# Patient Record
Sex: FEMALE | Race: BLACK OR AFRICAN AMERICAN | NOT HISPANIC OR LATINO | Employment: OTHER | URBAN - METROPOLITAN AREA
[De-identification: names, ages, dates, MRNs, and addresses within clinical notes are randomized per-mention and may not be internally consistent; named-entity substitution may affect disease eponyms.]

---

## 2017-04-04 ENCOUNTER — OFFICE VISIT (OUTPATIENT)
Dept: ORTHOPEDIC SURGERY | Facility: CLINIC | Age: 72
End: 2017-04-04

## 2017-04-04 VITALS — HEIGHT: 66 IN | TEMPERATURE: 97.6 F | WEIGHT: 235 LBS | BODY MASS INDEX: 37.77 KG/M2

## 2017-04-04 DIAGNOSIS — M47.22 CERVICAL SPONDYLOSIS WITH RADICULOPATHY: Primary | ICD-10-CM

## 2017-04-04 PROCEDURE — 99214 OFFICE O/P EST MOD 30 MIN: CPT | Performed by: ORTHOPAEDIC SURGERY

## 2017-04-04 RX ORDER — LISINOPRIL 5 MG/1
TABLET ORAL
Refills: 0 | COMMUNITY
Start: 2017-03-27 | End: 2017-04-17 | Stop reason: SDUPTHER

## 2017-04-04 RX ORDER — SODIUM CHLORIDE, SODIUM LACTATE, POTASSIUM CHLORIDE, CALCIUM CHLORIDE 600; 310; 30; 20 MG/100ML; MG/100ML; MG/100ML; MG/100ML
100 INJECTION, SOLUTION INTRAVENOUS CONTINUOUS
Status: CANCELLED | OUTPATIENT
Start: 2017-04-04

## 2017-04-04 RX ORDER — ATORVASTATIN CALCIUM 10 MG/1
10 TABLET, FILM COATED ORAL NIGHTLY
COMMUNITY
Start: 2017-04-03

## 2017-04-04 RX ORDER — SODIUM CHLORIDE 0.9 % (FLUSH) 0.9 %
1-10 SYRINGE (ML) INJECTION AS NEEDED
Status: CANCELLED | OUTPATIENT
Start: 2017-04-04

## 2017-04-04 RX ORDER — MELOXICAM 7.5 MG/1
7.5 TABLET ORAL DAILY
Status: ON HOLD | COMMUNITY
Start: 2017-04-03 | End: 2017-04-27

## 2017-04-04 NOTE — PROGRESS NOTES
She complains of continued neck pain now radiating into the shoulder and arm on the right causing occipital headache numbness and tingling.  Colby's difficulties.  She is failed to improve with traction physical therapy medication and time.  She does not want to do epidural injections have told her these are temporary at best.  No balance difficulties bowel or bladder complaints.  Musculoskeletal:  Posture is slightly kyphotic to coronal and sagittal inspection.  Motion appears undisturbed.  The skin about the area is intact.  There is no palpable or visible deformity.  There is no local spasm. There is moderate tenderness to percussion and palpation of the cervical spine.   Neurologic:  In the bilateral upper extremities there is no evidence of atrophy.  Motor function is undisturbed in shoulder abduction, elbow flexion, wrist extension, finger extension, triceps extension, or finger abduction   .  Sensation appears symmetrically intact to light touch   .  Reflexes are diminished in the biceps, brachioradialis, and triceps. Link test is negative.  Gait appears undisturbed.  Spurling test is negative.  Cervical x-rays show C3 4 and C4 5 spondylolisthesis of 5 mm each which reduce his significantly on the supine MRI scan.  There is foraminal stenosis at 3 for and 45 and mild central stenosis as well.  56 and 67 show bilateral foraminal stenosis and disc degeneration.  Impression is cervical spine spondylosis with radiculopathy.  She is failed to improve with conservative care  Plan: Recommended C3 to C7 anterior cervical discectomy and fusion with allograft and plate.I discussed the risks and benefits of anterior cervical discectomy and fusion with instrumentation and allograft or mechanical strut placement.  Risks include adverse anesthetic events such as death, stroke and myocardial infarction.  More specific surgical complications include infection, nonunion, and persistent pain.  Less likely problems include  hardware failure, hoarseness, prolonged difficulty swallowing, visceral or vascular injury, pneumothorax, graft extrusion, and paralysis, among others. There is a 90 percent chance of success.   Alternatives were also discussed.  After careful consideration the patient wishes to proceed with surgery.

## 2017-04-17 ENCOUNTER — APPOINTMENT (OUTPATIENT)
Dept: PREADMISSION TESTING | Facility: HOSPITAL | Age: 72
End: 2017-04-17

## 2017-04-17 VITALS
HEART RATE: 83 BPM | RESPIRATION RATE: 18 BRPM | WEIGHT: 249 LBS | DIASTOLIC BLOOD PRESSURE: 74 MMHG | OXYGEN SATURATION: 96 % | BODY MASS INDEX: 40.02 KG/M2 | TEMPERATURE: 98.3 F | HEIGHT: 66 IN | SYSTOLIC BLOOD PRESSURE: 122 MMHG

## 2017-04-17 LAB
ANION GAP SERPL CALCULATED.3IONS-SCNC: 14 MMOL/L
BUN BLD-MCNC: 27 MG/DL (ref 8–23)
BUN/CREAT SERPL: 26 (ref 7–25)
CALCIUM SPEC-SCNC: 9.5 MG/DL (ref 8.6–10.5)
CHLORIDE SERPL-SCNC: 103 MMOL/L (ref 98–107)
CO2 SERPL-SCNC: 24 MMOL/L (ref 22–29)
CREAT BLD-MCNC: 1.04 MG/DL (ref 0.57–1)
DEPRECATED RDW RBC AUTO: 44.6 FL (ref 37–54)
ERYTHROCYTE [DISTWIDTH] IN BLOOD BY AUTOMATED COUNT: 13.6 % (ref 11.7–13)
GFR SERPL CREATININE-BSD FRML MDRD: 63 ML/MIN/1.73
GLUCOSE BLD-MCNC: 198 MG/DL (ref 65–99)
HCT VFR BLD AUTO: 34.9 % (ref 35.6–45.5)
HGB BLD-MCNC: 11.3 G/DL (ref 11.9–15.5)
MCH RBC QN AUTO: 29.3 PG (ref 26.9–32)
MCHC RBC AUTO-ENTMCNC: 32.4 G/DL (ref 32.4–36.3)
MCV RBC AUTO: 90.4 FL (ref 80.5–98.2)
PLATELET # BLD AUTO: 265 10*3/MM3 (ref 140–500)
PMV BLD AUTO: 10.4 FL (ref 6–12)
POTASSIUM BLD-SCNC: 4.1 MMOL/L (ref 3.5–5.2)
RBC # BLD AUTO: 3.86 10*6/MM3 (ref 3.9–5.2)
SODIUM BLD-SCNC: 141 MMOL/L (ref 136–145)
WBC NRBC COR # BLD: 7.61 10*3/MM3 (ref 4.5–10.7)

## 2017-04-17 PROCEDURE — 36415 COLL VENOUS BLD VENIPUNCTURE: CPT

## 2017-04-17 PROCEDURE — 93010 ELECTROCARDIOGRAM REPORT: CPT | Performed by: INTERNAL MEDICINE

## 2017-04-17 PROCEDURE — 80048 BASIC METABOLIC PNL TOTAL CA: CPT

## 2017-04-17 PROCEDURE — 85027 COMPLETE CBC AUTOMATED: CPT

## 2017-04-17 PROCEDURE — 93005 ELECTROCARDIOGRAM TRACING: CPT

## 2017-04-17 RX ORDER — GLIMEPIRIDE 2 MG/1
2 TABLET ORAL 2 TIMES DAILY
COMMUNITY
End: 2017-10-02

## 2017-04-17 RX ORDER — POTASSIUM CHLORIDE 750 MG/1
10 TABLET, FILM COATED, EXTENDED RELEASE ORAL 2 TIMES DAILY WITH MEALS
COMMUNITY
End: 2018-01-26 | Stop reason: HOSPADM

## 2017-04-17 RX ORDER — CHOLECALCIFEROL (VITAMIN D3) 125 MCG
2000 CAPSULE ORAL DAILY
COMMUNITY
End: 2022-05-19 | Stop reason: HOSPADM

## 2017-04-17 RX ORDER — RANITIDINE 150 MG/1
150 TABLET ORAL 2 TIMES DAILY
COMMUNITY
End: 2022-05-17

## 2017-04-17 RX ORDER — LISINOPRIL 5 MG/1
5 TABLET ORAL DAILY
COMMUNITY
End: 2018-01-03

## 2017-04-17 RX ORDER — DULOXETIN HYDROCHLORIDE 60 MG/1
60 CAPSULE, DELAYED RELEASE ORAL 2 TIMES DAILY
COMMUNITY
End: 2018-02-14

## 2017-04-17 RX ORDER — ASPIRIN 81 MG/1
81 TABLET ORAL DAILY
COMMUNITY
End: 2018-01-03 | Stop reason: SDUPTHER

## 2017-04-17 NOTE — DISCHARGE INSTRUCTIONS
Take the following medications the morning of surgery with a small sip of water:  NONE    [id number]    General Instructions:  • Do not eat solid food after midnight the night before surgery.  • You may drink clear liquids day of surgery but must stop at least one hour before your hospital arrival time.  • It is beneficial for you to have a clear drink that contains carbohydrates the day of surgery.  We suggest a 20 ounce bottle of Gatorade or Powerade for non-diabetic patients or a 20 ounce bottle of G2 or Powerade Zero for diabetic patients. (Pediatric patients, are not advised to drink a 20 ounce carbohydrate drink)    Clear liquids are liquids you can see through.  Nothing red in color.     Plain water                               Sports drinks  Sodas                                   Gelatin (Jell-O)  Fruit juices without pulp such as white grape juice and apple juice  Popsicles that contain no fruit or yogurt  Tea or coffee (no cream or milk added)  Gatorade / Powerade  G2 / Powerade Zero    • Infants may have breast milk up to four hours before surgery.  • Infants drinking formula may drink formula up to six hours before surgery.   • Patients who avoid smoking, chewing tobacco and alcohol for 4 weeks prior to surgery have a reduced risk of post-operative complications.  Quit smoking as many days before surgery as you can.  • Do not smoke, use chewing tobacco or drink alcohol the day of surgery.   • If applicable bring your C-PAP/ BI-PAP machine.  • Bring any papers given to you in the doctor’s office.  • Wear clean comfortable clothes and socks.  • Do not wear contact lenses or make-up.  Bring a case for your glasses.   • Bring crutches or walker if applicable.  • Leave all other valuables and jewelry at home.  • The Pre-Admission Testing nurse will instruct you to bring medications if unable to obtain an accurate list in Pre-Admission Testing.            Preventing a Surgical Site Infection:  • For 2 to 3  days before surgery, avoid shaving with a razor because the razor can irritate skin and make it easier to develop an infection.  • The night prior to surgery sleep in a clean bed with clean clothing.  Do not allow pets to sleep with you.  • Shower on the morning of surgery using a fresh bar of anti-bacterial soap (such as Dial) and clean washcloth.  Dry with a clean towel and dress in clean clothing.  • Ask your surgeon if you will be receiving antibiotics prior to surgery.  • Make sure you, your family, and all healthcare providers clean their hands with soap and water or an alcohol based hand  before caring for you or your wound.    Day of surgery:  Upon arrival, a Pre-op nurse and Anesthesiologist will review your health history, obtain vital signs, and answer questions you may have.  The only belongings needed at this time will be your home medications and if applicable your C-PAP/BI-PAP machine.  If you are staying overnight your family can leave the rest of your belongings in the car and bring them to your room later.  A Pre-op nurse will start an IV and you may receive medication in preparation for surgery, including something to help you relax.  Your family will be able to see you in the Pre-op area.  While you are in surgery your family should notify the waiting room  if they leave the waiting room area and provide a contact phone number.    Please be aware that surgery does come with discomfort.  We want to make every effort to control your discomfort so please discuss any uncontrolled symptoms with your nurse.   Your doctor will most likely have prescribed pain medications.      If you are going home after surgery you will receive individualized written care instructions before being discharged.  A responsible adult must drive you to and from the hospital on the day of your surgery and stay with you for 24 hours.    If you are staying overnight following surgery, you will be  transported to your hospital room following the recovery period.  [facility] has all private rooms.    If you have any questions please call Pre-Admission Testing at [phone].  Deductibles and co-payments are collected on the day of service. Please be prepared to pay the required co-pay, deductible or deposit on the day of service as defined by your plan.

## 2017-04-18 ENCOUNTER — TELEPHONE (OUTPATIENT)
Dept: ORTHOPEDIC SURGERY | Facility: CLINIC | Age: 72
End: 2017-04-18

## 2017-04-27 ENCOUNTER — APPOINTMENT (OUTPATIENT)
Dept: GENERAL RADIOLOGY | Facility: HOSPITAL | Age: 72
End: 2017-04-27

## 2017-04-27 ENCOUNTER — ANESTHESIA (OUTPATIENT)
Dept: PERIOP | Facility: HOSPITAL | Age: 72
End: 2017-04-27

## 2017-04-27 ENCOUNTER — HOSPITAL ENCOUNTER (INPATIENT)
Facility: HOSPITAL | Age: 72
LOS: 2 days | Discharge: HOME | End: 2017-04-29
Attending: ORTHOPAEDIC SURGERY | Admitting: ORTHOPAEDIC SURGERY

## 2017-04-27 ENCOUNTER — ANESTHESIA EVENT (OUTPATIENT)
Dept: PERIOP | Facility: HOSPITAL | Age: 72
End: 2017-04-27

## 2017-04-27 DIAGNOSIS — M47.22 CERVICAL SPONDYLOSIS WITH RADICULOPATHY: ICD-10-CM

## 2017-04-27 LAB
CLOSE TME COLL+ADP + EPINEP PNL BLD: 97 %
GLUCOSE BLDC GLUCOMTR-MCNC: 148 MG/DL (ref 70–130)
GLUCOSE BLDC GLUCOMTR-MCNC: 246 MG/DL (ref 70–130)
GLUCOSE BLDC GLUCOMTR-MCNC: 283 MG/DL (ref 70–130)

## 2017-04-27 PROCEDURE — 94799 UNLISTED PULMONARY SVC/PX: CPT

## 2017-04-27 PROCEDURE — G0378 HOSPITAL OBSERVATION PER HR: HCPCS

## 2017-04-27 PROCEDURE — C1713 ANCHOR/SCREW BN/BN,TIS/BN: HCPCS | Performed by: ORTHOPAEDIC SURGERY

## 2017-04-27 PROCEDURE — 22846 INSERT SPINE FIXATION DEVICE: CPT | Performed by: ORTHOPAEDIC SURGERY

## 2017-04-27 PROCEDURE — 25010000002 VANCOMYCIN: Performed by: ORTHOPAEDIC SURGERY

## 2017-04-27 PROCEDURE — 25010000002 MIDAZOLAM PER 1 MG: Performed by: ANESTHESIOLOGY

## 2017-04-27 PROCEDURE — 25010000002 DEXAMETHASONE PER 1 MG: Performed by: NURSE ANESTHETIST, CERTIFIED REGISTERED

## 2017-04-27 PROCEDURE — 72020 X-RAY EXAM OF SPINE 1 VIEW: CPT

## 2017-04-27 PROCEDURE — 25010000002 FENTANYL CITRATE (PF) 100 MCG/2ML SOLUTION: Performed by: ANESTHESIOLOGY

## 2017-04-27 PROCEDURE — 85576 BLOOD PLATELET AGGREGATION: CPT | Performed by: ORTHOPAEDIC SURGERY

## 2017-04-27 PROCEDURE — 20931 SP BONE ALGRFT STRUCT ADD-ON: CPT | Performed by: ORTHOPAEDIC SURGERY

## 2017-04-27 PROCEDURE — 25010000002 HYDROMORPHONE PER 4 MG: Performed by: NURSE ANESTHETIST, CERTIFIED REGISTERED

## 2017-04-27 PROCEDURE — 76000 FLUOROSCOPY <1 HR PHYS/QHP: CPT

## 2017-04-27 PROCEDURE — 0RB30ZZ EXCISION OF CERVICAL VERTEBRAL DISC, OPEN APPROACH: ICD-10-PCS | Performed by: ORTHOPAEDIC SURGERY

## 2017-04-27 PROCEDURE — 22551 ARTHRD ANT NTRBDY CERVICAL: CPT | Performed by: ORTHOPAEDIC SURGERY

## 2017-04-27 PROCEDURE — 63710000001 DEXAMETHASONE PER 0.25 MG: Performed by: ORTHOPAEDIC SURGERY

## 2017-04-27 PROCEDURE — 25010000002 FENTANYL CITRATE (PF) 100 MCG/2ML SOLUTION: Performed by: NURSE ANESTHETIST, CERTIFIED REGISTERED

## 2017-04-27 PROCEDURE — 25010000002 PHENYLEPHRINE PER 1 ML: Performed by: NURSE ANESTHETIST, CERTIFIED REGISTERED

## 2017-04-27 PROCEDURE — 22552 ARTHRD ANT NTRBD CERVICAL EA: CPT | Performed by: ORTHOPAEDIC SURGERY

## 2017-04-27 PROCEDURE — 25010000002 PROPOFOL 10 MG/ML EMULSION: Performed by: NURSE ANESTHETIST, CERTIFIED REGISTERED

## 2017-04-27 PROCEDURE — 0RG20K0 FUSION OF 2 OR MORE CERVICAL VERTEBRAL JOINTS WITH NONAUTOLOGOUS TISSUE SUBSTITUTE, ANTERIOR APPROACH, ANTERIOR COLUMN, OPEN APPROACH: ICD-10-PCS | Performed by: ORTHOPAEDIC SURGERY

## 2017-04-27 PROCEDURE — L0120 CERV FLEX N/ADJ FOAM PRE OTS: HCPCS | Performed by: ORTHOPAEDIC SURGERY

## 2017-04-27 PROCEDURE — 25810000003 POTASSIUM CHLORIDE PER 2 MEQ: Performed by: ORTHOPAEDIC SURGERY

## 2017-04-27 PROCEDURE — 25010000002 ONDANSETRON PER 1 MG: Performed by: NURSE ANESTHETIST, CERTIFIED REGISTERED

## 2017-04-27 PROCEDURE — 25010000002 NEOSTIGMINE 10 MG/10ML SOLUTION: Performed by: NURSE ANESTHETIST, CERTIFIED REGISTERED

## 2017-04-27 PROCEDURE — 82962 GLUCOSE BLOOD TEST: CPT

## 2017-04-27 DEVICE — ALLOGRFT SPINE ACDF VERTIGRAFT WEDGE FZ 7DEG 6.22X4.75MM: Type: IMPLANTABLE DEVICE | Site: SPINE CERVICAL | Status: FUNCTIONAL

## 2017-04-27 DEVICE — IMPLANTABLE DEVICE: Type: IMPLANTABLE DEVICE | Site: SPINE CERVICAL | Status: FUNCTIONAL

## 2017-04-27 DEVICE — SCRW EAGLE/SWIFT PLS S/TAP 12MM: Type: IMPLANTABLE DEVICE | Site: SPINE CERVICAL | Status: FUNCTIONAL

## 2017-04-27 RX ORDER — NALOXONE HCL 0.4 MG/ML
0.2 VIAL (ML) INJECTION AS NEEDED
Status: DISCONTINUED | OUTPATIENT
Start: 2017-04-27 | End: 2017-04-27 | Stop reason: HOSPADM

## 2017-04-27 RX ORDER — PROMETHAZINE HYDROCHLORIDE 25 MG/1
25 SUPPOSITORY RECTAL ONCE AS NEEDED
Status: DISCONTINUED | OUTPATIENT
Start: 2017-04-27 | End: 2017-04-27 | Stop reason: HOSPADM

## 2017-04-27 RX ORDER — ONDANSETRON 4 MG/1
4 TABLET, ORALLY DISINTEGRATING ORAL EVERY 6 HOURS PRN
Status: DISCONTINUED | OUTPATIENT
Start: 2017-04-27 | End: 2017-04-29 | Stop reason: HOSPADM

## 2017-04-27 RX ORDER — SODIUM CHLORIDE 0.9 % (FLUSH) 0.9 %
1-10 SYRINGE (ML) INJECTION AS NEEDED
Status: DISCONTINUED | OUTPATIENT
Start: 2017-04-27 | End: 2017-04-29 | Stop reason: HOSPADM

## 2017-04-27 RX ORDER — DIPHENHYDRAMINE HYDROCHLORIDE 50 MG/ML
12.5 INJECTION INTRAMUSCULAR; INTRAVENOUS
Status: DISCONTINUED | OUTPATIENT
Start: 2017-04-27 | End: 2017-04-27 | Stop reason: HOSPADM

## 2017-04-27 RX ORDER — SODIUM CHLORIDE 0.9 % (FLUSH) 0.9 %
1-10 SYRINGE (ML) INJECTION AS NEEDED
Status: DISCONTINUED | OUTPATIENT
Start: 2017-04-27 | End: 2017-04-27 | Stop reason: HOSPADM

## 2017-04-27 RX ORDER — FENTANYL CITRATE 50 UG/ML
25 INJECTION, SOLUTION INTRAMUSCULAR; INTRAVENOUS
Status: DISCONTINUED | OUTPATIENT
Start: 2017-04-27 | End: 2017-04-27 | Stop reason: HOSPADM

## 2017-04-27 RX ORDER — FAMOTIDINE 20 MG/1
20 TABLET, FILM COATED ORAL DAILY
Status: DISCONTINUED | OUTPATIENT
Start: 2017-04-27 | End: 2017-04-29 | Stop reason: HOSPADM

## 2017-04-27 RX ORDER — MIDAZOLAM HYDROCHLORIDE 1 MG/ML
2 INJECTION INTRAMUSCULAR; INTRAVENOUS
Status: DISCONTINUED | OUTPATIENT
Start: 2017-04-27 | End: 2017-04-27 | Stop reason: HOSPADM

## 2017-04-27 RX ORDER — PROMETHAZINE HYDROCHLORIDE 25 MG/ML
12.5 INJECTION, SOLUTION INTRAMUSCULAR; INTRAVENOUS ONCE AS NEEDED
Status: DISCONTINUED | OUTPATIENT
Start: 2017-04-27 | End: 2017-04-27 | Stop reason: HOSPADM

## 2017-04-27 RX ORDER — ONDANSETRON 2 MG/ML
4 INJECTION INTRAMUSCULAR; INTRAVENOUS ONCE AS NEEDED
Status: DISCONTINUED | OUTPATIENT
Start: 2017-04-27 | End: 2017-04-27 | Stop reason: HOSPADM

## 2017-04-27 RX ORDER — HYDROMORPHONE HCL IN 0.9% NACL 10 MG/50ML
PATIENT CONTROLLED ANALGESIA SYRINGE INTRAVENOUS CONTINUOUS
Status: DISCONTINUED | OUTPATIENT
Start: 2017-04-27 | End: 2017-04-29 | Stop reason: HOSPADM

## 2017-04-27 RX ORDER — LISINOPRIL 5 MG/1
5 TABLET ORAL DAILY
Status: DISCONTINUED | OUTPATIENT
Start: 2017-04-27 | End: 2017-04-29 | Stop reason: HOSPADM

## 2017-04-27 RX ORDER — FLUTICASONE PROPIONATE 50 MCG
1 SPRAY, SUSPENSION (ML) NASAL DAILY
Status: DISCONTINUED | OUTPATIENT
Start: 2017-04-27 | End: 2017-04-29 | Stop reason: HOSPADM

## 2017-04-27 RX ORDER — SODIUM CHLORIDE AND POTASSIUM CHLORIDE 150; 450 MG/100ML; MG/100ML
100 INJECTION, SOLUTION INTRAVENOUS CONTINUOUS
Status: DISCONTINUED | OUTPATIENT
Start: 2017-04-27 | End: 2017-04-28

## 2017-04-27 RX ORDER — BISACODYL 10 MG
10 SUPPOSITORY, RECTAL RECTAL DAILY PRN
Status: DISCONTINUED | OUTPATIENT
Start: 2017-04-27 | End: 2017-04-29 | Stop reason: HOSPADM

## 2017-04-27 RX ORDER — ONDANSETRON 4 MG/1
4 TABLET, FILM COATED ORAL EVERY 6 HOURS PRN
Status: DISCONTINUED | OUTPATIENT
Start: 2017-04-27 | End: 2017-04-29 | Stop reason: HOSPADM

## 2017-04-27 RX ORDER — DEXAMETHASONE 4 MG/1
4 TABLET ORAL EVERY 6 HOURS SCHEDULED
Status: DISCONTINUED | OUTPATIENT
Start: 2017-04-27 | End: 2017-04-29 | Stop reason: HOSPADM

## 2017-04-27 RX ORDER — DEXAMETHASONE SODIUM PHOSPHATE 10 MG/ML
INJECTION INTRAMUSCULAR; INTRAVENOUS AS NEEDED
Status: DISCONTINUED | OUTPATIENT
Start: 2017-04-27 | End: 2017-04-27 | Stop reason: SURG

## 2017-04-27 RX ORDER — MIDAZOLAM HYDROCHLORIDE 1 MG/ML
1 INJECTION INTRAMUSCULAR; INTRAVENOUS
Status: DISCONTINUED | OUTPATIENT
Start: 2017-04-27 | End: 2017-04-27 | Stop reason: HOSPADM

## 2017-04-27 RX ORDER — FLUMAZENIL 0.1 MG/ML
0.2 INJECTION INTRAVENOUS AS NEEDED
Status: DISCONTINUED | OUTPATIENT
Start: 2017-04-27 | End: 2017-04-27 | Stop reason: HOSPADM

## 2017-04-27 RX ORDER — DULOXETIN HYDROCHLORIDE 60 MG/1
60 CAPSULE, DELAYED RELEASE ORAL 2 TIMES DAILY
Status: DISCONTINUED | OUTPATIENT
Start: 2017-04-27 | End: 2017-04-29 | Stop reason: HOSPADM

## 2017-04-27 RX ORDER — SODIUM CHLORIDE, SODIUM LACTATE, POTASSIUM CHLORIDE, CALCIUM CHLORIDE 600; 310; 30; 20 MG/100ML; MG/100ML; MG/100ML; MG/100ML
100 INJECTION, SOLUTION INTRAVENOUS CONTINUOUS
Status: DISCONTINUED | OUTPATIENT
Start: 2017-04-27 | End: 2017-04-28

## 2017-04-27 RX ORDER — POLYETHYLENE GLYCOL 3350 17 G/17G
17 POWDER, FOR SOLUTION ORAL DAILY PRN
Status: DISCONTINUED | OUTPATIENT
Start: 2017-04-27 | End: 2017-04-29 | Stop reason: HOSPADM

## 2017-04-27 RX ORDER — ROCURONIUM BROMIDE 10 MG/ML
INJECTION, SOLUTION INTRAVENOUS AS NEEDED
Status: DISCONTINUED | OUTPATIENT
Start: 2017-04-27 | End: 2017-04-27 | Stop reason: SURG

## 2017-04-27 RX ORDER — HYDROMORPHONE HYDROCHLORIDE 1 MG/ML
0.5 INJECTION, SOLUTION INTRAMUSCULAR; INTRAVENOUS; SUBCUTANEOUS
Status: DISCONTINUED | OUTPATIENT
Start: 2017-04-27 | End: 2017-04-27 | Stop reason: HOSPADM

## 2017-04-27 RX ORDER — DIPHENHYDRAMINE HCL 25 MG
25 CAPSULE ORAL NIGHTLY PRN
Status: DISCONTINUED | OUTPATIENT
Start: 2017-04-27 | End: 2017-04-29 | Stop reason: HOSPADM

## 2017-04-27 RX ORDER — DEXAMETHASONE SODIUM PHOSPHATE 4 MG/ML
4 INJECTION, SOLUTION INTRA-ARTICULAR; INTRALESIONAL; INTRAMUSCULAR; INTRAVENOUS; SOFT TISSUE EVERY 6 HOURS SCHEDULED
Status: DISCONTINUED | OUTPATIENT
Start: 2017-04-27 | End: 2017-04-29 | Stop reason: HOSPADM

## 2017-04-27 RX ORDER — HYDROMORPHONE HCL 110MG/55ML
PATIENT CONTROLLED ANALGESIA SYRINGE INTRAVENOUS AS NEEDED
Status: DISCONTINUED | OUTPATIENT
Start: 2017-04-27 | End: 2017-04-27 | Stop reason: SURG

## 2017-04-27 RX ORDER — ONDANSETRON 2 MG/ML
4 INJECTION INTRAMUSCULAR; INTRAVENOUS EVERY 6 HOURS PRN
Status: DISCONTINUED | OUTPATIENT
Start: 2017-04-27 | End: 2017-04-29 | Stop reason: HOSPADM

## 2017-04-27 RX ORDER — OXYCODONE AND ACETAMINOPHEN 7.5; 325 MG/1; MG/1
1 TABLET ORAL ONCE AS NEEDED
Status: DISCONTINUED | OUTPATIENT
Start: 2017-04-27 | End: 2017-04-27 | Stop reason: HOSPADM

## 2017-04-27 RX ORDER — PROPOFOL 10 MG/ML
VIAL (ML) INTRAVENOUS AS NEEDED
Status: DISCONTINUED | OUTPATIENT
Start: 2017-04-27 | End: 2017-04-27 | Stop reason: SURG

## 2017-04-27 RX ORDER — HYDROMORPHONE HCL IN 0.9% NACL 10 MG/50ML
PATIENT CONTROLLED ANALGESIA SYRINGE INTRAVENOUS
Status: COMPLETED
Start: 2017-04-27 | End: 2017-04-27

## 2017-04-27 RX ORDER — FUROSEMIDE 40 MG/1
40 TABLET ORAL DAILY
Status: DISCONTINUED | OUTPATIENT
Start: 2017-04-27 | End: 2017-04-28

## 2017-04-27 RX ORDER — FAMOTIDINE 10 MG/ML
20 INJECTION, SOLUTION INTRAVENOUS
Status: DISCONTINUED | OUTPATIENT
Start: 2017-04-27 | End: 2017-04-27 | Stop reason: HOSPADM

## 2017-04-27 RX ORDER — ATORVASTATIN CALCIUM 10 MG/1
10 TABLET, FILM COATED ORAL NIGHTLY
Status: DISCONTINUED | OUTPATIENT
Start: 2017-04-27 | End: 2017-04-27 | Stop reason: CLARIF

## 2017-04-27 RX ORDER — CETIRIZINE HYDROCHLORIDE 10 MG/1
10 TABLET ORAL DAILY
Status: DISCONTINUED | OUTPATIENT
Start: 2017-04-27 | End: 2017-04-29 | Stop reason: HOSPADM

## 2017-04-27 RX ORDER — PROMETHAZINE HYDROCHLORIDE 25 MG/1
12.5 TABLET ORAL ONCE AS NEEDED
Status: DISCONTINUED | OUTPATIENT
Start: 2017-04-27 | End: 2017-04-27 | Stop reason: HOSPADM

## 2017-04-27 RX ORDER — FENTANYL CITRATE 50 UG/ML
50 INJECTION, SOLUTION INTRAMUSCULAR; INTRAVENOUS
Status: DISCONTINUED | OUTPATIENT
Start: 2017-04-27 | End: 2017-04-27 | Stop reason: HOSPADM

## 2017-04-27 RX ORDER — ALBUTEROL SULFATE 90 UG/1
2 AEROSOL, METERED RESPIRATORY (INHALATION) EVERY 6 HOURS PRN
Status: DISCONTINUED | OUTPATIENT
Start: 2017-04-27 | End: 2017-04-27 | Stop reason: CLARIF

## 2017-04-27 RX ORDER — ONDANSETRON 2 MG/ML
INJECTION INTRAMUSCULAR; INTRAVENOUS AS NEEDED
Status: DISCONTINUED | OUTPATIENT
Start: 2017-04-27 | End: 2017-04-27 | Stop reason: SURG

## 2017-04-27 RX ORDER — HYDRALAZINE HYDROCHLORIDE 20 MG/ML
5 INJECTION INTRAMUSCULAR; INTRAVENOUS
Status: DISCONTINUED | OUTPATIENT
Start: 2017-04-27 | End: 2017-04-27 | Stop reason: HOSPADM

## 2017-04-27 RX ORDER — MAGNESIUM HYDROXIDE 1200 MG/15ML
LIQUID ORAL AS NEEDED
Status: DISCONTINUED | OUTPATIENT
Start: 2017-04-27 | End: 2017-04-27 | Stop reason: HOSPADM

## 2017-04-27 RX ORDER — LABETALOL HYDROCHLORIDE 5 MG/ML
5 INJECTION, SOLUTION INTRAVENOUS
Status: DISCONTINUED | OUTPATIENT
Start: 2017-04-27 | End: 2017-04-27 | Stop reason: HOSPADM

## 2017-04-27 RX ORDER — FENTANYL CITRATE 50 UG/ML
INJECTION, SOLUTION INTRAMUSCULAR; INTRAVENOUS AS NEEDED
Status: DISCONTINUED | OUTPATIENT
Start: 2017-04-27 | End: 2017-04-27 | Stop reason: SURG

## 2017-04-27 RX ORDER — ROSUVASTATIN CALCIUM 5 MG/1
5 TABLET, COATED ORAL NIGHTLY
Status: DISCONTINUED | OUTPATIENT
Start: 2017-04-27 | End: 2017-04-29 | Stop reason: HOSPADM

## 2017-04-27 RX ORDER — ACETAMINOPHEN 325 MG/1
650 TABLET ORAL EVERY 4 HOURS PRN
Status: DISCONTINUED | OUTPATIENT
Start: 2017-04-27 | End: 2017-04-29 | Stop reason: HOSPADM

## 2017-04-27 RX ORDER — SODIUM CHLORIDE, SODIUM LACTATE, POTASSIUM CHLORIDE, CALCIUM CHLORIDE 600; 310; 30; 20 MG/100ML; MG/100ML; MG/100ML; MG/100ML
9 INJECTION, SOLUTION INTRAVENOUS CONTINUOUS PRN
Status: DISCONTINUED | OUTPATIENT
Start: 2017-04-27 | End: 2017-04-27 | Stop reason: HOSPADM

## 2017-04-27 RX ORDER — LIDOCAINE HYDROCHLORIDE 20 MG/ML
INJECTION, SOLUTION INFILTRATION; PERINEURAL AS NEEDED
Status: DISCONTINUED | OUTPATIENT
Start: 2017-04-27 | End: 2017-04-27 | Stop reason: SURG

## 2017-04-27 RX ORDER — SENNA AND DOCUSATE SODIUM 50; 8.6 MG/1; MG/1
1 TABLET, FILM COATED ORAL 2 TIMES DAILY
Status: DISCONTINUED | OUTPATIENT
Start: 2017-04-27 | End: 2017-04-29 | Stop reason: HOSPADM

## 2017-04-27 RX ORDER — GLYCOPYRROLATE 0.2 MG/ML
INJECTION INTRAMUSCULAR; INTRAVENOUS AS NEEDED
Status: DISCONTINUED | OUTPATIENT
Start: 2017-04-27 | End: 2017-04-27 | Stop reason: SURG

## 2017-04-27 RX ORDER — ALBUTEROL SULFATE 2.5 MG/3ML
2.5 SOLUTION RESPIRATORY (INHALATION) EVERY 6 HOURS PRN
Status: DISCONTINUED | OUTPATIENT
Start: 2017-04-27 | End: 2017-04-29 | Stop reason: HOSPADM

## 2017-04-27 RX ORDER — NALOXONE HCL 0.4 MG/ML
0.1 VIAL (ML) INJECTION
Status: DISCONTINUED | OUTPATIENT
Start: 2017-04-27 | End: 2017-04-29 | Stop reason: HOSPADM

## 2017-04-27 RX ORDER — PROMETHAZINE HYDROCHLORIDE 25 MG/1
25 TABLET ORAL ONCE AS NEEDED
Status: DISCONTINUED | OUTPATIENT
Start: 2017-04-27 | End: 2017-04-27 | Stop reason: HOSPADM

## 2017-04-27 RX ORDER — OXYCODONE HYDROCHLORIDE AND ACETAMINOPHEN 5; 325 MG/1; MG/1
2 TABLET ORAL EVERY 4 HOURS PRN
Status: DISCONTINUED | OUTPATIENT
Start: 2017-04-27 | End: 2017-04-29 | Stop reason: HOSPADM

## 2017-04-27 RX ORDER — NEOSTIGMINE METHYLSULFATE 1 MG/ML
INJECTION, SOLUTION INTRAVENOUS AS NEEDED
Status: DISCONTINUED | OUTPATIENT
Start: 2017-04-27 | End: 2017-04-27 | Stop reason: SURG

## 2017-04-27 RX ADMIN — DEXAMETHASONE SODIUM PHOSPHATE 8 MG: 10 INJECTION INTRAMUSCULAR; INTRAVENOUS at 13:35

## 2017-04-27 RX ADMIN — CETIRIZINE HYDROCHLORIDE 10 MG: 10 TABLET, FILM COATED ORAL at 21:24

## 2017-04-27 RX ADMIN — Medication: at 16:05

## 2017-04-27 RX ADMIN — LISINOPRIL 5 MG: 5 TABLET ORAL at 21:24

## 2017-04-27 RX ADMIN — POTASSIUM CHLORIDE AND SODIUM CHLORIDE 100 ML/HR: 450; 150 INJECTION, SOLUTION INTRAVENOUS at 21:25

## 2017-04-27 RX ADMIN — DEXAMETHASONE 4 MG: 4 TABLET ORAL at 21:24

## 2017-04-27 RX ADMIN — EPHEDRINE SULFATE 10 MG: 50 INJECTION INTRAMUSCULAR; INTRAVENOUS; SUBCUTANEOUS at 14:00

## 2017-04-27 RX ADMIN — LIDOCAINE HYDROCHLORIDE 50 MG: 20 INJECTION, SOLUTION INFILTRATION; PERINEURAL at 13:07

## 2017-04-27 RX ADMIN — FUROSEMIDE 40 MG: 40 TABLET ORAL at 22:52

## 2017-04-27 RX ADMIN — FAMOTIDINE 20 MG: 20 TABLET, FILM COATED ORAL at 21:24

## 2017-04-27 RX ADMIN — FENTANYL CITRATE 50 MCG: 50 INJECTION INTRAMUSCULAR; INTRAVENOUS at 13:51

## 2017-04-27 RX ADMIN — MIDAZOLAM 0.5 MG: 1 INJECTION INTRAMUSCULAR; INTRAVENOUS at 11:41

## 2017-04-27 RX ADMIN — FENTANYL CITRATE 50 MCG: 50 INJECTION INTRAMUSCULAR; INTRAVENOUS at 13:07

## 2017-04-27 RX ADMIN — SODIUM CHLORIDE, POTASSIUM CHLORIDE, SODIUM LACTATE AND CALCIUM CHLORIDE 100 ML/HR: 600; 310; 30; 20 INJECTION, SOLUTION INTRAVENOUS at 11:40

## 2017-04-27 RX ADMIN — FAMOTIDINE 20 MG: 10 INJECTION, SOLUTION INTRAVENOUS at 11:43

## 2017-04-27 RX ADMIN — VANCOMYCIN HYDROCHLORIDE 1500 MG: 1 INJECTION, POWDER, LYOPHILIZED, FOR SOLUTION INTRAVENOUS at 11:46

## 2017-04-27 RX ADMIN — FENTANYL CITRATE 25 MCG: 50 INJECTION INTRAMUSCULAR; INTRAVENOUS at 11:40

## 2017-04-27 RX ADMIN — PHENYLEPHRINE HYDROCHLORIDE 100 MCG: 10 INJECTION INTRAVENOUS at 13:35

## 2017-04-27 RX ADMIN — SODIUM CHLORIDE, POTASSIUM CHLORIDE, SODIUM LACTATE AND CALCIUM CHLORIDE: 600; 310; 30; 20 INJECTION, SOLUTION INTRAVENOUS at 14:15

## 2017-04-27 RX ADMIN — VANCOMYCIN HYDROCHLORIDE 1750 MG: 1 INJECTION, POWDER, LYOPHILIZED, FOR SOLUTION INTRAVENOUS at 21:23

## 2017-04-27 RX ADMIN — NEOSTIGMINE METHYLSULFATE 2 MG: 1 INJECTION INTRAVENOUS at 15:24

## 2017-04-27 RX ADMIN — FENTANYL CITRATE 50 MCG: 50 INJECTION INTRAMUSCULAR; INTRAVENOUS at 13:41

## 2017-04-27 RX ADMIN — HYDROMORPHONE HYDROCHLORIDE 0.4 MG: 2 INJECTION, SOLUTION INTRAMUSCULAR; INTRAVENOUS; SUBCUTANEOUS at 14:51

## 2017-04-27 RX ADMIN — ROCURONIUM BROMIDE 50 MG: 10 INJECTION INTRAVENOUS at 13:07

## 2017-04-27 RX ADMIN — SODIUM CHLORIDE, POTASSIUM CHLORIDE, SODIUM LACTATE AND CALCIUM CHLORIDE: 600; 310; 30; 20 INJECTION, SOLUTION INTRAVENOUS at 13:01

## 2017-04-27 RX ADMIN — DULOXETINE HYDROCHLORIDE 60 MG: 60 CAPSULE, DELAYED RELEASE ORAL at 21:24

## 2017-04-27 RX ADMIN — ONDANSETRON 4 MG: 2 INJECTION INTRAMUSCULAR; INTRAVENOUS at 13:35

## 2017-04-27 RX ADMIN — FLUTICASONE PROPIONATE 1 SPRAY: 50 SPRAY, METERED NASAL at 21:25

## 2017-04-27 RX ADMIN — ROSUVASTATIN CALCIUM 5 MG: 5 TABLET, FILM COATED ORAL at 21:24

## 2017-04-27 RX ADMIN — GLYCOPYRROLATE 0.2 MG: 0.2 INJECTION INTRAMUSCULAR; INTRAVENOUS at 15:24

## 2017-04-27 RX ADMIN — PROPOFOL 20 MG: 10 INJECTION, EMULSION INTRAVENOUS at 13:07

## 2017-04-27 RX ADMIN — DOCUSATE SODIUM,SENNOSIDES 1 TABLET: 50; 8.6 TABLET, FILM COATED ORAL at 21:23

## 2017-04-27 RX ADMIN — HYDROMORPHONE HYDROCHLORIDE 0.4 MG: 2 INJECTION, SOLUTION INTRAMUSCULAR; INTRAVENOUS; SUBCUTANEOUS at 13:53

## 2017-04-27 NOTE — ANESTHESIA PREPROCEDURE EVALUATION
Anesthesia Evaluation     Patient summary reviewed   NPO Status: > 8 hours   Airway   Mallampati: II  Neck ROM: limited  no difficulty expected  Dental      Pulmonary    (+) COPD,   Cardiovascular     ECG reviewed  Rhythm: regular    (+) hypertension,       Neuro/Psych  GI/Hepatic/Renal/Endo    (+) obesity,  diabetes mellitus,     Musculoskeletal     Abdominal    Substance History      OB/GYN          Other                                    Anesthesia Plan    ASA 3     general     intravenous induction   Anesthetic plan and risks discussed with patient.  Use of blood products discussed with patient .

## 2017-04-27 NOTE — ANESTHESIA PROCEDURE NOTES
Airway  Date/Time: 4/27/2017 1:07 PM  End Time:4/27/2017 1:12 PM  Airway not difficult    General Information and Staff    Patient location during procedure: OR  Anesthesiologist: JOCELINE MAHAJAN  CRNA: DAVE HANSEN    Indications and Patient Condition  Indications for airway management: airway protection    Preoxygenated: yes  MILS maintained throughout  Mask difficulty assessment: 1 - vent by mask    Final Airway Details  Final airway type: endotracheal airway      Successful airway: ETT  Cuffed: yes   Successful intubation technique: direct laryngoscopy  Facilitating devices/methods: intubating stylet and cricoid pressure  Endotracheal tube insertion site: oral  Blade: Fernando  Blade size: #3  ETT size: 7.0 mm  Cormack-Lehane Classification: grade I - full view of glottis  Placement verified by: chest auscultation and capnometry   Inital cuff pressure (cm H2O): 21  Cuff volume (mL): 6  Measured from: lips  Number of attempts at approach: 1    Additional Comments  PreO2 X 5 mins. SIVI. Atraumatic Intubation. BBS= ,edentulous

## 2017-04-27 NOTE — ANESTHESIA POSTPROCEDURE EVALUATION
Patient: Raquel Gonsales    Procedure Summary     Date Anesthesia Start Anesthesia Stop Room / Location    04/27/17 1301 1545  [facility] /  [facility]       Procedure Diagnosis Surgeon Provider    CERVICAL DISCECTOMY ANTERIOR FUSION WITH INSTRUMENTATION C3 to C7 (N/A Spine Cervical) Cervical spondylosis with radiculopathy  (Cervical spondylosis with radiculopathy [M47.22]) MD Dominic Bliss MD          Anesthesia Type: general  Last vitals  /94 (04/27/17 1640)    Temp      Pulse 101 (04/27/17 1640)   Resp 16 (04/27/17 1640)    SpO2 95 % (04/27/17 1640)      Post Anesthesia Care and Evaluation    Patient location during evaluation: PACU  Patient participation: complete - patient participated  Level of consciousness: awake and alert  Pain management: adequate  Airway patency: patent  Anesthetic complications: No anesthetic complications    Cardiovascular status: acceptable  Respiratory status: acceptable  Hydration status: acceptable

## 2017-04-28 ENCOUNTER — APPOINTMENT (OUTPATIENT)
Dept: MRI IMAGING | Facility: HOSPITAL | Age: 72
End: 2017-04-28

## 2017-04-28 LAB
ALBUMIN SERPL-MCNC: 4.1 G/DL (ref 3.5–5.2)
ALBUMIN/GLOB SERPL: 1.1 G/DL
ALP SERPL-CCNC: 99 U/L (ref 39–117)
ALT SERPL W P-5'-P-CCNC: 21 U/L (ref 1–33)
ANION GAP SERPL CALCULATED.3IONS-SCNC: 17.8 MMOL/L
AST SERPL-CCNC: 28 U/L (ref 1–32)
BASOPHILS # BLD AUTO: 0 10*3/MM3 (ref 0–0.2)
BASOPHILS NFR BLD AUTO: 0 % (ref 0–1.5)
BILIRUB SERPL-MCNC: 0.5 MG/DL (ref 0.1–1.2)
BUN BLD-MCNC: 20 MG/DL (ref 8–23)
BUN/CREAT SERPL: 19 (ref 7–25)
CALCIUM SPEC-SCNC: 9.2 MG/DL (ref 8.6–10.5)
CHLORIDE SERPL-SCNC: 92 MMOL/L (ref 98–107)
CO2 SERPL-SCNC: 21.2 MMOL/L (ref 22–29)
CREAT BLD-MCNC: 1.05 MG/DL (ref 0.57–1)
D-LACTATE SERPL-SCNC: 3 MMOL/L (ref 0.5–2)
D-LACTATE SERPL-SCNC: 3.4 MMOL/L (ref 0.5–2)
D-LACTATE SERPL-SCNC: 3.7 MMOL/L (ref 0.5–2)
D-LACTATE SERPL-SCNC: 5.3 MMOL/L (ref 0.5–2)
DEPRECATED RDW RBC AUTO: 43 FL (ref 37–54)
EOSINOPHIL # BLD AUTO: 0 10*3/MM3 (ref 0–0.7)
EOSINOPHIL NFR BLD AUTO: 0 % (ref 0.3–6.2)
ERYTHROCYTE [DISTWIDTH] IN BLOOD BY AUTOMATED COUNT: 13.5 % (ref 11.7–13)
GFR SERPL CREATININE-BSD FRML MDRD: 62 ML/MIN/1.73
GLOBULIN UR ELPH-MCNC: 3.8 GM/DL
GLUCOSE BLD-MCNC: 548 MG/DL (ref 65–99)
GLUCOSE BLDC GLUCOMTR-MCNC: 316 MG/DL (ref 70–130)
GLUCOSE BLDC GLUCOMTR-MCNC: 321 MG/DL (ref 70–130)
GLUCOSE BLDC GLUCOMTR-MCNC: 372 MG/DL (ref 70–130)
GLUCOSE BLDC GLUCOMTR-MCNC: 419 MG/DL (ref 70–130)
GLUCOSE BLDC GLUCOMTR-MCNC: 427 MG/DL (ref 70–130)
GLUCOSE BLDC GLUCOMTR-MCNC: 447 MG/DL (ref 70–130)
GLUCOSE BLDC GLUCOMTR-MCNC: 481 MG/DL (ref 70–130)
HBA1C MFR BLD: 10 % (ref 4.8–5.6)
HCT VFR BLD AUTO: 34.4 % (ref 35.6–45.5)
HGB BLD-MCNC: 11.4 G/DL (ref 11.9–15.5)
IMM GRANULOCYTES # BLD: 0.03 10*3/MM3 (ref 0–0.03)
IMM GRANULOCYTES NFR BLD: 0.3 % (ref 0–0.5)
LYMPHOCYTES # BLD AUTO: 1.15 10*3/MM3 (ref 0.9–4.8)
LYMPHOCYTES NFR BLD AUTO: 13.1 % (ref 19.6–45.3)
MCH RBC QN AUTO: 28.5 PG (ref 26.9–32)
MCHC RBC AUTO-ENTMCNC: 33.1 G/DL (ref 32.4–36.3)
MCV RBC AUTO: 86 FL (ref 80.5–98.2)
MONOCYTES # BLD AUTO: 0.38 10*3/MM3 (ref 0.2–1.2)
MONOCYTES NFR BLD AUTO: 4.3 % (ref 5–12)
NEUTROPHILS # BLD AUTO: 7.22 10*3/MM3 (ref 1.9–8.1)
NEUTROPHILS NFR BLD AUTO: 82.3 % (ref 42.7–76)
NRBC BLD MANUAL-RTO: 0 /100 WBC (ref 0–0)
PLATELET # BLD AUTO: 262 10*3/MM3 (ref 140–500)
PMV BLD AUTO: 10.1 FL (ref 6–12)
POTASSIUM BLD-SCNC: 4.9 MMOL/L (ref 3.5–5.2)
PROCALCITONIN SERPL-MCNC: 0.06 NG/ML (ref 0.1–0.25)
PROT SERPL-MCNC: 7.9 G/DL (ref 6–8.5)
RBC # BLD AUTO: 4 10*6/MM3 (ref 3.9–5.2)
SODIUM BLD-SCNC: 131 MMOL/L (ref 136–145)
WBC NRBC COR # BLD: 8.78 10*3/MM3 (ref 4.5–10.7)

## 2017-04-28 PROCEDURE — 83605 ASSAY OF LACTIC ACID: CPT | Performed by: ORTHOPAEDIC SURGERY

## 2017-04-28 PROCEDURE — 70553 MRI BRAIN STEM W/O & W/DYE: CPT

## 2017-04-28 PROCEDURE — 25010000002 LORAZEPAM PER 2 MG: Performed by: INTERNAL MEDICINE

## 2017-04-28 PROCEDURE — 82962 GLUCOSE BLOOD TEST: CPT

## 2017-04-28 PROCEDURE — A9577 INJ MULTIHANCE: HCPCS | Performed by: ORTHOPAEDIC SURGERY

## 2017-04-28 PROCEDURE — 63710000001 INSULIN DETEMER PER 5 UNITS: Performed by: INTERNAL MEDICINE

## 2017-04-28 PROCEDURE — 83036 HEMOGLOBIN GLYCOSYLATED A1C: CPT | Performed by: INTERNAL MEDICINE

## 2017-04-28 PROCEDURE — 63710000001 DEXAMETHASONE PER 0.25 MG: Performed by: ORTHOPAEDIC SURGERY

## 2017-04-28 PROCEDURE — 63710000001 DIPHENHYDRAMINE PER 50 MG: Performed by: ORTHOPAEDIC SURGERY

## 2017-04-28 PROCEDURE — 0 GADOBENATE DIMEGLUMINE 529 MG/ML SOLUTION: Performed by: ORTHOPAEDIC SURGERY

## 2017-04-28 PROCEDURE — 63710000001 INSULIN REGULAR HUMAN PER 5 UNITS: Performed by: INTERNAL MEDICINE

## 2017-04-28 PROCEDURE — 83605 ASSAY OF LACTIC ACID: CPT | Performed by: INTERNAL MEDICINE

## 2017-04-28 PROCEDURE — 25010000002 DEXAMETHASONE PER 1 MG: Performed by: ORTHOPAEDIC SURGERY

## 2017-04-28 PROCEDURE — 80053 COMPREHEN METABOLIC PANEL: CPT | Performed by: INTERNAL MEDICINE

## 2017-04-28 PROCEDURE — 63710000001 INSULIN ASPART PER 5 UNITS: Performed by: INTERNAL MEDICINE

## 2017-04-28 PROCEDURE — 84145 PROCALCITONIN (PCT): CPT | Performed by: INTERNAL MEDICINE

## 2017-04-28 PROCEDURE — 85025 COMPLETE CBC W/AUTO DIFF WBC: CPT | Performed by: INTERNAL MEDICINE

## 2017-04-28 PROCEDURE — 99024 POSTOP FOLLOW-UP VISIT: CPT | Performed by: ORTHOPAEDIC SURGERY

## 2017-04-28 RX ORDER — SODIUM CHLORIDE 9 MG/ML
125 INJECTION, SOLUTION INTRAVENOUS CONTINUOUS
Status: ACTIVE | OUTPATIENT
Start: 2017-04-28 | End: 2017-04-29

## 2017-04-28 RX ORDER — NICOTINE POLACRILEX 4 MG
15 LOZENGE BUCCAL
Status: DISCONTINUED | OUTPATIENT
Start: 2017-04-28 | End: 2017-04-29 | Stop reason: HOSPADM

## 2017-04-28 RX ORDER — GLIMEPIRIDE 2 MG/1
2 TABLET ORAL 2 TIMES DAILY
Status: DISCONTINUED | OUTPATIENT
Start: 2017-04-28 | End: 2017-04-28 | Stop reason: CLARIF

## 2017-04-28 RX ORDER — LORAZEPAM 2 MG/ML
0.5 INJECTION INTRAMUSCULAR ONCE
Status: COMPLETED | OUTPATIENT
Start: 2017-04-28 | End: 2017-04-28

## 2017-04-28 RX ORDER — OXYCODONE HYDROCHLORIDE AND ACETAMINOPHEN 5; 325 MG/1; MG/1
1 TABLET ORAL EVERY 4 HOURS PRN
Qty: 35 TABLET | Refills: 0 | Status: SHIPPED | OUTPATIENT
Start: 2017-04-28 | End: 2017-05-07

## 2017-04-28 RX ORDER — DEXTROSE MONOHYDRATE 25 G/50ML
25 INJECTION, SOLUTION INTRAVENOUS
Status: DISCONTINUED | OUTPATIENT
Start: 2017-04-28 | End: 2017-04-29 | Stop reason: HOSPADM

## 2017-04-28 RX ORDER — GLIPIZIDE 5 MG/1
5 TABLET ORAL
Status: DISCONTINUED | OUTPATIENT
Start: 2017-04-28 | End: 2017-04-29 | Stop reason: HOSPADM

## 2017-04-28 RX ORDER — SENNA AND DOCUSATE SODIUM 50; 8.6 MG/1; MG/1
1 TABLET, FILM COATED ORAL 2 TIMES DAILY
Qty: 30 TABLET | Refills: 1 | OUTPATIENT
Start: 2017-04-28 | End: 2017-10-02

## 2017-04-28 RX ADMIN — DEXAMETHASONE SODIUM PHOSPHATE 4 MG: 4 INJECTION, SOLUTION INTRAMUSCULAR; INTRAVENOUS at 03:02

## 2017-04-28 RX ADMIN — INSULIN DETEMIR 35 UNITS: 100 INJECTION, SOLUTION SUBCUTANEOUS at 15:23

## 2017-04-28 RX ADMIN — LORAZEPAM 0.5 MG: 2 INJECTION, SOLUTION INTRAMUSCULAR; INTRAVENOUS at 11:58

## 2017-04-28 RX ADMIN — DEXAMETHASONE 4 MG: 4 TABLET ORAL at 15:28

## 2017-04-28 RX ADMIN — ROSUVASTATIN CALCIUM 5 MG: 5 TABLET, FILM COATED ORAL at 22:12

## 2017-04-28 RX ADMIN — DIPHENHYDRAMINE HYDROCHLORIDE 25 MG: 25 CAPSULE ORAL at 22:08

## 2017-04-28 RX ADMIN — INSULIN ASPART 7 UNITS: 100 INJECTION, SOLUTION INTRAVENOUS; SUBCUTANEOUS at 09:47

## 2017-04-28 RX ADMIN — GADOBENATE DIMEGLUMINE 20 ML: 529 INJECTION, SOLUTION INTRAVENOUS at 12:55

## 2017-04-28 RX ADMIN — FLUTICASONE PROPIONATE 1 SPRAY: 50 SPRAY, METERED NASAL at 09:47

## 2017-04-28 RX ADMIN — DOCUSATE SODIUM,SENNOSIDES 1 TABLET: 50; 8.6 TABLET, FILM COATED ORAL at 17:20

## 2017-04-28 RX ADMIN — FAMOTIDINE 20 MG: 20 TABLET, FILM COATED ORAL at 09:47

## 2017-04-28 RX ADMIN — GLIPIZIDE 5 MG: 5 TABLET ORAL at 17:19

## 2017-04-28 RX ADMIN — SODIUM CHLORIDE 125 ML/HR: 9 INJECTION, SOLUTION INTRAVENOUS at 13:36

## 2017-04-28 RX ADMIN — PREGABALIN 75 MG: 50 CAPSULE ORAL at 15:23

## 2017-04-28 RX ADMIN — DEXAMETHASONE 4 MG: 4 TABLET ORAL at 09:47

## 2017-04-28 RX ADMIN — OXYCODONE HYDROCHLORIDE AND ACETAMINOPHEN 2 TABLET: 5; 325 TABLET ORAL at 13:36

## 2017-04-28 RX ADMIN — INSULIN ASPART 9 UNITS: 100 INJECTION, SOLUTION INTRAVENOUS; SUBCUTANEOUS at 13:35

## 2017-04-28 RX ADMIN — DOCUSATE SODIUM,SENNOSIDES 1 TABLET: 50; 8.6 TABLET, FILM COATED ORAL at 09:47

## 2017-04-28 RX ADMIN — OXYCODONE HYDROCHLORIDE AND ACETAMINOPHEN 2 TABLET: 5; 325 TABLET ORAL at 06:50

## 2017-04-28 RX ADMIN — FUROSEMIDE 40 MG: 40 TABLET ORAL at 09:47

## 2017-04-28 RX ADMIN — DULOXETINE HYDROCHLORIDE 60 MG: 60 CAPSULE, DELAYED RELEASE ORAL at 17:20

## 2017-04-28 RX ADMIN — SODIUM CHLORIDE 2000 ML: 9 INJECTION, SOLUTION INTRAVENOUS at 16:09

## 2017-04-28 RX ADMIN — PREGABALIN 75 MG: 50 CAPSULE ORAL at 22:09

## 2017-04-28 RX ADMIN — LISINOPRIL 5 MG: 5 TABLET ORAL at 09:47

## 2017-04-28 RX ADMIN — INSULIN ASPART 9 UNITS: 100 INJECTION, SOLUTION INTRAVENOUS; SUBCUTANEOUS at 22:09

## 2017-04-28 RX ADMIN — CETIRIZINE HYDROCHLORIDE 10 MG: 10 TABLET, FILM COATED ORAL at 09:47

## 2017-04-28 RX ADMIN — DULOXETINE HYDROCHLORIDE 60 MG: 60 CAPSULE, DELAYED RELEASE ORAL at 09:47

## 2017-04-28 RX ADMIN — HUMAN INSULIN 5 UNITS: 100 INJECTION, SOLUTION SUBCUTANEOUS at 17:50

## 2017-04-28 RX ADMIN — OXYCODONE HYDROCHLORIDE AND ACETAMINOPHEN 1 TABLET: 5; 325 TABLET ORAL at 22:09

## 2017-04-28 RX ADMIN — OXYCODONE HYDROCHLORIDE AND ACETAMINOPHEN 2 TABLET: 5; 325 TABLET ORAL at 02:49

## 2017-04-28 RX ADMIN — INSULIN ASPART 9 UNITS: 100 INJECTION, SOLUTION INTRAVENOUS; SUBCUTANEOUS at 17:19

## 2017-04-28 RX ADMIN — SODIUM CHLORIDE 125 ML/HR: 9 INJECTION, SOLUTION INTRAVENOUS at 18:20

## 2017-04-28 RX ADMIN — DEXAMETHASONE 4 MG: 4 TABLET ORAL at 22:08

## 2017-04-29 VITALS
TEMPERATURE: 97.9 F | HEART RATE: 100 BPM | BODY MASS INDEX: 39.37 KG/M2 | WEIGHT: 245 LBS | SYSTOLIC BLOOD PRESSURE: 138 MMHG | OXYGEN SATURATION: 95 % | RESPIRATION RATE: 16 BRPM | DIASTOLIC BLOOD PRESSURE: 77 MMHG | HEIGHT: 66 IN

## 2017-04-29 LAB
ANION GAP SERPL CALCULATED.3IONS-SCNC: 13.6 MMOL/L
BASOPHILS # BLD AUTO: 0 10*3/MM3 (ref 0–0.2)
BASOPHILS NFR BLD AUTO: 0 % (ref 0–1.5)
BUN BLD-MCNC: 23 MG/DL (ref 8–23)
BUN/CREAT SERPL: 25 (ref 7–25)
CALCIUM SPEC-SCNC: 8.5 MG/DL (ref 8.6–10.5)
CHLORIDE SERPL-SCNC: 100 MMOL/L (ref 98–107)
CO2 SERPL-SCNC: 23.4 MMOL/L (ref 22–29)
CREAT BLD-MCNC: 0.92 MG/DL (ref 0.57–1)
D-LACTATE SERPL-SCNC: 1.6 MMOL/L (ref 0.5–2)
DEPRECATED RDW RBC AUTO: 43.9 FL (ref 37–54)
EOSINOPHIL # BLD AUTO: 0 10*3/MM3 (ref 0–0.7)
EOSINOPHIL NFR BLD AUTO: 0 % (ref 0.3–6.2)
ERYTHROCYTE [DISTWIDTH] IN BLOOD BY AUTOMATED COUNT: 13.4 % (ref 11.7–13)
GFR SERPL CREATININE-BSD FRML MDRD: 73 ML/MIN/1.73
GLUCOSE BLD-MCNC: 313 MG/DL (ref 65–99)
GLUCOSE BLDC GLUCOMTR-MCNC: 262 MG/DL (ref 70–130)
GLUCOSE BLDC GLUCOMTR-MCNC: 294 MG/DL (ref 70–130)
GLUCOSE BLDC GLUCOMTR-MCNC: 383 MG/DL (ref 70–130)
HCT VFR BLD AUTO: 31.3 % (ref 35.6–45.5)
HGB BLD-MCNC: 10.2 G/DL (ref 11.9–15.5)
IMM GRANULOCYTES # BLD: 0.02 10*3/MM3 (ref 0–0.03)
IMM GRANULOCYTES NFR BLD: 0.2 % (ref 0–0.5)
LYMPHOCYTES # BLD AUTO: 1.08 10*3/MM3 (ref 0.9–4.8)
LYMPHOCYTES NFR BLD AUTO: 12.1 % (ref 19.6–45.3)
MCH RBC QN AUTO: 29.4 PG (ref 26.9–32)
MCHC RBC AUTO-ENTMCNC: 32.6 G/DL (ref 32.4–36.3)
MCV RBC AUTO: 90.2 FL (ref 80.5–98.2)
MONOCYTES # BLD AUTO: 0.46 10*3/MM3 (ref 0.2–1.2)
MONOCYTES NFR BLD AUTO: 5.1 % (ref 5–12)
NEUTROPHILS # BLD AUTO: 7.38 10*3/MM3 (ref 1.9–8.1)
NEUTROPHILS NFR BLD AUTO: 82.6 % (ref 42.7–76)
PLATELET # BLD AUTO: 237 10*3/MM3 (ref 140–500)
PMV BLD AUTO: 10.4 FL (ref 6–12)
POTASSIUM BLD-SCNC: 4 MMOL/L (ref 3.5–5.2)
RBC # BLD AUTO: 3.47 10*6/MM3 (ref 3.9–5.2)
SODIUM BLD-SCNC: 137 MMOL/L (ref 136–145)
WBC NRBC COR # BLD: 8.94 10*3/MM3 (ref 4.5–10.7)

## 2017-04-29 PROCEDURE — 63710000001 INSULIN DETEMER PER 5 UNITS: Performed by: INTERNAL MEDICINE

## 2017-04-29 PROCEDURE — 80048 BASIC METABOLIC PNL TOTAL CA: CPT | Performed by: INTERNAL MEDICINE

## 2017-04-29 PROCEDURE — 83605 ASSAY OF LACTIC ACID: CPT | Performed by: INTERNAL MEDICINE

## 2017-04-29 PROCEDURE — 85025 COMPLETE CBC W/AUTO DIFF WBC: CPT | Performed by: INTERNAL MEDICINE

## 2017-04-29 PROCEDURE — 63710000001 INSULIN ASPART PER 5 UNITS: Performed by: INTERNAL MEDICINE

## 2017-04-29 PROCEDURE — 82962 GLUCOSE BLOOD TEST: CPT

## 2017-04-29 PROCEDURE — 63710000001 DEXAMETHASONE PER 0.25 MG: Performed by: ORTHOPAEDIC SURGERY

## 2017-04-29 RX ADMIN — INSULIN ASPART 8 UNITS: 100 INJECTION, SOLUTION INTRAVENOUS; SUBCUTANEOUS at 12:50

## 2017-04-29 RX ADMIN — DULOXETINE HYDROCHLORIDE 60 MG: 60 CAPSULE, DELAYED RELEASE ORAL at 09:07

## 2017-04-29 RX ADMIN — INSULIN ASPART 6 UNITS: 100 INJECTION, SOLUTION INTRAVENOUS; SUBCUTANEOUS at 09:07

## 2017-04-29 RX ADMIN — LISINOPRIL 5 MG: 5 TABLET ORAL at 09:07

## 2017-04-29 RX ADMIN — PREGABALIN 75 MG: 50 CAPSULE ORAL at 06:08

## 2017-04-29 RX ADMIN — DEXAMETHASONE 4 MG: 4 TABLET ORAL at 04:00

## 2017-04-29 RX ADMIN — SODIUM CHLORIDE 125 ML/HR: 9 INJECTION, SOLUTION INTRAVENOUS at 04:01

## 2017-04-29 RX ADMIN — FLUTICASONE PROPIONATE 1 SPRAY: 50 SPRAY, METERED NASAL at 09:07

## 2017-04-29 RX ADMIN — CETIRIZINE HYDROCHLORIDE 10 MG: 10 TABLET, FILM COATED ORAL at 09:07

## 2017-04-29 RX ADMIN — GLIPIZIDE 5 MG: 5 TABLET ORAL at 09:07

## 2017-04-29 RX ADMIN — INSULIN DETEMIR 35 UNITS: 100 INJECTION, SOLUTION SUBCUTANEOUS at 06:08

## 2017-04-29 RX ADMIN — DOCUSATE SODIUM,SENNOSIDES 1 TABLET: 50; 8.6 TABLET, FILM COATED ORAL at 09:07

## 2017-04-29 RX ADMIN — DEXAMETHASONE 4 MG: 4 TABLET ORAL at 09:07

## 2017-04-29 RX ADMIN — FAMOTIDINE 20 MG: 20 TABLET, FILM COATED ORAL at 09:07

## 2017-05-03 ENCOUNTER — TELEPHONE (OUTPATIENT)
Dept: ORTHOPEDIC SURGERY | Facility: CLINIC | Age: 72
End: 2017-05-03

## 2017-05-17 ENCOUNTER — OFFICE VISIT (OUTPATIENT)
Dept: ORTHOPEDIC SURGERY | Facility: CLINIC | Age: 72
End: 2017-05-17

## 2017-05-17 VITALS — TEMPERATURE: 97.6 F | BODY MASS INDEX: 39.37 KG/M2 | HEIGHT: 66 IN | WEIGHT: 245 LBS

## 2017-05-17 DIAGNOSIS — Z98.1 S/P CERVICAL SPINAL FUSION: Primary | ICD-10-CM

## 2017-05-17 PROCEDURE — 99024 POSTOP FOLLOW-UP VISIT: CPT | Performed by: ORTHOPAEDIC SURGERY

## 2017-05-17 PROCEDURE — 72020 X-RAY EXAM OF SPINE 1 VIEW: CPT | Performed by: ORTHOPAEDIC SURGERY

## 2017-05-17 PROCEDURE — 72040 X-RAY EXAM NECK SPINE 2-3 VW: CPT | Performed by: ORTHOPAEDIC SURGERY

## 2017-05-17 RX ORDER — OMEGA-3-ACID ETHYL ESTERS 1 G/1
CAPSULE, LIQUID FILLED ORAL
Refills: 3 | COMMUNITY
Start: 2017-05-02 | End: 2018-01-03 | Stop reason: SDUPTHER

## 2017-06-28 ENCOUNTER — OFFICE VISIT (OUTPATIENT)
Dept: ORTHOPEDIC SURGERY | Facility: CLINIC | Age: 72
End: 2017-06-28

## 2017-06-28 VITALS — BODY MASS INDEX: 39.37 KG/M2 | HEIGHT: 66 IN | WEIGHT: 245 LBS

## 2017-06-28 DIAGNOSIS — Z98.1 HISTORY OF LUMBAR FUSION: ICD-10-CM

## 2017-06-28 DIAGNOSIS — Z98.1 S/P CERVICAL SPINAL FUSION: Primary | ICD-10-CM

## 2017-06-28 DIAGNOSIS — M54.50 LUMBAR SPINE PAIN: ICD-10-CM

## 2017-06-28 PROCEDURE — 72020 X-RAY EXAM OF SPINE 1 VIEW: CPT | Performed by: ORTHOPAEDIC SURGERY

## 2017-06-28 PROCEDURE — 99212 OFFICE O/P EST SF 10 MIN: CPT | Performed by: ORTHOPAEDIC SURGERY

## 2017-06-28 PROCEDURE — 72100 X-RAY EXAM L-S SPINE 2/3 VWS: CPT | Performed by: ORTHOPAEDIC SURGERY

## 2017-06-28 RX ORDER — INSULIN ASPART 100 [IU]/ML
INJECTION, SOLUTION INTRAVENOUS; SUBCUTANEOUS
Refills: 2 | COMMUNITY
Start: 2017-06-01 | End: 2018-01-03 | Stop reason: SDUPTHER

## 2017-06-28 RX ORDER — LOSARTAN POTASSIUM 50 MG/1
TABLET ORAL
Refills: 1 | COMMUNITY
Start: 2017-06-09 | End: 2018-01-03 | Stop reason: SDUPTHER

## 2017-06-28 RX ORDER — INSULIN DEGLUDEC 200 U/ML
INJECTION, SOLUTION SUBCUTANEOUS
Refills: 1 | COMMUNITY
Start: 2017-06-01 | End: 2018-01-03 | Stop reason: SDUPTHER

## 2017-06-28 RX ORDER — MELOXICAM 7.5 MG/1
TABLET ORAL
Refills: 0 | COMMUNITY
Start: 2017-06-01 | End: 2017-10-02

## 2017-08-03 ENCOUNTER — OFFICE VISIT (OUTPATIENT)
Dept: ORTHOPEDIC SURGERY | Facility: CLINIC | Age: 72
End: 2017-08-03

## 2017-08-03 VITALS — HEIGHT: 66 IN | BODY MASS INDEX: 39.37 KG/M2 | TEMPERATURE: 97.6 F | WEIGHT: 245 LBS

## 2017-08-03 DIAGNOSIS — Z96.643 STATUS POST BILATERAL TOTAL HIP REPLACEMENT: ICD-10-CM

## 2017-08-03 DIAGNOSIS — M70.61 TROCHANTERIC BURSITIS OF BOTH HIPS: ICD-10-CM

## 2017-08-03 DIAGNOSIS — M25.551 BILATERAL HIP PAIN: Primary | ICD-10-CM

## 2017-08-03 DIAGNOSIS — Z98.1 S/P LUMBAR FUSION: ICD-10-CM

## 2017-08-03 DIAGNOSIS — M70.62 TROCHANTERIC BURSITIS OF BOTH HIPS: ICD-10-CM

## 2017-08-03 DIAGNOSIS — M25.552 BILATERAL HIP PAIN: Primary | ICD-10-CM

## 2017-08-03 PROCEDURE — 73521 X-RAY EXAM HIPS BI 2 VIEWS: CPT | Performed by: ORTHOPAEDIC SURGERY

## 2017-08-03 PROCEDURE — 99214 OFFICE O/P EST MOD 30 MIN: CPT | Performed by: ORTHOPAEDIC SURGERY

## 2017-08-03 NOTE — PROGRESS NOTES
"Patient Name: Raquel Gonsales   YOB: 1945  Referring Primary Care Physician: Roger Patel MD      Chief Complaint:    Chief Complaint   Patient presents with   • Left Hip - Establish Care   • Right Hip - Establish Care        Subjective:    HPI:   Raquel Gonsales is a pleasant 72 y.o. year old who presents today for evaluation of   Chief Complaint   Patient presents with   • Left Hip - Establish Care   • Right Hip - Establish Care   patient with [facility] about 15 yrs ago and spinal fusion for stenosis in last few years with recent neck surgery per Dr booker with \"hip\" pain.  Saw dr shah and now sent to me.  Armida did shots and they didn't help much.  Pain radiates to the knee on the right.  Walks with walker.  Has neuropathy and feels that is worse since fusion..  Achy, \"electric screwdriver\" pain in the lateral hips.  Tried meds, ice walker and rest.  No PT in awhile.  atraumatic    This problem is new to this examiner.     Medications:   Home Medications:  Current Outpatient Prescriptions on File Prior to Visit   Medication Sig   • ACCU-CHEK MARIO PLUS test strip TEST 4 TIMES DAILY AS DIRECTED   • ACCU-CHEK SOFTCLIX LANCETS lancets TEST 4 TIMES DAILY AS DIRECTED   • albuterol (PROVENTIL HFA;VENTOLIN HFA) 108 (90 BASE) MCG/ACT inhaler Inhale 2 puffs Every 6 (Six) Hours As Needed.   • aspirin 81 MG EC tablet Take 81 mg by mouth Daily.   • atorvastatin (LIPITOR) 10 MG tablet Take 10 mg by mouth Every Night.   • calcium carbonate-vitamin d 600-400 MG-UNIT per tablet Take 1 tablet by mouth 2 (Two) Times a Day.   • Cholecalciferol (VITAMIN D3) 2000 UNITS tablet Take 2,000 Units by mouth Daily.   • DULoxetine (CYMBALTA) 60 MG capsule Take 60 mg by mouth 2 (Two) Times a Day.   • EASY COMFORT PEN NEEDLES 31G X 8 MM misc USE AS DIRECTED WITH LANTUS AND VICTOZA PENS   • fluticasone (FLONASE) 50 MCG/ACT nasal spray 1 spray into each nostril Daily. INHALE 1 (ONE) SPRAY IN EACH NOSTRIL ONCE DAILY   • " furosemide (LASIX) 40 MG tablet Take 40 mg by mouth Daily.   • glimepiride (AMARYL) 2 MG tablet Take 2 mg by mouth 2 (Two) Times a Day.   • LANTUS SOLOSTAR 100 UNIT/ML solution pen-injector Inject 35 Units under the skin Every Night.   • lisinopril (PRINIVIL,ZESTRIL) 5 MG tablet Take 5 mg by mouth Daily.   • loratadine (CLARITIN) 10 MG tablet Take 10 mg by mouth Daily.   • losartan (COZAAR) 50 MG tablet TAKE 1 (ONE) TABLET BY MOUTH ONCE DAILY   • LYRICA 75 MG capsule TAKE 1 (ONE) CAPSULE BY MOUTH 3 TIMES DAILY   • meloxicam (MOBIC) 7.5 MG tablet TAKE 1 (ONE) TABLET BY MOUTH ONCE DAILY WITH FOOD   • NOVOLOG FLEXPEN 100 UNIT/ML solution pen-injector sc pen INJECT 4-20 UNITS 3 TIMES DAILY   • omega-3 acid ethyl esters (LOVAZA) 1 G capsule TAKE 1 (ONE) CAPSULE BY MOUTH ONCE DAILY   • Omega-3 Fatty Acids (FISH OIL) 1000 MG capsule capsule Take 1,000 mg by mouth Every Night.   • potassium chloride (K-DUR) 10 MEQ CR tablet Take 10 mEq by mouth Every Night.   • Pregabalin (LYRICA PO) Take 75 tablets by mouth 3 (Three) Times a Day.   • raNITIdine (ZANTAC) 150 MG tablet Take 150 mg by mouth 2 (Two) Times a Day.   • sennosides-docusate sodium (SENOKOT-S) 8.6-50 MG tablet Take 1 tablet by mouth 2 (Two) Times a Day.   • TRESIBA FLEXTOUCH 200 UNIT/ML solution pen-injector Inject 40-80 UNITS ONCE daily   • VICTOZA 18 MG/3ML solution pen-injector Inject 1 pen as directed Daily. INJECT 1.8 MG UNDER THE SKIN ONCE DAILY     No current facility-administered medications on file prior to visit.      Current Medications:  Scheduled Meds:  Continuous Infusions:  No current facility-administered medications for this visit.   PRN Meds:.    I have reviewed the patient's medical history in detail and updated the computerized patient record.  Review and summarization of old records includes:    Past Medical History:   Diagnosis Date   • Acid reflux    • Asthma    • Congestion of right ear    • COPD (chronic obstructive pulmonary disease)    •  "Diabetes mellitus    • History of fainting spells of unknown cause    • History of fracture of arm     RIGHT ULNA/RADIUS   • Hypertension    • Low back pain    • Migraine    • Neck pain    • Neuropathy     HANDS AND FEET   • Numbness or tingling     FEET   • Rheumatoid arthritis         Past Surgical History:   Procedure Laterality Date   • ANTERIOR CERVICAL DISCECTOMY W/ FUSION N/A 4/27/2017    Procedure: CERVICAL DISCECTOMY ANTERIOR FUSION WITH INSTRUMENTATION C3 to C7;  Surgeon: Chan Farrar MD;  Location: [facility] OR;  Service:    • APPENDECTOMY     • BACK SURGERY  2016   • CATARACT EXTRACTION Bilateral    • CHOLECYSTECTOMY     • FOOT SURGERY Bilateral     Great toes   • HIP SURGERY      replacement   • HYSTERECTOMY     • JOINT REPLACEMENT Bilateral     THR   • JOINT REPLACEMENT Bilateral     TKR   • WRIST SURGERY Right     fx        Social History     Occupational History   • Not on file.     Social History Main Topics   • Smoking status: Never Smoker   • Smokeless tobacco: Never Used   • Alcohol use No   • Drug use: No   • Sexual activity: Defer      Social History     Social History Narrative        Family History   Problem Relation Age of Onset   • Diabetes Other    • Diabetes Father        ROS: 14 point review of systems was performed and all other systems were reviewed and are negative except for documented findings in HPI and today's encounter.     Allergies:   Allergies   Allergen Reactions   • Hydrocodone Itching     \"All pain medicines make her itch:   • Penicillins Hives   • Sulfa Antibiotics Other (See Comments)     Hair loss  Pt states sulfa causes her hair to fall out     Constitutional:  Denies fever, shaking or chills   Eyes:  Denies change in visual acuity   HENT:  Denies nasal congestion or sore throat   Respiratory:  Denies cough or shortness of breath   Cardiovascular:  Denies chest pain or severe LE edema   GI:  Denies abdominal pain, nausea, vomiting, bloody stools or diarrhea "   Musculoskeletal:  Numbness, tingling, pain, or loss of motor function only as noted above in history of present illness.  : Denies painful urination or hematuria  Integument:  Denies rash, lesion or ulceration   Neurologic:  Denies headache or focal weakness  Endocrine:  Denies lymphadenopathy  Psych:  Denies confusion or change in mental status   Hem:  Denies active bleeding    Objective:    Physical Exam: 72 y.o. female  Body mass index is 39.54 kg/(m^2)., @WT@, @HT@  Vitals:    08/03/17 0918   Temp: 97.6 °F (36.4 °C)     Vital signs reviewed.   General Appearance:    Alert, cooperative, in no acute distress                  Eyes: conjunctiva clear  ENT: external ears and nose atraumatic  CV: no peripheral edema  Resp: normal respiratory effort  Skin: no rashes or wounds; normal turgor  Psych: mood and affect appropriate  Lymph: no nodes appreciated  Neuro: neuropathy  Vascular:  Palpable peripheral pulse in noted extremity but decreased  Musculoskeletal Extremities: walks antalgically with walker.  bmi 39.  tender tiffany over the greater troch.  central obesity and hard to palp areas around hip but fells like atrophy right more than left.  no thigh pain with amb.   neuropathy distally    Radiology:   Imaging done today and discussed at length with the patient:    Indication: pain related symptoms and total joint follow up,  Views: 2V AP&LAT bilateral hip(s)   Findings: sp tiffany total hips with light wear on the left.  instrumented fusion of the lumbar spine on old spine films here reviewed.  Comparison views: not available      Assessment:     ICD-10-CM ICD-9-CM   1. Bilateral hip pain M25.551 719.45    M25.552    2. Trochanteric bursitis of both hips M70.61 726.5    M70.62    3. Status post bilateral total hip replacement Z96.643 V43.64   4. S/P lumbar fusion Z98.1 V45.4        Procedures       Plan: Biomechanics of pertinent body area discussed.  Risks, benefits, alternatives, comparisons, and complications of  accepted medicines, injections, recommendations, surgical procedures, and therapies explained and education provided in laymen's terms. The patient was given the opportunity to ask questions and they were answerved to their satisfaction.   Natural history and expected course discussed. Questions answered.  Advice on benefits of, and types of regular/moderate exercise.  Lifestyle measures for weight loss with biomechanical explanations for weight loss and how this affects orthopedic condition.  Medications per orders; safety, precautions, and warnings given.  PT referral.  Biomechanics and proper use of cane/ambulatory aids.   Offered cortsisone injections but delcined.  Recommend some voltaren gel and PT.  Fu if needs injections.  Went over meds and precautions.  Denies contraindication      8/3/2017

## 2017-08-14 ENCOUNTER — HOSPITAL ENCOUNTER (OUTPATIENT)
Dept: PHYSICAL THERAPY | Facility: HOSPITAL | Age: 72
Setting detail: THERAPIES SERIES
Discharge: HOME | End: 2017-08-14
Attending: ORTHOPAEDIC SURGERY

## 2017-08-14 DIAGNOSIS — M70.61 TROCHANTERIC BURSITIS OF BOTH HIPS: ICD-10-CM

## 2017-08-14 DIAGNOSIS — M25.552 BILATERAL HIP PAIN: Primary | ICD-10-CM

## 2017-08-14 DIAGNOSIS — M25.551 BILATERAL HIP PAIN: Primary | ICD-10-CM

## 2017-08-14 DIAGNOSIS — Z96.643 STATUS POST BILATERAL TOTAL HIP REPLACEMENT: ICD-10-CM

## 2017-08-14 DIAGNOSIS — M70.62 TROCHANTERIC BURSITIS OF BOTH HIPS: ICD-10-CM

## 2017-08-14 PROCEDURE — G8979 MOBILITY GOAL STATUS: HCPCS | Performed by: PHYSICAL THERAPIST

## 2017-08-14 PROCEDURE — G8978 MOBILITY CURRENT STATUS: HCPCS | Performed by: PHYSICAL THERAPIST

## 2017-08-14 PROCEDURE — 97162 PT EVAL MOD COMPLEX 30 MIN: CPT | Performed by: PHYSICAL THERAPIST

## 2017-08-14 NOTE — THERAPY EVALUATION
Outpatient Physical Therapy Ortho Initial Evaluation  [facility]     Patient Name: Raquel Gonsales  : 1945  MRN: [id number]  Today's Date: 2017      Visit Date: 2017    Patient Active Problem List   Diagnosis   • Cervical spondylosis with radiculopathy   • Bilateral hip pain   • Trochanteric bursitis of both hips   • Status post bilateral total hip replacement   • S/P lumbar fusion        Past Medical History:   Diagnosis Date   • Acid reflux    • Asthma    • Congestion of right ear    • COPD (chronic obstructive pulmonary disease)    • Diabetes mellitus    • History of fainting spells of unknown cause    • History of fracture of arm     RIGHT ULNA/RADIUS   • Hypertension    • Low back pain    • Migraine    • Neck pain    • Neuropathy     HANDS AND FEET   • Numbness or tingling     FEET   • Rheumatoid arthritis         Past Surgical History:   Procedure Laterality Date   • ANTERIOR CERVICAL DISCECTOMY W/ FUSION N/A 2017    Procedure: CERVICAL DISCECTOMY ANTERIOR FUSION WITH INSTRUMENTATION C3 to C7;  Surgeon: Chan Farrra MD;  Location: [facility];  Service:    • APPENDECTOMY     • BACK SURGERY     • CATARACT EXTRACTION Bilateral    • CHOLECYSTECTOMY     • FOOT SURGERY Bilateral     Great toes   • HIP SURGERY      replacement   • HYSTERECTOMY     • JOINT REPLACEMENT Bilateral     THR   • JOINT REPLACEMENT Bilateral     TKR   • WRIST SURGERY Right     fx       Visit Dx:     ICD-10-CM ICD-9-CM   1. Bilateral hip pain M25.551 719.45    M25.552    2. Trochanteric bursitis of both hips M70.61 726.5    M70.62    3. Status post bilateral total hip replacement Z96.643 V43.64             Patient History       17 1400          History    Chief Complaint Difficulty Walking;Difficulty with daily activities;Balance Problems;Joint stiffness;Muscle weakness;Pain  -KT      Type of Pain Hip pain  -KT      Date Current Problem(s) Began 17  -KT      Brief Description of Current  "Complaint Pt has a history of bilat KAREN ~15 years ago.  She states there is a \"gap\" on her xray and she needs to have revision however her MD does not do hips.  She also had lumbar fusion in 2016.  Pt having groin pain/pelvic pain and has been using a rollator walker for past 3 months.  Also has bilat TKA.  Has multi joint OA.  Pt can't walk easily, difficulty with transfers and turning over in bed. Also history of cervical discectomy/fusion 4/17 by Dr. Farrar.  -KT      What clinical tests have you had for this problem? X-ray  -KT      Pain     Pain Location Hip  -KT      Pain at Present 9  -KT      Pain Frequency Constant/continuous  -KT      Pain Description Aching;Shooting  -KT      What Performance Factors Make the Current Problem(s) WORSE? sitting for long periods, walking, standing, rolling over in bed  -KT      Difficulties with ADL's? unable to go to [employer], can't do anything  -KT      Fall Risk Assessment    Any falls in the past year: No  -KT      Daily Activities    Primary Language English  -KT      Safety    Are you being hurt, hit, or frightened by anyone at home or in your life? No  -KT      Are you being neglected by a caregiver No  -KT        User Key  (r) = Recorded By, (t) = Taken By, (c) = Cosigned By    Initials Name Provider Type    KT Aminta Farooq PT Physical Therapist                PT Ortho       08/14/17 1040    Posture/Observations    Posture/Observations Comments Pt is obese, amb slowly with rollator.  -KT    Right Hip    Flexion AROM deficit 95 deg  -KT    ABduction AROM Deficit 40 deg  -KT    Internal Rotation AROM Deficit 15 deg   pain  -KT    External Rotation AROM deficit 40 deg   pain  -KT    Right Knee    Extension/Flexion AROM Deficit 0-120 deg  -KT    Right Hip    Hip Flexion Gross Movement (3/5) fair  -KT    Hip ABduction Gross Movement (3+/5) fair plus  -KT    Hip ADduction Gross Movement (3/5) fair  -KT    Lower Extremity    Lower Ext Manual Muscle Testing Detail Left LE " grossly 3+ to 4-/5 with less pain compared to right  -KT    Right Knee    Knee Extension Gross Movement (3+/5) fair plus   increased pain in right hip  -KT    Knee Flexion Gross Movement (4-/5) good minus  -KT    Transfers    Transfers, Sit-Stand Baca independent  -KT    Gait Assessment/Treatment    Gait, Baca Level independent  -KT    Gait, Assistive Device rollator  -KT      User Key  (r) = Recorded By, (t) = Taken By, (c) = Cosigned By    Initials Name Provider Type    DAKOTAH Farooq PT Physical Therapist                            Therapy Education       08/14/17 1100          Therapy Education    Education Details Pt educated on options for therapy including possible aquatics.   Pt not sure she is interested.   Discussed use of heat/ice for pain.  -KT      Given Symptoms/condition management;Pain management  -KT      How Provided Verbal  -KT      Provided to Patient  -KT      Level of Understanding Verbalized  -KT        User Key  (r) = Recorded By, (t) = Taken By, (c) = Cosigned By    Initials Name Provider Type    DAKOTAH Farooq PT Physical Therapist                PT OP Goals       08/14/17 1100       PT Short Term Goals    STG Date to Achieve 08/28/17  -KT     STG 1 Indepedent with initial home program for ROM/strengthening of hip and LE.  -KT     STG 2 Pt to report decreased pain in hips to 5 or less for improved tolerance to mobility.  -KT     Long Term Goals    LTG Date to Achieve 09/13/17  -KT     LTG 1 Pt to improve TUG score to 40 seconds or less indicating improved mobility.  -KT     LTG 2 Pt to increase extremity strength so she can complete 5x sit to  less than 40 sec.    -KT     LTG 3 Pt to decrease pain to 3 or less for improved tolerance to mobility.    -KT     Time Calculation    PT Goal Re-Cert Due Date 09/13/17  -KT       User Key  (r) = Recorded By, (t) = Taken By, (c) = Cosigned By    Initials Name Provider Type    DAKOTAH Farooq PT Physical Therapist     "            PT Assessment/Plan       08/14/17 1445       PT Assessment    Functional Limitations Impaired gait;Impaired locomotion;Limitation in home management;Limitations in community activities;Limitations in functional capacity and performance;Performance in leisure activities;Performance in self-care ADL  -KT     Impairments Endurance;Gait;Impaired flexibility;Range of motion;Posture;Muscle strength;Pain;Impaired muscle power  -KT     Assessment Comments Pt is a 72 year old female who presents with significant hip pain bilaterally R>L 9/10 on VAS,  decreased strength of LE, decreased functional mobility with TUG score of greater than 1 minute, participation restrictions in [employer] activities and activity limitations with daily ADLs and household chores.  Personal factors include history of cervical and lumbar surgeries, obesity and diabetes.  Pt reports that xray shows a \"gap\" in her hip (could not confirm on imaging report) and she is in need of revision of her KAREN.   I am not sure how she will tolerate or respond to therapy/exercise.   She is a good candidate for aquatics therapy however she is hesitant to do this.    -KT     Please refer to paper survey for additional self-reported information Yes  -KT     Rehab Potential Fair  -KT     Patient/caregiver participated in establishment of treatment plan and goals Yes  -KT     Patient would benefit from skilled therapy intervention Yes  -KT     PT Plan    PT Frequency 2x/week  -KT     Predicted Duration of Therapy Intervention (days/wks) 4 weeks  -KT     Planned CPT's? PT EVAL MOD COMPLELITY: 19725;PT MANUAL THERAPY EA 15 MIN: 71858;PT THER PROC EA 15 MIN: 82312;PT NEUROMUSC RE-EDUCATION EA 15 MIN: 79977;PT HOT OR COLD PACK TREAT MCARE;PT ELECTRICAL STIM UNATTEND: ;PT ULTRASOUND EA 15 MIN: 23709;PT AQUATIC THERAPY EA 15 MIN: 28293  -KT     PT Plan Comments Initiate exercise program (easy) and attempt Nustep at low intensity to see if patient will tolerate " land therapy.   May try to talk to her about aquatics if land therapy not tolerated.     -KT       User Key  (r) = Recorded By, (t) = Taken By, (c) = Cosigned By    Initials Name Provider Type    DAKOTAH Farooq PT Physical Therapist                                    Outcome Measures       08/14/17 1400          5 Times Sit to Stand    5 Times Sit to Stand (seconds) 53 seconds  -KT      Timed Up and Go (TUG)    TUG Test 1 63 seconds  -KT      Timed Up and Go Comments using walker  -KT        User Key  (r) = Recorded By, (t) = Taken By, (c) = Cosigned By    Initials Name Provider Type    DAKOTAH Farooq PT Physical Therapist            Time Calculation:   Start Time: 1045  Stop Time: 1115  Time Calculation (min): 30 min     Therapy Charges for Today     Code Description Service Date Service Provider Modifiers Qty    97515389157  PT MOBILITY CURRENT 8/14/2017 Aminta Farooq, PT GP, CM 1    43317941223 HC PT MOBILITY PROJECTED 8/14/2017 Aminta Farooq, PT GP, CL 1    57254479738 HC PT EVAL MOD COMPLEXITY 2 8/14/2017 Aminta Farooq, PT GP 1          PT G-Codes  PT Professional Judgement Used?: Yes  Outcome Measure Options: Timed Up and Go (TUG), 5x Sit to Stand  Score: TUG=62 sec; FTSS=53 sec  Functional Limitation: Mobility: Walking and moving around  Mobility: Walking and Moving Around Current Status (): At least 80 percent but less than 100 percent impaired, limited or restricted  Mobility: Walking and Moving Around Goal Status (): At least 60 percent but less than 80 percent impaired, limited or restricted         Aminta Farooq PT  8/14/2017

## 2017-08-21 ENCOUNTER — APPOINTMENT (OUTPATIENT)
Dept: PHYSICAL THERAPY | Facility: HOSPITAL | Age: 72
End: 2017-08-21

## 2017-08-25 ENCOUNTER — APPOINTMENT (OUTPATIENT)
Dept: PHYSICAL THERAPY | Facility: HOSPITAL | Age: 72
End: 2017-08-25

## 2017-08-28 ENCOUNTER — APPOINTMENT (OUTPATIENT)
Dept: PHYSICAL THERAPY | Facility: HOSPITAL | Age: 72
End: 2017-08-28

## 2017-08-29 ENCOUNTER — OFFICE VISIT (OUTPATIENT)
Dept: ORTHOPEDIC SURGERY | Facility: CLINIC | Age: 72
End: 2017-08-29

## 2017-08-29 VITALS — WEIGHT: 257 LBS | TEMPERATURE: 97.4 F | BODY MASS INDEX: 41.3 KG/M2 | HEIGHT: 66 IN

## 2017-08-29 DIAGNOSIS — M54.50 LUMBAR SPINE PAIN: ICD-10-CM

## 2017-08-29 DIAGNOSIS — Z98.1 S/P CERVICAL SPINAL FUSION: Primary | ICD-10-CM

## 2017-08-29 DIAGNOSIS — M54.2 CERVICAL SPINE PAIN: ICD-10-CM

## 2017-08-29 PROCEDURE — 72020 X-RAY EXAM OF SPINE 1 VIEW: CPT | Performed by: ORTHOPAEDIC SURGERY

## 2017-08-29 PROCEDURE — 99213 OFFICE O/P EST LOW 20 MIN: CPT | Performed by: ORTHOPAEDIC SURGERY

## 2017-09-01 ENCOUNTER — APPOINTMENT (OUTPATIENT)
Dept: PHYSICAL THERAPY | Facility: HOSPITAL | Age: 72
End: 2017-09-01

## 2017-09-07 ENCOUNTER — HOSPITAL ENCOUNTER (OUTPATIENT)
Dept: MRI IMAGING | Facility: HOSPITAL | Age: 72
Discharge: HOME | End: 2017-09-07
Attending: ORTHOPAEDIC SURGERY | Admitting: ORTHOPAEDIC SURGERY

## 2017-09-07 DIAGNOSIS — M54.2 CERVICAL SPINE PAIN: ICD-10-CM

## 2017-09-07 DIAGNOSIS — M54.50 LUMBAR SPINE PAIN: ICD-10-CM

## 2017-09-07 LAB — CREAT BLDA-MCNC: 0.9 MG/DL (ref 0.6–1.3)

## 2017-09-07 PROCEDURE — 82565 ASSAY OF CREATININE: CPT

## 2017-09-07 PROCEDURE — 72158 MRI LUMBAR SPINE W/O & W/DYE: CPT

## 2017-09-07 PROCEDURE — A9577 INJ MULTIHANCE: HCPCS | Performed by: ORTHOPAEDIC SURGERY

## 2017-09-07 PROCEDURE — 0 GADOBENATE DIMEGLUMINE 529 MG/ML SOLUTION: Performed by: ORTHOPAEDIC SURGERY

## 2017-09-07 RX ADMIN — GADOBENATE DIMEGLUMINE 20 ML: 529 INJECTION, SOLUTION INTRAVENOUS at 11:30

## 2017-09-14 ENCOUNTER — TELEPHONE (OUTPATIENT)
Dept: ORTHOPEDIC SURGERY | Facility: CLINIC | Age: 72
End: 2017-09-14

## 2017-09-14 NOTE — TELEPHONE ENCOUNTER
Inform her that the MRI demonstrates some narrowing around the nerves at L2-3 above the level of the fusion.  She can try LESIs if she likes for now.

## 2017-09-15 ENCOUNTER — DOCUMENTATION (OUTPATIENT)
Dept: PHYSICAL THERAPY | Facility: HOSPITAL | Age: 72
End: 2017-09-15

## 2017-09-15 DIAGNOSIS — M54.50 LUMBAR SPINE PAIN: Primary | ICD-10-CM

## 2017-09-15 DIAGNOSIS — M70.61 TROCHANTERIC BURSITIS OF BOTH HIPS: ICD-10-CM

## 2017-09-15 DIAGNOSIS — M70.62 TROCHANTERIC BURSITIS OF BOTH HIPS: ICD-10-CM

## 2017-09-15 DIAGNOSIS — M25.552 BILATERAL HIP PAIN: Primary | ICD-10-CM

## 2017-09-15 DIAGNOSIS — M25.551 BILATERAL HIP PAIN: Primary | ICD-10-CM

## 2017-09-21 ENCOUNTER — TELEPHONE (OUTPATIENT)
Dept: ORTHOPEDIC SURGERY | Facility: CLINIC | Age: 72
End: 2017-09-21

## 2017-09-21 NOTE — TELEPHONE ENCOUNTER
Spoke to the patient and went over results again and advised her she is just waiting for an appt for the epidural injections. Patient understood and was given the Religion scheduling number.cld

## 2017-09-21 NOTE — TELEPHONE ENCOUNTER
I am confused: See note from 914.  This was already addressed an epidural injection was recommended and schedule.  See if he can figure out what's going on.

## 2017-10-02 ENCOUNTER — HOSPITAL ENCOUNTER (OUTPATIENT)
Dept: PAIN MEDICINE | Facility: HOSPITAL | Age: 72
Discharge: HOME | End: 2017-10-02
Attending: ORTHOPAEDIC SURGERY | Admitting: ORTHOPAEDIC SURGERY

## 2017-10-02 ENCOUNTER — ANESTHESIA EVENT (OUTPATIENT)
Dept: PAIN MEDICINE | Facility: HOSPITAL | Age: 72
End: 2017-10-02

## 2017-10-02 ENCOUNTER — HOSPITAL ENCOUNTER (OUTPATIENT)
Dept: GENERAL RADIOLOGY | Facility: HOSPITAL | Age: 72
Discharge: HOME | End: 2017-10-02

## 2017-10-02 ENCOUNTER — ANESTHESIA (OUTPATIENT)
Dept: PAIN MEDICINE | Facility: HOSPITAL | Age: 72
End: 2017-10-02

## 2017-10-02 VITALS
TEMPERATURE: 98.8 F | HEART RATE: 88 BPM | DIASTOLIC BLOOD PRESSURE: 93 MMHG | SYSTOLIC BLOOD PRESSURE: 169 MMHG | HEIGHT: 66 IN | WEIGHT: 225 LBS | BODY MASS INDEX: 36.16 KG/M2 | RESPIRATION RATE: 16 BRPM | OXYGEN SATURATION: 96 %

## 2017-10-02 DIAGNOSIS — M54.50 LUMBAR SPINE PAIN: ICD-10-CM

## 2017-10-02 DIAGNOSIS — R52 PAIN: ICD-10-CM

## 2017-10-02 LAB — GLUCOSE BLDC GLUCOMTR-MCNC: 122 MG/DL (ref 70–130)

## 2017-10-02 PROCEDURE — 77003 FLUOROGUIDE FOR SPINE INJECT: CPT

## 2017-10-02 PROCEDURE — 25010000002 METHYLPREDNISOLONE PER 80 MG: Performed by: ANESTHESIOLOGY

## 2017-10-02 PROCEDURE — C1755 CATHETER, INTRASPINAL: HCPCS

## 2017-10-02 PROCEDURE — 82962 GLUCOSE BLOOD TEST: CPT

## 2017-10-02 RX ORDER — SODIUM CHLORIDE 0.9 % (FLUSH) 0.9 %
1-10 SYRINGE (ML) INJECTION AS NEEDED
Status: DISCONTINUED | OUTPATIENT
Start: 2017-10-02 | End: 2017-10-03 | Stop reason: HOSPADM

## 2017-10-02 RX ORDER — FENTANYL CITRATE 50 UG/ML
50 INJECTION, SOLUTION INTRAMUSCULAR; INTRAVENOUS
Status: DISCONTINUED | OUTPATIENT
Start: 2017-10-02 | End: 2017-10-03 | Stop reason: HOSPADM

## 2017-10-02 RX ORDER — MIDAZOLAM HYDROCHLORIDE 1 MG/ML
1 INJECTION INTRAMUSCULAR; INTRAVENOUS
Status: DISCONTINUED | OUTPATIENT
Start: 2017-10-02 | End: 2017-10-03 | Stop reason: HOSPADM

## 2017-10-02 RX ORDER — LIDOCAINE HYDROCHLORIDE 10 MG/ML
1 INJECTION, SOLUTION INFILTRATION; PERINEURAL ONCE
Status: DISCONTINUED | OUTPATIENT
Start: 2017-10-02 | End: 2017-10-03 | Stop reason: HOSPADM

## 2017-10-02 RX ORDER — METHYLPREDNISOLONE ACETATE 80 MG/ML
80 INJECTION, SUSPENSION INTRA-ARTICULAR; INTRALESIONAL; INTRAMUSCULAR; SOFT TISSUE ONCE
Status: COMPLETED | OUTPATIENT
Start: 2017-10-02 | End: 2017-10-02

## 2017-10-02 RX ADMIN — METHYLPREDNISOLONE ACETATE 80 MG: 80 INJECTION, SUSPENSION INTRA-ARTICULAR; INTRALESIONAL; INTRAMUSCULAR; SOFT TISSUE at 08:55

## 2017-10-02 NOTE — H&P
[facility]    History and Physical    Patient Name: Raquel Gonsales  :  1945  MRN:  [id number]  Date of Admission: 10/2/2017    Subjective     Patient is a 72 y.o. female presents with chief complaint of chronic, constant, severe, 6-10 / 10, aching, burning, sharp, low back and leg: bilateral pain.  Onset of symptoms was gradual starting several years ago.  Symptoms are associated/aggravated by nothing in particular. Symptoms improve with nothing    The following portions of the patients history were reviewed and updated as appropriate: current medications, allergies, past medical history, past surgical history, past family history, past social history and problem list                Objective     Past Medical History:   Past Medical History:   Diagnosis Date   • Acid reflux    • Asthma    • Congestion of right ear    • COPD (chronic obstructive pulmonary disease)    • Diabetes mellitus    • History of fainting spells of unknown cause    • History of fracture of arm     RIGHT ULNA/RADIUS   • Hypertension    • Low back pain    • Migraine    • Neck pain    • Neuropathy     HANDS AND FEET   • Numbness or tingling     FEET   • Rheumatoid arthritis      Past Surgical History:   Past Surgical History:   Procedure Laterality Date   • ANTERIOR CERVICAL DISCECTOMY W/ FUSION N/A 2017    Procedure: CERVICAL DISCECTOMY ANTERIOR FUSION WITH INSTRUMENTATION C3 to C7;  Surgeon: Chan Farrar MD;  Location: [facility] MAIN OR;  Service:    • APPENDECTOMY     • BACK SURGERY  2016   • CATARACT EXTRACTION Bilateral    • CHOLECYSTECTOMY     • FOOT SURGERY Bilateral     Great toes   • HIP SURGERY      replacement   • HYSTERECTOMY     • JOINT REPLACEMENT Bilateral     THR   • JOINT REPLACEMENT Bilateral     TKR   • WRIST SURGERY Right     fx     Family History:   Family History   Problem Relation Age of Onset   • Diabetes Other    • Diabetes Father      Social History:   Social History   Substance Use Topics   • Smoking  status: Never Smoker   • Smokeless tobacco: Never Used   • Alcohol use No       Vital Signs Range for the last 24 hours  Temperature:     Temp Source:     BP: BP: ()/()    Pulse:     Respirations:     SPO2:     O2 Amount (l/min):     O2 Devices     Weight:           --------------------------------------------------------------------------------    Current Outpatient Prescriptions   Medication Sig Dispense Refill   • ACCU-CHEK MARIO PLUS test strip TEST 4 TIMES DAILY AS DIRECTED  5   • ACCU-CHEK SOFTCLIX LANCETS lancets TEST 4 TIMES DAILY AS DIRECTED  0   • albuterol (PROVENTIL HFA;VENTOLIN HFA) 108 (90 BASE) MCG/ACT inhaler Inhale 2 puffs Every 6 (Six) Hours As Needed.     • aspirin 81 MG EC tablet Take 81 mg by mouth Daily.     • atorvastatin (LIPITOR) 10 MG tablet Take 10 mg by mouth Every Night.     • calcium carbonate-vitamin d 600-400 MG-UNIT per tablet Take 1 tablet by mouth 2 (Two) Times a Day.  2   • Cholecalciferol (VITAMIN D3) 2000 UNITS tablet Take 2,000 Units by mouth Daily.     • diclofenac (VOLTAREN) 1 % gel gel Apply 4 g topically 3 (Three) Times a Day. 4 tube 4   • DULoxetine (CYMBALTA) 60 MG capsule Take 60 mg by mouth 2 (Two) Times a Day.     • EASY COMFORT PEN NEEDLES 31G X 8 MM misc USE AS DIRECTED WITH LANTUS AND VICTOZA PENS  0   • fluticasone (FLONASE) 50 MCG/ACT nasal spray 1 spray into each nostril Daily. INHALE 1 (ONE) SPRAY IN EACH NOSTRIL ONCE DAILY  5   • furosemide (LASIX) 40 MG tablet Take 40 mg by mouth Daily.     • glimepiride (AMARYL) 2 MG tablet Take 2 mg by mouth 2 (Two) Times a Day.     • LANTUS SOLOSTAR 100 UNIT/ML solution pen-injector Inject 35 Units under the skin Every Night.  5   • lisinopril (PRINIVIL,ZESTRIL) 5 MG tablet Take 5 mg by mouth Daily.     • loratadine (CLARITIN) 10 MG tablet Take 10 mg by mouth Daily.     • losartan (COZAAR) 50 MG tablet TAKE 1 (ONE) TABLET BY MOUTH ONCE DAILY  1   • LYRICA 75 MG capsule TAKE 1 (ONE) CAPSULE BY MOUTH 3 TIMES DAILY  0   •  meloxicam (MOBIC) 7.5 MG tablet TAKE 1 (ONE) TABLET BY MOUTH ONCE DAILY WITH FOOD  0   • NOVOLOG FLEXPEN 100 UNIT/ML solution pen-injector sc pen INJECT 4-20 UNITS 3 TIMES DAILY  2   • omega-3 acid ethyl esters (LOVAZA) 1 G capsule TAKE 1 (ONE) CAPSULE BY MOUTH ONCE DAILY  3   • Omega-3 Fatty Acids (FISH OIL) 1000 MG capsule capsule Take 1,000 mg by mouth Every Night.     • potassium chloride (K-DUR) 10 MEQ CR tablet Take 10 mEq by mouth Every Night.     • Pregabalin (LYRICA PO) Take 75 tablets by mouth 3 (Three) Times a Day.     • raNITIdine (ZANTAC) 150 MG tablet Take 150 mg by mouth 2 (Two) Times a Day.     • sennosides-docusate sodium (SENOKOT-S) 8.6-50 MG tablet Take 1 tablet by mouth 2 (Two) Times a Day. 30 tablet 1   • TRESIBA FLEXTOUCH 200 UNIT/ML solution pen-injector Inject 40-80 UNITS ONCE daily  1   • VICTOZA 18 MG/3ML solution pen-injector Inject 1 pen as directed Daily. INJECT 1.8 MG UNDER THE SKIN ONCE DAILY  4     Current Facility-Administered Medications   Medication Dose Route Frequency Provider Last Rate Last Dose   • fentaNYL citrate (PF) (SUBLIMAZE) injection 50 mcg  50 mcg Intravenous Q5 Min PRN Rigoberto Rhoades MD       • iopamidol (ISOVUE-M 200) injection 41%  2 mL Injection Once in imaging Rigoberto Rhoades MD       • lidocaine (XYLOCAINE) 1 % injection 1 mL  1 mL Intradermal Once Rigoberto Rhoades MD       • methylPREDNISolone acetate (DEPO-medrol) injection 80 mg  80 mg Intramuscular Once Rigoberto Rhoades MD       • midazolam (VERSED) injection 1 mg  1 mg Intravenous Q5 Min PRN Rigoberto Rhoades MD       • sodium chloride 0.9 % flush 1-10 mL  1-10 mL Intravenous PRN Rigoberto Rhoades MD           --------------------------------------------------------------------------------  Assessment/Plan      Anesthesia Evaluation     Patient summary reviewed and Nursing notes reviewed          Airway   Mallampati: III  Dental - normal exam     Pulmonary - normal exam   (+) COPD, asthma,   Cardiovascular -  normal exam    (+) hypertension,       Neuro/Psych- negative ROS and neuro exam normal  GI/Hepatic/Renal/Endo    (+)  diabetes mellitus,     Musculoskeletal (-) normal exam    (+) Straight Leg Test, radiculopathy  Abdominal  - normal exam   Substance History - negative use     OB/GYN negative ob/gyn ROS         Other            Phys Exam Other: walker                         Diagnosis and Plan    Treatment Plan  ASA 3      Procedures: Lumbar Epidural Steroid Injection(LESI), With fluoroscopy,       Anesthetic plan and risks discussed with patient.          Diagnosis     * Lumbar neuritis [M54.16]     * Lumbar radiculopathy [M54.16]                  PAST MEDICAL HISTORY:  Current Outpatient Prescriptions on File Prior to Encounter   Medication Sig Dispense Refill   • ACCU-CHEK MARIO PLUS test strip TEST 4 TIMES DAILY AS DIRECTED  5   • ACCU-CHEK SOFTCLIX LANCETS lancets TEST 4 TIMES DAILY AS DIRECTED  0   • albuterol (PROVENTIL HFA;VENTOLIN HFA) 108 (90 BASE) MCG/ACT inhaler Inhale 2 puffs Every 6 (Six) Hours As Needed.     • aspirin 81 MG EC tablet Take 81 mg by mouth Daily.     • atorvastatin (LIPITOR) 10 MG tablet Take 10 mg by mouth Every Night.     • calcium carbonate-vitamin d 600-400 MG-UNIT per tablet Take 1 tablet by mouth 2 (Two) Times a Day.  2   • Cholecalciferol (VITAMIN D3) 2000 UNITS tablet Take 2,000 Units by mouth Daily.     • diclofenac (VOLTAREN) 1 % gel gel Apply 4 g topically 3 (Three) Times a Day. 4 tube 4   • DULoxetine (CYMBALTA) 60 MG capsule Take 60 mg by mouth 2 (Two) Times a Day.     • EASY COMFORT PEN NEEDLES 31G X 8 MM misc USE AS DIRECTED WITH LANTUS AND VICTOZA PENS  0   • fluticasone (FLONASE) 50 MCG/ACT nasal spray 1 spray into each nostril Daily. INHALE 1 (ONE) SPRAY IN EACH NOSTRIL ONCE DAILY  5   • furosemide (LASIX) 40 MG tablet Take 40 mg by mouth Daily.     • glimepiride (AMARYL) 2 MG tablet Take 2 mg by mouth 2 (Two) Times a Day.     • LANTUS SOLOSTAR 100 UNIT/ML solution  pen-injector Inject 35 Units under the skin Every Night.  5   • lisinopril (PRINIVIL,ZESTRIL) 5 MG tablet Take 5 mg by mouth Daily.     • loratadine (CLARITIN) 10 MG tablet Take 10 mg by mouth Daily.     • losartan (COZAAR) 50 MG tablet TAKE 1 (ONE) TABLET BY MOUTH ONCE DAILY  1   • LYRICA 75 MG capsule TAKE 1 (ONE) CAPSULE BY MOUTH 3 TIMES DAILY  0   • meloxicam (MOBIC) 7.5 MG tablet TAKE 1 (ONE) TABLET BY MOUTH ONCE DAILY WITH FOOD  0   • NOVOLOG FLEXPEN 100 UNIT/ML solution pen-injector sc pen INJECT 4-20 UNITS 3 TIMES DAILY  2   • omega-3 acid ethyl esters (LOVAZA) 1 G capsule TAKE 1 (ONE) CAPSULE BY MOUTH ONCE DAILY  3   • Omega-3 Fatty Acids (FISH OIL) 1000 MG capsule capsule Take 1,000 mg by mouth Every Night.     • potassium chloride (K-DUR) 10 MEQ CR tablet Take 10 mEq by mouth Every Night.     • Pregabalin (LYRICA PO) Take 75 tablets by mouth 3 (Three) Times a Day.     • raNITIdine (ZANTAC) 150 MG tablet Take 150 mg by mouth 2 (Two) Times a Day.     • sennosides-docusate sodium (SENOKOT-S) 8.6-50 MG tablet Take 1 tablet by mouth 2 (Two) Times a Day. 30 tablet 1   • TRESIBA FLEXTOUCH 200 UNIT/ML solution pen-injector Inject 40-80 UNITS ONCE daily  1   • VICTOZA 18 MG/3ML solution pen-injector Inject 1 pen as directed Daily. INJECT 1.8 MG UNDER THE SKIN ONCE DAILY  4     No current facility-administered medications on file prior to encounter.        Past Medical History:   Diagnosis Date   • Acid reflux    • Asthma    • Congestion of right ear    • COPD (chronic obstructive pulmonary disease)    • Diabetes mellitus    • History of fainting spells of unknown cause    • History of fracture of arm     RIGHT ULNA/RADIUS   • Hypertension    • Low back pain    • Migraine    • Neck pain    • Neuropathy     HANDS AND FEET   • Numbness or tingling     FEET   • Rheumatoid arthritis          SOCIAL HISTORY:  No tobacco    REVIEW OF SYSTEMS:  No hematologic infectious or constitutional symptoms  Other review of systems  non-contributory    PHYSICAL EXAM:  There were no vitals taken for this visit.  Well-developed well-nourished no acute distress  Extra ocular movements intact  Mallampati class 2 airway  Cardiac:  Regular rate and rhythm  Lungs:  Clear to auscultation bilaterally with good effort  Alert and oriented ×3  Deep tendon reflexes normal in the bilateral patella  Positive straight leg raise bilaterally  5 out of 5 strength bilateral upper and lower extremities  Lumbar spine without obvious deformities ecchymoses  Lumbar spine nontender to palpation      DIAGNOSIS:  Post-Op Diagnosis Codes:     * Lumbar neuritis [M54.16]     * Lumbar radiculopathy [M54.16]    PLAN:  1.  Lumbar epidural steroid injections, up to 3, spaced 1-2 weeks apart.  If pain control is acceptable after 1 or 2 injections, it was discussed with the patient that they may return for the subsequent injections if and when their pain returns.  The risks were discussed with the patient including failure of relief, worsening pain, Headache (post dural puncture headache), bleeding (epidural hematoma) and infection (epidural abscess or skin infection).  2.  Physical therapy exercises at home as prescribed by physical therapy or from the pain clinic handout (given to the patient).  Continuation of these exercises every day, or multiple times per week, even when the patient has good pain relief, was stressed to the patient as a preventative measure to decrease the frequency and severity of future pain episodes.  3.  Continue pain medicines as already prescribed.  If patient not currently taking any, it is recommended to begin Acetaminophen 1000 mg po q 8 hours.  If other medicines containing Acetaminophen are currently prescribed, maintain daily dose at 3000 mg.    4.  If they can tolerate NSAIDS, it is recommended to take Ibuprofen 600 mg po q 6 hours for 7 days during pain exacerbations.  Alternatively, they may substitute an NSAID of their choice (e.g. Aleve).   This may be taken at the same time as Acetaminophen.  5.  Heat and ice to the affected area as tolerated for pain control.  It was discussed that heating pads can cause burns.  6.  Daily low impact exercise such as walking or water exercise was recommended to maintain overall health and aid in weight control.   7.  Follow up as needed for subsequent injections.  8.  Patient was counseled to abstain from tobacco products.

## 2017-10-02 NOTE — ANESTHESIA PROCEDURE NOTES
PAIN Epidural block    Patient location during procedure: pain clinic  Start Time: 10/2/2017 8:45 AM  Stop Time: 10/2/2017 8:56 AM  Indication:at surgeon's request and procedure for pain  Performed By  Anesthesiologist: CAMERON VILLALBA  Preanesthetic Checklist  Completed: patient identified, site marked, surgical consent, pre-op evaluation, timeout performed, IV checked, risks and benefits discussed and monitors and equipment checked  Additional Notes  Dx:  Post-Op Diagnosis Codes:     * Lumbar neuritis (M54.16)     * Lumbar radiculopathy (M54.16)  80 mg depomedrol in epid    Pain in groin and buttocks; may need trial at L5S1 if L2-3 doesn't give relief  Prep:  Pt Position:prone (prone)  Sterile Tech:cap, gloves, mask and sterile barrier  Prep:chlorhexidine gluconate and isopropyl alcohol  Monitoring:blood pressure monitoring, EKG and continuous pulse oximetry  Procedure:  Approach:midline  Guidance: fluoroscopy and c arm pa and lat and loss of resistance  Location:lumbar  Level:2-3 (interlaminar)  Needle Type:Rory (dr luke metzger)  Needle Gauge:20  Aspiration:negative  Medications:  Depomedrol:80 mg  Preservative Free Saline:3mL  Comments:No dye due to allergy  Post Assessment:  Post-procedure: bandaid.  Pt Tolerance:patient tolerated the procedure well with no apparent complications  Complications:no

## 2017-10-31 ENCOUNTER — TRANSCRIBE ORDERS (OUTPATIENT)
Dept: ADMINISTRATIVE | Facility: HOSPITAL | Age: 72
End: 2017-10-31

## 2017-10-31 DIAGNOSIS — Z12.39 SCREENING BREAST EXAMINATION: Primary | ICD-10-CM

## 2017-11-22 ENCOUNTER — HOSPITAL ENCOUNTER (OUTPATIENT)
Dept: MAMMOGRAPHY | Facility: HOSPITAL | Age: 72
Discharge: HOME | End: 2017-11-22
Admitting: NURSE PRACTITIONER

## 2017-11-22 DIAGNOSIS — Z12.39 SCREENING BREAST EXAMINATION: ICD-10-CM

## 2017-11-22 PROCEDURE — 77063 BREAST TOMOSYNTHESIS BI: CPT

## 2017-11-22 PROCEDURE — G0202 SCR MAMMO BI INCL CAD: HCPCS

## 2017-12-12 ENCOUNTER — OFFICE VISIT (OUTPATIENT)
Dept: ORTHOPEDIC SURGERY | Facility: CLINIC | Age: 72
End: 2017-12-12

## 2017-12-12 VITALS — BODY MASS INDEX: 29.05 KG/M2 | WEIGHT: 180 LBS | TEMPERATURE: 98.9 F

## 2017-12-12 DIAGNOSIS — M48.062 SPINAL STENOSIS OF LUMBAR REGION WITH NEUROGENIC CLAUDICATION: Primary | ICD-10-CM

## 2017-12-12 PROCEDURE — 99214 OFFICE O/P EST MOD 30 MIN: CPT | Performed by: ORTHOPAEDIC SURGERY

## 2017-12-12 RX ORDER — SODIUM CHLORIDE, SODIUM LACTATE, POTASSIUM CHLORIDE, CALCIUM CHLORIDE 600; 310; 30; 20 MG/100ML; MG/100ML; MG/100ML; MG/100ML
100 INJECTION, SOLUTION INTRAVENOUS CONTINUOUS
Status: CANCELLED | OUTPATIENT
Start: 2017-12-12

## 2017-12-12 NOTE — PROGRESS NOTES
New patient or new problem visit    CC: Weakness and falling    HPI: Several months of increasing weakness and falling.  Epidurals helped her pain but not the weakness.  She status post L3 to S1 laminectomy and fusion and has worn out above.    PFSH: See attached    ROS: See attached    PE: Constitutional: Vital signs above-noted.  Awake, alert and oriented    Psychiatric: Affect and insight do not appear grossly disturbed.    Pulmonary: Breathing is unlabored, color is good.    Skin: Warm, dry and normal turgor    Cardiac:  pedal pulses intact.  No edema.    Eyesight and hearing appear adequate for examination purposes      Musculoskeletal:  There is some tenderness to percussion and palpation of the spine. Motion appears stiff, of course.  Posture is unremarkable to coronal and sagittal inspection.    The skin about the area is intact.  There is no palpable or visible deformity.  There is no local spasm.       Neurologic:   Reflexes are sent in the patellae and achilles.   Motor function is undisturbed in quadriceps, EHL, and gastrocnemius      Sensation appears symmetrically intact to light touch   .  In the bilateral lower extremities there is no evidence of atrophy.   Clonus is absent..  Gait appears undisturbed.  SLR test negative    XRAY: Dusty film x-rays from before show solid fusion L3 to S1.  MRI from August demonstrated L2 3 stenosis above the fusion from L3 to S1.  Recent plain films do show a good centimeter or more of ride projecting above the L3 screws so that attachment therein would be possible    Other: n/a    Impression: L2-3 stenosis.  She is falling so that changes all the risk and benefit ratio.  I'm recommending L2-3 repeat laminectomy and fusion with instrumentation.    Plan: L2-3 laminectomy fusion with instrumentation.  Risk and benefits discussed.

## 2017-12-14 PROBLEM — M48.062 SPINAL STENOSIS OF LUMBAR REGION WITH NEUROGENIC CLAUDICATION: Status: ACTIVE | Noted: 2017-12-14

## 2017-12-26 DIAGNOSIS — M25.551 BILATERAL HIP PAIN: Primary | ICD-10-CM

## 2017-12-26 DIAGNOSIS — M25.552 BILATERAL HIP PAIN: Primary | ICD-10-CM

## 2018-01-03 ENCOUNTER — APPOINTMENT (OUTPATIENT)
Dept: PREADMISSION TESTING | Facility: HOSPITAL | Age: 73
End: 2018-01-03

## 2018-01-03 VITALS
BODY MASS INDEX: 42.14 KG/M2 | SYSTOLIC BLOOD PRESSURE: 135 MMHG | OXYGEN SATURATION: 95 % | DIASTOLIC BLOOD PRESSURE: 90 MMHG | WEIGHT: 262.2 LBS | HEART RATE: 95 BPM | TEMPERATURE: 98.4 F | HEIGHT: 66 IN | RESPIRATION RATE: 18 BRPM

## 2018-01-03 LAB
ANION GAP SERPL CALCULATED.3IONS-SCNC: 12.9 MMOL/L
BUN BLD-MCNC: 16 MG/DL (ref 8–23)
BUN/CREAT SERPL: 18.2 (ref 7–25)
CALCIUM SPEC-SCNC: 9.6 MG/DL (ref 8.6–10.5)
CHLORIDE SERPL-SCNC: 102 MMOL/L (ref 98–107)
CO2 SERPL-SCNC: 23.1 MMOL/L (ref 22–29)
CREAT BLD-MCNC: 0.88 MG/DL (ref 0.57–1)
DEPRECATED RDW RBC AUTO: 46.2 FL (ref 37–54)
ERYTHROCYTE [DISTWIDTH] IN BLOOD BY AUTOMATED COUNT: 13.8 % (ref 11.7–13)
GFR SERPL CREATININE-BSD FRML MDRD: 77 ML/MIN/1.73
GLUCOSE BLD-MCNC: 118 MG/DL (ref 65–99)
HCT VFR BLD AUTO: 36.8 % (ref 35.6–45.5)
HGB BLD-MCNC: 11.5 G/DL (ref 11.9–15.5)
MCH RBC QN AUTO: 28.8 PG (ref 26.9–32)
MCHC RBC AUTO-ENTMCNC: 31.3 G/DL (ref 32.4–36.3)
MCV RBC AUTO: 92.2 FL (ref 80.5–98.2)
PLATELET # BLD AUTO: 267 10*3/MM3 (ref 140–500)
PMV BLD AUTO: 10.6 FL (ref 6–12)
POTASSIUM BLD-SCNC: 4.2 MMOL/L (ref 3.5–5.2)
RBC # BLD AUTO: 3.99 10*6/MM3 (ref 3.9–5.2)
SODIUM BLD-SCNC: 138 MMOL/L (ref 136–145)
WBC NRBC COR # BLD: 7.45 10*3/MM3 (ref 4.5–10.7)

## 2018-01-03 PROCEDURE — 93005 ELECTROCARDIOGRAM TRACING: CPT

## 2018-01-03 PROCEDURE — 93010 ELECTROCARDIOGRAM REPORT: CPT | Performed by: INTERNAL MEDICINE

## 2018-01-03 PROCEDURE — 36415 COLL VENOUS BLD VENIPUNCTURE: CPT

## 2018-01-03 PROCEDURE — 85027 COMPLETE CBC AUTOMATED: CPT | Performed by: ORTHOPAEDIC SURGERY

## 2018-01-03 PROCEDURE — 80048 BASIC METABOLIC PNL TOTAL CA: CPT | Performed by: ORTHOPAEDIC SURGERY

## 2018-01-03 RX ORDER — ASPIRIN 81 MG/1
81 TABLET ORAL DAILY
COMMUNITY
End: 2022-05-19 | Stop reason: HOSPADM

## 2018-01-03 RX ORDER — OMEGA-3-ACID ETHYL ESTERS 1 G/1
2 CAPSULE, LIQUID FILLED ORAL 2 TIMES DAILY
COMMUNITY
End: 2022-05-17

## 2018-01-03 RX ORDER — LOSARTAN POTASSIUM 50 MG/1
50 TABLET ORAL EVERY MORNING
COMMUNITY

## 2018-01-03 RX ORDER — MELOXICAM 7.5 MG/1
7.5 TABLET ORAL DAILY
COMMUNITY
End: 2018-04-17

## 2018-01-03 RX ORDER — PREGABALIN 75 MG/1
75 CAPSULE ORAL 3 TIMES DAILY
COMMUNITY
End: 2018-02-14

## 2018-01-03 RX ORDER — LANOLIN ALCOHOL/MO/W.PET/CERES
1000 CREAM (GRAM) TOPICAL DAILY
COMMUNITY
End: 2022-05-19 | Stop reason: HOSPADM

## 2018-01-03 NOTE — DISCHARGE INSTRUCTIONS
PLEASE ARRIVE AT 12:00 PM ON 1/8/2018      Take the following medications the morning of surgery with a small sip of water:  LOSARTAN, LYRICA, RANITIDINE      General Instructions:  • Do not eat solid food after midnight the night before surgery.  • You may drink clear liquids day of surgery but must stop at least one hour before your hospital arrival time.  • It is beneficial for you to have a clear drink that contains carbohydrates the day of surgery.  We suggest a 12 to 20 ounce bottle of Gatorade or Powerade for non-diabetic patients or a 12 to 20 ounce bottle of G2 or Powerade Zero for diabetic patients. (Pediatric patients, are not advised to drink a 12 to 20 ounce carbohydrate drink)    Clear liquids are liquids you can see through.  Nothing red in color.     Plain water                               Sports drinks  Sodas                                   Gelatin (Jell-O)  Fruit juices without pulp such as white grape juice and apple juice  Popsicles that contain no fruit or yogurt  Tea or coffee (no cream or milk added)  Gatorade / Powerade  G2 / Powerade Zero    • Infants may have breast milk up to four hours before surgery.  • Infants drinking formula may drink formula up to six hours before surgery.   • Patients who avoid smoking, chewing tobacco and alcohol for 4 weeks prior to surgery have a reduced risk of post-operative complications.  Quit smoking as many days before surgery as you can.  • Do not smoke, use chewing tobacco or drink alcohol the day of surgery.   • If applicable bring your C-PAP/ BI-PAP machine.  • Bring any papers given to you in the doctor’s office.  • Wear clean comfortable clothes and socks.  • Do not wear contact lenses or make-up.  Bring a case for your glasses.   • Bring crutches or walker if applicable.  • Remove all piercings.  Leave jewelry and any other valuables at home.  • Hair extensions with metal clips must be removed prior to surgery.  • The Pre-Admission Testing nurse  will instruct you to bring medications if unable to obtain an accurate list in Pre-Admission Testing.            Preventing a Surgical Site Infection:  • For 2 to 3 days before surgery, avoid shaving with a razor because the razor can irritate skin and make it easier to develop an infection.  • The night prior to surgery sleep in a clean bed with clean clothing.  Do not allow pets to sleep with you.  • Shower on the morning of surgery using a fresh bar of anti-bacterial soap (such as Dial) and clean washcloth.  Dry with a clean towel and dress in clean clothing.  • Ask your surgeon if you will be receiving antibiotics prior to surgery.  • Make sure you, your family, and all healthcare providers clean their hands with soap and water or an alcohol based hand  before caring for you or your wound.    Day of surgery:  Upon arrival, a Pre-op nurse and Anesthesiologist will review your health history, obtain vital signs, and answer questions you may have.  The only belongings needed at this time will be your home medications and if applicable your C-PAP/BI-PAP machine.  If you are staying overnight your family can leave the rest of your belongings in the car and bring them to your room later.  A Pre-op nurse will start an IV and you may receive medication in preparation for surgery, including something to help you relax.  Your family will be able to see you in the Pre-op area.  While you are in surgery your family should notify the waiting room  if they leave the waiting room area and provide a contact phone number.    Please be aware that surgery does come with discomfort.  We want to make every effort to control your discomfort so please discuss any uncontrolled symptoms with your nurse.   Your doctor will most likely have prescribed pain medications.      If you are going home after surgery you will receive individualized written care instructions before being discharged.  A responsible adult must  drive you to and from the hospital on the day of your surgery and stay with you for 24 hours.    If you are staying overnight following surgery, you will be transported to your hospital room following the recovery period.  [facility] has all private rooms.    If you have any questions please call Pre-Admission Testing at [phone].  Deductibles and co-payments are collected on the day of service. Please be prepared to pay the required co-pay, deductible or deposit on the day of service as defined by your plan.

## 2018-01-08 ENCOUNTER — HOSPITAL ENCOUNTER (INPATIENT)
Facility: HOSPITAL | Age: 73
LOS: 10 days | End: 2018-01-18
Attending: ORTHOPAEDIC SURGERY | Admitting: ORTHOPAEDIC SURGERY

## 2018-01-08 ENCOUNTER — APPOINTMENT (OUTPATIENT)
Dept: MRI IMAGING | Facility: HOSPITAL | Age: 73
End: 2018-01-08

## 2018-01-08 DIAGNOSIS — M48.062 SPINAL STENOSIS OF LUMBAR REGION WITH NEUROGENIC CLAUDICATION: ICD-10-CM

## 2018-01-08 DIAGNOSIS — G47.33 OBSTRUCTIVE SLEEP APNEA: ICD-10-CM

## 2018-01-08 DIAGNOSIS — R26.89 DECREASED MOBILITY: Primary | ICD-10-CM

## 2018-01-08 PROBLEM — G45.9 TIA (TRANSIENT ISCHEMIC ATTACK): Status: ACTIVE | Noted: 2018-01-08

## 2018-01-08 PROBLEM — E66.9 OBESITY: Status: ACTIVE | Noted: 2018-01-08

## 2018-01-08 PROBLEM — J44.9 COPD (CHRONIC OBSTRUCTIVE PULMONARY DISEASE): Status: ACTIVE | Noted: 2018-01-08

## 2018-01-08 PROBLEM — M48.061 LUMBAR SPINAL STENOSIS: Status: ACTIVE | Noted: 2018-01-08

## 2018-01-08 PROBLEM — E11.9 DM2 (DIABETES MELLITUS, TYPE 2) (HCC): Status: ACTIVE | Noted: 2018-01-08

## 2018-01-08 PROBLEM — I10 HTN (HYPERTENSION): Status: ACTIVE | Noted: 2018-01-08

## 2018-01-08 PROBLEM — R29.6 RECURRENT FALLS: Status: ACTIVE | Noted: 2018-01-08

## 2018-01-08 LAB
ABO GROUP BLD: NORMAL
BLD GP AB SCN SERPL QL: NEGATIVE
GLUCOSE BLDC GLUCOMTR-MCNC: 124 MG/DL (ref 70–130)
GLUCOSE BLDC GLUCOMTR-MCNC: 178 MG/DL (ref 70–130)
GLUCOSE BLDC GLUCOMTR-MCNC: 182 MG/DL (ref 70–130)
RH BLD: POSITIVE

## 2018-01-08 PROCEDURE — 70544 MR ANGIOGRAPHY HEAD W/O DYE: CPT

## 2018-01-08 PROCEDURE — 82962 GLUCOSE BLOOD TEST: CPT

## 2018-01-08 PROCEDURE — 86850 RBC ANTIBODY SCREEN: CPT | Performed by: ORTHOPAEDIC SURGERY

## 2018-01-08 PROCEDURE — 70553 MRI BRAIN STEM W/O & W/DYE: CPT

## 2018-01-08 PROCEDURE — 70549 MR ANGIOGRAPH NECK W/O&W/DYE: CPT

## 2018-01-08 PROCEDURE — 86900 BLOOD TYPING SEROLOGIC ABO: CPT | Performed by: ORTHOPAEDIC SURGERY

## 2018-01-08 PROCEDURE — G0378 HOSPITAL OBSERVATION PER HR: HCPCS

## 2018-01-08 PROCEDURE — 0 GADOBENATE DIMEGLUMINE 529 MG/ML SOLUTION: Performed by: INTERNAL MEDICINE

## 2018-01-08 PROCEDURE — 86901 BLOOD TYPING SEROLOGIC RH(D): CPT | Performed by: ORTHOPAEDIC SURGERY

## 2018-01-08 PROCEDURE — 63710000001 INSULIN DETEMER PER 5 UNITS: Performed by: INTERNAL MEDICINE

## 2018-01-08 PROCEDURE — 63710000001 INSULIN ASPART PER 5 UNITS: Performed by: INTERNAL MEDICINE

## 2018-01-08 PROCEDURE — 25010000002 VANCOMYCIN 10 G RECONSTITUTED SOLUTION: Performed by: ORTHOPAEDIC SURGERY

## 2018-01-08 PROCEDURE — A9577 INJ MULTIHANCE: HCPCS | Performed by: INTERNAL MEDICINE

## 2018-01-08 RX ORDER — LORAZEPAM 1 MG/1
1 TABLET ORAL ONCE
Status: COMPLETED | OUTPATIENT
Start: 2018-01-09 | End: 2018-01-08

## 2018-01-08 RX ORDER — LOSARTAN POTASSIUM 50 MG/1
50 TABLET ORAL DAILY
Status: DISCONTINUED | OUTPATIENT
Start: 2018-01-08 | End: 2018-01-18 | Stop reason: HOSPADM

## 2018-01-08 RX ORDER — DEXTROSE MONOHYDRATE 25 G/50ML
25 INJECTION, SOLUTION INTRAVENOUS
Status: DISCONTINUED | OUTPATIENT
Start: 2018-01-08 | End: 2018-01-18 | Stop reason: HOSPADM

## 2018-01-08 RX ORDER — FAMOTIDINE 20 MG/1
20 TABLET, FILM COATED ORAL 2 TIMES DAILY
Status: DISCONTINUED | OUTPATIENT
Start: 2018-01-08 | End: 2018-01-18 | Stop reason: HOSPADM

## 2018-01-08 RX ORDER — ATORVASTATIN CALCIUM 10 MG/1
10 TABLET, FILM COATED ORAL NIGHTLY
Status: DISCONTINUED | OUTPATIENT
Start: 2018-01-08 | End: 2018-01-18 | Stop reason: HOSPADM

## 2018-01-08 RX ORDER — MELATONIN
2000 DAILY
Status: DISCONTINUED | OUTPATIENT
Start: 2018-01-08 | End: 2018-01-18 | Stop reason: HOSPADM

## 2018-01-08 RX ORDER — SODIUM CHLORIDE, SODIUM LACTATE, POTASSIUM CHLORIDE, CALCIUM CHLORIDE 600; 310; 30; 20 MG/100ML; MG/100ML; MG/100ML; MG/100ML
100 INJECTION, SOLUTION INTRAVENOUS CONTINUOUS
Status: DISCONTINUED | OUTPATIENT
Start: 2018-01-08 | End: 2018-01-15

## 2018-01-08 RX ORDER — POTASSIUM CHLORIDE 750 MG/1
10 CAPSULE, EXTENDED RELEASE ORAL 2 TIMES DAILY WITH MEALS
Status: DISCONTINUED | OUTPATIENT
Start: 2018-01-08 | End: 2018-01-11

## 2018-01-08 RX ORDER — ACETAMINOPHEN 325 MG/1
650 TABLET ORAL EVERY 4 HOURS PRN
Status: DISCONTINUED | OUTPATIENT
Start: 2018-01-08 | End: 2018-01-18 | Stop reason: HOSPADM

## 2018-01-08 RX ORDER — ACETAMINOPHEN 650 MG/1
650 SUPPOSITORY RECTAL EVERY 4 HOURS PRN
Status: DISCONTINUED | OUTPATIENT
Start: 2018-01-08 | End: 2018-01-18 | Stop reason: HOSPADM

## 2018-01-08 RX ORDER — FLUTICASONE PROPIONATE 50 MCG
1 SPRAY, SUSPENSION (ML) NASAL DAILY
Status: DISCONTINUED | OUTPATIENT
Start: 2018-01-08 | End: 2018-01-18 | Stop reason: HOSPADM

## 2018-01-08 RX ORDER — NICOTINE POLACRILEX 4 MG
15 LOZENGE BUCCAL
Status: DISCONTINUED | OUTPATIENT
Start: 2018-01-08 | End: 2018-01-18 | Stop reason: HOSPADM

## 2018-01-08 RX ORDER — PREGABALIN 75 MG/1
75 CAPSULE ORAL 3 TIMES DAILY
Status: DISCONTINUED | OUTPATIENT
Start: 2018-01-08 | End: 2018-01-09

## 2018-01-08 RX ORDER — ASPIRIN 81 MG/1
81 TABLET ORAL DAILY
Status: DISCONTINUED | OUTPATIENT
Start: 2018-01-08 | End: 2018-01-11

## 2018-01-08 RX ORDER — FUROSEMIDE 40 MG/1
40 TABLET ORAL DAILY
Status: DISCONTINUED | OUTPATIENT
Start: 2018-01-08 | End: 2018-01-16

## 2018-01-08 RX ORDER — SODIUM CHLORIDE 0.9 % (FLUSH) 0.9 %
1-10 SYRINGE (ML) INJECTION AS NEEDED
Status: DISCONTINUED | OUTPATIENT
Start: 2018-01-08 | End: 2018-01-11

## 2018-01-08 RX ORDER — ONDANSETRON 2 MG/ML
4 INJECTION INTRAMUSCULAR; INTRAVENOUS EVERY 6 HOURS PRN
Status: DISCONTINUED | OUTPATIENT
Start: 2018-01-08 | End: 2018-01-18 | Stop reason: HOSPADM

## 2018-01-08 RX ORDER — SODIUM CHLORIDE 9 MG/ML
75 INJECTION, SOLUTION INTRAVENOUS CONTINUOUS
Status: DISCONTINUED | OUTPATIENT
Start: 2018-01-08 | End: 2018-01-11

## 2018-01-08 RX ORDER — CHOLECALCIFEROL (VITAMIN D3) 125 MCG
1000 CAPSULE ORAL DAILY
Status: DISCONTINUED | OUTPATIENT
Start: 2018-01-08 | End: 2018-01-18 | Stop reason: HOSPADM

## 2018-01-08 RX ORDER — ALBUTEROL SULFATE 2.5 MG/3ML
2.5 SOLUTION RESPIRATORY (INHALATION) EVERY 6 HOURS PRN
Status: DISCONTINUED | OUTPATIENT
Start: 2018-01-08 | End: 2018-01-18 | Stop reason: HOSPADM

## 2018-01-08 RX ORDER — CETIRIZINE HYDROCHLORIDE 10 MG/1
10 TABLET ORAL DAILY
Status: DISCONTINUED | OUTPATIENT
Start: 2018-01-08 | End: 2018-01-18 | Stop reason: HOSPADM

## 2018-01-08 RX ADMIN — LOSARTAN POTASSIUM 50 MG: 50 TABLET, FILM COATED ORAL at 23:15

## 2018-01-08 RX ADMIN — PREGABALIN 75 MG: 75 CAPSULE ORAL at 23:15

## 2018-01-08 RX ADMIN — CETIRIZINE HYDROCHLORIDE 10 MG: 10 TABLET, FILM COATED ORAL at 23:15

## 2018-01-08 RX ADMIN — CALCIUM CARBONATE-VITAMIN D TAB 500 MG-200 UNIT 1000 MG: 500-200 TAB at 23:19

## 2018-01-08 RX ADMIN — LORAZEPAM 1 MG: 1 TABLET ORAL at 19:59

## 2018-01-08 RX ADMIN — VITAMIN D, TAB 1000IU (100/BT) 2000 UNITS: 25 TAB at 23:19

## 2018-01-08 RX ADMIN — VANCOMYCIN HYDROCHLORIDE 1250 MG: 100 INJECTION, POWDER, LYOPHILIZED, FOR SOLUTION INTRAVENOUS at 16:02

## 2018-01-08 RX ADMIN — INSULIN ASPART 2 UNITS: 100 INJECTION, SOLUTION INTRAVENOUS; SUBCUTANEOUS at 23:10

## 2018-01-08 RX ADMIN — ATORVASTATIN CALCIUM 10 MG: 10 TABLET, FILM COATED ORAL at 23:15

## 2018-01-08 RX ADMIN — GADOBENATE DIMEGLUMINE 20 ML: 529 INJECTION, SOLUTION INTRAVENOUS at 21:25

## 2018-01-08 RX ADMIN — FLUTICASONE PROPIONATE 1 SPRAY: 50 SPRAY, METERED NASAL at 23:55

## 2018-01-08 RX ADMIN — SODIUM CHLORIDE 75 ML/HR: 9 INJECTION, SOLUTION INTRAVENOUS at 18:56

## 2018-01-08 RX ADMIN — FUROSEMIDE 40 MG: 40 TABLET ORAL at 23:19

## 2018-01-08 RX ADMIN — INSULIN DETEMIR 10 UNITS: 100 INJECTION, SOLUTION SUBCUTANEOUS at 23:14

## 2018-01-08 RX ADMIN — POTASSIUM CHLORIDE 10 MEQ: 750 CAPSULE, EXTENDED RELEASE ORAL at 23:15

## 2018-01-08 RX ADMIN — FAMOTIDINE 20 MG: 20 TABLET, FILM COATED ORAL at 23:15

## 2018-01-08 RX ADMIN — ASPIRIN 81 MG: 81 TABLET ORAL at 23:20

## 2018-01-09 ENCOUNTER — APPOINTMENT (OUTPATIENT)
Dept: CARDIOLOGY | Facility: HOSPITAL | Age: 73
End: 2018-01-09
Attending: INTERNAL MEDICINE

## 2018-01-09 PROBLEM — R26.89 DECREASED MOBILITY: Status: ACTIVE | Noted: 2017-12-14

## 2018-01-09 LAB
ALBUMIN SERPL-MCNC: 4 G/DL (ref 3.5–5.2)
ALBUMIN/GLOB SERPL: 1.2 G/DL
ALP SERPL-CCNC: 92 U/L (ref 39–117)
ALT SERPL W P-5'-P-CCNC: 21 U/L (ref 1–33)
ANION GAP SERPL CALCULATED.3IONS-SCNC: 13.7 MMOL/L
ARTERIAL PATENCY WRIST A: ABNORMAL
AST SERPL-CCNC: 28 U/L (ref 1–32)
ATMOSPHERIC PRESS: 752.3 MMHG
BASE EXCESS BLDA CALC-SCNC: 2.6 MMOL/L (ref 0–2)
BDY SITE: ABNORMAL
BH CV ECHO MEAS - ACS: 1.9 CM
BH CV ECHO MEAS - AO MEAN PG (FULL): 5 MMHG
BH CV ECHO MEAS - AO MEAN PG: 8 MMHG
BH CV ECHO MEAS - AO ROOT AREA (BSA CORRECTED): 1.4
BH CV ECHO MEAS - AO ROOT AREA: 8 CM^2
BH CV ECHO MEAS - AO ROOT DIAM: 3.2 CM
BH CV ECHO MEAS - AO V2 MEAN: 131 CM/SEC
BH CV ECHO MEAS - AO V2 VTI: 35.2 CM
BH CV ECHO MEAS - AVA(I,A): 2.3 CM^2
BH CV ECHO MEAS - AVA(I,D): 2.3 CM^2
BH CV ECHO MEAS - BSA(HAYCOCK): 2.4 M^2
BH CV ECHO MEAS - BSA: 2.2 M^2
BH CV ECHO MEAS - BZI_BMI: 41.3 KILOGRAMS/M^2
BH CV ECHO MEAS - BZI_METRIC_HEIGHT: 167.6 CM
BH CV ECHO MEAS - BZI_METRIC_WEIGHT: 116.1 KG
BH CV ECHO MEAS - CONTRAST EF (2CH): 70.6 ML/M^2
BH CV ECHO MEAS - CONTRAST EF 4CH: 67.7 ML/M^2
BH CV ECHO MEAS - EDV(CUBED): 54.9 ML
BH CV ECHO MEAS - EDV(MOD-SP2): 68 ML
BH CV ECHO MEAS - EDV(MOD-SP4): 96 ML
BH CV ECHO MEAS - EDV(TEICH): 62 ML
BH CV ECHO MEAS - EF(CUBED): 64.1 %
BH CV ECHO MEAS - EF(MOD-SP2): 70.6 %
BH CV ECHO MEAS - EF(MOD-SP4): 67.7 %
BH CV ECHO MEAS - EF(TEICH): 56.4 %
BH CV ECHO MEAS - ESV(CUBED): 19.7 ML
BH CV ECHO MEAS - ESV(MOD-SP2): 20 ML
BH CV ECHO MEAS - ESV(MOD-SP4): 31 ML
BH CV ECHO MEAS - ESV(TEICH): 27 ML
BH CV ECHO MEAS - FS: 28.9 %
BH CV ECHO MEAS - IVS/LVPW: 1.2
BH CV ECHO MEAS - IVSD: 1.3 CM
BH CV ECHO MEAS - LA DIMENSION: 4.2 CM
BH CV ECHO MEAS - LA/AO: 1.3
BH CV ECHO MEAS - LAT PEAK E' VEL: 8.7 CM/SEC
BH CV ECHO MEAS - LV DIASTOLIC VOL/BSA (35-75): 43.2 ML/M^2
BH CV ECHO MEAS - LV MASS(C)D: 153.2 GRAMS
BH CV ECHO MEAS - LV MASS(C)DI: 69 GRAMS/M^2
BH CV ECHO MEAS - LV MEAN PG: 3 MMHG
BH CV ECHO MEAS - LV SYSTOLIC VOL/BSA (12-30): 14 ML/M^2
BH CV ECHO MEAS - LV V1 MEAN: 87.1 CM/SEC
BH CV ECHO MEAS - LV V1 VTI: 23.6 CM
BH CV ECHO MEAS - LVIDD: 3.8 CM
BH CV ECHO MEAS - LVIDS: 2.7 CM
BH CV ECHO MEAS - LVLD AP2: 7.5 CM
BH CV ECHO MEAS - LVLD AP4: 7.8 CM
BH CV ECHO MEAS - LVLS AP2: 5.3 CM
BH CV ECHO MEAS - LVLS AP4: 5.7 CM
BH CV ECHO MEAS - LVOT AREA (M): 3.5 CM^2
BH CV ECHO MEAS - LVOT AREA: 3.5 CM^2
BH CV ECHO MEAS - LVOT DIAM: 2.1 CM
BH CV ECHO MEAS - LVPWD: 1.1 CM
BH CV ECHO MEAS - MED PEAK E' VEL: 5.2 CM/SEC
BH CV ECHO MEAS - MV A DUR: 0.14 SEC
BH CV ECHO MEAS - MV A MAX VEL: 72.6 CM/SEC
BH CV ECHO MEAS - MV DEC SLOPE: 304 CM/SEC^2
BH CV ECHO MEAS - MV DEC TIME: 0.15 SEC
BH CV ECHO MEAS - MV E MAX VEL: 64.2 CM/SEC
BH CV ECHO MEAS - MV E/A: 0.88
BH CV ECHO MEAS - MV MEAN PG: 2 MMHG
BH CV ECHO MEAS - MV P1/2T MAX VEL: 91.9 CM/SEC
BH CV ECHO MEAS - MV P1/2T: 88.5 MSEC
BH CV ECHO MEAS - MV V2 MEAN: 65.1 CM/SEC
BH CV ECHO MEAS - MV V2 VTI: 22.1 CM
BH CV ECHO MEAS - MVA P1/2T LCG: 2.4 CM^2
BH CV ECHO MEAS - MVA(P1/2T): 2.5 CM^2
BH CV ECHO MEAS - MVA(VTI): 3.7 CM^2
BH CV ECHO MEAS - PA ACC SLOPE: 796 CM/SEC^2
BH CV ECHO MEAS - PA ACC TIME: 0.11 SEC
BH CV ECHO MEAS - PA MAX PG (FULL): 1.7 MMHG
BH CV ECHO MEAS - PA MAX PG: 3 MMHG
BH CV ECHO MEAS - PA PR(ACCEL): 30 MMHG
BH CV ECHO MEAS - PA V2 MAX: 86.9 CM/SEC
BH CV ECHO MEAS - PULM A REVS DUR: 0.13 SEC
BH CV ECHO MEAS - PULM A REVS VEL: 27.6 CM/SEC
BH CV ECHO MEAS - PULM DIAS VEL: 38 CM/SEC
BH CV ECHO MEAS - PULM S/D: 1.2
BH CV ECHO MEAS - PULM SYS VEL: 44.9 CM/SEC
BH CV ECHO MEAS - PVA(V,A): 2 CM^2
BH CV ECHO MEAS - PVA(V,D): 2 CM^2
BH CV ECHO MEAS - QP/QS: 0.56
BH CV ECHO MEAS - RAP SYSTOLE: 3 MMHG
BH CV ECHO MEAS - RV MAX PG: 1.3 MMHG
BH CV ECHO MEAS - RV MEAN PG: 1 MMHG
BH CV ECHO MEAS - RV V1 MAX: 56.4 CM/SEC
BH CV ECHO MEAS - RV V1 MEAN: 44.3 CM/SEC
BH CV ECHO MEAS - RV V1 VTI: 14.5 CM
BH CV ECHO MEAS - RVOT AREA: 3.1 CM^2
BH CV ECHO MEAS - RVOT DIAM: 2 CM
BH CV ECHO MEAS - RVSP: 29 MMHG
BH CV ECHO MEAS - SI(AO): 127.4 ML/M^2
BH CV ECHO MEAS - SI(CUBED): 15.8 ML/M^2
BH CV ECHO MEAS - SI(LVOT): 36.8 ML/M^2
BH CV ECHO MEAS - SI(MOD-SP2): 21.6 ML/M^2
BH CV ECHO MEAS - SI(MOD-SP4): 29.3 ML/M^2
BH CV ECHO MEAS - SI(TEICH): 15.7 ML/M^2
BH CV ECHO MEAS - SV(AO): 283.1 ML
BH CV ECHO MEAS - SV(CUBED): 35.2 ML
BH CV ECHO MEAS - SV(LVOT): 81.7 ML
BH CV ECHO MEAS - SV(MOD-SP2): 48 ML
BH CV ECHO MEAS - SV(MOD-SP4): 65 ML
BH CV ECHO MEAS - SV(RVOT): 45.6 ML
BH CV ECHO MEAS - SV(TEICH): 34.9 ML
BH CV ECHO MEAS - TAPSE (>1.6): 1.8 CM2
BH CV ECHO MEAS - TR MAX VEL: 257 CM/SEC
BH CV XLRA - RV BASE: 3.3 CM
BH CV XLRA - RV LENGTH: 5.9 CM
BH CV XLRA - RV MID: 2.3 CM
BH CV XLRA - TDI S': 13.7 CM/SEC
BILIRUB SERPL-MCNC: 0.4 MG/DL (ref 0.1–1.2)
BUN BLD-MCNC: 13 MG/DL (ref 8–23)
BUN/CREAT SERPL: 15.7 (ref 7–25)
CALCIUM SPEC-SCNC: 9.5 MG/DL (ref 8.6–10.5)
CHLORIDE SERPL-SCNC: 100 MMOL/L (ref 98–107)
CHOLEST SERPL-MCNC: 121 MG/DL (ref 0–200)
CO2 SERPL-SCNC: 24.3 MMOL/L (ref 22–29)
CREAT BLD-MCNC: 0.83 MG/DL (ref 0.57–1)
DEPRECATED RDW RBC AUTO: 48.3 FL (ref 37–54)
E/E' RATIO: 9.9
ERYTHROCYTE [DISTWIDTH] IN BLOOD BY AUTOMATED COUNT: 14.2 % (ref 11.7–13)
GFR SERPL CREATININE-BSD FRML MDRD: 82 ML/MIN/1.73
GLOBULIN UR ELPH-MCNC: 3.4 GM/DL
GLUCOSE BLD-MCNC: 192 MG/DL (ref 65–99)
GLUCOSE BLDC GLUCOMTR-MCNC: 191 MG/DL (ref 70–130)
GLUCOSE BLDC GLUCOMTR-MCNC: 194 MG/DL (ref 70–130)
GLUCOSE BLDC GLUCOMTR-MCNC: 248 MG/DL (ref 70–130)
GLUCOSE BLDC GLUCOMTR-MCNC: 256 MG/DL (ref 70–130)
HBA1C MFR BLD: 9.39 % (ref 4.8–5.6)
HCO3 BLDA-SCNC: 28.2 MMOL/L (ref 22–28)
HCT VFR BLD AUTO: 35.4 % (ref 35.6–45.5)
HDLC SERPL-MCNC: 48 MG/DL (ref 40–60)
HGB BLD-MCNC: 10.8 G/DL (ref 11.9–15.5)
HOROWITZ INDEX BLD+IHG-RTO: 21 %
LDLC SERPL CALC-MCNC: 51 MG/DL (ref 0–100)
LDLC/HDLC SERPL: 1.06 {RATIO}
LEFT ATRIUM VOLUME INDEX: 20 ML/M2
MAXIMAL PREDICTED HEART RATE: 148 BPM
MCH RBC QN AUTO: 28.6 PG (ref 26.9–32)
MCHC RBC AUTO-ENTMCNC: 30.5 G/DL (ref 32.4–36.3)
MCV RBC AUTO: 93.7 FL (ref 80.5–98.2)
MODALITY: ABNORMAL
O2 A-A PPRESDIFF RESPIRATORY: 0.8 MMHG
PCO2 BLDA: 47 MM HG (ref 35–45)
PH BLDA: 7.39 PH UNITS (ref 7.35–7.45)
PLATELET # BLD AUTO: 251 10*3/MM3 (ref 140–500)
PMV BLD AUTO: 10.7 FL (ref 6–12)
PO2 BLDA: 75 MM HG (ref 80–100)
POTASSIUM BLD-SCNC: 3.4 MMOL/L (ref 3.5–5.2)
PROT SERPL-MCNC: 7.4 G/DL (ref 6–8.5)
RBC # BLD AUTO: 3.78 10*6/MM3 (ref 3.9–5.2)
SAO2 % BLDCOA: 94.5 % (ref 92–99)
SODIUM BLD-SCNC: 138 MMOL/L (ref 136–145)
STRESS TARGET HR: 126 BPM
TOTAL RATE: 20 BREATHS/MINUTE
TRIGL SERPL-MCNC: 110 MG/DL (ref 0–150)
VLDLC SERPL-MCNC: 22 MG/DL (ref 5–40)
WBC NRBC COR # BLD: 7.52 10*3/MM3 (ref 4.5–10.7)

## 2018-01-09 PROCEDURE — G8979 MOBILITY GOAL STATUS: HCPCS

## 2018-01-09 PROCEDURE — 82962 GLUCOSE BLOOD TEST: CPT

## 2018-01-09 PROCEDURE — 36600 WITHDRAWAL OF ARTERIAL BLOOD: CPT

## 2018-01-09 PROCEDURE — 99024 POSTOP FOLLOW-UP VISIT: CPT | Performed by: ORTHOPAEDIC SURGERY

## 2018-01-09 PROCEDURE — 63710000001 INSULIN ASPART PER 5 UNITS: Performed by: INTERNAL MEDICINE

## 2018-01-09 PROCEDURE — G0378 HOSPITAL OBSERVATION PER HR: HCPCS

## 2018-01-09 PROCEDURE — 85027 COMPLETE CBC AUTOMATED: CPT | Performed by: INTERNAL MEDICINE

## 2018-01-09 PROCEDURE — 93306 TTE W/DOPPLER COMPLETE: CPT | Performed by: INTERNAL MEDICINE

## 2018-01-09 PROCEDURE — 97165 OT EVAL LOW COMPLEX 30 MIN: CPT

## 2018-01-09 PROCEDURE — 97162 PT EVAL MOD COMPLEX 30 MIN: CPT

## 2018-01-09 PROCEDURE — 93306 TTE W/DOPPLER COMPLETE: CPT

## 2018-01-09 PROCEDURE — 82803 BLOOD GASES ANY COMBINATION: CPT

## 2018-01-09 PROCEDURE — 80061 LIPID PANEL: CPT | Performed by: INTERNAL MEDICINE

## 2018-01-09 PROCEDURE — G8996 SWALLOW CURRENT STATUS: HCPCS

## 2018-01-09 PROCEDURE — 99205 OFFICE O/P NEW HI 60 MIN: CPT | Performed by: RADIOLOGY

## 2018-01-09 PROCEDURE — 97535 SELF CARE MNGMENT TRAINING: CPT

## 2018-01-09 PROCEDURE — 94799 UNLISTED PULMONARY SVC/PX: CPT

## 2018-01-09 PROCEDURE — 63710000001 INSULIN DETEMER PER 5 UNITS: Performed by: INTERNAL MEDICINE

## 2018-01-09 PROCEDURE — 97110 THERAPEUTIC EXERCISES: CPT

## 2018-01-09 PROCEDURE — G8978 MOBILITY CURRENT STATUS: HCPCS

## 2018-01-09 PROCEDURE — G8998 SWALLOW D/C STATUS: HCPCS

## 2018-01-09 PROCEDURE — 92610 EVALUATE SWALLOWING FUNCTION: CPT

## 2018-01-09 PROCEDURE — G8988 SELF CARE GOAL STATUS: HCPCS

## 2018-01-09 PROCEDURE — 83036 HEMOGLOBIN GLYCOSYLATED A1C: CPT | Performed by: INTERNAL MEDICINE

## 2018-01-09 PROCEDURE — 80053 COMPREHEN METABOLIC PANEL: CPT | Performed by: INTERNAL MEDICINE

## 2018-01-09 PROCEDURE — G8997 SWALLOW GOAL STATUS: HCPCS

## 2018-01-09 PROCEDURE — G8987 SELF CARE CURRENT STATUS: HCPCS

## 2018-01-09 RX ADMIN — VITAMIN D, TAB 1000IU (100/BT) 2000 UNITS: 25 TAB at 08:13

## 2018-01-09 RX ADMIN — INSULIN ASPART 6 UNITS: 100 INJECTION, SOLUTION INTRAVENOUS; SUBCUTANEOUS at 21:50

## 2018-01-09 RX ADMIN — CYANOCOBALAMIN TAB 500 MCG 1000 MCG: 500 TAB at 08:13

## 2018-01-09 RX ADMIN — INSULIN DETEMIR 10 UNITS: 100 INJECTION, SOLUTION SUBCUTANEOUS at 21:50

## 2018-01-09 RX ADMIN — INSULIN ASPART 4 UNITS: 100 INJECTION, SOLUTION INTRAVENOUS; SUBCUTANEOUS at 12:07

## 2018-01-09 RX ADMIN — FUROSEMIDE 40 MG: 40 TABLET ORAL at 08:12

## 2018-01-09 RX ADMIN — INSULIN ASPART 2 UNITS: 100 INJECTION, SOLUTION INTRAVENOUS; SUBCUTANEOUS at 08:14

## 2018-01-09 RX ADMIN — SODIUM CHLORIDE 75 ML/HR: 9 INJECTION, SOLUTION INTRAVENOUS at 21:49

## 2018-01-09 RX ADMIN — FLUTICASONE PROPIONATE 1 SPRAY: 50 SPRAY, METERED NASAL at 08:15

## 2018-01-09 RX ADMIN — CALCIUM CARBONATE-VITAMIN D TAB 500 MG-200 UNIT 1000 MG: 500-200 TAB at 08:13

## 2018-01-09 RX ADMIN — ATORVASTATIN CALCIUM 10 MG: 10 TABLET, FILM COATED ORAL at 21:50

## 2018-01-09 RX ADMIN — CETIRIZINE HYDROCHLORIDE 10 MG: 10 TABLET, FILM COATED ORAL at 08:14

## 2018-01-09 RX ADMIN — ACETAMINOPHEN 650 MG: 325 TABLET ORAL at 08:12

## 2018-01-09 RX ADMIN — POTASSIUM CHLORIDE 10 MEQ: 750 CAPSULE, EXTENDED RELEASE ORAL at 08:12

## 2018-01-09 RX ADMIN — FAMOTIDINE 20 MG: 20 TABLET, FILM COATED ORAL at 21:50

## 2018-01-09 RX ADMIN — PREGABALIN 75 MG: 75 CAPSULE ORAL at 08:12

## 2018-01-09 RX ADMIN — LOSARTAN POTASSIUM 50 MG: 50 TABLET, FILM COATED ORAL at 08:13

## 2018-01-09 RX ADMIN — INSULIN ASPART 2 UNITS: 100 INJECTION, SOLUTION INTRAVENOUS; SUBCUTANEOUS at 18:40

## 2018-01-09 RX ADMIN — POTASSIUM CHLORIDE 10 MEQ: 750 CAPSULE, EXTENDED RELEASE ORAL at 18:40

## 2018-01-09 RX ADMIN — SODIUM CHLORIDE 75 ML/HR: 9 INJECTION, SOLUTION INTRAVENOUS at 08:25

## 2018-01-09 RX ADMIN — FAMOTIDINE 20 MG: 20 TABLET, FILM COATED ORAL at 08:13

## 2018-01-09 RX ADMIN — ASPIRIN 81 MG: 81 TABLET ORAL at 08:13

## 2018-01-10 ENCOUNTER — APPOINTMENT (OUTPATIENT)
Dept: CT IMAGING | Facility: HOSPITAL | Age: 73
End: 2018-01-10

## 2018-01-10 LAB
ALBUMIN SERPL-MCNC: 3.3 G/DL (ref 3.5–5.2)
ALBUMIN/GLOB SERPL: 0.9 G/DL
ALP SERPL-CCNC: 84 U/L (ref 39–117)
ALT SERPL W P-5'-P-CCNC: 17 U/L (ref 1–33)
ANION GAP SERPL CALCULATED.3IONS-SCNC: 13.4 MMOL/L
AST SERPL-CCNC: 24 U/L (ref 1–32)
BASOPHILS # BLD AUTO: 0.03 10*3/MM3 (ref 0–0.2)
BASOPHILS NFR BLD AUTO: 0.4 % (ref 0–1.5)
BILIRUB SERPL-MCNC: 0.4 MG/DL (ref 0.1–1.2)
BUN BLD-MCNC: 13 MG/DL (ref 8–23)
BUN/CREAT SERPL: 15.3 (ref 7–25)
CALCIUM SPEC-SCNC: 8.6 MG/DL (ref 8.6–10.5)
CHLORIDE SERPL-SCNC: 101 MMOL/L (ref 98–107)
CO2 SERPL-SCNC: 24.6 MMOL/L (ref 22–29)
CREAT BLD-MCNC: 0.85 MG/DL (ref 0.57–1)
DEPRECATED RDW RBC AUTO: 47.1 FL (ref 37–54)
EOSINOPHIL # BLD AUTO: 0.26 10*3/MM3 (ref 0–0.7)
EOSINOPHIL NFR BLD AUTO: 3.3 % (ref 0.3–6.2)
ERYTHROCYTE [DISTWIDTH] IN BLOOD BY AUTOMATED COUNT: 13.9 % (ref 11.7–13)
GFR SERPL CREATININE-BSD FRML MDRD: 80 ML/MIN/1.73
GLOBULIN UR ELPH-MCNC: 3.7 GM/DL
GLUCOSE BLD-MCNC: 141 MG/DL (ref 65–99)
GLUCOSE BLDC GLUCOMTR-MCNC: 163 MG/DL (ref 70–130)
GLUCOSE BLDC GLUCOMTR-MCNC: 216 MG/DL (ref 70–130)
GLUCOSE BLDC GLUCOMTR-MCNC: 261 MG/DL (ref 70–130)
GLUCOSE BLDC GLUCOMTR-MCNC: 269 MG/DL (ref 70–130)
HCT VFR BLD AUTO: 33.6 % (ref 35.6–45.5)
HGB BLD-MCNC: 10.4 G/DL (ref 11.9–15.5)
IMM GRANULOCYTES # BLD: 0 10*3/MM3 (ref 0–0.03)
IMM GRANULOCYTES NFR BLD: 0 % (ref 0–0.5)
LYMPHOCYTES # BLD AUTO: 3.09 10*3/MM3 (ref 0.9–4.8)
LYMPHOCYTES NFR BLD AUTO: 39 % (ref 19.6–45.3)
MCH RBC QN AUTO: 28.7 PG (ref 26.9–32)
MCHC RBC AUTO-ENTMCNC: 31 G/DL (ref 32.4–36.3)
MCV RBC AUTO: 92.8 FL (ref 80.5–98.2)
MONOCYTES # BLD AUTO: 0.91 10*3/MM3 (ref 0.2–1.2)
MONOCYTES NFR BLD AUTO: 11.5 % (ref 5–12)
NEUTROPHILS # BLD AUTO: 3.63 10*3/MM3 (ref 1.9–8.1)
NEUTROPHILS NFR BLD AUTO: 45.8 % (ref 42.7–76)
PLATELET # BLD AUTO: 239 10*3/MM3 (ref 140–500)
PMV BLD AUTO: 10.4 FL (ref 6–12)
POTASSIUM BLD-SCNC: 3.6 MMOL/L (ref 3.5–5.2)
PROT SERPL-MCNC: 7 G/DL (ref 6–8.5)
RBC # BLD AUTO: 3.62 10*6/MM3 (ref 3.9–5.2)
SODIUM BLD-SCNC: 139 MMOL/L (ref 136–145)
WBC NRBC COR # BLD: 7.92 10*3/MM3 (ref 4.5–10.7)

## 2018-01-10 PROCEDURE — 80053 COMPREHEN METABOLIC PANEL: CPT | Performed by: HOSPITALIST

## 2018-01-10 PROCEDURE — G0378 HOSPITAL OBSERVATION PER HR: HCPCS

## 2018-01-10 PROCEDURE — 99214 OFFICE O/P EST MOD 30 MIN: CPT | Performed by: NURSE PRACTITIONER

## 2018-01-10 PROCEDURE — 70496 CT ANGIOGRAPHY HEAD: CPT

## 2018-01-10 PROCEDURE — 85025 COMPLETE CBC W/AUTO DIFF WBC: CPT | Performed by: HOSPITALIST

## 2018-01-10 PROCEDURE — 82962 GLUCOSE BLOOD TEST: CPT

## 2018-01-10 PROCEDURE — 70498 CT ANGIOGRAPHY NECK: CPT

## 2018-01-10 PROCEDURE — 0 IOPAMIDOL 61 % SOLUTION: Performed by: HOSPITALIST

## 2018-01-10 PROCEDURE — 63710000001 INSULIN ASPART PER 5 UNITS: Performed by: INTERNAL MEDICINE

## 2018-01-10 RX ORDER — DULOXETIN HYDROCHLORIDE 60 MG/1
60 CAPSULE, DELAYED RELEASE ORAL DAILY
Status: DISCONTINUED | OUTPATIENT
Start: 2018-01-11 | End: 2018-01-18 | Stop reason: HOSPADM

## 2018-01-10 RX ADMIN — FAMOTIDINE 20 MG: 20 TABLET, FILM COATED ORAL at 19:57

## 2018-01-10 RX ADMIN — INSULIN ASPART 4 UNITS: 100 INJECTION, SOLUTION INTRAVENOUS; SUBCUTANEOUS at 22:08

## 2018-01-10 RX ADMIN — INSULIN DETEMIR 10 UNITS: 100 INJECTION, SOLUTION SUBCUTANEOUS at 22:08

## 2018-01-10 RX ADMIN — ATORVASTATIN CALCIUM 10 MG: 10 TABLET, FILM COATED ORAL at 19:57

## 2018-01-10 RX ADMIN — FUROSEMIDE 40 MG: 40 TABLET ORAL at 09:20

## 2018-01-10 RX ADMIN — POTASSIUM CHLORIDE 10 MEQ: 750 CAPSULE, EXTENDED RELEASE ORAL at 18:35

## 2018-01-10 RX ADMIN — INSULIN ASPART 4 UNITS: 100 INJECTION, SOLUTION INTRAVENOUS; SUBCUTANEOUS at 18:35

## 2018-01-10 RX ADMIN — CETIRIZINE HYDROCHLORIDE 10 MG: 10 TABLET, FILM COATED ORAL at 09:21

## 2018-01-10 RX ADMIN — SODIUM CHLORIDE 75 ML/HR: 9 INJECTION, SOLUTION INTRAVENOUS at 19:57

## 2018-01-10 RX ADMIN — ASPIRIN 81 MG: 81 TABLET ORAL at 09:19

## 2018-01-10 RX ADMIN — LOSARTAN POTASSIUM 50 MG: 50 TABLET, FILM COATED ORAL at 09:21

## 2018-01-10 RX ADMIN — FLUTICASONE PROPIONATE 1 SPRAY: 50 SPRAY, METERED NASAL at 09:19

## 2018-01-10 RX ADMIN — FAMOTIDINE 20 MG: 20 TABLET, FILM COATED ORAL at 09:21

## 2018-01-10 RX ADMIN — IOPAMIDOL 95 ML: 612 INJECTION, SOLUTION INTRAVENOUS at 14:45

## 2018-01-10 RX ADMIN — INSULIN ASPART 2 UNITS: 100 INJECTION, SOLUTION INTRAVENOUS; SUBCUTANEOUS at 09:21

## 2018-01-10 RX ADMIN — POTASSIUM CHLORIDE 10 MEQ: 750 CAPSULE, EXTENDED RELEASE ORAL at 09:19

## 2018-01-11 ENCOUNTER — ANESTHESIA (OUTPATIENT)
Dept: PERIOP | Facility: HOSPITAL | Age: 73
End: 2018-01-11

## 2018-01-11 ENCOUNTER — ANESTHESIA EVENT (OUTPATIENT)
Dept: PERIOP | Facility: HOSPITAL | Age: 73
End: 2018-01-11

## 2018-01-11 ENCOUNTER — APPOINTMENT (OUTPATIENT)
Dept: GENERAL RADIOLOGY | Facility: HOSPITAL | Age: 73
End: 2018-01-11

## 2018-01-11 LAB
GLUCOSE BLDC GLUCOMTR-MCNC: 215 MG/DL (ref 70–130)
GLUCOSE BLDC GLUCOMTR-MCNC: 253 MG/DL (ref 70–130)
GLUCOSE BLDC GLUCOMTR-MCNC: 269 MG/DL (ref 70–130)
GLUCOSE BLDC GLUCOMTR-MCNC: 302 MG/DL (ref 70–130)
GLUCOSE BLDC GLUCOMTR-MCNC: 306 MG/DL (ref 70–130)
HCT VFR BLD AUTO: 33.2 % (ref 35.6–45.5)
HGB BLD-MCNC: 10.4 G/DL (ref 11.9–15.5)

## 2018-01-11 PROCEDURE — 25010000002 DEXAMETHASONE PER 1 MG: Performed by: NURSE ANESTHETIST, CERTIFIED REGISTERED

## 2018-01-11 PROCEDURE — C1713 ANCHOR/SCREW BN/BN,TIS/BN: HCPCS | Performed by: ORTHOPAEDIC SURGERY

## 2018-01-11 PROCEDURE — 25010000002 HYDROMORPHONE PER 4 MG

## 2018-01-11 PROCEDURE — 22840 INSERT SPINE FIXATION DEVICE: CPT | Performed by: ORTHOPAEDIC SURGERY

## 2018-01-11 PROCEDURE — 25010000002 VANCOMYCIN PER 500 MG: Performed by: ORTHOPAEDIC SURGERY

## 2018-01-11 PROCEDURE — 22612 ARTHRD PST TQ 1NTRSPC LUMBAR: CPT | Performed by: ORTHOPAEDIC SURGERY

## 2018-01-11 PROCEDURE — 63710000001 INSULIN ASPART PER 5 UNITS: Performed by: INTERNAL MEDICINE

## 2018-01-11 PROCEDURE — 72100 X-RAY EXAM L-S SPINE 2/3 VWS: CPT

## 2018-01-11 PROCEDURE — 63047 LAM FACETEC & FORAMOT LUMBAR: CPT | Performed by: ORTHOPAEDIC SURGERY

## 2018-01-11 PROCEDURE — 25010000002 MIDAZOLAM PER 1 MG: Performed by: ANESTHESIOLOGY

## 2018-01-11 PROCEDURE — 25010000002 PROPOFOL 10 MG/ML EMULSION: Performed by: NURSE ANESTHETIST, CERTIFIED REGISTERED

## 2018-01-11 PROCEDURE — 25010000002 FENTANYL CITRATE (PF) 100 MCG/2ML SOLUTION

## 2018-01-11 PROCEDURE — 07DR3ZZ EXTRACTION OF ILIAC BONE MARROW, PERCUTANEOUS APPROACH: ICD-10-PCS | Performed by: ORTHOPAEDIC SURGERY

## 2018-01-11 PROCEDURE — 82962 GLUCOSE BLOOD TEST: CPT

## 2018-01-11 PROCEDURE — 25010000002 VANCOMYCIN 10 G RECONSTITUTED SOLUTION: Performed by: ORTHOPAEDIC SURGERY

## 2018-01-11 PROCEDURE — 0SG0071 FUSION OF LUMBAR VERTEBRAL JOINT WITH AUTOLOGOUS TISSUE SUBSTITUTE, POSTERIOR APPROACH, POSTERIOR COLUMN, OPEN APPROACH: ICD-10-PCS | Performed by: ORTHOPAEDIC SURGERY

## 2018-01-11 PROCEDURE — 25010000002 PHENYLEPHRINE PER 1 ML: Performed by: NURSE ANESTHETIST, CERTIFIED REGISTERED

## 2018-01-11 PROCEDURE — 85018 HEMOGLOBIN: CPT | Performed by: ORTHOPAEDIC SURGERY

## 2018-01-11 PROCEDURE — 85014 HEMATOCRIT: CPT | Performed by: ORTHOPAEDIC SURGERY

## 2018-01-11 PROCEDURE — 25010000002 ONDANSETRON PER 1 MG: Performed by: NURSE ANESTHETIST, CERTIFIED REGISTERED

## 2018-01-11 PROCEDURE — 25010000002 FENTANYL CITRATE (PF) 100 MCG/2ML SOLUTION: Performed by: NURSE ANESTHETIST, CERTIFIED REGISTERED

## 2018-01-11 PROCEDURE — 01NB0ZZ RELEASE LUMBAR NERVE, OPEN APPROACH: ICD-10-PCS | Performed by: ORTHOPAEDIC SURGERY

## 2018-01-11 PROCEDURE — 20939 BONE MARROW ASPIR BONE GRFG: CPT | Performed by: ORTHOPAEDIC SURGERY

## 2018-01-11 PROCEDURE — 25810000003 POTASSIUM CHLORIDE PER 2 MEQ: Performed by: ORTHOPAEDIC SURGERY

## 2018-01-11 PROCEDURE — 76000 FLUOROSCOPY <1 HR PHYS/QHP: CPT

## 2018-01-11 DEVICE — SCRW EXPEDIUM PA TI 7X45MM: Type: IMPLANTABLE DEVICE | Site: SPINE LUMBAR | Status: FUNCTIONAL

## 2018-01-11 DEVICE — SCRW INNR EXPEDIUM: Type: IMPLANTABLE DEVICE | Site: SPINE LUMBAR | Status: FUNCTIONAL

## 2018-01-11 DEVICE — CONN 2SIDE/SIDE MONARCH TI 5.50X5.50: Type: IMPLANTABLE DEVICE | Site: SPINE LUMBAR | Status: FUNCTIONAL

## 2018-01-11 DEVICE — STRIP 7800310 MASTERGRAFT STRIP 10CM
Type: IMPLANTABLE DEVICE | Site: SPINE LUMBAR | Status: FUNCTIONAL
Brand: MASTERGRAFT® STRIP

## 2018-01-11 DEVICE — ROD PREBNT SPINE EXPEDIUM TI 5.5X35MM: Type: IMPLANTABLE DEVICE | Site: SPINE LUMBAR | Status: FUNCTIONAL

## 2018-01-11 DEVICE — SCRW EXPEDIUM PA TI 6X45MM: Type: IMPLANTABLE DEVICE | Site: SPINE LUMBAR | Status: FUNCTIONAL

## 2018-01-11 RX ORDER — HYDROMORPHONE HCL 110MG/55ML
PATIENT CONTROLLED ANALGESIA SYRINGE INTRAVENOUS
Status: COMPLETED
Start: 2018-01-11 | End: 2018-01-11

## 2018-01-11 RX ORDER — ONDANSETRON 2 MG/ML
4 INJECTION INTRAMUSCULAR; INTRAVENOUS EVERY 6 HOURS PRN
Status: DISCONTINUED | OUTPATIENT
Start: 2018-01-11 | End: 2018-01-18 | Stop reason: HOSPADM

## 2018-01-11 RX ORDER — PROMETHAZINE HYDROCHLORIDE 25 MG/ML
12.5 INJECTION, SOLUTION INTRAMUSCULAR; INTRAVENOUS ONCE AS NEEDED
Status: DISCONTINUED | OUTPATIENT
Start: 2018-01-11 | End: 2018-01-11 | Stop reason: HOSPADM

## 2018-01-11 RX ORDER — FAMOTIDINE 10 MG/ML
20 INJECTION, SOLUTION INTRAVENOUS ONCE
Status: COMPLETED | OUTPATIENT
Start: 2018-01-11 | End: 2018-01-11

## 2018-01-11 RX ORDER — TEMAZEPAM 15 MG/1
15 CAPSULE ORAL NIGHTLY PRN
Status: DISCONTINUED | OUTPATIENT
Start: 2018-01-11 | End: 2018-01-12

## 2018-01-11 RX ORDER — SODIUM CHLORIDE 0.9 % (FLUSH) 0.9 %
1-10 SYRINGE (ML) INJECTION AS NEEDED
Status: DISCONTINUED | OUTPATIENT
Start: 2018-01-11 | End: 2018-01-11 | Stop reason: HOSPADM

## 2018-01-11 RX ORDER — ONDANSETRON 4 MG/1
4 TABLET, FILM COATED ORAL EVERY 6 HOURS PRN
Status: DISCONTINUED | OUTPATIENT
Start: 2018-01-11 | End: 2018-01-18 | Stop reason: HOSPADM

## 2018-01-11 RX ORDER — PROPOFOL 10 MG/ML
VIAL (ML) INTRAVENOUS AS NEEDED
Status: DISCONTINUED | OUTPATIENT
Start: 2018-01-11 | End: 2018-01-11 | Stop reason: SURG

## 2018-01-11 RX ORDER — LIDOCAINE HYDROCHLORIDE 20 MG/ML
INJECTION, SOLUTION INFILTRATION; PERINEURAL AS NEEDED
Status: DISCONTINUED | OUTPATIENT
Start: 2018-01-11 | End: 2018-01-11 | Stop reason: SURG

## 2018-01-11 RX ORDER — DEXAMETHASONE SODIUM PHOSPHATE 10 MG/ML
INJECTION INTRAMUSCULAR; INTRAVENOUS AS NEEDED
Status: DISCONTINUED | OUTPATIENT
Start: 2018-01-11 | End: 2018-01-11 | Stop reason: SURG

## 2018-01-11 RX ORDER — MIDAZOLAM HYDROCHLORIDE 1 MG/ML
2 INJECTION INTRAMUSCULAR; INTRAVENOUS
Status: DISCONTINUED | OUTPATIENT
Start: 2018-01-11 | End: 2018-01-11 | Stop reason: HOSPADM

## 2018-01-11 RX ORDER — DIPHENHYDRAMINE HYDROCHLORIDE 50 MG/ML
12.5 INJECTION INTRAMUSCULAR; INTRAVENOUS
Status: DISCONTINUED | OUTPATIENT
Start: 2018-01-11 | End: 2018-01-11 | Stop reason: HOSPADM

## 2018-01-11 RX ORDER — ROCURONIUM BROMIDE 10 MG/ML
INJECTION, SOLUTION INTRAVENOUS AS NEEDED
Status: DISCONTINUED | OUTPATIENT
Start: 2018-01-11 | End: 2018-01-11 | Stop reason: SURG

## 2018-01-11 RX ORDER — ONDANSETRON 2 MG/ML
4 INJECTION INTRAMUSCULAR; INTRAVENOUS ONCE AS NEEDED
Status: DISCONTINUED | OUTPATIENT
Start: 2018-01-11 | End: 2018-01-11 | Stop reason: HOSPADM

## 2018-01-11 RX ORDER — ONDANSETRON 4 MG/1
4 TABLET, ORALLY DISINTEGRATING ORAL EVERY 6 HOURS PRN
Status: DISCONTINUED | OUTPATIENT
Start: 2018-01-11 | End: 2018-01-18 | Stop reason: HOSPADM

## 2018-01-11 RX ORDER — EPHEDRINE SULFATE 50 MG/ML
INJECTION, SOLUTION INTRAVENOUS AS NEEDED
Status: DISCONTINUED | OUTPATIENT
Start: 2018-01-11 | End: 2018-01-11 | Stop reason: SURG

## 2018-01-11 RX ORDER — FENTANYL CITRATE 50 UG/ML
50 INJECTION, SOLUTION INTRAMUSCULAR; INTRAVENOUS
Status: DISCONTINUED | OUTPATIENT
Start: 2018-01-11 | End: 2018-01-11 | Stop reason: HOSPADM

## 2018-01-11 RX ORDER — FENTANYL CITRATE 50 UG/ML
INJECTION, SOLUTION INTRAMUSCULAR; INTRAVENOUS
Status: COMPLETED
Start: 2018-01-11 | End: 2018-01-11

## 2018-01-11 RX ORDER — FENTANYL CITRATE 50 UG/ML
INJECTION, SOLUTION INTRAMUSCULAR; INTRAVENOUS AS NEEDED
Status: DISCONTINUED | OUTPATIENT
Start: 2018-01-11 | End: 2018-01-11 | Stop reason: SURG

## 2018-01-11 RX ORDER — PROMETHAZINE HYDROCHLORIDE 25 MG/1
25 TABLET ORAL ONCE AS NEEDED
Status: DISCONTINUED | OUTPATIENT
Start: 2018-01-11 | End: 2018-01-11 | Stop reason: HOSPADM

## 2018-01-11 RX ORDER — HYDROMORPHONE HCL IN 0.9% NACL 10 MG/50ML
PATIENT CONTROLLED ANALGESIA SYRINGE INTRAVENOUS CONTINUOUS
Status: DISCONTINUED | OUTPATIENT
Start: 2018-01-11 | End: 2018-01-15

## 2018-01-11 RX ORDER — PROMETHAZINE HYDROCHLORIDE 25 MG/1
12.5 TABLET ORAL ONCE AS NEEDED
Status: DISCONTINUED | OUTPATIENT
Start: 2018-01-11 | End: 2018-01-11 | Stop reason: HOSPADM

## 2018-01-11 RX ORDER — LABETALOL HYDROCHLORIDE 5 MG/ML
5 INJECTION, SOLUTION INTRAVENOUS
Status: DISCONTINUED | OUTPATIENT
Start: 2018-01-11 | End: 2018-01-11 | Stop reason: HOSPADM

## 2018-01-11 RX ORDER — OXYCODONE HYDROCHLORIDE AND ACETAMINOPHEN 5; 325 MG/1; MG/1
2 TABLET ORAL EVERY 4 HOURS PRN
Status: DISCONTINUED | OUTPATIENT
Start: 2018-01-11 | End: 2018-01-13

## 2018-01-11 RX ORDER — EPHEDRINE SULFATE 50 MG/ML
5 INJECTION, SOLUTION INTRAVENOUS ONCE AS NEEDED
Status: DISCONTINUED | OUTPATIENT
Start: 2018-01-11 | End: 2018-01-11 | Stop reason: HOSPADM

## 2018-01-11 RX ORDER — HYDRALAZINE HYDROCHLORIDE 20 MG/ML
5 INJECTION INTRAMUSCULAR; INTRAVENOUS
Status: DISCONTINUED | OUTPATIENT
Start: 2018-01-11 | End: 2018-01-11 | Stop reason: HOSPADM

## 2018-01-11 RX ORDER — BISACODYL 10 MG
10 SUPPOSITORY, RECTAL RECTAL DAILY PRN
Status: DISCONTINUED | OUTPATIENT
Start: 2018-01-11 | End: 2018-01-18 | Stop reason: HOSPADM

## 2018-01-11 RX ORDER — NALOXONE HCL 0.4 MG/ML
0.2 VIAL (ML) INJECTION AS NEEDED
Status: DISCONTINUED | OUTPATIENT
Start: 2018-01-11 | End: 2018-01-11 | Stop reason: HOSPADM

## 2018-01-11 RX ORDER — FLUMAZENIL 0.1 MG/ML
0.2 INJECTION INTRAVENOUS AS NEEDED
Status: DISCONTINUED | OUTPATIENT
Start: 2018-01-11 | End: 2018-01-11 | Stop reason: HOSPADM

## 2018-01-11 RX ORDER — NALOXONE HCL 0.4 MG/ML
0.1 VIAL (ML) INJECTION
Status: DISCONTINUED | OUTPATIENT
Start: 2018-01-11 | End: 2018-01-18 | Stop reason: HOSPADM

## 2018-01-11 RX ORDER — SODIUM CHLORIDE AND POTASSIUM CHLORIDE 150; 450 MG/100ML; MG/100ML
100 INJECTION, SOLUTION INTRAVENOUS CONTINUOUS
Status: DISCONTINUED | OUTPATIENT
Start: 2018-01-11 | End: 2018-01-15

## 2018-01-11 RX ORDER — PROMETHAZINE HYDROCHLORIDE 25 MG/1
25 SUPPOSITORY RECTAL ONCE AS NEEDED
Status: DISCONTINUED | OUTPATIENT
Start: 2018-01-11 | End: 2018-01-11 | Stop reason: HOSPADM

## 2018-01-11 RX ORDER — MIDAZOLAM HYDROCHLORIDE 1 MG/ML
1 INJECTION INTRAMUSCULAR; INTRAVENOUS
Status: DISCONTINUED | OUTPATIENT
Start: 2018-01-11 | End: 2018-01-11 | Stop reason: HOSPADM

## 2018-01-11 RX ORDER — DIPHENHYDRAMINE HYDROCHLORIDE, ZINC ACETATE 2; .1 G/100G; G/100G
CREAM TOPICAL 3 TIMES DAILY PRN
Status: DISCONTINUED | OUTPATIENT
Start: 2018-01-11 | End: 2018-01-18 | Stop reason: HOSPADM

## 2018-01-11 RX ORDER — SODIUM CHLORIDE 0.9 % (FLUSH) 0.9 %
1-10 SYRINGE (ML) INJECTION AS NEEDED
Status: DISCONTINUED | OUTPATIENT
Start: 2018-01-11 | End: 2018-01-18 | Stop reason: HOSPADM

## 2018-01-11 RX ORDER — SODIUM CHLORIDE, SODIUM LACTATE, POTASSIUM CHLORIDE, CALCIUM CHLORIDE 600; 310; 30; 20 MG/100ML; MG/100ML; MG/100ML; MG/100ML
9 INJECTION, SOLUTION INTRAVENOUS CONTINUOUS
Status: DISCONTINUED | OUTPATIENT
Start: 2018-01-11 | End: 2018-01-15

## 2018-01-11 RX ORDER — ONDANSETRON 2 MG/ML
INJECTION INTRAMUSCULAR; INTRAVENOUS AS NEEDED
Status: DISCONTINUED | OUTPATIENT
Start: 2018-01-11 | End: 2018-01-11 | Stop reason: SURG

## 2018-01-11 RX ORDER — HYDROMORPHONE HCL IN 0.9% NACL 10 MG/50ML
PATIENT CONTROLLED ANALGESIA SYRINGE INTRAVENOUS
Status: COMPLETED
Start: 2018-01-11 | End: 2018-01-11

## 2018-01-11 RX ORDER — SODIUM CHLORIDE, SODIUM LACTATE, POTASSIUM CHLORIDE, CALCIUM CHLORIDE 600; 310; 30; 20 MG/100ML; MG/100ML; MG/100ML; MG/100ML
100 INJECTION, SOLUTION INTRAVENOUS CONTINUOUS
Status: DISCONTINUED | OUTPATIENT
Start: 2018-01-11 | End: 2018-01-15

## 2018-01-11 RX ADMIN — SUGAMMADEX 350 MG: 100 INJECTION, SOLUTION INTRAVENOUS at 10:26

## 2018-01-11 RX ADMIN — FAMOTIDINE 20 MG: 10 INJECTION, SOLUTION INTRAVENOUS at 06:59

## 2018-01-11 RX ADMIN — EPHEDRINE SULFATE 10 MG: 50 INJECTION INTRAMUSCULAR; INTRAVENOUS; SUBCUTANEOUS at 08:03

## 2018-01-11 RX ADMIN — DIPHENHYDRAMINE HYDROCHLORIDE, ZINC ACETATE 1 APPLICATION: 2; .1 CREAM TOPICAL at 22:46

## 2018-01-11 RX ADMIN — Medication 1 MG: at 06:59

## 2018-01-11 RX ADMIN — FENTANYL CITRATE 50 MCG: 50 INJECTION, SOLUTION INTRAMUSCULAR; INTRAVENOUS at 08:19

## 2018-01-11 RX ADMIN — DEXAMETHASONE SODIUM PHOSPHATE 4 MG: 10 INJECTION INTRAMUSCULAR; INTRAVENOUS at 10:28

## 2018-01-11 RX ADMIN — PHENYLEPHRINE HYDROCHLORIDE 100 MCG: 10 INJECTION INTRAVENOUS at 09:04

## 2018-01-11 RX ADMIN — Medication 1 MG: at 07:10

## 2018-01-11 RX ADMIN — FENTANYL CITRATE 50 MCG: 50 INJECTION, SOLUTION INTRAMUSCULAR; INTRAVENOUS at 07:40

## 2018-01-11 RX ADMIN — SODIUM CHLORIDE AND POTASSIUM CHLORIDE 100 ML/HR: 4.5; 1.49 INJECTION, SOLUTION INTRAVENOUS at 15:43

## 2018-01-11 RX ADMIN — PHENYLEPHRINE HYDROCHLORIDE 100 MCG: 10 INJECTION INTRAVENOUS at 09:58

## 2018-01-11 RX ADMIN — PHENYLEPHRINE HYDROCHLORIDE 100 MCG: 10 INJECTION INTRAVENOUS at 09:36

## 2018-01-11 RX ADMIN — FENTANYL CITRATE 100 MCG: 50 INJECTION, SOLUTION INTRAMUSCULAR; INTRAVENOUS at 10:36

## 2018-01-11 RX ADMIN — FENTANYL CITRATE 50 MCG: 50 INJECTION, SOLUTION INTRAMUSCULAR; INTRAVENOUS at 11:19

## 2018-01-11 RX ADMIN — SODIUM CHLORIDE, POTASSIUM CHLORIDE, SODIUM LACTATE AND CALCIUM CHLORIDE: 600; 310; 30; 20 INJECTION, SOLUTION INTRAVENOUS at 08:27

## 2018-01-11 RX ADMIN — PHENYLEPHRINE HYDROCHLORIDE 100 MCG: 10 INJECTION INTRAVENOUS at 09:24

## 2018-01-11 RX ADMIN — ROCURONIUM BROMIDE 10 MG: 10 INJECTION INTRAVENOUS at 10:02

## 2018-01-11 RX ADMIN — PHENYLEPHRINE HYDROCHLORIDE 100 MCG: 10 INJECTION INTRAVENOUS at 09:50

## 2018-01-11 RX ADMIN — EPHEDRINE SULFATE 10 MG: 50 INJECTION INTRAMUSCULAR; INTRAVENOUS; SUBCUTANEOUS at 08:08

## 2018-01-11 RX ADMIN — Medication: at 11:31

## 2018-01-11 RX ADMIN — EPHEDRINE SULFATE 10 MG: 50 INJECTION INTRAMUSCULAR; INTRAVENOUS; SUBCUTANEOUS at 07:58

## 2018-01-11 RX ADMIN — ONDANSETRON 4 MG: 2 INJECTION INTRAMUSCULAR; INTRAVENOUS at 10:28

## 2018-01-11 RX ADMIN — VANCOMYCIN HYDROCHLORIDE 1750 MG: 100 INJECTION, POWDER, LYOPHILIZED, FOR SOLUTION INTRAVENOUS at 06:25

## 2018-01-11 RX ADMIN — INSULIN DETEMIR 10 UNITS: 100 INJECTION, SOLUTION SUBCUTANEOUS at 20:50

## 2018-01-11 RX ADMIN — HYDROMORPHONE HYDROCHLORIDE 0.5 MG: 2 INJECTION INTRAMUSCULAR; INTRAVENOUS; SUBCUTANEOUS at 11:20

## 2018-01-11 RX ADMIN — SODIUM CHLORIDE, POTASSIUM CHLORIDE, SODIUM LACTATE AND CALCIUM CHLORIDE 9 ML/HR: 600; 310; 30; 20 INJECTION, SOLUTION INTRAVENOUS at 07:02

## 2018-01-11 RX ADMIN — ROCURONIUM BROMIDE 30 MG: 10 INJECTION INTRAVENOUS at 08:45

## 2018-01-11 RX ADMIN — EPHEDRINE SULFATE 10 MG: 50 INJECTION INTRAMUSCULAR; INTRAVENOUS; SUBCUTANEOUS at 08:44

## 2018-01-11 RX ADMIN — VANCOMYCIN HYDROCHLORIDE 1750 MG: 100 INJECTION, POWDER, LYOPHILIZED, FOR SOLUTION INTRAVENOUS at 17:28

## 2018-01-11 RX ADMIN — FENTANYL CITRATE 50 MCG: 50 INJECTION, SOLUTION INTRAMUSCULAR; INTRAVENOUS at 11:44

## 2018-01-11 RX ADMIN — SODIUM CHLORIDE, POTASSIUM CHLORIDE, SODIUM LACTATE AND CALCIUM CHLORIDE: 600; 310; 30; 20 INJECTION, SOLUTION INTRAVENOUS at 10:28

## 2018-01-11 RX ADMIN — ROCURONIUM BROMIDE 50 MG: 10 INJECTION INTRAVENOUS at 07:40

## 2018-01-11 RX ADMIN — PROPOFOL 150 MG: 10 INJECTION, EMULSION INTRAVENOUS at 07:40

## 2018-01-11 RX ADMIN — INSULIN ASPART 6 UNITS: 100 INJECTION, SOLUTION INTRAVENOUS; SUBCUTANEOUS at 17:23

## 2018-01-11 RX ADMIN — INSULIN ASPART 7 UNITS: 100 INJECTION, SOLUTION INTRAVENOUS; SUBCUTANEOUS at 20:46

## 2018-01-11 RX ADMIN — LIDOCAINE HYDROCHLORIDE 80 MG: 20 INJECTION, SOLUTION INFILTRATION; PERINEURAL at 07:40

## 2018-01-11 RX ADMIN — FENTANYL CITRATE 50 MCG: 50 INJECTION, SOLUTION INTRAMUSCULAR; INTRAVENOUS at 07:54

## 2018-01-11 RX ADMIN — EPHEDRINE SULFATE 10 MG: 50 INJECTION INTRAMUSCULAR; INTRAVENOUS; SUBCUTANEOUS at 08:59

## 2018-01-11 NOTE — ANESTHESIA PREPROCEDURE EVALUATION
Anesthesia Evaluation            Airway   Mallampati: III  TM distance: >3 FB  Neck ROM: full  Dental    (+) edentulous    Pulmonary    (+) COPD, asthma,   Cardiovascular     (+) hypertension, dysrhythmias PVC, hyperlipidemia  (-) angina      Neuro/Psych  (+) headaches, syncope, numbness,     TIA: questionable hx, W/U including MRI of head and neck OK , Echo good EF structures nl.  GI/Hepatic/Renal/Endo    (+) morbid obesity, GERD, diabetes mellitus,   (-) hepatitis, liver disease, no renal disease    Musculoskeletal     Abdominal    Substance History      OB/GYN          Other   (+) arthritis                                             Anesthesia Plan    ASA 3     general     intravenous induction   Anesthetic plan and risks discussed with patient.

## 2018-01-11 NOTE — ANESTHESIA PROCEDURE NOTES
Airway  Urgency: elective    Airway not difficult    General Information and Staff    Patient location during procedure: OR  Anesthesiologist: KARINE CASTILLO  CRNA: JUAN CHOI    Indications and Patient Condition  Indications for airway management: airway protection    Preoxygenated: yes  Mask difficulty assessment: 2 - vent by mask + OA or adjuvant +/- NMBA    Final Airway Details  Final airway type: endotracheal airway      Successful airway: ETT  Cuffed: yes   Successful intubation technique: direct laryngoscopy  Endotracheal tube insertion site: oral  Blade: Mccormick  Blade size: #2  ETT size: 7.0 mm  Cormack-Lehane Classification: grade I - full view of glottis  Placement verified by: chest auscultation and capnometry   Cuff volume (mL): 8  Number of attempts at approach: 1    Additional Comments  PreO2 with 100% O2;  FeO2 >85%;  sniff position; easy mask ventilation;  Intubated with no difficultly after eyes taped; Appears atraumatic;  Lips and teeth intact as preop condition;  Airway secured. Connected to ventilator.

## 2018-01-12 LAB
ANION GAP SERPL CALCULATED.3IONS-SCNC: 10.3 MMOL/L
BASOPHILS # BLD AUTO: 0.02 10*3/MM3 (ref 0–0.2)
BASOPHILS NFR BLD AUTO: 0.2 % (ref 0–1.5)
BUN BLD-MCNC: 12 MG/DL (ref 8–23)
BUN/CREAT SERPL: 14.5 (ref 7–25)
CALCIUM SPEC-SCNC: 8.4 MG/DL (ref 8.6–10.5)
CHLORIDE SERPL-SCNC: 100 MMOL/L (ref 98–107)
CO2 SERPL-SCNC: 25.7 MMOL/L (ref 22–29)
CREAT BLD-MCNC: 0.83 MG/DL (ref 0.57–1)
DEPRECATED RDW RBC AUTO: 47.9 FL (ref 37–54)
EOSINOPHIL # BLD AUTO: 0.04 10*3/MM3 (ref 0–0.7)
EOSINOPHIL NFR BLD AUTO: 0.3 % (ref 0.3–6.2)
ERYTHROCYTE [DISTWIDTH] IN BLOOD BY AUTOMATED COUNT: 14 % (ref 11.7–13)
GFR SERPL CREATININE-BSD FRML MDRD: 82 ML/MIN/1.73
GLUCOSE BLD-MCNC: 308 MG/DL (ref 65–99)
GLUCOSE BLDC GLUCOMTR-MCNC: 216 MG/DL (ref 70–130)
GLUCOSE BLDC GLUCOMTR-MCNC: 258 MG/DL (ref 70–130)
GLUCOSE BLDC GLUCOMTR-MCNC: 310 MG/DL (ref 70–130)
GLUCOSE BLDC GLUCOMTR-MCNC: 356 MG/DL (ref 70–130)
HCT VFR BLD AUTO: 30.8 % (ref 35.6–45.5)
HGB BLD-MCNC: 9.5 G/DL (ref 11.9–15.5)
IMM GRANULOCYTES # BLD: 0.04 10*3/MM3 (ref 0–0.03)
IMM GRANULOCYTES NFR BLD: 0.3 % (ref 0–0.5)
LYMPHOCYTES # BLD AUTO: 1.85 10*3/MM3 (ref 0.9–4.8)
LYMPHOCYTES NFR BLD AUTO: 15.1 % (ref 19.6–45.3)
MCH RBC QN AUTO: 28.9 PG (ref 26.9–32)
MCHC RBC AUTO-ENTMCNC: 30.8 G/DL (ref 32.4–36.3)
MCV RBC AUTO: 93.6 FL (ref 80.5–98.2)
MONOCYTES # BLD AUTO: 1.66 10*3/MM3 (ref 0.2–1.2)
MONOCYTES NFR BLD AUTO: 13.6 % (ref 5–12)
NEUTROPHILS # BLD AUTO: 8.64 10*3/MM3 (ref 1.9–8.1)
NEUTROPHILS NFR BLD AUTO: 70.5 % (ref 42.7–76)
PLATELET # BLD AUTO: 237 10*3/MM3 (ref 140–500)
PMV BLD AUTO: 10.4 FL (ref 6–12)
POTASSIUM BLD-SCNC: 4.6 MMOL/L (ref 3.5–5.2)
RBC # BLD AUTO: 3.29 10*6/MM3 (ref 3.9–5.2)
SODIUM BLD-SCNC: 136 MMOL/L (ref 136–145)
WBC NRBC COR # BLD: 12.25 10*3/MM3 (ref 4.5–10.7)

## 2018-01-12 PROCEDURE — 63710000001 INSULIN ASPART PER 5 UNITS: Performed by: INTERNAL MEDICINE

## 2018-01-12 PROCEDURE — 25010000002 LORAZEPAM PER 2 MG: Performed by: HOSPITALIST

## 2018-01-12 PROCEDURE — 99024 POSTOP FOLLOW-UP VISIT: CPT | Performed by: ORTHOPAEDIC SURGERY

## 2018-01-12 PROCEDURE — 80048 BASIC METABOLIC PNL TOTAL CA: CPT | Performed by: HOSPITALIST

## 2018-01-12 PROCEDURE — 85025 COMPLETE CBC W/AUTO DIFF WBC: CPT | Performed by: HOSPITALIST

## 2018-01-12 PROCEDURE — 25810000003 POTASSIUM CHLORIDE PER 2 MEQ: Performed by: ORTHOPAEDIC SURGERY

## 2018-01-12 PROCEDURE — 82962 GLUCOSE BLOOD TEST: CPT

## 2018-01-12 RX ORDER — LORAZEPAM 2 MG/ML
0.5 INJECTION INTRAMUSCULAR ONCE
Status: COMPLETED | OUTPATIENT
Start: 2018-01-12 | End: 2018-01-12

## 2018-01-12 RX ADMIN — OXYCODONE HYDROCHLORIDE AND ACETAMINOPHEN 2 TABLET: 5; 325 TABLET ORAL at 19:43

## 2018-01-12 RX ADMIN — INSULIN ASPART 8 UNITS: 100 INJECTION, SOLUTION INTRAVENOUS; SUBCUTANEOUS at 08:43

## 2018-01-12 RX ADMIN — DIPHENHYDRAMINE HYDROCHLORIDE, ZINC ACETATE: 2; .1 CREAM TOPICAL at 11:07

## 2018-01-12 RX ADMIN — INSULIN ASPART 6 UNITS: 100 INJECTION, SOLUTION INTRAVENOUS; SUBCUTANEOUS at 18:00

## 2018-01-12 RX ADMIN — FLUTICASONE PROPIONATE 1 SPRAY: 50 SPRAY, METERED NASAL at 08:43

## 2018-01-12 RX ADMIN — OXYCODONE HYDROCHLORIDE AND ACETAMINOPHEN 1 TABLET: 5; 325 TABLET ORAL at 12:48

## 2018-01-12 RX ADMIN — LORAZEPAM 0.5 MG: 2 INJECTION, SOLUTION INTRAMUSCULAR; INTRAVENOUS at 10:58

## 2018-01-12 RX ADMIN — SODIUM CHLORIDE AND POTASSIUM CHLORIDE 100 ML/HR: 4.5; 1.49 INJECTION, SOLUTION INTRAVENOUS at 04:06

## 2018-01-12 RX ADMIN — INSULIN ASPART 7 UNITS: 100 INJECTION, SOLUTION INTRAVENOUS; SUBCUTANEOUS at 12:26

## 2018-01-13 LAB
GLUCOSE BLDC GLUCOMTR-MCNC: 200 MG/DL (ref 70–130)
GLUCOSE BLDC GLUCOMTR-MCNC: 240 MG/DL (ref 70–130)
GLUCOSE BLDC GLUCOMTR-MCNC: 257 MG/DL (ref 70–130)
GLUCOSE BLDC GLUCOMTR-MCNC: 301 MG/DL (ref 70–130)

## 2018-01-13 PROCEDURE — 25810000003 POTASSIUM CHLORIDE PER 2 MEQ: Performed by: ORTHOPAEDIC SURGERY

## 2018-01-13 PROCEDURE — 82962 GLUCOSE BLOOD TEST: CPT

## 2018-01-13 PROCEDURE — 94799 UNLISTED PULMONARY SVC/PX: CPT

## 2018-01-13 PROCEDURE — 63710000001 INSULIN ASPART PER 5 UNITS: Performed by: INTERNAL MEDICINE

## 2018-01-13 PROCEDURE — 97110 THERAPEUTIC EXERCISES: CPT | Performed by: PHYSICAL THERAPIST

## 2018-01-13 RX ORDER — TRAMADOL HYDROCHLORIDE 50 MG/1
50 TABLET ORAL EVERY 4 HOURS PRN
Status: DISCONTINUED | OUTPATIENT
Start: 2018-01-13 | End: 2018-01-14

## 2018-01-13 RX ORDER — TRAMADOL HYDROCHLORIDE 50 MG/1
100 TABLET ORAL EVERY 4 HOURS PRN
Status: DISCONTINUED | OUTPATIENT
Start: 2018-01-13 | End: 2018-01-14

## 2018-01-13 RX ADMIN — INSULIN ASPART 4 UNITS: 100 INJECTION, SOLUTION INTRAVENOUS; SUBCUTANEOUS at 21:19

## 2018-01-13 RX ADMIN — SODIUM CHLORIDE AND POTASSIUM CHLORIDE 100 ML/HR: 4.5; 1.49 INJECTION, SOLUTION INTRAVENOUS at 09:06

## 2018-01-13 RX ADMIN — INSULIN ASPART 7 UNITS: 100 INJECTION, SOLUTION INTRAVENOUS; SUBCUTANEOUS at 12:34

## 2018-01-13 RX ADMIN — TRAMADOL HYDROCHLORIDE 100 MG: 50 TABLET, FILM COATED ORAL at 14:10

## 2018-01-13 RX ADMIN — LOSARTAN POTASSIUM 50 MG: 50 TABLET, FILM COATED ORAL at 09:05

## 2018-01-13 RX ADMIN — OXYCODONE HYDROCHLORIDE AND ACETAMINOPHEN 1 TABLET: 5; 325 TABLET ORAL at 03:24

## 2018-01-13 RX ADMIN — FLUTICASONE PROPIONATE 1 SPRAY: 50 SPRAY, METERED NASAL at 09:05

## 2018-01-13 RX ADMIN — INSULIN ASPART 4 UNITS: 100 INJECTION, SOLUTION INTRAVENOUS; SUBCUTANEOUS at 09:06

## 2018-01-13 RX ADMIN — CETIRIZINE HYDROCHLORIDE 10 MG: 10 TABLET, FILM COATED ORAL at 09:05

## 2018-01-13 RX ADMIN — FAMOTIDINE 20 MG: 20 TABLET, FILM COATED ORAL at 21:19

## 2018-01-13 RX ADMIN — INSULIN ASPART 6 UNITS: 100 INJECTION, SOLUTION INTRAVENOUS; SUBCUTANEOUS at 17:08

## 2018-01-13 RX ADMIN — CYANOCOBALAMIN TAB 500 MCG 1000 MCG: 500 TAB at 09:05

## 2018-01-13 RX ADMIN — FUROSEMIDE 40 MG: 40 TABLET ORAL at 09:05

## 2018-01-13 RX ADMIN — TRAMADOL HYDROCHLORIDE 100 MG: 50 TABLET, FILM COATED ORAL at 23:41

## 2018-01-13 RX ADMIN — VITAMIN D, TAB 1000IU (100/BT) 2000 UNITS: 25 TAB at 09:05

## 2018-01-13 RX ADMIN — ATORVASTATIN CALCIUM 10 MG: 10 TABLET, FILM COATED ORAL at 21:19

## 2018-01-13 RX ADMIN — CALCIUM CARBONATE-VITAMIN D TAB 500 MG-200 UNIT 1000 MG: 500-200 TAB at 09:05

## 2018-01-13 RX ADMIN — DULOXETINE HYDROCHLORIDE 60 MG: 60 CAPSULE, DELAYED RELEASE ORAL at 09:05

## 2018-01-13 RX ADMIN — FAMOTIDINE 20 MG: 20 TABLET, FILM COATED ORAL at 09:05

## 2018-01-14 LAB
ABO GROUP BLD: NORMAL
ANION GAP SERPL CALCULATED.3IONS-SCNC: 11.9 MMOL/L
BASOPHILS # BLD AUTO: 0.02 10*3/MM3 (ref 0–0.2)
BASOPHILS # BLD AUTO: 0.03 10*3/MM3 (ref 0–0.2)
BASOPHILS NFR BLD AUTO: 0.2 % (ref 0–1.5)
BASOPHILS NFR BLD AUTO: 0.2 % (ref 0–1.5)
BLD GP AB SCN SERPL QL: NEGATIVE
BUN BLD-MCNC: 8 MG/DL (ref 8–23)
BUN/CREAT SERPL: 10.5 (ref 7–25)
CALCIUM SPEC-SCNC: 8.3 MG/DL (ref 8.6–10.5)
CHLORIDE SERPL-SCNC: 98 MMOL/L (ref 98–107)
CO2 SERPL-SCNC: 24.1 MMOL/L (ref 22–29)
CREAT BLD-MCNC: 0.76 MG/DL (ref 0.57–1)
DEPRECATED RDW RBC AUTO: 44.8 FL (ref 37–54)
DEPRECATED RDW RBC AUTO: 46.5 FL (ref 37–54)
EOSINOPHIL # BLD AUTO: 0.3 10*3/MM3 (ref 0–0.7)
EOSINOPHIL # BLD AUTO: 0.39 10*3/MM3 (ref 0–0.7)
EOSINOPHIL NFR BLD AUTO: 2.2 % (ref 0.3–6.2)
EOSINOPHIL NFR BLD AUTO: 3.3 % (ref 0.3–6.2)
ERYTHROCYTE [DISTWIDTH] IN BLOOD BY AUTOMATED COUNT: 13.6 % (ref 11.7–13)
ERYTHROCYTE [DISTWIDTH] IN BLOOD BY AUTOMATED COUNT: 14 % (ref 11.7–13)
GFR SERPL CREATININE-BSD FRML MDRD: 91 ML/MIN/1.73
GLUCOSE BLD-MCNC: 206 MG/DL (ref 65–99)
GLUCOSE BLDC GLUCOMTR-MCNC: 245 MG/DL (ref 70–130)
GLUCOSE BLDC GLUCOMTR-MCNC: 280 MG/DL (ref 70–130)
GLUCOSE BLDC GLUCOMTR-MCNC: 295 MG/DL (ref 70–130)
HCT VFR BLD AUTO: 24.4 % (ref 35.6–45.5)
HCT VFR BLD AUTO: 30.3 % (ref 35.6–45.5)
HGB BLD-MCNC: 10 G/DL (ref 11.9–15.5)
HGB BLD-MCNC: 7.8 G/DL (ref 11.9–15.5)
IMM GRANULOCYTES # BLD: 0.05 10*3/MM3 (ref 0–0.03)
IMM GRANULOCYTES # BLD: 0.06 10*3/MM3 (ref 0–0.03)
IMM GRANULOCYTES NFR BLD: 0.4 % (ref 0–0.5)
IMM GRANULOCYTES NFR BLD: 0.4 % (ref 0–0.5)
LYMPHOCYTES # BLD AUTO: 2.05 10*3/MM3 (ref 0.9–4.8)
LYMPHOCYTES # BLD AUTO: 2.76 10*3/MM3 (ref 0.9–4.8)
LYMPHOCYTES NFR BLD AUTO: 15.2 % (ref 19.6–45.3)
LYMPHOCYTES NFR BLD AUTO: 23.1 % (ref 19.6–45.3)
MCH RBC QN AUTO: 29.4 PG (ref 26.9–32)
MCH RBC QN AUTO: 29.6 PG (ref 26.9–32)
MCHC RBC AUTO-ENTMCNC: 32 G/DL (ref 32.4–36.3)
MCHC RBC AUTO-ENTMCNC: 33 G/DL (ref 32.4–36.3)
MCV RBC AUTO: 89.6 FL (ref 80.5–98.2)
MCV RBC AUTO: 92.1 FL (ref 80.5–98.2)
MONOCYTES # BLD AUTO: 1.73 10*3/MM3 (ref 0.2–1.2)
MONOCYTES # BLD AUTO: 2 10*3/MM3 (ref 0.2–1.2)
MONOCYTES NFR BLD AUTO: 14.5 % (ref 5–12)
MONOCYTES NFR BLD AUTO: 14.9 % (ref 5–12)
NEUTROPHILS # BLD AUTO: 6.98 10*3/MM3 (ref 1.9–8.1)
NEUTROPHILS # BLD AUTO: 9.02 10*3/MM3 (ref 1.9–8.1)
NEUTROPHILS NFR BLD AUTO: 58.5 % (ref 42.7–76)
NEUTROPHILS NFR BLD AUTO: 67.1 % (ref 42.7–76)
PLATELET # BLD AUTO: 216 10*3/MM3 (ref 140–500)
PLATELET # BLD AUTO: 234 10*3/MM3 (ref 140–500)
PMV BLD AUTO: 10.1 FL (ref 6–12)
PMV BLD AUTO: 9.8 FL (ref 6–12)
POTASSIUM BLD-SCNC: 3.7 MMOL/L (ref 3.5–5.2)
RBC # BLD AUTO: 2.65 10*6/MM3 (ref 3.9–5.2)
RBC # BLD AUTO: 3.38 10*6/MM3 (ref 3.9–5.2)
RH BLD: POSITIVE
SODIUM BLD-SCNC: 134 MMOL/L (ref 136–145)
WBC NRBC COR # BLD: 11.93 10*3/MM3 (ref 4.5–10.7)
WBC NRBC COR # BLD: 13.46 10*3/MM3 (ref 4.5–10.7)

## 2018-01-14 PROCEDURE — 86901 BLOOD TYPING SEROLOGIC RH(D): CPT | Performed by: ORTHOPAEDIC SURGERY

## 2018-01-14 PROCEDURE — 86900 BLOOD TYPING SEROLOGIC ABO: CPT | Performed by: ORTHOPAEDIC SURGERY

## 2018-01-14 PROCEDURE — 30233N1 TRANSFUSION OF NONAUTOLOGOUS RED BLOOD CELLS INTO PERIPHERAL VEIN, PERCUTANEOUS APPROACH: ICD-10-PCS | Performed by: ORTHOPAEDIC SURGERY

## 2018-01-14 PROCEDURE — 86850 RBC ANTIBODY SCREEN: CPT | Performed by: ORTHOPAEDIC SURGERY

## 2018-01-14 PROCEDURE — P9016 RBC LEUKOCYTES REDUCED: HCPCS

## 2018-01-14 PROCEDURE — 94799 UNLISTED PULMONARY SVC/PX: CPT

## 2018-01-14 PROCEDURE — 86900 BLOOD TYPING SEROLOGIC ABO: CPT

## 2018-01-14 PROCEDURE — 82962 GLUCOSE BLOOD TEST: CPT

## 2018-01-14 PROCEDURE — 85025 COMPLETE CBC W/AUTO DIFF WBC: CPT | Performed by: ORTHOPAEDIC SURGERY

## 2018-01-14 PROCEDURE — 25010000002 POTASSIUM CHLORIDE PER 2 MEQ OF POTASSIUM: Performed by: ORTHOPAEDIC SURGERY

## 2018-01-14 PROCEDURE — 36430 TRANSFUSION BLD/BLD COMPNT: CPT

## 2018-01-14 PROCEDURE — 86923 COMPATIBILITY TEST ELECTRIC: CPT

## 2018-01-14 PROCEDURE — 80048 BASIC METABOLIC PNL TOTAL CA: CPT | Performed by: HOSPITALIST

## 2018-01-14 PROCEDURE — 85025 COMPLETE CBC W/AUTO DIFF WBC: CPT | Performed by: HOSPITALIST

## 2018-01-14 PROCEDURE — 63710000001 INSULIN ASPART PER 5 UNITS: Performed by: INTERNAL MEDICINE

## 2018-01-14 RX ORDER — ACETAMINOPHEN 325 MG/1
650 TABLET ORAL EVERY 6 HOURS PRN
Status: DISCONTINUED | OUTPATIENT
Start: 2018-01-14 | End: 2018-01-18 | Stop reason: HOSPADM

## 2018-01-14 RX ADMIN — CETIRIZINE HYDROCHLORIDE 10 MG: 10 TABLET, FILM COATED ORAL at 08:38

## 2018-01-14 RX ADMIN — CYANOCOBALAMIN TAB 500 MCG 1000 MCG: 500 TAB at 08:39

## 2018-01-14 RX ADMIN — CALCIUM CARBONATE-VITAMIN D TAB 500 MG-200 UNIT 1000 MG: 500-200 TAB at 08:38

## 2018-01-14 RX ADMIN — INSULIN ASPART 6 UNITS: 100 INJECTION, SOLUTION INTRAVENOUS; SUBCUTANEOUS at 12:13

## 2018-01-14 RX ADMIN — LOSARTAN POTASSIUM 50 MG: 50 TABLET, FILM COATED ORAL at 08:38

## 2018-01-14 RX ADMIN — SODIUM CHLORIDE AND POTASSIUM CHLORIDE 100 ML/HR: 4.5; 1.49 INJECTION, SOLUTION INTRAVENOUS at 08:32

## 2018-01-14 RX ADMIN — TRAMADOL HYDROCHLORIDE 100 MG: 50 TABLET, FILM COATED ORAL at 08:37

## 2018-01-14 RX ADMIN — FAMOTIDINE 20 MG: 20 TABLET, FILM COATED ORAL at 08:38

## 2018-01-14 RX ADMIN — INSULIN ASPART 6 UNITS: 100 INJECTION, SOLUTION INTRAVENOUS; SUBCUTANEOUS at 17:15

## 2018-01-14 RX ADMIN — INSULIN ASPART 4 UNITS: 100 INJECTION, SOLUTION INTRAVENOUS; SUBCUTANEOUS at 08:37

## 2018-01-14 RX ADMIN — VITAMIN D, TAB 1000IU (100/BT) 2000 UNITS: 25 TAB at 08:38

## 2018-01-14 RX ADMIN — TRAMADOL HYDROCHLORIDE 100 MG: 50 TABLET, FILM COATED ORAL at 17:16

## 2018-01-14 RX ADMIN — DULOXETINE HYDROCHLORIDE 60 MG: 60 CAPSULE, DELAYED RELEASE ORAL at 08:38

## 2018-01-14 RX ADMIN — FUROSEMIDE 40 MG: 40 TABLET ORAL at 08:39

## 2018-01-15 ENCOUNTER — APPOINTMENT (OUTPATIENT)
Dept: CARDIOLOGY | Facility: HOSPITAL | Age: 73
End: 2018-01-15
Attending: ORTHOPAEDIC SURGERY

## 2018-01-15 LAB
ABO + RH BLD: NORMAL
ANION GAP SERPL CALCULATED.3IONS-SCNC: 14.7 MMOL/L
ANION GAP SERPL CALCULATED.3IONS-SCNC: 7.3 MMOL/L
BASOPHILS # BLD AUTO: 0.02 10*3/MM3 (ref 0–0.2)
BASOPHILS NFR BLD AUTO: 0.2 % (ref 0–1.5)
BH BB BLOOD EXPIRATION DATE: NORMAL
BH BB BLOOD TYPE BARCODE: 6200
BH BB DISPENSE STATUS: NORMAL
BH BB PRODUCT CODE: NORMAL
BH BB UNIT NUMBER: NORMAL
BH CV UPPER VENOUS LEFT AXILLARY AUGMENT: NORMAL
BH CV UPPER VENOUS LEFT AXILLARY COMPETENT: NORMAL
BH CV UPPER VENOUS LEFT AXILLARY COMPRESS: NORMAL
BH CV UPPER VENOUS LEFT AXILLARY PHASIC: NORMAL
BH CV UPPER VENOUS LEFT AXILLARY SPONT: NORMAL
BH CV UPPER VENOUS LEFT BASILIC FOREARM COMPRESS: NORMAL
BH CV UPPER VENOUS LEFT BASILIC UPPER COMPRESS: NORMAL
BH CV UPPER VENOUS LEFT BRACHIAL COMPRESS: NORMAL
BH CV UPPER VENOUS LEFT CEPHALIC FOREARM COLOR: 1
BH CV UPPER VENOUS LEFT CEPHALIC FOREARM COMPRESS: NORMAL
BH CV UPPER VENOUS LEFT CEPHALIC FOREARM THROMBUS: NORMAL
BH CV UPPER VENOUS LEFT CEPHALIC UPPER COMPRESS: NORMAL
BH CV UPPER VENOUS LEFT INTERNAL JUGULAR AUGMENT: NORMAL
BH CV UPPER VENOUS LEFT INTERNAL JUGULAR COMPETENT: NORMAL
BH CV UPPER VENOUS LEFT INTERNAL JUGULAR COMPRESS: NORMAL
BH CV UPPER VENOUS LEFT INTERNAL JUGULAR PHASIC: NORMAL
BH CV UPPER VENOUS LEFT INTERNAL JUGULAR SPONT: NORMAL
BH CV UPPER VENOUS LEFT RADIAL COMPRESS: NORMAL
BH CV UPPER VENOUS LEFT SUBCLAVIAN AUGMENT: NORMAL
BH CV UPPER VENOUS LEFT SUBCLAVIAN COMPETENT: NORMAL
BH CV UPPER VENOUS LEFT SUBCLAVIAN COMPRESS: NORMAL
BH CV UPPER VENOUS LEFT SUBCLAVIAN PHASIC: NORMAL
BH CV UPPER VENOUS LEFT SUBCLAVIAN SPONT: NORMAL
BH CV UPPER VENOUS LEFT ULNAR COMPRESS: NORMAL
BH CV UPPER VENOUS RIGHT AXILLARY AUGMENT: NORMAL
BH CV UPPER VENOUS RIGHT AXILLARY COMPETENT: NORMAL
BH CV UPPER VENOUS RIGHT AXILLARY COMPRESS: NORMAL
BH CV UPPER VENOUS RIGHT AXILLARY PHASIC: NORMAL
BH CV UPPER VENOUS RIGHT AXILLARY SPONT: NORMAL
BH CV UPPER VENOUS RIGHT BASILIC FOREARM COMPRESS: NORMAL
BH CV UPPER VENOUS RIGHT BASILIC UPPER COMPRESS: NORMAL
BH CV UPPER VENOUS RIGHT BRACHIAL COMPRESS: NORMAL
BH CV UPPER VENOUS RIGHT CEPHALIC FOREARM COLOR: 1
BH CV UPPER VENOUS RIGHT CEPHALIC FOREARM COMPRESS: NORMAL
BH CV UPPER VENOUS RIGHT CEPHALIC FOREARM THROMBUS: NORMAL
BH CV UPPER VENOUS RIGHT CEPHALIC UPPER COMPRESS: NORMAL
BH CV UPPER VENOUS RIGHT INTERNAL JUGULAR AUGMENT: NORMAL
BH CV UPPER VENOUS RIGHT INTERNAL JUGULAR COMPETENT: NORMAL
BH CV UPPER VENOUS RIGHT INTERNAL JUGULAR COMPRESS: NORMAL
BH CV UPPER VENOUS RIGHT INTERNAL JUGULAR PHASIC: NORMAL
BH CV UPPER VENOUS RIGHT INTERNAL JUGULAR SPONT: NORMAL
BH CV UPPER VENOUS RIGHT RADIAL COMPRESS: NORMAL
BH CV UPPER VENOUS RIGHT SUBCLAVIAN AUGMENT: NORMAL
BH CV UPPER VENOUS RIGHT SUBCLAVIAN COMPETENT: NORMAL
BH CV UPPER VENOUS RIGHT SUBCLAVIAN COMPRESS: NORMAL
BH CV UPPER VENOUS RIGHT SUBCLAVIAN PHASIC: NORMAL
BH CV UPPER VENOUS RIGHT SUBCLAVIAN SPONT: NORMAL
BH CV UPPER VENOUS RIGHT ULNAR COMPRESS: NORMAL
BUN BLD-MCNC: 9 MG/DL (ref 8–23)
BUN BLD-MCNC: 9 MG/DL (ref 8–23)
BUN/CREAT SERPL: 10.3 (ref 7–25)
BUN/CREAT SERPL: 11.5 (ref 7–25)
CALCIUM SPEC-SCNC: 8.5 MG/DL (ref 8.6–10.5)
CALCIUM SPEC-SCNC: 8.6 MG/DL (ref 8.6–10.5)
CHLORIDE SERPL-SCNC: 90 MMOL/L (ref 98–107)
CHLORIDE SERPL-SCNC: 93 MMOL/L (ref 98–107)
CO2 SERPL-SCNC: 21.3 MMOL/L (ref 22–29)
CO2 SERPL-SCNC: 27.7 MMOL/L (ref 22–29)
CREAT BLD-MCNC: 0.78 MG/DL (ref 0.57–1)
CREAT BLD-MCNC: 0.87 MG/DL (ref 0.57–1)
DEPRECATED RDW RBC AUTO: 45.8 FL (ref 37–54)
EOSINOPHIL # BLD AUTO: 0.35 10*3/MM3 (ref 0–0.7)
EOSINOPHIL NFR BLD AUTO: 2.7 % (ref 0.3–6.2)
ERYTHROCYTE [DISTWIDTH] IN BLOOD BY AUTOMATED COUNT: 13.6 % (ref 11.7–13)
GFR SERPL CREATININE-BSD FRML MDRD: 78 ML/MIN/1.73
GFR SERPL CREATININE-BSD FRML MDRD: 88 ML/MIN/1.73
GLUCOSE BLD-MCNC: 227 MG/DL (ref 65–99)
GLUCOSE BLD-MCNC: 300 MG/DL (ref 65–99)
GLUCOSE BLDC GLUCOMTR-MCNC: 257 MG/DL (ref 70–130)
GLUCOSE BLDC GLUCOMTR-MCNC: 300 MG/DL (ref 70–130)
GLUCOSE BLDC GLUCOMTR-MCNC: 334 MG/DL (ref 70–130)
GLUCOSE BLDC GLUCOMTR-MCNC: 336 MG/DL (ref 70–130)
HCT VFR BLD AUTO: 31.1 % (ref 35.6–45.5)
HGB BLD-MCNC: 9.7 G/DL (ref 11.9–15.5)
IMM GRANULOCYTES # BLD: 0.04 10*3/MM3 (ref 0–0.03)
IMM GRANULOCYTES NFR BLD: 0.3 % (ref 0–0.5)
LYMPHOCYTES # BLD AUTO: 2.19 10*3/MM3 (ref 0.9–4.8)
LYMPHOCYTES NFR BLD AUTO: 16.9 % (ref 19.6–45.3)
MCH RBC QN AUTO: 28.8 PG (ref 26.9–32)
MCHC RBC AUTO-ENTMCNC: 31.2 G/DL (ref 32.4–36.3)
MCV RBC AUTO: 92.3 FL (ref 80.5–98.2)
MONOCYTES # BLD AUTO: 1.91 10*3/MM3 (ref 0.2–1.2)
MONOCYTES NFR BLD AUTO: 14.7 % (ref 5–12)
NEUTROPHILS # BLD AUTO: 8.44 10*3/MM3 (ref 1.9–8.1)
NEUTROPHILS NFR BLD AUTO: 65.2 % (ref 42.7–76)
PLATELET # BLD AUTO: 245 10*3/MM3 (ref 140–500)
PMV BLD AUTO: 9.9 FL (ref 6–12)
POTASSIUM BLD-SCNC: 3.9 MMOL/L (ref 3.5–5.2)
POTASSIUM BLD-SCNC: 4.1 MMOL/L (ref 3.5–5.2)
RBC # BLD AUTO: 3.37 10*6/MM3 (ref 3.9–5.2)
SODIUM BLD-SCNC: 125 MMOL/L (ref 136–145)
SODIUM BLD-SCNC: 129 MMOL/L (ref 136–145)
UNIT  ABO: NORMAL
UNIT  RH: NORMAL
WBC NRBC COR # BLD: 12.95 10*3/MM3 (ref 4.5–10.7)

## 2018-01-15 PROCEDURE — 84443 ASSAY THYROID STIM HORMONE: CPT | Performed by: HOSPITALIST

## 2018-01-15 PROCEDURE — 93970 EXTREMITY STUDY: CPT

## 2018-01-15 PROCEDURE — 99024 POSTOP FOLLOW-UP VISIT: CPT | Performed by: ORTHOPAEDIC SURGERY

## 2018-01-15 PROCEDURE — 63710000001 INSULIN ASPART PER 5 UNITS: Performed by: INTERNAL MEDICINE

## 2018-01-15 PROCEDURE — 80048 BASIC METABOLIC PNL TOTAL CA: CPT | Performed by: HOSPITALIST

## 2018-01-15 PROCEDURE — 94799 UNLISTED PULMONARY SVC/PX: CPT

## 2018-01-15 PROCEDURE — 97110 THERAPEUTIC EXERCISES: CPT | Performed by: PHYSICAL THERAPIST

## 2018-01-15 PROCEDURE — 85025 COMPLETE CBC W/AUTO DIFF WBC: CPT | Performed by: ORTHOPAEDIC SURGERY

## 2018-01-15 PROCEDURE — 82962 GLUCOSE BLOOD TEST: CPT

## 2018-01-15 RX ADMIN — ACETAMINOPHEN 650 MG: 325 TABLET ORAL at 17:36

## 2018-01-15 RX ADMIN — INSULIN ASPART 6 UNITS: 100 INJECTION, SOLUTION INTRAVENOUS; SUBCUTANEOUS at 09:06

## 2018-01-15 RX ADMIN — VITAMIN D, TAB 1000IU (100/BT) 2000 UNITS: 25 TAB at 09:05

## 2018-01-15 RX ADMIN — ATORVASTATIN CALCIUM 10 MG: 10 TABLET, FILM COATED ORAL at 22:03

## 2018-01-15 RX ADMIN — FAMOTIDINE 20 MG: 20 TABLET, FILM COATED ORAL at 22:03

## 2018-01-15 RX ADMIN — BISACODYL 10 MG: 10 SUPPOSITORY RECTAL at 09:00

## 2018-01-15 RX ADMIN — CYANOCOBALAMIN TAB 500 MCG 1000 MCG: 500 TAB at 09:06

## 2018-01-15 RX ADMIN — CALCIUM CARBONATE-VITAMIN D TAB 500 MG-200 UNIT 1000 MG: 500-200 TAB at 09:06

## 2018-01-15 RX ADMIN — ACETAMINOPHEN 650 MG: 325 TABLET ORAL at 09:05

## 2018-01-15 RX ADMIN — INSULIN ASPART 7 UNITS: 100 INJECTION, SOLUTION INTRAVENOUS; SUBCUTANEOUS at 17:36

## 2018-01-15 RX ADMIN — ACETAMINOPHEN 650 MG: 325 TABLET ORAL at 04:16

## 2018-01-15 RX ADMIN — INSULIN ASPART 7 UNITS: 100 INJECTION, SOLUTION INTRAVENOUS; SUBCUTANEOUS at 12:37

## 2018-01-15 RX ADMIN — DULOXETINE HYDROCHLORIDE 60 MG: 60 CAPSULE, DELAYED RELEASE ORAL at 09:06

## 2018-01-15 RX ADMIN — ACETAMINOPHEN 650 MG: 325 TABLET ORAL at 23:37

## 2018-01-15 RX ADMIN — INSULIN ASPART 7 UNITS: 100 INJECTION, SOLUTION INTRAVENOUS; SUBCUTANEOUS at 22:03

## 2018-01-15 RX ADMIN — FAMOTIDINE 20 MG: 20 TABLET, FILM COATED ORAL at 09:05

## 2018-01-15 RX ADMIN — CETIRIZINE HYDROCHLORIDE 10 MG: 10 TABLET, FILM COATED ORAL at 09:06

## 2018-01-15 RX ADMIN — FUROSEMIDE 40 MG: 40 TABLET ORAL at 09:05

## 2018-01-15 RX ADMIN — LOSARTAN POTASSIUM 50 MG: 50 TABLET, FILM COATED ORAL at 09:06

## 2018-01-16 LAB
ANION GAP SERPL CALCULATED.3IONS-SCNC: 8.7 MMOL/L
ANION GAP SERPL CALCULATED.3IONS-SCNC: 9.2 MMOL/L
BUN BLD-MCNC: 8 MG/DL (ref 8–23)
BUN BLD-MCNC: 8 MG/DL (ref 8–23)
BUN/CREAT SERPL: 10.1 (ref 7–25)
BUN/CREAT SERPL: 10.8 (ref 7–25)
CALCIUM SPEC-SCNC: 8.7 MG/DL (ref 8.6–10.5)
CALCIUM SPEC-SCNC: 8.8 MG/DL (ref 8.6–10.5)
CHLORIDE SERPL-SCNC: 92 MMOL/L (ref 98–107)
CHLORIDE SERPL-SCNC: 95 MMOL/L (ref 98–107)
CO2 SERPL-SCNC: 27.3 MMOL/L (ref 22–29)
CO2 SERPL-SCNC: 29.8 MMOL/L (ref 22–29)
CREAT BLD-MCNC: 0.74 MG/DL (ref 0.57–1)
CREAT BLD-MCNC: 0.79 MG/DL (ref 0.57–1)
GFR SERPL CREATININE-BSD FRML MDRD: 87 ML/MIN/1.73
GFR SERPL CREATININE-BSD FRML MDRD: 94 ML/MIN/1.73
GLUCOSE BLD-MCNC: 238 MG/DL (ref 65–99)
GLUCOSE BLD-MCNC: 242 MG/DL (ref 65–99)
GLUCOSE BLDC GLUCOMTR-MCNC: 256 MG/DL (ref 70–130)
GLUCOSE BLDC GLUCOMTR-MCNC: 286 MG/DL (ref 70–130)
GLUCOSE BLDC GLUCOMTR-MCNC: 340 MG/DL (ref 70–130)
GLUCOSE BLDC GLUCOMTR-MCNC: 373 MG/DL (ref 70–130)
OSMOLALITY UR: 295 MOSM/KG
POTASSIUM BLD-SCNC: 3.6 MMOL/L (ref 3.5–5.2)
POTASSIUM BLD-SCNC: 3.7 MMOL/L (ref 3.5–5.2)
SODIUM BLD-SCNC: 128 MMOL/L (ref 136–145)
SODIUM BLD-SCNC: 134 MMOL/L (ref 136–145)
SODIUM BLD-SCNC: 134 MMOL/L (ref 136–145)
SODIUM UR-SCNC: <20 MMOL/L
TSH SERPL DL<=0.05 MIU/L-ACNC: 2.51 MIU/ML (ref 0.27–4.2)

## 2018-01-16 PROCEDURE — 80048 BASIC METABOLIC PNL TOTAL CA: CPT | Performed by: HOSPITALIST

## 2018-01-16 PROCEDURE — 83935 ASSAY OF URINE OSMOLALITY: CPT | Performed by: HOSPITALIST

## 2018-01-16 PROCEDURE — 97112 NEUROMUSCULAR REEDUCATION: CPT

## 2018-01-16 PROCEDURE — 63710000001 INSULIN ASPART PER 5 UNITS: Performed by: INTERNAL MEDICINE

## 2018-01-16 PROCEDURE — 97535 SELF CARE MNGMENT TRAINING: CPT

## 2018-01-16 PROCEDURE — 84295 ASSAY OF SERUM SODIUM: CPT | Performed by: INTERNAL MEDICINE

## 2018-01-16 PROCEDURE — 97110 THERAPEUTIC EXERCISES: CPT

## 2018-01-16 PROCEDURE — 82962 GLUCOSE BLOOD TEST: CPT

## 2018-01-16 PROCEDURE — 63710000001 INSULIN ASPART PER 5 UNITS: Performed by: HOSPITALIST

## 2018-01-16 PROCEDURE — 99024 POSTOP FOLLOW-UP VISIT: CPT | Performed by: ORTHOPAEDIC SURGERY

## 2018-01-16 PROCEDURE — 84300 ASSAY OF URINE SODIUM: CPT | Performed by: HOSPITALIST

## 2018-01-16 RX ORDER — SODIUM CHLORIDE 9 MG/ML
75 INJECTION, SOLUTION INTRAVENOUS CONTINUOUS
Status: ACTIVE | OUTPATIENT
Start: 2018-01-16 | End: 2018-01-17

## 2018-01-16 RX ADMIN — INSULIN ASPART 5 UNITS: 100 INJECTION, SOLUTION INTRAVENOUS; SUBCUTANEOUS at 19:32

## 2018-01-16 RX ADMIN — LOSARTAN POTASSIUM 50 MG: 50 TABLET, FILM COATED ORAL at 08:38

## 2018-01-16 RX ADMIN — VITAMIN D, TAB 1000IU (100/BT) 2000 UNITS: 25 TAB at 08:37

## 2018-01-16 RX ADMIN — FAMOTIDINE 20 MG: 20 TABLET, FILM COATED ORAL at 08:37

## 2018-01-16 RX ADMIN — FAMOTIDINE 20 MG: 20 TABLET, FILM COATED ORAL at 21:12

## 2018-01-16 RX ADMIN — CETIRIZINE HYDROCHLORIDE 10 MG: 10 TABLET, FILM COATED ORAL at 08:37

## 2018-01-16 RX ADMIN — INSULIN ASPART 6 UNITS: 100 INJECTION, SOLUTION INTRAVENOUS; SUBCUTANEOUS at 12:13

## 2018-01-16 RX ADMIN — INSULIN ASPART 6 UNITS: 100 INJECTION, SOLUTION INTRAVENOUS; SUBCUTANEOUS at 08:37

## 2018-01-16 RX ADMIN — INSULIN ASPART 8 UNITS: 100 INJECTION, SOLUTION INTRAVENOUS; SUBCUTANEOUS at 22:21

## 2018-01-16 RX ADMIN — CYANOCOBALAMIN TAB 500 MCG 1000 MCG: 500 TAB at 08:37

## 2018-01-16 RX ADMIN — SODIUM CHLORIDE 75 ML/HR: 9 INJECTION, SOLUTION INTRAVENOUS at 12:13

## 2018-01-16 RX ADMIN — ATORVASTATIN CALCIUM 10 MG: 10 TABLET, FILM COATED ORAL at 21:12

## 2018-01-16 RX ADMIN — FLUTICASONE PROPIONATE 1 SPRAY: 50 SPRAY, METERED NASAL at 08:37

## 2018-01-16 RX ADMIN — INSULIN ASPART 7 UNITS: 100 INJECTION, SOLUTION INTRAVENOUS; SUBCUTANEOUS at 17:39

## 2018-01-16 RX ADMIN — DULOXETINE HYDROCHLORIDE 60 MG: 60 CAPSULE, DELAYED RELEASE ORAL at 08:38

## 2018-01-16 RX ADMIN — CALCIUM CARBONATE-VITAMIN D TAB 500 MG-200 UNIT 1000 MG: 500-200 TAB at 08:38

## 2018-01-17 LAB
ALBUMIN SERPL-MCNC: 3 G/DL (ref 3.5–5.2)
ANION GAP SERPL CALCULATED.3IONS-SCNC: 15 MMOL/L
BUN BLD-MCNC: 10 MG/DL (ref 8–23)
BUN/CREAT SERPL: 14.1 (ref 7–25)
CALCIUM SPEC-SCNC: 8.8 MG/DL (ref 8.6–10.5)
CHLORIDE SERPL-SCNC: 97 MMOL/L (ref 98–107)
CO2 SERPL-SCNC: 24 MMOL/L (ref 22–29)
CREAT BLD-MCNC: 0.71 MG/DL (ref 0.57–1)
GFR SERPL CREATININE-BSD FRML MDRD: 98 ML/MIN/1.73
GLUCOSE BLD-MCNC: 240 MG/DL (ref 65–99)
GLUCOSE BLDC GLUCOMTR-MCNC: 228 MG/DL (ref 70–130)
GLUCOSE BLDC GLUCOMTR-MCNC: 239 MG/DL (ref 70–130)
GLUCOSE BLDC GLUCOMTR-MCNC: 252 MG/DL (ref 70–130)
GLUCOSE BLDC GLUCOMTR-MCNC: 280 MG/DL (ref 70–130)
MAGNESIUM SERPL-MCNC: 2 MG/DL (ref 1.6–2.4)
PHOSPHATE SERPL-MCNC: 2.7 MG/DL (ref 2.5–4.5)
POTASSIUM BLD-SCNC: 3.4 MMOL/L (ref 3.5–5.2)
SODIUM BLD-SCNC: 136 MMOL/L (ref 136–145)

## 2018-01-17 PROCEDURE — 80069 RENAL FUNCTION PANEL: CPT | Performed by: INTERNAL MEDICINE

## 2018-01-17 PROCEDURE — 97112 NEUROMUSCULAR REEDUCATION: CPT

## 2018-01-17 PROCEDURE — 82962 GLUCOSE BLOOD TEST: CPT

## 2018-01-17 PROCEDURE — 83735 ASSAY OF MAGNESIUM: CPT | Performed by: INTERNAL MEDICINE

## 2018-01-17 PROCEDURE — 97110 THERAPEUTIC EXERCISES: CPT

## 2018-01-17 PROCEDURE — 63710000001 INSULIN ASPART PER 5 UNITS: Performed by: HOSPITALIST

## 2018-01-17 PROCEDURE — 63710000001 INSULIN ASPART PER 5 UNITS: Performed by: INTERNAL MEDICINE

## 2018-01-17 RX ORDER — POTASSIUM CHLORIDE 1.5 G/1.77G
40 POWDER, FOR SOLUTION ORAL AS NEEDED
Status: DISCONTINUED | OUTPATIENT
Start: 2018-01-17 | End: 2018-01-17 | Stop reason: CLARIF

## 2018-01-17 RX ORDER — DEXTROSE MONOHYDRATE 25 G/50ML
25 INJECTION, SOLUTION INTRAVENOUS
Status: DISCONTINUED | OUTPATIENT
Start: 2018-01-17 | End: 2018-01-18 | Stop reason: HOSPADM

## 2018-01-17 RX ORDER — POTASSIUM CHLORIDE 7.45 MG/ML
10 INJECTION INTRAVENOUS
Status: DISCONTINUED | OUTPATIENT
Start: 2018-01-17 | End: 2018-01-18 | Stop reason: HOSPADM

## 2018-01-17 RX ORDER — NICOTINE POLACRILEX 4 MG
15 LOZENGE BUCCAL
Status: DISCONTINUED | OUTPATIENT
Start: 2018-01-17 | End: 2018-01-18 | Stop reason: HOSPADM

## 2018-01-17 RX ORDER — POTASSIUM CHLORIDE 750 MG/1
40 CAPSULE, EXTENDED RELEASE ORAL AS NEEDED
Status: DISCONTINUED | OUTPATIENT
Start: 2018-01-17 | End: 2018-01-18 | Stop reason: HOSPADM

## 2018-01-17 RX ADMIN — CYANOCOBALAMIN TAB 500 MCG 1000 MCG: 500 TAB at 08:49

## 2018-01-17 RX ADMIN — CETIRIZINE HYDROCHLORIDE 10 MG: 10 TABLET, FILM COATED ORAL at 08:49

## 2018-01-17 RX ADMIN — INSULIN ASPART 7 UNITS: 100 INJECTION, SOLUTION INTRAVENOUS; SUBCUTANEOUS at 17:59

## 2018-01-17 RX ADMIN — INSULIN ASPART 4 UNITS: 100 INJECTION, SOLUTION INTRAVENOUS; SUBCUTANEOUS at 12:14

## 2018-01-17 RX ADMIN — FAMOTIDINE 20 MG: 20 TABLET, FILM COATED ORAL at 08:49

## 2018-01-17 RX ADMIN — ATORVASTATIN CALCIUM 10 MG: 10 TABLET, FILM COATED ORAL at 21:39

## 2018-01-17 RX ADMIN — INSULIN ASPART 8 UNITS: 100 INJECTION, SOLUTION INTRAVENOUS; SUBCUTANEOUS at 17:59

## 2018-01-17 RX ADMIN — LOSARTAN POTASSIUM 50 MG: 50 TABLET, FILM COATED ORAL at 08:49

## 2018-01-17 RX ADMIN — FLUTICASONE PROPIONATE 1 SPRAY: 50 SPRAY, METERED NASAL at 08:49

## 2018-01-17 RX ADMIN — CALCIUM CARBONATE-VITAMIN D TAB 500 MG-200 UNIT 1000 MG: 500-200 TAB at 08:49

## 2018-01-17 RX ADMIN — INSULIN ASPART 5 UNITS: 100 INJECTION, SOLUTION INTRAVENOUS; SUBCUTANEOUS at 08:50

## 2018-01-17 RX ADMIN — INSULIN ASPART 4 UNITS: 100 INJECTION, SOLUTION INTRAVENOUS; SUBCUTANEOUS at 08:50

## 2018-01-17 RX ADMIN — POTASSIUM CHLORIDE 40 MEQ: 750 CAPSULE, EXTENDED RELEASE ORAL at 12:14

## 2018-01-17 RX ADMIN — INSULIN ASPART 5 UNITS: 100 INJECTION, SOLUTION INTRAVENOUS; SUBCUTANEOUS at 12:14

## 2018-01-17 RX ADMIN — FAMOTIDINE 20 MG: 20 TABLET, FILM COATED ORAL at 21:39

## 2018-01-17 RX ADMIN — VITAMIN D, TAB 1000IU (100/BT) 2000 UNITS: 25 TAB at 08:49

## 2018-01-17 RX ADMIN — SODIUM CHLORIDE 75 ML/HR: 9 INJECTION, SOLUTION INTRAVENOUS at 00:33

## 2018-01-17 RX ADMIN — DULOXETINE HYDROCHLORIDE 60 MG: 60 CAPSULE, DELAYED RELEASE ORAL at 08:49

## 2018-01-17 RX ADMIN — POTASSIUM CHLORIDE 40 MEQ: 750 CAPSULE, EXTENDED RELEASE ORAL at 18:05

## 2018-01-17 RX ADMIN — INSULIN ASPART 8 UNITS: 100 INJECTION, SOLUTION INTRAVENOUS; SUBCUTANEOUS at 21:39

## 2018-01-18 ENCOUNTER — HOSPITAL ENCOUNTER (INPATIENT)
Facility: HOSPITAL | Age: 73
LOS: 8 days | Discharge: HOME | End: 2018-01-26
Attending: PHYSICAL MEDICINE & REHABILITATION | Admitting: PHYSICAL MEDICINE & REHABILITATION

## 2018-01-18 VITALS
RESPIRATION RATE: 16 BRPM | SYSTOLIC BLOOD PRESSURE: 137 MMHG | WEIGHT: 262.9 LBS | OXYGEN SATURATION: 99 % | DIASTOLIC BLOOD PRESSURE: 82 MMHG | BODY MASS INDEX: 42.25 KG/M2 | HEART RATE: 92 BPM | HEIGHT: 66 IN | TEMPERATURE: 98.6 F

## 2018-01-18 LAB
ALBUMIN SERPL-MCNC: 3.1 G/DL (ref 3.5–5.2)
ALBUMIN/GLOB SERPL: 0.8 G/DL
ALP SERPL-CCNC: 87 U/L (ref 39–117)
ALT SERPL W P-5'-P-CCNC: 20 U/L (ref 1–33)
ANION GAP SERPL CALCULATED.3IONS-SCNC: 11 MMOL/L
AST SERPL-CCNC: 24 U/L (ref 1–32)
BILIRUB SERPL-MCNC: 0.4 MG/DL (ref 0.1–1.2)
BUN BLD-MCNC: 6 MG/DL (ref 8–23)
BUN/CREAT SERPL: 8.6 (ref 7–25)
CALCIUM SPEC-SCNC: 9 MG/DL (ref 8.6–10.5)
CHLORIDE SERPL-SCNC: 100 MMOL/L (ref 98–107)
CO2 SERPL-SCNC: 25 MMOL/L (ref 22–29)
CREAT BLD-MCNC: 0.7 MG/DL (ref 0.57–1)
GFR SERPL CREATININE-BSD FRML MDRD: 100 ML/MIN/1.73
GLOBULIN UR ELPH-MCNC: 3.9 GM/DL
GLUCOSE BLD-MCNC: 166 MG/DL (ref 65–99)
GLUCOSE BLDC GLUCOMTR-MCNC: 185 MG/DL (ref 70–130)
GLUCOSE BLDC GLUCOMTR-MCNC: 204 MG/DL (ref 70–130)
GLUCOSE BLDC GLUCOMTR-MCNC: 232 MG/DL (ref 70–130)
GLUCOSE BLDC GLUCOMTR-MCNC: 275 MG/DL (ref 70–130)
POTASSIUM BLD-SCNC: 3.8 MMOL/L (ref 3.5–5.2)
PROT SERPL-MCNC: 7 G/DL (ref 6–8.5)
SODIUM BLD-SCNC: 136 MMOL/L (ref 136–145)

## 2018-01-18 PROCEDURE — 97112 NEUROMUSCULAR REEDUCATION: CPT

## 2018-01-18 PROCEDURE — 97535 SELF CARE MNGMENT TRAINING: CPT

## 2018-01-18 PROCEDURE — 97110 THERAPEUTIC EXERCISES: CPT

## 2018-01-18 PROCEDURE — 63710000001 INSULIN DETEMER PER 5 UNITS: Performed by: HOSPITALIST

## 2018-01-18 PROCEDURE — 63710000001 INSULIN ASPART PER 5 UNITS: Performed by: HOSPITALIST

## 2018-01-18 PROCEDURE — 94799 UNLISTED PULMONARY SVC/PX: CPT

## 2018-01-18 PROCEDURE — 80053 COMPREHEN METABOLIC PANEL: CPT | Performed by: INTERNAL MEDICINE

## 2018-01-18 PROCEDURE — 82962 GLUCOSE BLOOD TEST: CPT

## 2018-01-18 RX ORDER — NICOTINE POLACRILEX 4 MG
15 LOZENGE BUCCAL
Status: DISCONTINUED | OUTPATIENT
Start: 2018-01-18 | End: 2018-01-26 | Stop reason: HOSPADM

## 2018-01-18 RX ORDER — ONDANSETRON 4 MG/1
4 TABLET, FILM COATED ORAL EVERY 6 HOURS PRN
Status: DISCONTINUED | OUTPATIENT
Start: 2018-01-18 | End: 2018-01-26 | Stop reason: HOSPADM

## 2018-01-18 RX ORDER — ALBUTEROL SULFATE 2.5 MG/3ML
2.5 SOLUTION RESPIRATORY (INHALATION) EVERY 6 HOURS PRN
Status: CANCELLED | OUTPATIENT
Start: 2018-01-18

## 2018-01-18 RX ORDER — NICOTINE POLACRILEX 4 MG
15 LOZENGE BUCCAL
Status: CANCELLED | OUTPATIENT
Start: 2018-01-18

## 2018-01-18 RX ORDER — BISACODYL 10 MG
10 SUPPOSITORY, RECTAL RECTAL DAILY PRN
Status: DISCONTINUED | OUTPATIENT
Start: 2018-01-18 | End: 2018-01-26 | Stop reason: HOSPADM

## 2018-01-18 RX ORDER — MELATONIN
2000 DAILY
Status: CANCELLED | OUTPATIENT
Start: 2018-01-19

## 2018-01-18 RX ORDER — DULOXETIN HYDROCHLORIDE 60 MG/1
60 CAPSULE, DELAYED RELEASE ORAL DAILY
Status: DISCONTINUED | OUTPATIENT
Start: 2018-01-19 | End: 2018-01-26 | Stop reason: HOSPADM

## 2018-01-18 RX ORDER — MELATONIN
2000 DAILY
Status: DISCONTINUED | OUTPATIENT
Start: 2018-01-19 | End: 2018-01-26 | Stop reason: HOSPADM

## 2018-01-18 RX ORDER — LOSARTAN POTASSIUM 50 MG/1
50 TABLET ORAL DAILY
Status: CANCELLED | OUTPATIENT
Start: 2018-01-19

## 2018-01-18 RX ORDER — LOSARTAN POTASSIUM 50 MG/1
50 TABLET ORAL DAILY
Status: DISCONTINUED | OUTPATIENT
Start: 2018-01-19 | End: 2018-01-26 | Stop reason: HOSPADM

## 2018-01-18 RX ORDER — NALOXONE HCL 0.4 MG/ML
0.1 VIAL (ML) INJECTION
Status: CANCELLED | OUTPATIENT
Start: 2018-01-18

## 2018-01-18 RX ORDER — DIPHENHYDRAMINE HYDROCHLORIDE, ZINC ACETATE 2; .1 G/100G; G/100G
CREAM TOPICAL 3 TIMES DAILY PRN
Status: CANCELLED | OUTPATIENT
Start: 2018-01-18

## 2018-01-18 RX ORDER — FAMOTIDINE 20 MG/1
20 TABLET, FILM COATED ORAL 2 TIMES DAILY
Status: DISCONTINUED | OUTPATIENT
Start: 2018-01-18 | End: 2018-01-26 | Stop reason: HOSPADM

## 2018-01-18 RX ORDER — DIPHENHYDRAMINE HYDROCHLORIDE, ZINC ACETATE 2; .1 G/100G; G/100G
CREAM TOPICAL 3 TIMES DAILY PRN
Status: DISCONTINUED | OUTPATIENT
Start: 2018-01-18 | End: 2018-01-26 | Stop reason: HOSPADM

## 2018-01-18 RX ORDER — ALBUTEROL SULFATE 2.5 MG/3ML
2.5 SOLUTION RESPIRATORY (INHALATION) EVERY 6 HOURS PRN
Status: DISCONTINUED | OUTPATIENT
Start: 2018-01-18 | End: 2018-01-26 | Stop reason: HOSPADM

## 2018-01-18 RX ORDER — ONDANSETRON 2 MG/ML
4 INJECTION INTRAMUSCULAR; INTRAVENOUS EVERY 6 HOURS PRN
Status: DISCONTINUED | OUTPATIENT
Start: 2018-01-18 | End: 2018-01-26 | Stop reason: HOSPADM

## 2018-01-18 RX ORDER — CETIRIZINE HYDROCHLORIDE 10 MG/1
10 TABLET ORAL DAILY
Status: DISCONTINUED | OUTPATIENT
Start: 2018-01-19 | End: 2018-01-26 | Stop reason: HOSPADM

## 2018-01-18 RX ORDER — ONDANSETRON 4 MG/1
4 TABLET, ORALLY DISINTEGRATING ORAL EVERY 6 HOURS PRN
Status: CANCELLED | OUTPATIENT
Start: 2018-01-18

## 2018-01-18 RX ORDER — ONDANSETRON 4 MG/1
4 TABLET, ORALLY DISINTEGRATING ORAL EVERY 6 HOURS PRN
Status: DISCONTINUED | OUTPATIENT
Start: 2018-01-18 | End: 2018-01-26 | Stop reason: HOSPADM

## 2018-01-18 RX ORDER — ONDANSETRON 2 MG/ML
4 INJECTION INTRAMUSCULAR; INTRAVENOUS EVERY 6 HOURS PRN
Status: CANCELLED | OUTPATIENT
Start: 2018-01-18

## 2018-01-18 RX ORDER — ACETAMINOPHEN 325 MG/1
650 TABLET ORAL EVERY 6 HOURS PRN
Status: CANCELLED | OUTPATIENT
Start: 2018-01-18

## 2018-01-18 RX ORDER — FLUTICASONE PROPIONATE 50 MCG
1 SPRAY, SUSPENSION (ML) NASAL DAILY
Status: CANCELLED | OUTPATIENT
Start: 2018-01-19

## 2018-01-18 RX ORDER — ONDANSETRON 4 MG/1
4 TABLET, FILM COATED ORAL EVERY 6 HOURS PRN
Status: CANCELLED | OUTPATIENT
Start: 2018-01-18

## 2018-01-18 RX ORDER — ATORVASTATIN CALCIUM 10 MG/1
10 TABLET, FILM COATED ORAL NIGHTLY
Status: CANCELLED | OUTPATIENT
Start: 2018-01-18

## 2018-01-18 RX ORDER — CHOLECALCIFEROL (VITAMIN D3) 125 MCG
1000 CAPSULE ORAL DAILY
Status: DISCONTINUED | OUTPATIENT
Start: 2018-01-19 | End: 2018-01-26 | Stop reason: HOSPADM

## 2018-01-18 RX ORDER — CHOLECALCIFEROL (VITAMIN D3) 125 MCG
1000 CAPSULE ORAL DAILY
Status: CANCELLED | OUTPATIENT
Start: 2018-01-19

## 2018-01-18 RX ORDER — ATORVASTATIN CALCIUM 10 MG/1
10 TABLET, FILM COATED ORAL NIGHTLY
Status: DISCONTINUED | OUTPATIENT
Start: 2018-01-18 | End: 2018-01-26 | Stop reason: HOSPADM

## 2018-01-18 RX ORDER — FLUTICASONE PROPIONATE 50 MCG
1 SPRAY, SUSPENSION (ML) NASAL DAILY
Status: DISCONTINUED | OUTPATIENT
Start: 2018-01-19 | End: 2018-01-26 | Stop reason: HOSPADM

## 2018-01-18 RX ORDER — FAMOTIDINE 20 MG/1
20 TABLET, FILM COATED ORAL 2 TIMES DAILY
Status: CANCELLED | OUTPATIENT
Start: 2018-01-18

## 2018-01-18 RX ORDER — CETIRIZINE HYDROCHLORIDE 10 MG/1
10 TABLET ORAL DAILY
Status: CANCELLED | OUTPATIENT
Start: 2018-01-19

## 2018-01-18 RX ORDER — ACETAMINOPHEN 325 MG/1
650 TABLET ORAL EVERY 6 HOURS PRN
Status: DISCONTINUED | OUTPATIENT
Start: 2018-01-18 | End: 2018-01-26 | Stop reason: HOSPADM

## 2018-01-18 RX ORDER — DULOXETIN HYDROCHLORIDE 60 MG/1
60 CAPSULE, DELAYED RELEASE ORAL DAILY
Status: CANCELLED | OUTPATIENT
Start: 2018-01-19

## 2018-01-18 RX ORDER — BISACODYL 10 MG
10 SUPPOSITORY, RECTAL RECTAL DAILY PRN
Status: CANCELLED | OUTPATIENT
Start: 2018-01-18

## 2018-01-18 RX ADMIN — INSULIN ASPART 8 UNITS: 100 INJECTION, SOLUTION INTRAVENOUS; SUBCUTANEOUS at 17:46

## 2018-01-18 RX ADMIN — CETIRIZINE HYDROCHLORIDE 10 MG: 10 TABLET, FILM COATED ORAL at 08:22

## 2018-01-18 RX ADMIN — LOSARTAN POTASSIUM 50 MG: 50 TABLET, FILM COATED ORAL at 08:22

## 2018-01-18 RX ADMIN — FAMOTIDINE 20 MG: 20 TABLET, FILM COATED ORAL at 08:22

## 2018-01-18 RX ADMIN — FAMOTIDINE 20 MG: 20 TABLET, FILM COATED ORAL at 21:09

## 2018-01-18 RX ADMIN — FLUTICASONE PROPIONATE 1 SPRAY: 50 SPRAY, METERED NASAL at 08:21

## 2018-01-18 RX ADMIN — INSULIN ASPART 9 UNITS: 100 INJECTION, SOLUTION INTRAVENOUS; SUBCUTANEOUS at 17:46

## 2018-01-18 RX ADMIN — INSULIN ASPART 3 UNITS: 100 INJECTION, SOLUTION INTRAVENOUS; SUBCUTANEOUS at 21:09

## 2018-01-18 RX ADMIN — ATORVASTATIN CALCIUM 10 MG: 10 TABLET, FILM COATED ORAL at 21:09

## 2018-01-18 RX ADMIN — CALCIUM CARBONATE-VITAMIN D TAB 500 MG-200 UNIT 1000 MG: 500-200 TAB at 08:22

## 2018-01-18 RX ADMIN — VITAMIN D, TAB 1000IU (100/BT) 2000 UNITS: 25 TAB at 08:22

## 2018-01-18 RX ADMIN — DULOXETINE HYDROCHLORIDE 60 MG: 60 CAPSULE, DELAYED RELEASE ORAL at 08:22

## 2018-01-18 RX ADMIN — INSULIN ASPART 7 UNITS: 100 INJECTION, SOLUTION INTRAVENOUS; SUBCUTANEOUS at 12:44

## 2018-01-18 RX ADMIN — INSULIN ASPART 7 UNITS: 100 INJECTION, SOLUTION INTRAVENOUS; SUBCUTANEOUS at 08:21

## 2018-01-18 RX ADMIN — INSULIN ASPART 5 UNITS: 100 INJECTION, SOLUTION INTRAVENOUS; SUBCUTANEOUS at 08:20

## 2018-01-18 RX ADMIN — CYANOCOBALAMIN TAB 500 MCG 1000 MCG: 500 TAB at 08:22

## 2018-01-18 RX ADMIN — INSULIN DETEMIR 38 UNITS: 100 INJECTION, SOLUTION SUBCUTANEOUS at 21:11

## 2018-01-18 RX ADMIN — INSULIN ASPART 5 UNITS: 100 INJECTION, SOLUTION INTRAVENOUS; SUBCUTANEOUS at 12:44

## 2018-01-19 PROBLEM — M48.00 SPINAL STENOSIS: Status: ACTIVE | Noted: 2018-01-19

## 2018-01-19 LAB
GLUCOSE BLDC GLUCOMTR-MCNC: 203 MG/DL (ref 70–130)
GLUCOSE BLDC GLUCOMTR-MCNC: 209 MG/DL (ref 70–130)
GLUCOSE BLDC GLUCOMTR-MCNC: 242 MG/DL (ref 70–130)
GLUCOSE BLDC GLUCOMTR-MCNC: 347 MG/DL (ref 70–130)

## 2018-01-19 PROCEDURE — 97161 PT EVAL LOW COMPLEX 20 MIN: CPT

## 2018-01-19 PROCEDURE — 63710000001 INSULIN ASPART PER 5 UNITS: Performed by: HOSPITALIST

## 2018-01-19 PROCEDURE — 82962 GLUCOSE BLOOD TEST: CPT

## 2018-01-19 PROCEDURE — 97110 THERAPEUTIC EXERCISES: CPT

## 2018-01-19 PROCEDURE — 63710000001 INSULIN DETEMER PER 5 UNITS: Performed by: HOSPITALIST

## 2018-01-19 PROCEDURE — 97535 SELF CARE MNGMENT TRAINING: CPT

## 2018-01-19 PROCEDURE — 97165 OT EVAL LOW COMPLEX 30 MIN: CPT

## 2018-01-19 RX ADMIN — INSULIN ASPART 5 UNITS: 100 INJECTION, SOLUTION INTRAVENOUS; SUBCUTANEOUS at 08:12

## 2018-01-19 RX ADMIN — FAMOTIDINE 20 MG: 20 TABLET, FILM COATED ORAL at 08:14

## 2018-01-19 RX ADMIN — DULOXETINE HYDROCHLORIDE 60 MG: 60 CAPSULE, DELAYED RELEASE ORAL at 08:14

## 2018-01-19 RX ADMIN — VITAMIN D, TAB 1000IU (100/BT) 2000 UNITS: 25 TAB at 08:13

## 2018-01-19 RX ADMIN — CYANOCOBALAMIN TAB 500 MCG 1000 MCG: 500 TAB at 08:14

## 2018-01-19 RX ADMIN — INSULIN ASPART 5 UNITS: 100 INJECTION, SOLUTION INTRAVENOUS; SUBCUTANEOUS at 16:40

## 2018-01-19 RX ADMIN — CETIRIZINE HYDROCHLORIDE 10 MG: 10 TABLET, FILM COATED ORAL at 08:14

## 2018-01-19 RX ADMIN — INSULIN ASPART 9 UNITS: 100 INJECTION, SOLUTION INTRAVENOUS; SUBCUTANEOUS at 08:12

## 2018-01-19 RX ADMIN — FLUTICASONE PROPIONATE 1 SPRAY: 50 SPRAY, METERED NASAL at 08:13

## 2018-01-19 RX ADMIN — CALCIUM CARBONATE-VITAMIN D TAB 500 MG-200 UNIT 1000 MG: 500-200 TAB at 08:13

## 2018-01-19 RX ADMIN — INSULIN DETEMIR 40 UNITS: 100 INJECTION, SOLUTION SUBCUTANEOUS at 20:48

## 2018-01-19 RX ADMIN — INSULIN ASPART 10 UNITS: 100 INJECTION, SOLUTION INTRAVENOUS; SUBCUTANEOUS at 20:47

## 2018-01-19 RX ADMIN — INSULIN ASPART 9 UNITS: 100 INJECTION, SOLUTION INTRAVENOUS; SUBCUTANEOUS at 16:41

## 2018-01-19 RX ADMIN — ATORVASTATIN CALCIUM 10 MG: 10 TABLET, FILM COATED ORAL at 20:46

## 2018-01-19 RX ADMIN — LOSARTAN POTASSIUM 50 MG: 50 TABLET, FILM COATED ORAL at 08:14

## 2018-01-19 RX ADMIN — FAMOTIDINE 20 MG: 20 TABLET, FILM COATED ORAL at 20:46

## 2018-01-19 RX ADMIN — INSULIN ASPART 5 UNITS: 100 INJECTION, SOLUTION INTRAVENOUS; SUBCUTANEOUS at 12:06

## 2018-01-19 RX ADMIN — INSULIN ASPART 9 UNITS: 100 INJECTION, SOLUTION INTRAVENOUS; SUBCUTANEOUS at 12:06

## 2018-01-19 NOTE — PLAN OF CARE
Problem: Patient Care Overview (Adult)  Goal: Plan of Care Review  Outcome: Ongoing (interventions implemented as appropriate)   01/19/18 0034   Coping/Psychosocial Response Interventions   Plan Of Care Reviewed With patient   Patient Care Overview   Progress progress toward functional goals as expected   Outcome Evaluation   Outcome Summary/Follow up Plan Calm and cooperative. Oriented to room. Requires assist with transfer. Using call light for assist.       Problem: Pain, Chronic (Adult)  Goal: Acceptable Pain Control/Comfort Level  Outcome: Ongoing (interventions implemented as appropriate)      Problem: Skin Integrity Impairment, Risk/Actual (Adult)  Goal: Skin Integrity/Wound Healing  Outcome: Ongoing (interventions implemented as appropriate)      Problem: Mobility, Physical Impaired (Adult)  Goal: Identify Related Risk Factors and Signs and Symptoms  Outcome: Ongoing (interventions implemented as appropriate)

## 2018-01-19 NOTE — H&P
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 Farrar.    HISTORY OF PRESENT ILLNESS: Patient is a 72-year-old single black female admitted here in transfer from [facility] presented on 01/11/2018 with numbness of left upper, left lower extremity and face, history of falls. Patient apparently had previous lumbar laminectomy. Patient has had a workup that ruled out a stroke. Patient did have some confusion and urinary retention postoperatively. She is to wear a TLSO when out of bed.    PAST MEDICAL HISTORY:  1.  Patient is on disability secondary to degenerative joint disease.  2.  Hyperlipidemia.  3.  Hypertension.  4.  Asthma.  5.  COPD.  6.  Low back pain.  7.  Rheumatoid arthritis.  8.  Diverticulitis.  9.  Migraine headaches.  10.  Peripheral neuropathy.  11.  Diabetes mellitus.    PAST SURGICAL HISTORY:  1.  Bilateral cataract extraction.  2.  Bilateral great toe surgery.  3.  Bilateral total hip replacements.  4.  Bilateral knee replacements.  5.  Right wrist surgery.  6.  Hysterectomy.  7.  Appendectomy.  8.  Cholecystectomy.  9.  Inguinal hernia repair.  10.  Anterior disk surgery in 04/2017 with fusion.    FAMILY HISTORY: Pertinent for heart disease.    ALLERGIES:  1.  SULFA.  2.  HYDROCODONE.  3.  IODINE.  4.  PENICILLIN.    SOCIAL HISTORY: Patient lives in a first-floor apartment with no steps to access. She does have family members who will assist her but not 24/7. She was independent with ADLs. She does not drive. She denies alcohol or tobacco use.    PHYSICAL EXAMINATION:  GENERAL: Patient is awake, alert, generally well oriented, pleasant black female who is in no acute distress.  HEENT: Unremarkable.  NECK: Benign.  LUNGS: Clear to auscultation anteriorly.  HEART: Regular rate and rhythm without murmur or gallop.  ABDOMEN: Slightly distended but nontender. Bowel sounds are apparent  throughout.  NEUROMUSCULOSKELETAL: Patient is able to move all 4 extremities. Functional range of motion and strength. She does have some proximal weakness and does need assistance with transfers for that reason.    ASSESSMENT AND PLAN: Patient is admitted at this time to a comprehensive acute level rehab program where she will receive at least 3 hours of therapy a day, initially consisting of physical and occupational therapy, psychology for supportive therapy and cognitive evaluation. She will be assessed for and provided with necessary adaptive equipment. Any identified caregivers will be invited to participate in caregiver training. She will be discussed in a team-type setting once weekly where obstacles are defined and plans made for discharge home. Estimated length of stay of 1-2 weeks.

## 2018-01-19 NOTE — PROGRESS NOTES
Inpatient Rehabilitation Functional Measures Assessment    Functional Measures  DANUTA Eating:  [facility] Grooming: [facility] Bathing:  [facility] Upper Body Dressing:  [facility] Lower Body Dressing:  [facility] Toileting:  [facility] Bladder Management  Level of Assistance:  [place]  Frequency/Number of Accidents this Shift:  [facility] Bowel Management  Level of Assistance: [place]  Frequency/Number of Accidents this Shift: [facility] Bed/Chair/Wheelchair Transfer:  [facility] Toilet Transfer:  [facility] Tub/Shower Transfer:  [place]    Previously Documented Mode of Locomotion at Discharge: Field  DANUTA Expected Mode of Locomotion at Discharge: [facility] Walk/Wheelchair:  [facility] Stairs:  [facility] Comprehension:  Auditory comprehension is the usual mode. Comprehension  Score = 6, Modified [place].  Patient comprehends complex/abstract  information in their primary language with only mild difficulty.  DANUTA Expression:  Vocal expression is the usual mode. Expression Score = 6,  Modified Independent.  Patient expresses complex/abstract information in their  primary language, requiring: Additional time.  DANUTA Social Interaction:  Social Interaction Score = 6, Modified Independent.  Patient is modified independent for social interaction, requiring: Requires  additional time.  DANUTA Problem Solving:  Patient does not make appropriate decisions in order to  solve complex problems without assistance from a helper. Problem Solving Score =  5, Supervision.  Patient makes appropriate decisions in order to solve routine  problems with directing only under stressful or unfamiliar conditions, but no  more than 10% of the time, for the following behavior(s): Inability to follow  multi-step commands.  DANUTA Memory:  Memory Score = 6, [place].  Patient is modified  independent for memory, having only mild difficulty and using self-initiated or  environmental cues to remember.    Therapy Mode  Minutes  Occupational Therapy: Branch  Physical Therapy: Branch  Speech Language Pathology:  Branch    Signed by: Nano Lama RN

## 2018-01-19 NOTE — PROGRESS NOTES
"Discharge Planning Assessment  [facility]     Patient Name: Raquel Gonsales  MRN: [id number]  Today's Date: 1/19/2018    Admit Date: 1/18/2018          Discharge Needs Assessment       01/19/18 1422    Living Environment    Lives With alone    Living Arrangements apartment    Home Accessibility no concerns    Type of Financial/Environmental Concern none    Transportation Available family or friend will provide   Pt states Passport provides door to door transportation    Living Environment    Provides Primary Care For no one    Primary Care Provided By child(jeanne) (specify)    Quality Of Family Relationships supportive    Able to Return to Prior Living Arrangements yes    Living Arrangement Comments Daughters are supportive and can provide intermittent assistance at discharge    Discharge Needs Assessment    Concerns To Be Addressed care coordination/care conferences    Equipment Currently Used at Home rollator;glucometer;shower chair            Discharge Plan       01/19/18 1421    Case Management/Social Work Plan    Plan Home with intermittent assistance from family and neighbors    Patient/Family In Agreement With Plan yes    Additional Comments Assessment initiated with pt.  Explained rehab program and SW role.  Pt lives alone in a first floor apartment with no steps to enter.   Prior to admission, pt functioned independently at home. She has 6 daughters who live locally. Daughters assist pt with transportation and shopping.  Family is supportive but can provide only intermittent assistance at discharge.    Pt normally uses a rollator and a tub seat. She has used [facility] in the past.  Pt feels she is coping well with  her situation and feels confident that with a \"little therapy\" she will be able to care for herself at home.  Will contact daughter to complete evaluation.        Discharge Placement     No information found                Demographic Summary     None            Functional Status       " 01/19/18 1414    Functional Status Prior    Ambulation 1-->assistive equipment    Transferring 1-->assistive equipment    Toileting 1-->assistive equipment    Bathing 1-->assistive equipment    Dressing 0-->independent    Eating 0-->independent    Communication 0-->understands/communicates without difficulty    Swallowing 0-->swallows foods/liquids without difficulty    IADL    Medications independent    Meal Preparation independent    Housekeeping independent    Shopping assistive person    Oral Care independent    IADL Comments Pt's children assist her with transportation, grocery shopping, etc    Activity Tolerance    Current Activity Limitations spinal precautions    Cognitive/Perceptual/Developmental    Current Mental Status/Cognitive Functioning no deficits noted    Employment/Financial    Shift Worked first shift    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  Pt also receives food stamps    Who Manages Finances If Patient Unable pt's daughter    Employment/Finance Comments low, fixed income            Psychosocial       01/19/18 1418    Values/Beliefs    (F) Janessa: Importance of Culture, Spirituality, [employer] in Life Pt attends [employer]    Emotional/Psychological    Affect no deficits noted    Mood congruent to situation    Verbal Skills no deficits noted    Current Interpersonal Conduct/Behavior appropriate to situation    Mental Health Conditions/Symptoms denies    Thought Process Alterations no deficits noted    Previous Mental Health Treatment counseling    Emotional/Psychological Comments Pt denies depression, feels she is coping well. She has good family support and a strong Anglican janessa    Coping/Stress    Major Change/Loss/Stressor loss of independence;hospitalization    Patient Personal Strengths able to adapt;expressive of emotions;expressive of  needs;jim/spirituality;motivated;strong support system    Sources Of Support adult child(jeanne);friend(s)    Reaction To Health Status accepting;adjusting;hopeful;motivated    Understanding Of Condition And Treatment adequate understanding of medical condition;adequate understanding of treatment    Coping/Stress Caregiver    Major Change/Loss/Stressor caregiver responsibilities    Reaction To Health Status accepting;hopeful;motivated    Understanding Of Condition And Treatment adequate understanding of medical condition;adequate understanding of treatment    Suicide Risk    Suicidal Ideation no    Homicide Risk    Homicidal Ideation no            Abuse/Neglect     None            Legal     None            Substance Abuse       01/19/18 1421    Substance Use, Patient    Substance Use --   Occasional alcohol, no tobacco            Patient Forms     None          Barby Castillo

## 2018-01-19 NOTE — PROGRESS NOTES
SECTION GG    Self Care Performance:   Oral Hygiene: [place] sets up or cleans up; patient completes activity. [place]  assists only prior to or following the activity.   Toileting Hygiene: [place] does less than half the effort. [place] lifts, holds  or supports trunk or limbs but provides less than half the effort.   Shower/Bathe Self: [place] does less than half the effort. [place] lifts, holds  or supports trunk or limbs but provides less than half the effort.   Upper Body Dressing: [place] provides verbal cues or touching/steadying  assistance as patient completes activity.   Lower Body Dressing: [place] does more than half the effort. [place] lifts or  holds trunk or limbs and provides more than half the effort.   Putting On/Taking Off Footwear: [place] does all of the effort. Patient does  none of the effort to complete the activity. Or, the assistance of 2 or more  helpers is required for the patient to complete the activity.    Self Care Discharge Goals:   Oral Hygiene: Patient completed the activities by him/herself with no  assistance from a helper.    Mobility Toilet Transfer Performance: [place] does less than half the effort.  [place] lifts, holds or supports trunk or limbs but provides less than half the  effort.    Mobility Toilet Transfer Discharge Goal: [place]    Signed by: Veda Shukla OT

## 2018-01-19 NOTE — PLAN OF CARE
Problem: Inpatient Occupational Therapy  Goal: Transfer Training Goal 1 STG- OT  Outcome: Ongoing (interventions implemented as appropriate)   01/19/18 1530   Transfer Training OT STG   Transfer Training OT STG, Date Established 01/19/18   Transfer Training OT STG, Time to Achieve 1 wk   Transfer Training OT STG, Activity Type toilet   Transfer Training OT STG, [place] Level contact guard assist;verbal cues required   Transfer Training OT STG, Assist Device walker, rolling   Transfer Training OT STG, Additional Goal grab bars   Transfer Training OT STG, Outcome goal ongoing     Goal: Transfer Training Goal 1 LTG- OT  Outcome: Ongoing (interventions implemented as appropriate)   01/19/18 1530   Transfer Training OT LTG   Transfer Training OT LTG, Date Established 01/19/18   Transfer Training OT LTG, Time to Achieve by discharge   Transfer Training OT LTG, Activity Type toilet   Transfer Training OT LTG, [place] Level supervision required;conditional independence   Transfer Training OT LTG, Assist Device walker, rolling   Transfer Training OT LTG, Additional Goal grab bars   Transfer Training OT LTG, Outcome goal ongoing     Goal: Transfer Training Goal 2 STG- OT  Outcome: Ongoing (interventions implemented as appropriate)   01/19/18 1530   Transfer Training 2 OT STG   Transfer Training 2 OT STG, Date Established 01/19/18   Transfer Training 2 OT STG, Time to Achieve 1 wk   Transfer Training 2 OT STG, Activity Type shower chair;walk-in shower   Transfer Training 2 OT STG, [place] Level contact guard assist;verbal cues required   Transfer Training 2 OT STG, Assist Device walker, rolling;shower chair   Transfer Training 2 OT STG, Outcome goal ongoing     Goal: Transfer Training Goal 2 LTG- OT  Outcome: Ongoing (interventions implemented as appropriate)   01/19/18 1530   Transfer Training 2 OT LTG   Transfer Training 2 OT LTG, Date Established 01/19/18   Transfer Training 2 OT LTG, Time to Achieve by  discharge   Transfer Training 2 OT LTG, Activity Type shower chair;walk-in shower   Transfer Training 2 OT LTG, [place] Level supervision required   Transfer Training 2 OT LTG, Assist Device walker, rolling;shower chair   Transfer Training 2 OT LTG, Outcome goal ongoing     Goal: Patient Education Goal STG- OT  Outcome: Ongoing (interventions implemented as appropriate)   01/19/18 1530   Patient Education OT STG   Patient Education OT STG, Date Established 01/19/18   Patient Education OT STG, Time to Achieve 1 wk   Patient Education OT STG, Education Type precautions per surgeon;brace use/care   Patient Education OT STG, Education Understanding demonstrates adequately   Patient Education OT STG, Additional Goal with ADLs   Patient Education OT STG Outcome goal ongoing     Goal: Patient Education Goal LTG- OT  Outcome: Ongoing (interventions implemented as appropriate)   01/19/18 1530   Patient Education OT LTG   Patient Education OT LTG, Date Established 01/19/18   Patient Education OT LTG, Time to Achieve by discharge   Patient Education OT LTG, Education Type HEP;precautions per surgeon;brace use/care;home safety;adaptive equipment mgmt   Patient Education OT LTG, Education Understanding demonstrates adequately;independent   Patient Education OT LTG Outcome goal ongoing     Goal: ADL Goal STG- OT  Outcome: Ongoing (interventions implemented as appropriate)   01/19/18 1530   ADL OT STG   ADL OT STG, Date Established 01/19/18   ADL OT STG, Time to Achieve 1 wk   ADL OT STG, Activity Type ADL skills   ADL OT STG, [place] Level standby assist;min assist   ADL OT STG, Additional Goal with LH reacher   ADL OT STG, Outcome goal ongoing     Goal: ADL Goal LTG- OT  Outcome: Ongoing (interventions implemented as appropriate)   01/19/18 1530   ADL OT LTG   ADL OT LTG, Date Established 01/19/18   ADL OT LTG, Time to Achieve by discharge   ADL OT LTG, Activity Type ADL skills   ADL OT LTG, [place] Level modified  independent;standby assist   ADL OT LTG, Additional Goal with AE   ADL OT LTG, Outcome goal ongoing     Goal: Functional Mobility Goal STG- OT  Outcome: Ongoing (interventions implemented as appropriate)   01/19/18 1530   Functional Mobility OT STG   Functional Mobility Goal OT STG, Date Established 01/19/18   Functional Mobility Goal OT STG, Time to Achieve 1 wk   Functional Mobility Goal OT STG, [place] Level contact guard   Functional Mobility Goal OT STG, Assist Device rolling walker   Functional Mobility Goal OT STG, Distance to Achieve to the bathroom   Functional Mobility Goal OT STG, Outcome goal ongoing     Goal: Functional Mobility Goal LTG- OT  Outcome: Ongoing (interventions implemented as appropriate)   01/19/18 1530   Functional Mobility OT LTG   Functional Mobility Goal OT LTG, Date Established 01/19/18   Functional Mobility Goal OT LTG, Time to Achieve by discharge   Functional Mobility Goal OT LTG, [place] Level modified independent;independent with device;supervision   Functional Mobility Goal OT LTG, Assist Device rolling walker   Functional Mobility Goal OT LTG, Distance to Achieve to the bathroom   Functional Mobility Goal OT LTG, Outcome goal ongoing

## 2018-01-19 NOTE — PROGRESS NOTES
"Adult Nutrition  Assessment/PES    Patient Name:  Raquel Gonsales  YOB: 1945  MRN: [id number]  Admit Date:  1/18/2018    Assessment Date:  1/19/2018    Comments:  Nutrition screen complete.     Fluid restriction carried over from acute d/t hyponatremia issues. Last Na wnl (136).   Diabetic with elevated BG levels in 200's at times. Will address any edu needs prior to d/c.   RD to follow while on rehab.           Reason for Assessment       01/19/18 0714    Reason for Assessment    Reason For Assessment/Visit admission assessment    Cardiac HTN    Endocrine DM Type 2    Neurological Metabolic encephalopathy;Other (comment);TIA   Cervical spondylosis, s/p lumba spinal infusion; hx narrowing of spinal canal    Pulmonary/Critical Care COPD                Anthropometrics       01/19/18 0716    Anthropometrics    RD Documented Weight on Admission 113 kg (249 lb)    Anthropometrics (Special Considerations)    Height Used for Calculations 1.676 m (5' 6\")    RD Calculated BMI (kg/m2) 40    Body Mass Index (BMI)    BMI Grade greater than 40 - obesity grade III            Labs/Tests/Procedures/Meds       01/19/18 0718    Labs/Tests/Procedures/Meds    Diagnostic Test/Procedure Review reviewed    Labs/Tests Review Reviewed;Glucose    Medication Review Reviewed, pertinent;Antacid;Insulin    Significant Vitals reviewed            Physical Findings       01/19/18 0719    Physical Findings/Assessment    Additional Documentation Physical Appearance (Group)    Physical Appearance    Overall Physical Appearance obese    Skin other (see comments)   back wound              Nutrition Prescription Ordered       01/19/18 0722    Nutrition Prescription PO    Current PO Diet Regular    Fluid Consistency Thin    Supplement Ensure Compact    Supplement Frequency 2 times a day    Common Modifiers Consistent Carbohydrate;Fluid Restriction    Fluid Restriction mL per Day Other (comment)   1200 ml daily - restriction 1200ml, but do " not count either IVF or Boost volume towards it            Evaluation of Received Nutrient/Fluid Intake       01/19/18 0723    PO Evaluation    Number of Days PO Intake Evaluated 1 day    Number of Meals 1    % PO Intake 75%            Problem/Interventions:        Problem 1       01/19/18 0724    Nutrition Diagnoses Problem 1    Problem 1 Overweight/Obesity    Signs/Symptoms (evidenced by) BMI;% IBW    BMI Greater than 40    Percent (%)  %            Problem 2       01/19/18 0726    Nutrition Diagnoses Problem 2    Problem 2 Altered Nutrition Related to Labs    Endocrine DM Type 2    Signs/Symptoms (evidenced by) Biochemical    Specific Labs Noted Glucose                  Intervention Goal       01/19/18 0726    Intervention Goal    General Maintain nutrition;Improved nutrition related lab(s);Disease management/therapy    PO PO intake (%)    PO Intake % 75 %    Weight Appropriate weight loss            Nutrition Intervention       01/19/18 0726    Nutrition Intervention    RD/Tech Action Follow Tx progress;Care plan reviewd;Menu provided              Education/Evaluation       01/19/18 0726    Education    Education Will Instruct as appropriate    Monitor/Evaluation    Monitor Per protocol        Electronically signed by:  Umu Fair RD  01/19/18 7:27 AM

## 2018-01-19 NOTE — PROGRESS NOTES
Inpatient Rehabilitation Plan of Care Note    Plan of Care  Care Plan Reviewed - No updates at this time.    Sphincter Control    Performed Intervention(s)  Monitor intake and output  Fluid restriction      Psychosocial    Performed Intervention(s)  Support/ Peer group  Verbalizes needs and concerns      Safety    Performed Intervention(s)  Falls precautions/ protocol  Safety monitoring during functional activities  Safety rounds  Bed alarm and/or chair alarm      Body Systems    Performed Intervention(s)  Medication    Signed by: Nano Lama RN

## 2018-01-19 NOTE — PROGRESS NOTES
Inpatient Rehabilitation Functional Measures Assessment    Functional Measures  DANUTA Eating:  [facility] Grooming: [facility] Bathing:  [facility] Upper Body Dressing:  [facility] Lower Body Dressing:  [facility] Toileting:  [facility] Bladder Management  Level of Assistance:  [place]  Frequency/Number of Accidents this Shift:  [facility] Bowel Management  Level of Assistance: [place]  Frequency/Number of Accidents this Shift: [facility] Bed/Chair/Wheelchair Transfer:  [facility] Toilet Transfer:  [facility] Tub/Shower Transfer:  [place]    Previously Documented Mode of Locomotion at Discharge: Field  DANUTA Expected Mode of Locomotion at Discharge: [facility] Walk/Wheelchair:  [facility] Stairs:  [facility] Comprehension:  Auditory comprehension is the usual mode. Comprehension  Score = 6, Modified [place].  Patient comprehends complex/abstract  information in their primary language, requiring:  DANUTA Expression:  Vocal expression is the usual mode. Expression Score = 6,  Modified Independent.  Patient expresses complex/abstract information in their  primary language, requiring:  [facility] Social Interaction:  Social Interaction Score = 7, Independent. Patient is  completely independent for social interaction.  There are no activity  limitations.  DANUTA Problem Solving:  Activity was not observed.  DANUTA Memory:  Memory Score = 7, Independent.  Patient is completely independent  for memory.  There are no activity limitations.    Therapy Mode Minutes  Occupational Therapy: [place]  Physical Therapy: [place]  Speech Language Pathology:  [place]    Signed by: Alejandra Pisano RN

## 2018-01-19 NOTE — PROGRESS NOTES
SECTION GG    Mobility Performance:   Roll Left and Right: [place] does less than half the effort. [place] lifts, holds  or supports trunk or limbs but provides less than half the effort.   Sit to Lying: [place] does less than half the effort. [place] lifts, holds or  supports trunk or limbs but provides less than half the effort.   Lying to Sitting on Side of Bed: [place] does less than half the effort. [place]  lifts, holds or supports trunk or limbs but provides less than half the effort.   Sit to Stand: [place] provides verbal cues or touching/steadying assistance as  patient completes activity.   Chair/Bed to Chair Transfer: [place] does less than half the effort. [place]  lifts, holds or supports trunk or limbs but provides less than half the effort.   Car Transfer: Not attempted due to medical or safety concerns.    Patient Walks: Yes   Walk 10 Feet: [place] provides verbal cues or touching/steadying assistance as  patient completes activity.   Walk 50 Feet With 2 Turns: [place] does less than half the effort. [place] lifts,  holds or supports trunk or limbs but provides less than half the effort.   Walk 150 Feet: Not attempted due to medical or safety concerns.   Walking 10 Feet on Uneven Surfaces: Not attempted due to medical or safety  concerns.   1 Step Over Curb or Up/Down Stair: Not attempted due to medical or safety  concerns.   4 Steps Up and Down, With/Without Rail: Not attempted due to medical or safety  concerns.   12 Steps Up and Down, With/Without Rail: Not applicable.   Picking up an Object: Not attempted due to medical or safety concerns.      Uses Wheelchair/Scooter: No    Mobility Discharge Goals:   Sit to Stand: Patient completed the activities by him/herself with no  assistance from a helper.    Signed by: Mitzy Turner PT

## 2018-01-19 NOTE — PROGRESS NOTES
Inpatient Rehabilitation Functional Measures Assessment and Plan of Care    Plan of Care  Updated Problems/Interventions  Mobility    [OT] Toilet Transfers(Active)  Current Status(01/19/2018): MOD/MIN  Weekly Goal(01/26/2018): CGA  Discharge Goal: Supervision    [OT] Tub/Shower Transfers(Active)  Current Status(01/19/2018): TBA  Weekly Goal(01/26/2018): MIn  Discharge Goal: Supervision        Self Care    [OT] Bathing(Active)  Current Status(01/19/2018): MOD  Weekly Goal(01/26/2018): MIN  Discharge Goal: Supervision    [OT] Dressing (Lower)(Active)  Current Status(01/19/2018): MAX  Weekly Goal(01/26/2018): MOD with AE  Discharge Goal: Supervision with AE    [OT] Dressing (Upper)(Active)  Current Status(01/19/2018): set up  Weekly Goal(01/26/2018): MOD I  Discharge Goal: MOD I    [OT] Grooming(Active)  Current Status(01/19/2018): set up w/c level  Weekly Goal(01/26/2018): CGA standing  Discharge Goal: Supervision standing    [OT] Toileting(Active)  Current Status(01/19/2018): MOD  Weekly Goal(01/26/2018): MIN  Discharge Goal: Supervision    Functional Measures  DANUTA Eating:  [place]  DANUTA Grooming: Grooming Score = 5. Patient is supervision/set-up for grooming,  requiring: Stand by assistance. Verbal cuing, prompting, or instructing. No  assistive devices were required.  DANUTA Bathing:  Patient took sponge bath. Patient requires no physical assistance  for washing, rinsing, and drying the right arm. Patient requires no physical  assistance for washing, rinsing, and drying the left arm. Patient requires no  physical assistance for washing, rinsing, and drying the chest. Patient requires  no physical assistance for washing, rinsing, and drying the abdomen. Patient  requires no physical assistance for washing, rinsing, and drying the perineal  area. Patient requires total assistance for washing, rinsing, or drying the  buttocks. Patient requires no physical assistance for washing, rinsing, and  drying the right upper leg.  Patient requires no physical assistance for washing,  rinsing, and drying the left upper leg. Patient requires total assistance for  washing, rinsing, or drying the right lower leg, including the foot. Patient  requires total assistance for washing, rinsing, or drying the left lower leg,  including the foot. Patient performs 70 % of bathing tasks. Bathing Score = 3,  Moderate Assistance. No assistive devices were required.  DANUTA Upper Body Dressing:  Upper Body Dressing Score = 5. Patient is supervision  for upper body dressing, requiring: Stand by assistance. Gathering/setting out  clothes. No assistive devices were required.  DANUTA Lower Body Dressing:  Patient requires no physical assistance for gathering  clothes. Patient requires total assistance for holding clothing and/or threading  the right leg through the undergarment. Patient requires no physical assistance  for donning and/or doffing undergarment threading left leg. Patient requires no  physical assistance for donning and/or doffing undergarment over hips and  adjusting fasteners. Patient requires no physical assistance for donning and/or  doffing pants/skirt threading right leg. Patient requires total assistance for  holding clothing and/or threading the left leg through the pants/skirt. Patient  requires total assistance for holding clothing and/or pulling pants/skirt over  hips and adjusting fasteners. Patient requires total assistance for holding  clothing and/or donning and/or doffing right sock. Patient requires total  assistance for holding clothing and/or donning and/or doffing left sock. Patient  requires total assistance for holding clothing and/or donning and/or doffing  right shoe. Patient requires total assistance for holding clothing and/or  donning and/or doffing left shoe. Patient performs 36.36 % of lower body  dressing tasks. Lower Body Dressing Score = 2, Maximal Assistance. No assistive  devices were required.  DANUTA Toileting:  Patient  requires no physical assistance for adjusting clothing  before using a toilet, commode, bedpan, or urinal. Patient requires total  assistance for hygiene. Patient requires no physical assistance for adjusting  clothing after using a toilet, commode, bedpan, or urinal. Patient performs  66.67 % of toileting tasks. Toileting Score = 3, Moderate Assistance. Patient  requires the following assistive device(s): Grab bar. Adaptive device to  maintain balance. Adaptive device to maintain balance.    [facility] Bladder Management  Level of Assistance:  [place]  Frequency/Number of Accidents this Shift:  [facility] Bowel Management  Level of Assistance: [place]  Frequency/Number of Accidents this Shift: [facility] Bed/Chair/Wheelchair Transfer:  [facility] Toilet Transfer:  Toilet Transfer Score = 3.  Patient performs 50-74% of  effort and requires moderate assistance (some lifting) for transferring to and  from the toilet/commode. Patient requires the following assistive device(s):  Grab bars.  DANUTA Tub/Shower Transfer:  Activity was not observed.    Previously Documented Mode of Locomotion at Discharge: Field  DANUTA Expected Mode of Locomotion at Discharge: [facility] Walk/Wheelchair:  [facility] Stairs:  [facility] Comprehension:  [place]  [facility] Expression:  [facility] Social Interaction:  [facility] Problem Solving:  [facility] Memory:  [place]    Therapy Mode Minutes  Occupational Therapy: Individual: 90 minutes.  Physical Therapy: [place]  Speech Language Pathology:  [place]    Signed by: Veda Shukla OT

## 2018-01-19 NOTE — PLAN OF CARE
Problem: Patient Care Overview (Adult)  Goal: Plan of Care Review  Outcome: Ongoing (interventions implemented as appropriate)   01/19/18 1546   Coping/Psychosocial Response Interventions   Plan Of Care Reviewed With patient   Outcome Evaluation   Outcome Summary/Follow up Plan In PT, pt did well with bed mobility , transfers and gait with use of rollator. Pt needs vc for back precautions and assist for donning/doffing brace.       Problem: Inpatient Physical Therapy  Goal: Bed Mobility Goal LTG- PT  Outcome: Ongoing (interventions implemented as appropriate)   01/19/18 1546   Bed Mobility PT LTG   Bed Mobility PT LTG, Date Established 01/19/18   Bed Mobility PT LTG, Time to Achieve 1 wk   Bed Mobility PT LTG, Activity Type sidelying to sit/sit to sidelying;roll left/roll right   Bed Mobility PT LTG, [place] Level independent     Goal: Transfer Training Goal 1 LTG- PT  Outcome: Ongoing (interventions implemented as appropriate)   01/19/18 1546   Transfer Training PT LTG   Transfer Training PT LTG, Date Established 01/19/18   Transfer Training PT LTG, Time to Achieve 1 wk   Transfer Training PT LTG, Activity Type sit to stand/stand to sit   Transfer Training PT LTG, [place] Level conditional independence   Transfer Training PT LTG, Assist Device walker, rolling     Goal: Transfer Training Goal 2 LTG- PT  Outcome: Ongoing (interventions implemented as appropriate)   01/19/18 1546   Transfer Training 2 PT LTG   Transfer Training PT 2 LTG, Time to Achieve 1 wk   Transfer Training PT 2 LTG, Activity Type (car transfer)   Transfer Training PT 2 LTG, [place] Level supervision required   Transfer Training PT 2 LTG, Assist Device walker, rolling     Goal: Gait Training Goal LTG- PT  Outcome: Ongoing (interventions implemented as appropriate)   01/19/18 1546   Gait Training PT LTG   Gait Training Goal PT LTG, Date Established 01/19/18   Gait Training Goal PT LTG, Time to Achieve 1 wk   Gait Training Goal PT  LTG, [place] Level conditional independence   Gait Training Goal PT LTG, Assist Device walker, rolling   Gait Training Goal PT LTG, Distance to Achieve 200     Goal: Stair Training Goal LTG- PT  Outcome: Ongoing (interventions implemented as appropriate)   01/19/18 1546   Stair Training PT LTG   Stair Training Goal PT LTG, Date Established 01/19/18   Stair Training Goal PT LTG, Time to Achieve 1 wk   Stair Training Goal PT LTG, Number of Steps 4   Stair Training Goal PT LTG, [place] Level contact guard assist   Stair Training Goal PT LTG, Assist Device 2 handrails     Goal: Patient Education Goal LTG- PT  Outcome: Ongoing (interventions implemented as appropriate)   01/19/18 1546   Patient Education PT LTG   Patient Education PT LTG, Date Established 01/19/18   Patient Education PT LTG, Time to Achieve 1 wk   Patient Education PT LTG, Education Type elva/doff brace   Patient Education PT LTG, Education Understanding demonstrate adequately

## 2018-01-19 NOTE — PROGRESS NOTES
Inpatient Rehabilitation Plan of Care Note    Plan of Care  Care Plan Reviewed - No updates at this time.    Sphincter Control    Performed Intervention(s)  Monitor intake and output  Fluid restriction      Psychosocial    Performed Intervention(s)  Support/ Peer group  Verbalizes needs and concerns      Safety    Performed Intervention(s)  [place] precautions/ protocol  Safety monitoring during functional activities  Safety rounds  Bed alarm and/or chair alarm      Body Function Structure    Performed Intervention(s)  Dressing changes  Wound care      Body Systems    Performed Intervention(s)  Blood glucose testing  Medication    Signed by: Alejandra Pisano RN

## 2018-01-19 NOTE — THERAPY EVALUATION
Inpatient Rehabilitation - Occupational Therapy Initial Evaluation  [facility]     Patient Name: Raquel Gonsales  : 1945  MRN: [id number]  Today's Date: 2018  Onset of Illness/Injury or Date of Surgery Date: 18  Date of Referral to OT: 18  Referring Physician: Charan    Admit Date: 2018     No diagnosis found.  Patient Active Problem List   Diagnosis   • Cervical spondylosis with radiculopathy   • Bilateral hip pain   • Trochanteric bursitis of both hips   • Status post bilateral total hip replacement   • S/P lumbar fusion   • Spinal stenosis of lumbar region with neurogenic claudication   • Lumbar spinal stenosis   • TIA (transient ischemic attack)   • Obesity   • Recurrent falls   • DM2 (diabetes mellitus, type 2)   • HTN (hypertension)   • COPD (chronic obstructive pulmonary disease)   • Decreased mobility   • Spinal stenosis     Past Medical History:   Diagnosis Date   • Acid reflux    • Asthma    • COPD (chronic obstructive pulmonary disease)    • Diabetes mellitus    • Ear congestion, bilateral    • History of fainting spells of unknown cause    • Hyperlipidemia    • Hypertension    • Low back pain    • Migraine    • Neck pain    • Neuropathy     HANDS AND FEET   • Numbness or tingling     FEET   • Rheumatoid arthritis    • Sciatic nerve pain      Past Surgical History:   Procedure Laterality Date   • ANTERIOR CERVICAL DISCECTOMY W/ FUSION N/A 2017    Procedure: CERVICAL DISCECTOMY ANTERIOR FUSION WITH INSTRUMENTATION C3 to C7;  Surgeon: Chan Farrar MD;  Location: [facility];  Service:    • APPENDECTOMY     • BACK SURGERY  2016   • CATARACT EXTRACTION Bilateral    • CHOLECYSTECTOMY     • HERNIA REPAIR     • HYSTERECTOMY     • LUMBAR DISCECTOMY FUSION INSTRUMENTATION N/A 2018    Procedure: L2-3 repeat laminectomy and fusion with instrumentation;  Surgeon: Chan Farrar MD;  Location: [facility];  Service:    • TOE SURGERY Bilateral     GREAT TOE   •  TOTAL HIP ARTHROPLASTY Bilateral    • TOTAL KNEE ARTHROPLASTY Bilateral    • WRIST SURGERY Right     fx          OT ASSESSMENT FLOWSHEET (last 72 hours)      OT Evaluation       01/19/18 1522 01/19/18 1422 01/19/18 1414 01/19/18 0929 01/19/18 0800    Rehab Evaluation    Document Type evaluation  -AF   evaluation  -LB     Subjective Information agree to therapy;no complaints  -AF   agree to therapy  -LB     Patient Effort, Rehab Treatment good  -AF   good  -LB     General Information    Patient Profile Review yes  -AF        Onset of Illness/Injury or Date of Surgery Date 01/11/18  -AF   01/11/18  -LB     Referring Physician Charan  -AF   Farrar  -LB     General Observations sitting up in w/c after PT session in room  -AF   in bed supine, NAD  -LB     Pertinent History Of Current Problem repeat laminectomy with fusion and instrument, L2-3  -AF   s/p L2-3 repeat laminectomy with fusion and instrumentation  -LB     Precautions/Limitations fall precautions;spinal precautions;brace on when up  -AF   fall precautions;brace on when up;spinal precautions  -LB     Prior Level of Function independent:;ADL's  -AF   independent:;all household mobility  -LB     Equipment Currently Used at Home  rollator;glucometer;shower chair  -NO  rollator  -LB     Plans/Goals Discussed With patient  -AF   patient  -LB     Living Environment    Lives With alone  -AF alone  -NO       Living Arrangements  apartment  -NO       Home Accessibility  no concerns  -NO       Type of Financial/Environmental Concern  none  -NO       Transportation Available  family or friend will provide   Pt states [employer] provides door to door transportation  -NO       Clinical Impression    Date of Referral to OT 01/18/18  -AF        OT Diagnosis deficits present with overall strength, endurance and balance post back surgery which is limiting her independence with ADL's an dvalued occupations  -AF        Patient/Family Goals Statement to get stronger and go home  -AF         Criteria for Skilled Therapeutic Interventions Met treatment indicated;yes  -AF        Rehab Potential good, to achieve stated therapy goals  -AF        Therapy Frequency 3-5 times/wk  -AF        Predicted Duration of Therapy Intervention (days/wks) 2 weeks   -AF        Anticipated Discharge Disposition home with home health;home with assist  -AF        Functional Level Prior    Ambulation   1-->assistive equipment  -NO      Transferring   1-->assistive equipment  -NO      Toileting   1-->assistive equipment  -NO      Bathing   1-->assistive equipment  -NO      Dressing   0-->independent  -NO      Eating   0-->independent  -NO      Communication   0-->understands/communicates without difficulty  -NO      Swallowing   0-->swallows foods/liquids without difficulty  -NO      Pain Assessment    Pain Assessment No/denies pain  -AF   0-10  -LB     Pain Score    8  -LB 0  -TD    Post Pain Score    8  -LB     Pain Type    Acute pain  -LB     Pain Location    Back  -LB     Pain Intervention(s)    Emotional support  -LB     Vision Assessment/Intervention    Visual Impairment WFL with corrective lenses  -AF   WNL  -LB     Cognitive Assessment/Intervention    Current Cognitive/Communication Assessment functional  -AF   functional  -LB     Orientation Status oriented x 4  -AF   oriented x 4  -LB     Follows Commands/Answers Questions 100% of the time;able to follow single-step instructions  -AF   able to follow single-step instructions;100% of the time  -LB     Personal Safety WNL/WFL  -AF   WNL/WFL  -LB     Personal Safety Interventions fall prevention program maintained;gait belt;nonskid shoes/slippers when out of bed  -AF   fall prevention program maintained;gait belt;muscle strengthening facilitated;nonskid shoes/slippers when out of bed  -LB     ROM (Range of Motion)    General ROM Detail BUE AROM WFL  -AF   [place] LE WFL  -LB     MMT (Manual Muscle Testing)    General MMT Assessment Detail BUE 3+/5, arthritis.   R:5, L:15, lateral pinch R:6, L:10  -AF   grossly [place] LE WFL  -LB     Muscle Tone Assessment    Bilateral Upper Extremities Muscle Tone Assessment     mildly decreased tone  -TD    Bilateral Lower Extremities Muscle Tone Assessment     moderately decreased tone  -TD    Bed Mobility, Assessment/Treatment    Bed Mobility, Assistive Device    bed rails  -LB     Bed Mobility, Roll Left, [place]    minimum assist (75% patient effort)  -LB     Bed Mobility, Roll Right, [place]    minimum assist (75% patient effort)  -LB     Bed Mob, Sit to Supine, [place] verbal cues required;moderate assist (50% patient effort);maximum assist (25% patient effort)  -AF        Bed Mob, Sidelying to Sit, [place]    minimum assist (75% patient effort);verbal cues required  -LB     Bed Mob, Sit to Sidelying, [place]    verbal cues required;minimum assist (75% patient effort)  -LB     Bed Mobility, Impairments    strength decreased  -LB     Bed Mobility, Comment cues to log roll  -AF   cues to log roll  -LB     Transfer Assessment/Treatment    Transfers, Bed-Chair [place]    contact guard assist  -LB     Transfers, Chair-Bed [place] contact guard assist;verbal cues required  -AF   contact guard assist  -LB     Transfers, Bed-Chair-Bed, Assist Device    rolling walker  -LB     Transfers, Sit-Stand [place] contact guard assist  -AF   contact guard assist  -LB     Transfers, Stand-Sit [place] contact guard assist  -AF   contact guard assist  -LB     Transfers, Sit-Stand-Sit, Assist Device    --   rollator  -LB     Toilet Transfer, [place] moderate assist (50% patient effort);minimum assist (75% patient effort);nonverbal cues required (demo/gesture);verbal cues required  -AF        Toilet Transfer, Assistive Device --   grab bars  -AF        Transfer, Safety Issues    step length decreased  -LB     Transfer, Impairments    strength decreased  -LB     Transfer, Comment    don/doff brace on EOB with  Min after set-up  -LB     Stairs Assessment/Treatment    Stairs, Comment    pt has no steps to enter home and no longer negogiate steps  -LB     Upper Body Bathing Assessment/Training    UB Bathing Assess/Train, Position sitting;sink side  -AF        UB Bathing Assess/Train, [place] Level supervision required  -AF        Lower Body Bathing Assessment/Training    LB Bathing Assess/Train, Position standing;sitting;sink side  -AF        LB Bathing Assess/Train, [place] Level moderate assist (50% patient effort);verbal cues required  -AF        Upper Body Dressing Assessment/Training    UB Dressing Assess/Train, Position sitting;sink side  -AF        UB Dressing Assess/Train, [place] minimum assist (75% patient effort)  -AF        UB Dressing Assess/Train, Comment assist to don brace seated w/c level after donning shirt  -AF        Lower Body Dressing Assessment/Training    LB Dressing Assess/Train, Position standing;sitting;sink side  -AF        LB Dressing Assess/Train, [place] maximum assist (25% patient effort);verbal cues required  -AF        Toileting Assessment/Training    Toileting Assess/Train, Assistive Device grab bars;raised toilet seat  -AF        Toileting Assess/Train, Position sitting;standing  -AF        Toileting Assess/Train, [place]ep[place] Level moderate assist (50% patient effort)  -AF        Toileting Assess/Train, Impairments impaired balance  -AF        Grooming Assessment/Training    Grooming Assess/Train, Position sitting;sink side  -AF        Grooming Assess/Train, [place] Level supervision required  -AF        Balance Skills Training    Sitting-Level of Assistance Close supervision  -AF        Sitting-Balance Support Feet supported  -AF        Standing-Level of Assistance Contact guard  -AF   Contact guard  -LB     Static Standing Balance Support --   grab bar  -AF   assistive device;No upper extremity supported  -LB     Standing-Balance Activities --   toileting  -AF   Weight  Shift R-L  -LB     Standing Balance # of Minutes 2-3 mins  -AF   3   times 2  -LB     Fine Motor Coordination Training    9-Hole Peg Right 48  -AF        9-Hole Peg Left 41  -AF        Opposition Right:;Left:;intact  -AF        Functional Endurance    Detail (Functional Endurance) fair plus with ADl's   -AF        General Therapy Interventions    Planned Therapy Interventions adaptive equipment training;activity intolerance;ADL retraining;balance training;fine motor coordination training;home exercise program;strengthening;transfer training  -AF        Positioning and Restraints    Pre-Treatment Position sitting in chair/recliner  -AF   in bed  -LB     Post Treatment Position bed  -AF   wheelchair  -LB     In Bed supine;notified nsg;call light within reach;encouraged to call for assist;exit alarm on;with family/caregiver   in PM session  -AF        In Wheelchair sitting;call light within reach;encouraged to call for assist;exit alarm on;with other staff   with  in AM  -AF   sitting;call light within reach;encouraged to call for assist  -LB       01/18/18 2002 01/18/18 1643 01/18/18 1400 01/18/18 1019 01/17/18 1400    Rehab Evaluation    Document Type  therapy note (daily note)  -PC therapy note (daily note)  -HC therapy note (daily note)  -PC therapy note (daily note)  -HC    Subjective Information  agree to therapy  -PC agree to therapy;no complaints  -HC agree to therapy  -PC agree to therapy  -HC    Patient Effort, Rehab Treatment  good  -PC good  -HC good  -PC good  -HC    Patient Effort, Rehab Treatment Comment    good participation today  -PC     Symptoms Noted During/After Treatment  none  -PC none  -HC none  -PC fatigue  -HC    General Information    Precautions/Limitations  brace on when up;fall precautions  -PC fall precautions;brace on when up;spinal precautions  -HC brace on when up  -PC brace on when up;fall precautions;spinal precautions  -HC    Equipment Currently Used at Home walker,  rolling  -MM        Functional Level Prior    Ambulation 1-->assistive equipment  -MM        Transferring 1-->assistive equipment  -MM        Toileting 0-->independent  -MM        Bathing 0-->independent  -MM        Dressing 0-->independent  -MM        Eating 0-->independent  -MM        Communication 0-->understands/communicates without difficulty  -MM        Swallowing 0-->swallows foods/liquids without difficulty  -MM        Pain Assessment    Pain Assessment  0-10  -PC No/denies pain  -HC 0-10  -PC No/denies pain  -HC    Pain Score  4  -PC  3  -PC     Pain Location  Back  -PC  Back  -PC     Pain Orientation  Lower  -PC  Lower  -PC     Pain Intervention(s)  Medication (See MAR);Repositioned  -PC  Medication (See MAR);Repositioned  -PC     Vision Assessment/Intervention    Visual Impairment     WNL  -HC    Cognitive Assessment/Intervention    Current Cognitive/Communication Assessment  functional  -PC functional  -HC functional  -PC impaired  -HC    Orientation Status  oriented x 4  -PC oriented x 4  -HC oriented x 4  -PC oriented to;person;place;situation  -HC    Follows Commands/Answers Questions   100% of the time  -HC able to follow single-step instructions;100% of the time  -% of the time;able to follow single-step instructions;needs cueing;needs increased time;needs repetition  -HC    Personal Safety   WNL/WFL  -HC WNL/WFL  -PC mild impairment  -HC    Personal Safety Interventions   fall prevention program maintained;gait belt;nonskid shoes/slippers when out of bed  -HC fall prevention program maintained;gait belt  -PC fall prevention program maintained;gait belt;nonskid shoes/slippers when out of bed  -HC    ROM (Range of Motion)    General ROM Detail     BUE AROM increased to 3/4 range today  -    Bed Mobility, Assessment/Treatment    Bed Mobility, Roll Left, [place]  minimum assist (75% patient effort);verbal cues required  -PC  minimum assist (75% patient effort);verbal cues required  -PC      Bed Mob, Sidelying to Sit, [place]  minimum assist (75% patient effort);verbal cues required  -PC  minimum assist (75% patient effort);verbal cues required  -PC     Bed Mobility, Comment   up in chair  -HC  up in chair  -HC    Transfer Assessment/Treatment    Transfers, Sit-Stand [place]  minimum assist (75% patient effort)  -PC minimum assist (75% patient effort);2 person assist required  -HC minimum assist (75% patient effort)  -PC     Transfers, Stand-Sit [place]  minimum assist (75% patient effort)  -PC minimum assist (75% patient effort);2 person assist required;verbal cues required  -HC minimum assist (75% patient effort)  -PC     Transfers, Sit-Stand-Sit, Assist Device   other (see comments)   rollator  -HC      Toilet Transfer, [place]   minimum assist (75% patient effort);2 person assist required;verbal cues required;set up required  -      Toilet Transfer, Assistive Device   bariatric toilet seat;other (see comments)   bariatric BSC over commode  -      Transfer, Comment    brace donned in sitting, pt needed assist, but worked on teaching pt how to do it  -PC deferred due increased fatigue  -    Upper Body Bathing Assessment/Training    UB Bathing Assess/Train, Position   sitting;sink side  -      UB Bathing Assess/Train, [place] Level   supervision required;set up required  -      UB Bathing Assess/Train, Comment   sponge at sink, ed on removing brace to wash then putting it back on  -  pt reports she completed ADLs with [facility] staff requiring assist with every step including LB bathing and dressing  -    Lower Body Bathing Assessment/Training    LB Bathing Assess/Train, Position   standing  -      LB Bathing Assess/Train, [place] Level   supervision required;verbal cues required;set up required  -      LB Bathing Assess/Train, Comment   assist to doff brace but pt able demo donning brace with supervision and cues  -      Upper Body Dressing  Assessment/Training    UB Dressing Assess/Train, Clothing Type   doffing:;donning:;hospital gown   brace  -      UB Dressing Assess/Train, Position   sitting  -      UB Dressing Assess/Train, [place]   maximum assist (25% patient effort)  -      UB Dressing Assess/Train, Comment   continued ed on wear and care of brace  -      Lower Body Dressing Assessment/Training    LB Dressing Assess/Train, Clothing Type   doffing:;donning:;socks  -      LB Dressing Assess/Train, Position   sitting  -      LB Dressing Assess/Train, [place]   maximum assist (25% patient effort)  -      Toileting Assessment/Training    Toileting Assess/Train, Assistive Device   bariatric bedside commode;other (see comments)   over commode  -      Toileting Assess/Train, Position   sitting;standing  -      Toileting Assess/Train, [place]ep[place] Level   contact guard assist;verbal cues required;set up required  -      Grooming Assessment/Training    Grooming Assess/Train, Position   sitting;sink side  -      Grooming Assess/Train, [place] Level   supervision required;set up required  -      Grooming Assess/Train, Comment   shaved chin and washed face  -      Balance Skills Training    Sitting-Level of Assistance     Close supervision  -    Sitting-Balance Support     Feet supported  -    Therapy Exercises    Bilateral Upper Extremity     AROM:;30 reps;sitting;shoulder extension/flexion;elbow flexion/extension;hand pumps   3 sets of 10 reps to 3/4 shoulder range  -    Positioning and Restraints    Pre-Treatment Position  in bed  -PC sitting in chair/recliner  - in bed  -PC sitting in chair/recliner  -HC    Post Treatment Position  chair  -PC bathroom  - chair  - chair  -    In Chair  sitting;call light within reach;encouraged to call for assist  -  sitting;call light within reach;encouraged to call for assist;exit alarm on  -PC sitting;call light within reach;encouraged to call for assist;exit alarm  on;with family/caregiver  -    Bathroom   sitting;notified nsg;call light within reach;encouraged to call for assist   om BSC in   -        01/17/18 1116                Rehab Evaluation    Document Type therapy note (daily note)  -EF        Subjective Information agree to therapy  -EF        Patient Effort, Rehab Treatment good  -EF        Symptoms Noted During/After Treatment fatigue  -EF        General Information    Precautions/Limitations brace on when up;fall precautions;spinal precautions  -EF        Pain Assessment    Pain Assessment No/denies pain  -EF        Bed Mobility, Assessment/Treatment    Bed Mobility, Comment not tested-in chair  -EF        Transfer Assessment/Treatment    Transfers, Sit-Stand [place] verbal cues required;nonverbal cues required (demo/gesture);minimum assist (75% patient effort);2 person assist required  -EF        Transfers, Stand-Sit [place] verbal cues required;nonverbal cues required (demo/gesture);minimum assist (75% patient effort);2 person assist required  -EF        Transfers, Sit-Stand-Sit, Assist Device rolling walker  -EF        Transfer, Safety Issues weight-shifting ability decreased   initial difficulty weight shifting forward  -EF        Transfer, Impairments ROM decreased;sensation decreased;strength decreased;impaired balance  -EF        Therapy Exercises    Bilateral Lower Extremities AROM:;5 reps;LAQ  -EF        Positioning and Restraints    Pre-Treatment Position sitting in chair/recliner  -EF        Post Treatment Position chair  -EF        In Chair sitting;call light within reach;encouraged to call for assist;exit alarm on;with family/caregiver  -EF          User Key  (r) = Recorded By, (t) = Taken By, (c) = Cosigned By    Initials Name Effective Dates    EF Sunshine Gillette, PT 04/24/15 -     LB Mitzy Turner, PT 10/06/15 -     PC Hortencia Smith, PT 12/01/15 -     TD Alejandra Pisano, RN 06/16/16 -     AF Veda Shukla, OTR 12/01/15 -     NO  Barby Castillo 01/07/16 -     HC Margy Burks OTKHAI 08/17/17 -     MM Nano Lama RN 10/30/17 -            Occupational Therapy Education     Title: PT OT SLP Therapies (Done)     Topic: Occupational Therapy (Done)     Point: ADL training (Done)    Description: Instruct learner(s) on proper safety adaptation and remediation techniques during self care or transfers.   Instruct in proper use of assistive devices.    Learning Progress Summary    Learner Readiness Method Response Comment Documented by Status   Patient Acceptance EELAN NR edcuated on log rolling and spinal precautions during ADL tasks. will introduce AE with ADLs to increase her independence AF 01/19/18 1529 Done    Acceptance E VU  MM 01/19/18 0029 Done               Point: Home exercise program (Done)    Description: Instruct learner(s) on appropriate technique for monitoring, assisting and/or progressing therapeutic exercises/activities.    Learning Progress Summary    Learner Readiness Method Response Comment Documented by Status   Patient Acceptance E VU  MM 01/19/18 0029 Done               Point: Precautions (Done)    Description: Instruct learner(s) on prescribed precautions during self-care and functional transfers.    Learning Progress Summary    Learner Readiness Method Response Comment Documented by Status   Patient Acceptance EELAN NR edcuated on log rolling and spinal precautions during ADL tasks. will introduce AE with ADLs to increase her independence AF 01/19/18 1529 Done    Acceptance E VU  MM 01/19/18 0029 Done               Point: Body mechanics (Done)    Description: Instruct learner(s) on proper positioning and spine alignment during self-care, functional mobility activities and/or exercises.    Learning Progress Summary    Learner Readiness Method Response Comment Documented by Status   Patient Acceptance E VU  MM 01/19/18 0029 Done                      User Key     Initials Effective Dates Name Provider Type Discipline     AF 12/01/15 -  Veda Shukla OTR Occupational Therapist OT    MM 10/30/17 -  Nano Lama RN Registered Nurse Nurse                  OT Recommendation and Plan  Anticipated Discharge Disposition: home with home health, home with assist  Planned Therapy Interventions: adaptive equipment training, activity intolerance, ADL retraining, balance training, fine motor coordination training, home exercise program, strengthening, transfer training  Therapy Frequency: 3-5 times/wk             OT Goals       01/19/18 1530 01/16/18 1309 01/16/18 1219    Transfer Training OT STG    Transfer Training OT STG, Date Established 01/19/18  -AF      Transfer Training OT STG, Time to Achieve 1 wk  -AF      Transfer Training OT STG, Activity Type toilet  -AF      Transfer Training OT STG, [place] Level contact guard assist;verbal cues required  -AF      Transfer Training OT STG, Assist Device walker, rolling  -AF      Transfer Training OT STG, Additional Goal grab bars  -AF      Transfer Training OT STG, Outcome goal ongoing  -AF      Transfer Training OT LTG    Transfer Training OT LTG, Date Established 01/19/18  -AF  01/16/18  -HC    Transfer Training OT LTG, Time to Achieve by discharge  -AF  1 wk  -HC    Transfer Training OT LTG, Activity Type toilet  -AF  bed to chair /chair to bed;sit to stand/stand to sit;toilet  -HC    Transfer Training OT LTG, [place] Level supervision required;conditional independence  -AF  supervision required  -HC    Transfer Training OT LTG, Assist Device walker, rolling  -AF  walker, rolling  -HC    Transfer Training OT LTG, Additional Goal grab bars  -AF      Transfer Training OT LTG, Outcome goal ongoing  -AF  goal revised  -HC    Transfer Training 2 OT STG    Transfer Training 2 OT STG, Date Established 01/19/18  -AF      Transfer Training 2 OT STG, Time to Achieve 1 wk  -AF      Transfer Training 2 OT STG, Activity Type shower chair;walk-in shower  -AF      Transfer Training 2 OT STG,  [place] Level contact guard assist;verbal cues required  -AF      Transfer Training 2 OT STG, Assist Device walker, rolling;shower chair  -AF      Transfer Training 2 OT STG, Outcome goal ongoing  -AF      Transfer Training 2 OT LTG    Transfer Training 2 OT LTG, Date Established 01/19/18  -AF      Transfer Training 2 OT LTG, Time to Achieve by discharge  -AF      Transfer Training 2 OT LTG, Activity Type shower chair;walk-in shower  -AF      Transfer Training 2 OT LTG, [place] Level supervision required  -AF      Transfer Training 2 OT LTG, Assist Device walker, rolling;shower chair  -AF      Transfer Training 2 OT LTG, Outcome goal ongoing  -AF      Strength OT LTG    Strength Goal OT LTG, Date Established   01/16/18  -HC    Strength Goal OT LTG, Time to Achieve   1 wk  -HC    Strength Goal OT LTG, Outcome   goal revised  -HC    Patient Education OT STG    Patient Education OT STG, Date Established 01/19/18  -AF      Patient Education OT STG, Time to Achieve 1 wk  -AF      Patient Education OT STG, Education Type precautions per surgeon;brace use/care  -AF      Patient Education OT STG, Education Understanding demonstrates adequately  -AF      Patient Education OT STG, Additional Goal with ADLs  -AF      Patient Education OT STG Outcome goal ongoing  -AF      Patient Education OT LTG    Patient Education OT LTG, Date Established 01/19/18  -AF 01/16/18  -HC     Patient Education OT LTG, Time to Achieve by discharge  -AF 1 wk  -HC     Patient Education OT LTG, Education Type HEP;precautions per surgeon;brace use/care;home safety;adaptive equipment mgmt  -AF precautions per surgeon;brace use/care;positioning;posture/body mechanics;adaptive equipment mgmt  -HC     Patient Education OT LTG, Education Understanding demonstrates adequately;independent  -AF      Patient Education OT LTG Outcome goal ongoing  -AF      ADL OT STG    ADL OT STG, Date Established 01/19/18  -AF      ADL OT STG, Time to Achieve 1 wk   -AF      ADL OT STG, Activity Type ADL skills  -AF      ADL OT STG, [place] Level standby assist;min assist  -AF      ADL OT STG, Additional Goal with LH reacher  -AF      ADL OT STG, Outcome goal ongoing  -AF      ADL OT LTG    ADL OT LTG, Date Established 01/19/18  -AF 01/16/18  -HC     ADL OT LTG, Time to Achieve by discharge  -AF 1 wk  -HC     ADL OT LTG, Activity Type ADL skills  -AF ADL skills  -HC     ADL OT LTG, [place] Level modified independent;standby assist  -AF standby assist  -HC     ADL OT LTG, Additional Goal with AE  -AF      ADL OT LTG, Outcome goal ongoing  -AF      Functional Mobility OT STG    Functional Mobility Goal OT STG, Date Established 01/19/18  -AF      Functional Mobility Goal OT STG, Time to Achieve 1 wk  -AF      Functional Mobility Goal OT STG, [place] Level contact guard  -AF      Functional Mobility Goal OT STG, Assist Device rolling walker  -AF      Functional Mobility Goal OT STG, Distance to Achieve to the bathroom  -AF      Functional Mobility Goal OT STG, Outcome goal ongoing  -AF      Functional Mobility OT LTG    Functional Mobility Goal OT LTG, Date Established 01/19/18  -AF  01/16/18  -HC    Functional Mobility Goal OT LTG, Time to Achieve by discharge  -AF  1 wk  -HC    Functional Mobility Goal OT LTG, [place] Level modified independent;independent with device;supervision  -AF  supervision  -HC    Functional Mobility Goal OT LTG, Assist Device rolling walker  -AF      Functional Mobility Goal OT LTG, Distance to Achieve to the bathroom  -AF      Functional Mobility Goal OT LTG, Outcome goal ongoing  -AF  goal revised  -HC      01/09/18 1613          Transfer Training OT LTG    Transfer Training OT LTG, Date Established 01/09/18  -MR      Transfer Training OT LTG, Time to Achieve 1 wk  -MR      Transfer Training OT LTG, Activity Type bed to chair /chair to bed;toilet  -MR      Transfer Training OT LTG, [place] Level conditional independence  -MR       Transfer Training OT LTG, Assist Device walker, rolling  -MR      Strength OT LTG    Strength Goal OT LTG, Date Established 01/09/18  -MR      Strength Goal OT LTG, Time to Achieve 1 wk  -MR      Strength Goal OT LTG, Functional Goal Pt to perform BUE AROM x10 reps and 3 sets to increase UE strength for ADLs.  -MR      Functional Mobility OT LTG    Functional Mobility Goal OT LTG, Date Established 01/09/18  -MR      Functional Mobility Goal OT LTG, Time to Achieve 1 wk  -MR      Functional Mobility Goal OT LTG, [place] Level modified independent  -MR      Functional Mobility Goal OT LTG, Assist Device rolling walker  -MR      Functional Mobility Goal OT LTG, Distance to Achieve to the sink;to the bathroom  -MR        User Key  (r) = Recorded By, (t) = Taken By, (c) = Cosigned By    Initials Name Provider Type    AF Veda Shukla, OTR Occupational Therapist    MR Erin Genao, OT Occupational Therapist     Margy Burks, OTR Occupational Therapist                Outcome Measures       01/18/18 1500 01/17/18 1400 01/17/18 1100    How much difficulty does the patient currently have...    Turning from your back to your side while in flat bed without using bedrails?   2  -EF    Standing up from a chair using your arms (e.g., wheelchair, bedside chair)?   2  -EF    Moving from lying on back to sitting on the side of a flat bed without bedrails?   2  -EF    How much help from another person do you currently need...    Moving to and from a bed to a chair (including a wheelchair)?   2  -EF    Climbing 3-5 steps with a railing?   1  -EF    To walk in hospital room?   2  -EF    AM-PAC 6 Clicks Score   11  -EF    How much help from another is currently needed...    Putting on and taking off regular lower body clothing? 2  -HC 2  -HC     Bathing (including washing, rinsing, and drying) 2  -HC 2  -HC     Toileting (which includes using toilet bed pan or urinal) 2  -HC 1  -HC     Putting on and taking off regular  upper body clothing 3  -HC 2  -HC     Taking care of personal grooming (such as brushing teeth) 3  -HC 3  -HC     Eating meals 4  -HC 3  -HC     Score 16  -HC 13  -HC     Functional Assessment    Outcome Measure Options AM-PAC 6 Clicks Daily Activity (OT)  -HC AM-PAC 6 Clicks Daily Activity (OT)  -HC AM-PAC 6 Clicks Daily Activity (OT)  -EF      User Key  (r) = Recorded By, (t) = Taken By, (c) = Cosigned By    Initials Name Provider Type    EF Sunshine [place], PT Physical Therapist    HC CINDY Lott Occupational Therapist          Time Calculation:   OT Start Time: 1400  OT Stop Time: 1430  OT Time Calculation (min): 30 min    Therapy Charges for Today     Code Description Service Date Service Provider Modifiers Qty    [id number]  OT SELF CARE/MGMT/TRAIN EA 15 MIN 1/19/2018 CINDY Orosco GO 3    [id number]  OT THER PROC EA 15 MIN 1/19/2018 Veda Shukla OTR GO 1    [id number]  OT EVAL LOW COMPLEXITY 2 1/19/2018 CINDY Orosco GO 1               CINDY Orosco  1/19/2018

## 2018-01-19 NOTE — THERAPY EVALUATION
Inpatient Rehabilitation - Physical Therapy Initial Evaluation  [facility]     Patient Name: Raquel Gonsales  : 1945  MRN: [id number]  Today's Date: 2018   Onset of Illness/Injury or Date of Surgery Date: 18  Date of Referral to PT: 18  Referring Physician: Charan      Admit Date: 2018     Visit Dx:  No diagnosis found.  Patient Active Problem List   Diagnosis   • Cervical spondylosis with radiculopathy   • Bilateral hip pain   • Trochanteric bursitis of both hips   • Status post bilateral total hip replacement   • S/P lumbar fusion   • Spinal stenosis of lumbar region with neurogenic claudication   • Lumbar spinal stenosis   • TIA (transient ischemic attack)   • Obesity   • Recurrent falls   • DM2 (diabetes mellitus, type 2)   • HTN (hypertension)   • COPD (chronic obstructive pulmonary disease)   • Decreased mobility   • Spinal stenosis     Past Medical History:   Diagnosis Date   • Acid reflux    • Asthma    • COPD (chronic obstructive pulmonary disease)    • Diabetes mellitus    • Ear congestion, bilateral    • History of fainting spells of unknown cause    • Hyperlipidemia    • Hypertension    • Low back pain    • Migraine    • Neck pain    • Neuropathy     HANDS AND FEET   • Numbness or tingling     FEET   • Rheumatoid arthritis    • Sciatic nerve pain      Past Surgical History:   Procedure Laterality Date   • ANTERIOR CERVICAL DISCECTOMY W/ FUSION N/A 2017    Procedure: CERVICAL DISCECTOMY ANTERIOR FUSION WITH INSTRUMENTATION C3 to C7;  Surgeon: Chan Farrar MD;  Location: [facility];  Service:    • APPENDECTOMY     • BACK SURGERY     • CATARACT EXTRACTION Bilateral    • CHOLECYSTECTOMY     • HERNIA REPAIR     • HYSTERECTOMY     • LUMBAR DISCECTOMY FUSION INSTRUMENTATION N/A 2018    Procedure: L2-3 repeat laminectomy and fusion with instrumentation;  Surgeon: Chan Farrar MD;  Location: [facility];  Service:    • TOE SURGERY Bilateral     GREAT  TOE   • TOTAL HIP ARTHROPLASTY Bilateral    • TOTAL KNEE ARTHROPLASTY Bilateral    • WRIST SURGERY Right     fx          PT ASSESSMENT (last 72 hours)      PT Evaluation       01/19/18 1522 01/19/18 1422    Rehab Evaluation    Document Type evaluation  -AF     Subjective Information agree to therapy;no complaints  -AF     Patient Effort, Rehab Treatment good  -AF     General Information    Patient Profile Review yes  -AF     Onset of Illness/Injury or Date of Surgery Date 01/11/18  -AF     Referring Physician Charan  -AF     General Observations sitting up in w/c after PT session in room  -AF     Pertinent History Of Current Problem repeat laminectomy with fusion and instrument, L2-3  -AF     Precautions/Limitations fall precautions;spinal precautions;brace on when up  -AF     Prior Level of Function independent:;ADL's  -AF     Equipment Currently Used at Home  rollator;glucometer;shower chair  -NO    Plans/Goals Discussed With patient  -AF     Living Environment    Lives With alone  -AF alone  -NO    Living Arrangements  apartment  -NO    Home Accessibility  no concerns  -NO    Type of Financial/Environmental Concern  none  -NO    Transportation Available  family or friend will provide   Pt states [employer] provides door to door transportation  -NO    Pain Assessment    Pain Assessment No/denies pain  -AF     Vision Assessment/Intervention    Visual Impairment WFL with corrective lenses  -AF     Cognitive Assessment/Intervention    Current Cognitive/Communication Assessment functional  -AF     Orientation Status oriented x 4  -AF     Follows Commands/Answers Questions 100% of the time;able to follow single-step instructions  -AF     Personal Safety WNL/WFL  -AF     Personal Safety Interventions fall prevention program maintained;gait belt;nonskid shoes/slippers when out of bed  -AF     ROM (Range of Motion)    General ROM Detail BUE AROM WFL  -AF     MMT (Manual Muscle Testing)    General MMT Assessment Detail BUE  3+/5, arthritis.  R:5, L:15, lateral pinch R:6, L:10  -AF     Bed Mobility, Assessment/Treatment    Bed Mob, Sit to Supine, [place] verbal cues required;moderate assist (50% patient effort);maximum assist (25% patient effort)  -AF     Bed Mobility, Comment cues to log roll  -AF     Transfer Assessment/Treatment    Transfers, Chair-Bed [place] contact guard assist;verbal cues required  -AF     Transfers, Sit-Stand [place] contact guard assist  -AF     Transfers, Stand-Sit [place] contact guard assist  -AF     Toilet Transfer, [place] moderate assist (50% patient effort);minimum assist (75% patient effort);nonverbal cues required (demo/gesture);verbal cues required  -AF     Toilet Transfer, Assistive Device --   grab bars  -AF     Balance Skills Training    Sitting-Level of Assistance Close supervision  -AF     Sitting-Balance Support Feet supported  -AF     Standing-Level of Assistance Contact guard  -AF     Static Standing Balance Support --   grab bar  -AF     Standing-Balance Activities --   toileting  -AF     Standing Balance # of Minutes 2-3 mins  -AF     Fine Motor Coordination Training    9-Hole Peg Right 48  -AF     9-Hole Peg Left 41  -AF     Opposition Right:;Left:;intact  -AF     Functional Endurance    Detail (Functional Endurance) fair plus with ADl's   -AF     Positioning and Restraints    Pre-Treatment Position sitting in chair/recliner  -AF     Post Treatment Position bed  -AF     In Bed supine;notified nsg;call light within reach;encouraged to call for assist;exit alarm on;with family/caregiver   in PM session  -AF     In Wheelchair sitting;call light within reach;encouraged to call for assist;exit alarm on;with other staff   with  in AM  -AF       01/19/18 0929 01/19/18 0800    Rehab Evaluation    Document Type evaluation  -LB     Subjective Information agree to therapy  -LB     Patient Effort, Rehab Treatment good  -LB     General Information    Onset of  Illness/Injury or Date of Surgery Date 01/11/18  -LB     Referring Physician Charan  -LB     General Observations in bed supine, NAD  -LB     Pertinent History Of Current Problem s/p L2-3 repeat laminectomy with fusion and instrumentation  -LB     Precautions/Limitations fall precautions;brace on when up;spinal precautions  -LB     Prior Level of Function independent:;all household mobility  -LB     Equipment Currently Used at Home rollator  -LB     Plans/Goals Discussed With patient  -LB     Clinical Impression    Date of Referral to PT 01/19/18  -LB     Patient/Family Goals Statement To be able to take care of herself at home  -LB     Criteria for Skilled Therapeutic Interventions Met treatment indicated  -LB     Impairments Found (describe specific impairments) gait, locomotion, and balance  -LB     Rehab Potential good, to achieve stated therapy goals  -LB     Pain Assessment    Pain Assessment 0-10  -LB     Pain Score 8  -LB 0  -TD    Post Pain Score 8  -LB     Pain Type Acute pain  -LB     Pain Location Back  -LB     Pain Intervention(s) Emotional support  -LB     Vision Assessment/Intervention    Visual Impairment WNL  -LB     Cognitive Assessment/Intervention    Current Cognitive/Communication Assessment functional  -LB     Orientation Status oriented x 4  -LB     Follows Commands/Answers Questions able to follow single-step instructions;100% of the time  -LB     Personal Safety WNL/WFL  -LB     Personal Safety Interventions fall prevention program maintained;gait belt;muscle strengthening facilitated;nonskid shoes/slippers when out of bed  -LB     ROM (Range of Motion)    General ROM Detail [place] LE WFL  -LB     MMT (Manual Muscle Testing)    General MMT Assessment Detail grossly [place] LE WFL  -LB     Muscle Tone Assessment    Bilateral Upper Extremities Muscle Tone Assessment  mildly decreased tone  -TD    Bilateral Lower Extremities Muscle Tone Assessment  moderately decreased tone  -TD    Bed Mobility,  Assessment/Treatment    Bed Mobility, Assistive Device bed rails  -LB     Bed Mobility, Roll Left, [place] minimum assist (75% patient effort)  -LB     Bed Mobility, Roll Right, [place] minimum assist (75% patient effort)  -LB     Bed Mob, Sidelying to Sit, [place] minimum assist (75% patient effort);verbal cues required  -LB     Bed Mob, Sit to Sidelying, [place] verbal cues required;minimum assist (75% patient effort)  -LB     Bed Mobility, Impairments strength decreased  -LB     Bed Mobility, Comment cues to log roll  -LB     Transfer Assessment/Treatment    Transfers, Bed-Chair [place] contact guard assist  -LB     Transfers, Chair-Bed [place] contact guard assist  -LB     Transfers, Bed-Chair-Bed, Assist Device rolling walker  -LB     Transfers, Sit-Stand [place] contact guard assist  -LB     Transfers, Stand-Sit [place] contact guard assist  -LB     Transfers, Sit-Stand-Sit, Assist Device --   rollator  -LB     Transfer, Safety Issues step length decreased  -LB     Transfer, Impairments strength decreased  -LB     Transfer, Comment don/doff brace on EOB with Min after set-up  -LB     Gait Assessment/Treatment    Gait, [place] Level contact guard assist  -LB     Gait, Assistive Device rollator  -LB     Gait, Distance (Feet) 50   80 times 3  -LB     Gait, Gait Deviations bilateral:;forward flexed posture;raymond decreased;decreased heel strike;toe-to-floor clearance decreased  -LB     Gait, Impairments impaired balance  -LB     Stairs Assessment/Treatment    Stairs, Comment pt has no steps to enter home and no longer negogiate steps  -LB     Balance Skills Training    Standing-Level of Assistance Contact guard  -LB     Static Standing Balance Support assistive device;No upper extremity supported  -LB     Standing-Balance Activities Weight Shift R-L  -LB     Standing Balance # of Minutes 3   times 2  -LB     Positioning and Restraints    Pre-Treatment Position in bed   -LB     Post Treatment Position wheelchair  -LB     In Wheelchair sitting;call light within reach;encouraged to call for assist  -LB       01/18/18 2002 01/18/18 1643    Rehab Evaluation    Document Type  therapy note (daily note)  -PC    Subjective Information  agree to therapy  -PC    Patient Effort, Rehab Treatment  good  -PC    Symptoms Noted During/After Treatment  none  -PC    General Information    Precautions/Limitations  brace on when up;fall precautions  -PC    Equipment Currently Used at Home walker, rolling  -MM     Pain Assessment    Pain Assessment  0-10  -PC    Pain Score  4  -PC    Pain Location  Back  -PC    Pain Orientation  Lower  -PC    Pain Intervention(s)  Medication (See MAR);Repositioned  -PC    Cognitive Assessment/Intervention    Current Cognitive/Communication Assessment  functional  -PC    Orientation Status  oriented x 4  -PC    Bed Mobility, Assessment/Treatment    Bed Mobility, Roll Left, [place]  minimum assist (75% patient effort);verbal cues required  -PC    Bed Mob, Sidelying to Sit, [place]  minimum assist (75% patient effort);verbal cues required  -PC    Transfer Assessment/Treatment    Transfers, Sit-Stand [place]  minimum assist (75% patient effort)  -    Transfers, Stand-Sit [place]  minimum assist (75% patient effort)  -PC    Gait Assessment/Treatment    Gait, [place] Level  minimum assist (75% patient effort)  -PC    Gait, Assistive Device  rolling walker  -PC    Gait, Distance (Feet)  100  -PC    Gait, Gait Deviations  antalgic;raymond decreased;forward flexed posture  -PC    Positioning and Restraints    Pre-Treatment Position  in bed  -PC    Post Treatment Position  chair  -PC    In Chair  sitting;call light within reach;encouraged to call for assist  -PC      01/18/18 1400 01/18/18 1019    Rehab Evaluation    Document Type therapy note (daily note)  -HC therapy note (daily note)  -PC    Subjective Information agree to therapy;no complaints  -HC  agree to therapy  -PC    Patient Effort, Rehab Treatment good  -HC good  -PC    Patient Effort, Rehab Treatment Comment  good participation today  -PC    Symptoms Noted During/After Treatment none  -HC none  -PC    General Information    Precautions/Limitations fall precautions;brace on when up;spinal precautions  -HC brace on when up  -PC    Pain Assessment    Pain Assessment No/denies pain  - 0-10  -PC    Pain Score  3  -PC    Pain Location  Back  -PC    Pain Orientation  Lower  -PC    Pain Intervention(s)  Medication (See MAR);Repositioned  -PC    Cognitive Assessment/Intervention    Current Cognitive/Communication Assessment functional  -HC functional  -PC    Orientation Status oriented x 4  -HC oriented x 4  -PC    Follows Commands/Answers Questions 100% of the time  -HC able to follow single-step instructions;100% of the time  -PC    Personal Safety WNL/WFL  -HC WNL/WFL  -PC    Personal Safety Interventions fall prevention program maintained;gait belt;nonskid shoes/slippers when out of bed  - fall prevention program maintained;gait belt  -    Bed Mobility, Assessment/Treatment    Bed Mobility, Roll Left, [place]  minimum assist (75% patient effort);verbal cues required  -    Bed Mob, Sidelying to Sit, [place]  minimum assist (75% patient effort);verbal cues required  -    Bed Mobility, Comment up in chair  -     Transfer Assessment/Treatment    Transfers, Sit-Stand [place] minimum assist (75% patient effort);2 person assist required  -HC minimum assist (75% patient effort)  -PC    Transfers, Stand-Sit [place] minimum assist (75% patient effort);2 person assist required;verbal cues required  -HC minimum assist (75% patient effort)  -PC    Transfers, Sit-Stand-Sit, Assist Device other (see comments)   rollator  -     Toilet Transfer, [place] minimum assist (75% patient effort);2 person assist required;verbal cues required;set up required  -     Toilet Transfer, Assistive  Device bariatric toilet seat;other (see comments)   bariatric BSC over commode  -     Transfer, Comment  brace donned in sitting, pt needed assist, but worked on teaching pt how to do it  -PC    Gait Assessment/Treatment    Gait, [place] Level  minimum assist (75% patient effort)  -PC    Gait, Assistive Device  rolling walker  -PC    Gait, Distance (Feet)  80  -PC    Gait, Gait Deviations  antalgic;raymond decreased  -PC    Gait, Comment  improved step length  -PC    Positioning and Restraints    Pre-Treatment Position sitting in chair/recliner  -HC in bed  -PC    Post Treatment Position bathroom  -HC chair  -PC    In Chair  sitting;call light within reach;encouraged to call for assist;exit alarm on  -PC    Bathroom sitting;notified nsg;call light within reach;encouraged to call for assist   om BSC in   -       01/17/18 1400 01/17/18 1116    Rehab Evaluation    Document Type therapy note (daily note)  - therapy note (daily note)  -EF    Subjective Information agree to therapy  - agree to therapy  -EF    Patient Effort, Rehab Treatment good  -HC good  -EF    Symptoms Noted During/After Treatment fatigue  - fatigue  -EF    General Information    Precautions/Limitations brace on when up;fall precautions;spinal precautions  - brace on when up;fall precautions;spinal precautions  -EF    Pain Assessment    Pain Assessment No/denies pain  - No/denies pain  -EF    Vision Assessment/Intervention    Visual Impairment WNL  -     Cognitive Assessment/Intervention    Current Cognitive/Communication Assessment impaired  -     Orientation Status oriented to;person;place;situation  -     Follows Commands/Answers Questions 100% of the time;able to follow single-step instructions;needs cueing;needs increased time;needs repetition  -     Personal Safety mild impairment  -     Personal Safety Interventions fall prevention program maintained;gait belt;nonskid shoes/slippers when out of bed  -     ROM  (Range of Motion)    General ROM Detail BUE AROM increased to 3/4 range today  -     Bed Mobility, Assessment/Treatment    Bed Mobility, Comment up in chair  -HC not tested-in chair  -EF    Transfer Assessment/Treatment    Transfers, Sit-Stand [place]  verbal cues required;nonverbal cues required (demo/gesture);minimum assist (75% patient effort);2 person assist required  -EF    Transfers, Stand-Sit [place]  verbal cues required;nonverbal cues required (demo/gesture);minimum assist (75% patient effort);2 person assist required  -EF    Transfers, Sit-Stand-Sit, Assist Device  rolling walker  -EF    Transfer, Safety Issues  weight-shifting ability decreased   initial difficulty weight shifting forward  -EF    Transfer, Impairments  ROM decreased;sensation decreased;strength decreased;impaired balance  -EF    Transfer, Comment deferred due increased fatigue  -     Gait Assessment/Treatment    Gait, [place] Level  minimum assist (75% patient effort);2 person assist required   follow closely with chair  -EF    Gait, Assistive Device  rolling walker  -EF    Gait, Distance (Feet)  20  -EF    Gait, Gait Deviations  raymond decreased;forward flexed posture;step length decreased;toe-to-floor clearance decreased;weight-shifting ability decreased  -EF    Gait, Safety Issues  balance decreased during turns;step length decreased;weight-shifting ability decreased  -EF    Gait, Impairments  strength decreased;impaired balance;sensation decreased  -EF    Balance Skills Training    Sitting-Level of Assistance Close supervision  -     Sitting-Balance Support Feet supported  -     Therapy Exercises    Bilateral Lower Extremities  AROM:;5 reps;LAQ  -EF    Bilateral Upper Extremity AROM:;30 reps;sitting;shoulder extension/flexion;elbow flexion/extension;hand pumps   3 sets of 10 reps to 3/4 shoulder range  -     Positioning and Restraints    Pre-Treatment Position sitting in chair/recliner  - sitting in  chair/recliner  -EF    Post Treatment Position chair  -HC chair  -EF    In Chair sitting;call light within reach;encouraged to call for assist;exit alarm on;with family/caregiver  -HC sitting;call light within reach;encouraged to call for assist;exit alarm on;with family/caregiver  -EF      User Key  (r) = Recorded By, (t) = Taken By, (c) = Cosigned By    Initials Name Provider Type    EF Sunshine Gillette, PT Physical Therapist    LB Mitzy Turner, PT Physical Therapist    PC Hortencia Smith, PT Physical Therapist    TD Alejandra Pisano, KAYLAH Registered Nurse    AF Veda Shukla, OTR Occupational Therapist    NO Barby Castillo     HC Margy Burks, OTR Occupational Therapist    KAREN Lama, KAYLAH Registered Nurse          Physical Therapy Education     Title: PT OT SLP Therapies (Done)     Topic: Physical Therapy (Done)     Point: Mobility training (Done)    Learning Progress Summary    Learner Readiness Method Response Comment Documented by Status   Patient Acceptance E VU,NR  LB 01/19/18 1231 Done    Acceptance E VU  MM 01/19/18 0029 Done               Point: Home exercise program (Done)    Learning Progress Summary    Learner Readiness Method Response Comment Documented by Status   Patient Acceptance E VU  MM 01/19/18 0029 Done               Point: Body mechanics (Done)    Learning Progress Summary    Learner Readiness Method Response Comment Documented by Status   Patient Acceptance E VU  MM 01/19/18 0029 Done               Point: Precautions (Done)    Learning Progress Summary    Learner Readiness Method Response Comment Documented by Status   Patient Acceptance E VU  MM 01/19/18 0029 Done                      User Key     Initials Effective Dates Name Provider Type Discipline    LB 10/06/15 -  Mitzy Turner, PT Physical Therapist PT    MM 10/30/17 -  Nano Lama RN Registered Nurse Nurse                PT Recommendation and Plan  Anticipated Discharge Disposition: home with home  health  Planned Therapy Interventions: bed mobility training, gait training, home exercise program, strengthening, stair training, transfer training  PT Frequency: 2 times/day  Plan of Care Review  Plan Of Care Reviewed With: patient  Outcome Summary/Follow up Plan: In PT, pt did well with bed mobility , transfers and gait with use of rollator.  Pt needs vc for back precautions and assist for donning/doffing brace.          IP PT Goals       01/19/18 1546 01/15/18 1620 01/09/18 0843    Bed Mobility PT LTG    Bed Mobility PT LTG, Date Established 01/19/18  -LB 01/15/18  -KH 01/09/18  -MG    Bed Mobility PT LTG, Time to Achieve 1 wk  -LB 1 wk  -KH 4 days  -MG    Bed Mobility PT LTG, Activity Type sidelying to sit/sit to sidelying;roll left/roll right  -LB all bed mobility  -KH all bed mobility  -MG    Bed Mobility PT LTG, [place] Level independent  -LB minimum assist (75% patient effort)  -KH independent  -MG    Transfer Training PT LTG    Transfer Training PT LTG, Date Established 01/19/18  -LB 01/15/18  -KH 01/09/18  -MG    Transfer Training PT LTG, Time to Achieve 1 wk  -LB 1 wk  -KH 4 days  -MG    Transfer Training PT LTG, Activity Type sit to stand/stand to sit  -LB all transfers  -KH bed to chair /chair to bed;sit to stand/stand to sit  -MG    Transfer Training PT LTG, [place] Level conditional independence  -LB minimum assist (75% patient effort)  -KH conditional independence  -MG    Transfer Training PT LTG, Assist Device walker, rolling  -LB walker, rolling  -KH walker, rolling  -MG    Transfer Training 2 PT LTG    Transfer Training PT 2 LTG, Time to Achieve 1 wk  -LB      Transfer Training PT 2 LTG, Activity Type --   car transfer  -LB      Transfer Training PT 2 LTG, [place] Level supervision required  -LB      Transfer Training PT 2 LTG, Assist Device walker, rolling  -LB      Gait Training PT LTG    Gait Training Goal PT LTG, Date Established 01/19/18  -LB 01/15/18  -KH 01/09/18  -MG     Gait Training Goal PT LTG, Time to Achieve 1 wk  -LB 1 wk  -KH 4 days  -MG    Gait Training Goal PT LTG, [place] Level conditional independence  -LB minimum assist (75% patient effort)  -KH conditional independence  -MG    Gait Training Goal PT LTG, Assist Device walker, rolling  -LB walker, rolling  -KH walker, rolling  -MG    Gait Training Goal PT LTG, Distance to Achieve 200  -LB  50 ft  -  -MG    Stair Training PT LTG    Stair Training Goal PT LTG, Date Established 01/19/18  -LB      Stair Training Goal PT LTG, Time to Achieve 1 wk  -LB      Stair Training Goal PT LTG, Number of Steps 4  -LB      Stair Training Goal PT LTG, [place] Level contact guard assist  -LB      Stair Training Goal PT LTG, Assist Device 2 handrails  -LB      Patient Education PT LTG    Patient Education PT LTG, Date Established 01/19/18  -LB      Patient Education PT LTG, Time to Achieve 1 wk  -LB      Patient Education PT LTG, Education Type elva/doff brace  -LB      Patient Education PT LTG, Education Understanding demonstrate adequately  -LB        User Key  (r) = Recorded By, (t) = Taken By, (c) = Cosigned By    Initials Name Provider Type    OTTO Garvey, PT Physical Therapist    LB Mitzy Turner, PT Physical Therapist    MG Megan Gosselin, PT Physical Therapist                Outcome Measures       01/18/18 1500 01/17/18 1400 01/17/18 1100    How much difficulty does the patient currently have...    Turning from your back to your side while in flat bed without using bedrails?   2  -EF    Standing up from a chair using your arms (e.g., wheelchair, bedside chair)?   2  -EF    Moving from lying on back to sitting on the side of a flat bed without bedrails?   2  -EF    How much help from another person do you currently need...    Moving to and from a bed to a chair (including a wheelchair)?   2  -EF    Climbing 3-5 steps with a railing?   1  -EF    To walk in hospital room?   2  -EF    AM-PAC 6 Clicks Score    11  -EF    How much help from another is currently needed...    Putting on and taking off regular lower body clothing? 2  -HC 2  -HC     Bathing (including washing, rinsing, and drying) 2  -HC 2  -HC     Toileting (which includes using toilet bed pan or urinal) 2  -HC 1  -HC     Putting on and taking off regular upper body clothing 3  -HC 2  -HC     Taking care of personal grooming (such as brushing teeth) 3  -HC 3  -HC     Eating meals 4  -HC 3  -HC     Score 16  -HC 13  -HC     Functional Assessment    Outcome Measure Options AM-PAC 6 Clicks Daily Activity (OT)  -HC AM-PAC 6 Clicks Daily Activity (OT)  -HC AM-PAC 6 Clicks Daily Activity (OT)  -EF      User Key  (r) = Recorded By, (t) = Taken By, (c) = Cosigned By    Initials Name Provider Type    EF Sunshine [place], PT Physical Therapist    HC Margy Burks OTR Occupational Therapist           Time Calculation:         PT Charges       01/19/18 1532 01/19/18 1004       Time Calculation    Start Time 1330  -LB 0900  -LB     Stop Time 1400  -LB 1000  -LB     Time Calculation (min) 30 min  -LB 60 min  -LB     PT Received On 01/19/18  -LB 01/19/18  -LB     PT - Next Appointment 01/20/18  -LB 01/19/18  -LB     PT Goal Re-Cert Due Date  01/26/18  -LB       User Key  (r) = Recorded By, (t) = Taken By, (c) = Cosigned By    Initials Name Provider Type    LB Mitzy Turner, PT Physical Therapist          Therapy Charges for Today     Code Description Service Date Service Provider Modifiers Qty    77973609393  PT EVAL LOW COMPLEXITY 2 1/19/2018 Mitzy Turner, PT GP 1    86498656021 HC PT THER PROC EA 15 MIN 1/19/2018 Mitzy Turner, PT GP 5    [id number] HC PT THER SUPP EA 15 MIN 1/19/2018 Mitzy Turner, PT GP 1                 Mitzy Turner, PT  1/19/2018

## 2018-01-19 NOTE — PROGRESS NOTES
Inpatient Rehabilitation Functional Measures Assessment and Plan of Care    Plan of Care  Updated Problems/Interventions  Mobility    [PT] Bed/Chair/Wheelchair(Active)  Current Status(01/19/2018): Min/CGA with rollator  Weekly Goal(01/26/2018): SBA with Rollator  Discharge Goal: Mod Indep rollator    [PT] Bed Mobility(Active)  Current Status(01/19/2018): Mod/Min  Weekly Goal(01/26/2018): Min  Discharge Goal: Indep    [PT] Walk(Active)  Current Status(01/19/2018): 80 ft Rollator CGA  Weekly Goal(01/26/2018): BR Rollator CGA  Discharge Goal: 150 ft Rollator Mod Indep    Functional Measures  DANUTA Eating:  Branch  [facility] Grooming: [facility] [place] Bathing:  [facility] [place] Upper Body Dressing:  [facility] [place] Lower Body Dressing:  [facility] [place] Toileting:  [facility] [place] Bladder Management  Level of Assistance:  Branch  Frequency/Number of Accidents this Shift:  [facility] [place] Bowel Management  Level of Assistance: [place]  Frequency/Number of Accidents this Shift: Branch    [facility] Bed/Chair/Wheelchair Transfer:  Bed/chair/wheelchair Transfer Score = 4.  Patient performs 75% or more of effort and minimal assistance (little/incidental  help/lifting of one limb/steadying) for transferring to and from the  bed/chair/wheelchair, requiring: Patient requires the following assistive  device(s): Walker.  DANUTA Toilet Transfer:  [facility] [place] Tub/Shower Transfer:  [place]    Previously Documented Mode of Locomotion at Discharge: Field  DANUTA Expected Mode of Locomotion at Discharge: [facility] [place] Walk/Wheelchair:  WHEELCHAIR OBSERVATION   Activity was not observed.    WALK OBSERVATION   Walk Distance Scale = 2.  Distance walked is 50 -149 feet. Walk Score = 2.  Patient performs 75% or more of effort and requires minimal assistance.  Incidental assistance, contact guard or steadying was provided. Patient walked a  distance of 80 feet. Patient requires the following assistive device(s): Rolling  walker.  DANUTA Stairs:  Activity was not observed.    [facility]  Comprehension:  Branch  DANUTA Expression:  Branch  DANUTA Social Interaction:  Branch  [facility] Problem Solving:  Branch  [facility] Memory:  Branch    Therapy Mode Minutes  Occupational Therapy: Branch  Physical Therapy: Individual: 90 minutes.  Speech Language Pathology:  Branch    Signed by: Mitzy Turner PT

## 2018-01-20 LAB
GLUCOSE BLDC GLUCOMTR-MCNC: 174 MG/DL (ref 70–130)
GLUCOSE BLDC GLUCOMTR-MCNC: 215 MG/DL (ref 70–130)
GLUCOSE BLDC GLUCOMTR-MCNC: 216 MG/DL (ref 70–130)
GLUCOSE BLDC GLUCOMTR-MCNC: 227 MG/DL (ref 70–130)

## 2018-01-20 PROCEDURE — 97110 THERAPEUTIC EXERCISES: CPT

## 2018-01-20 PROCEDURE — 82962 GLUCOSE BLOOD TEST: CPT

## 2018-01-20 PROCEDURE — 97535 SELF CARE MNGMENT TRAINING: CPT

## 2018-01-20 PROCEDURE — 63710000001 INSULIN ASPART PER 5 UNITS: Performed by: HOSPITALIST

## 2018-01-20 RX ADMIN — CETIRIZINE HYDROCHLORIDE 10 MG: 10 TABLET, FILM COATED ORAL at 07:56

## 2018-01-20 RX ADMIN — DULOXETINE HYDROCHLORIDE 60 MG: 60 CAPSULE, DELAYED RELEASE ORAL at 07:57

## 2018-01-20 RX ADMIN — INSULIN ASPART 10 UNITS: 100 INJECTION, SOLUTION INTRAVENOUS; SUBCUTANEOUS at 11:41

## 2018-01-20 RX ADMIN — VITAMIN D, TAB 1000IU (100/BT) 2000 UNITS: 25 TAB at 07:56

## 2018-01-20 RX ADMIN — LOSARTAN POTASSIUM 50 MG: 50 TABLET, FILM COATED ORAL at 07:57

## 2018-01-20 RX ADMIN — INSULIN ASPART 10 UNITS: 100 INJECTION, SOLUTION INTRAVENOUS; SUBCUTANEOUS at 17:13

## 2018-01-20 RX ADMIN — INSULIN DETEMIR 40 UNITS: 100 INJECTION, SOLUTION SUBCUTANEOUS at 20:28

## 2018-01-20 RX ADMIN — CYANOCOBALAMIN TAB 500 MCG 1000 MCG: 500 TAB at 07:56

## 2018-01-20 RX ADMIN — ATORVASTATIN CALCIUM 10 MG: 10 TABLET, FILM COATED ORAL at 20:27

## 2018-01-20 RX ADMIN — INSULIN ASPART 5 UNITS: 100 INJECTION, SOLUTION INTRAVENOUS; SUBCUTANEOUS at 20:27

## 2018-01-20 RX ADMIN — FAMOTIDINE 20 MG: 20 TABLET, FILM COATED ORAL at 20:27

## 2018-01-20 RX ADMIN — INSULIN ASPART 3 UNITS: 100 INJECTION, SOLUTION INTRAVENOUS; SUBCUTANEOUS at 17:14

## 2018-01-20 RX ADMIN — INSULIN ASPART 5 UNITS: 100 INJECTION, SOLUTION INTRAVENOUS; SUBCUTANEOUS at 11:42

## 2018-01-20 RX ADMIN — INSULIN ASPART 5 UNITS: 100 INJECTION, SOLUTION INTRAVENOUS; SUBCUTANEOUS at 07:58

## 2018-01-20 RX ADMIN — FLUTICASONE PROPIONATE 1 SPRAY: 50 SPRAY, METERED NASAL at 07:56

## 2018-01-20 RX ADMIN — INSULIN ASPART 10 UNITS: 100 INJECTION, SOLUTION INTRAVENOUS; SUBCUTANEOUS at 07:56

## 2018-01-20 RX ADMIN — FAMOTIDINE 20 MG: 20 TABLET, FILM COATED ORAL at 07:57

## 2018-01-20 RX ADMIN — CALCIUM CARBONATE-VITAMIN D TAB 500 MG-200 UNIT 1000 MG: 500-200 TAB at 07:56

## 2018-01-20 NOTE — PLAN OF CARE
Problem: Patient Care Overview (Adult)  Goal: Plan of Care Review  Outcome: Ongoing (interventions implemented as appropriate)   01/20/18 0148   Coping/Psychosocial Response Interventions   Plan Of Care Reviewed With patient   Patient Care Overview   Progress progress toward functional goals as expected   Outcome Evaluation   Outcome Summary/Follow up Plan Pt. doing fine tonight. No c/o any pain. Brace when OOB. Insulins adjusted.      Goal: Adult Individualization and Mutuality  Outcome: Ongoing (interventions implemented as appropriate)    Goal: Discharge Needs Assessment  Outcome: Ongoing (interventions implemented as appropriate)      Problem: Pain, Chronic (Adult)  Goal: Acceptable Pain Control/Comfort Level  Outcome: Ongoing (interventions implemented as appropriate)      Problem: Skin Integrity Impairment, Risk/Actual (Adult)  Goal: Skin Integrity/Wound Healing  Outcome: Ongoing (interventions implemented as appropriate)      Problem: Mobility, Physical Impaired (Adult)  Goal: Enhanced Functionality Ability  Outcome: Ongoing (interventions implemented as appropriate)

## 2018-01-20 NOTE — PROGRESS NOTES
Inpatient Rehabilitation Functional Measures Assessment    Functional Measures  DANUTA Eating:  Branch  [facility] Grooming: Branch  [facility] Bathing:  Branch  [facility] Upper Body Dressing:  Branch  [facility] Lower Body Dressing:  [facility] Toileting:  [facility] Bladder Management  Level of Assistance:  [place]  Frequency/Number of Accidents this Shift:  [facility] Bowel Management  Level of Assistance: [place]  Frequency/Number of Accidents this Shift: Branch    [facility] Bed/Chair/Wheelchair Transfer:  Activity was not observed.  DANUTA Toilet Transfer:  [facility] Tub/Shower Transfer:  [place]    Previously Documented Mode of Locomotion at Discharge: Field  DANUTA Expected Mode of Locomotion at Discharge: [facility] Walk/Wheelchair:  WHEELCHAIR OBSERVATION   Activity was not observed.    WALK OBSERVATION   Walk Distance Scale = 2.  Distance walked is 50 -149 feet. Walk Score = 2.  Patient performs 75% or more of effort and requires minimal assistance.  Incidental assistance, contact guard or steadying was provided. Patient walked a  distance of 100 feet. Patient requires the following assistive device(s):  Rolling walker.  DANUTA Stairs:  Activity was not observed.    DANUTA Comprehension:  [facility] Expression:  [facility] Social Interaction:  [facility] Problem Solving:  [facility] Memory:  [place]    Therapy Mode Minutes  Occupational Therapy: [place]  Physical Therapy: Individual: 90 minutes.  Speech Language Pathology:  Branch    Signed by: Mitzy Turner PT

## 2018-01-20 NOTE — THERAPY TREATMENT NOTE
Inpatient Rehabilitation - Occupational Therapy Treatment Note  [facility]     Patient Name: Raquel Gonsales  : 1945  MRN: [id number]  Today's Date: 2018  Onset of Illness/Injury or Date of Surgery Date: 18  Date of Referral to OT: 18  Referring Physician: Charan      Admit Date: 2018    Visit Dx:   No diagnosis found.  Patient Active Problem List   Diagnosis   • Cervical spondylosis with radiculopathy   • Bilateral hip pain   • Trochanteric bursitis of both hips   • Status post bilateral total hip replacement   • S/P lumbar fusion   • Spinal stenosis of lumbar region with neurogenic claudication   • Lumbar spinal stenosis   • TIA (transient ischemic attack)   • Obesity   • Recurrent falls   • DM2 (diabetes mellitus, type 2)   • HTN (hypertension)   • COPD (chronic obstructive pulmonary disease)   • Decreased mobility   • Spinal stenosis             Adult Rehabilitation Note       18 1147 18 1031 18 1643    Rehab Assessment/Intervention    Discipline occupational therapist  -AF physical therapist  -LB physical therapist  -PC    Document Type therapy note (daily note)  -AF therapy note (daily note)  -LB therapy note (daily note)  -PC    Subjective Information agree to therapy;complains of;fatigue   fatigue in second session  -AF agree to therapy  -LB agree to therapy  -PC    Patient Effort, Rehab Treatment good  -AF good  -LB good  -PC    Symptoms Noted During/After Treatment   none  -PC    Precautions/Limitations brace on when up;fall precautions;spinal precautions  -AF fall precautions;brace on when up;spinal precautions  -LB brace on when up;fall precautions  -PC    Recorded by [AF] Veda Shukla OTR [LB] Mitzy Turner, PT [PC] Hortencia Smith, PT    Pain Assessment    Pain Assessment No/denies pain  -AF 0-10  -LB 0-10  -PC    Pain Score  5  -LB 4  -PC    Post Pain Score  5  -LB     Pain Type  Chronic pain  -LB     Pain Location  Hip  -LB Back  -PC    Pain  Orientation  Right  -LB Lower  -PC    Pain Intervention(s)   Medication (See MAR);Repositioned  -PC    Recorded by [AF] Veda Shukla, OTR [LB] Mitzy Turner PT [PC] Hortencia Smith PT    Cognitive Assessment/Intervention    Current Cognitive/Communication Assessment functional  -AF  functional  -PC    Orientation Status oriented x 4  -AF  oriented x 4  -PC    Follows Commands/Answers Questions 100% of the time;able to follow single-step instructions  -AF      Personal Safety WNL/WFL  -AF WNL/WFL  -LB     Personal Safety Interventions fall prevention program maintained;gait belt;nonskid shoes/slippers when out of bed  -AF fall prevention program maintained;gait belt;muscle strengthening facilitated;nonskid shoes/slippers when out of bed  -LB     Recorded by [AF] Veda Shukla, OTR [LB] Mitzy Turner PT [PC] Hortencia Smith PT    Bed Mobility, Assessment/Treatment    Bed Mobility, Roll Left, [place]   minimum assist (75% patient effort);verbal cues required  -PC    Bed Mob, Supine to Sit, [place] minimum assist (75% patient effort);verbal cues required;nonverbal cues required (demo/gesture)  -AF      Bed Mob, Sidelying to Sit, [place]   minimum assist (75% patient effort);verbal cues required  -PC    Recorded by [AF] Veda Shukla, ESTELLAR  [PC] Hortencia Smith PT    Transfer Assessment/Treatment    Transfers, Sit-Stand [place] contact guard assist  -AF verbal cues required;contact guard assist  -LB minimum assist (75% patient effort)  -PC    Transfers, Stand-Sit [place] contact guard assist  -AF verbal cues required;contact guard assist  -LB minimum assist (75% patient effort)  -PC    Transfers, Sit-Stand-Sit, Assist Device  rolling walker  -LB     Toilet Transfer, [place] minimum assist (75% patient effort);contact guard assist;verbal cues required  -AF      Toilet Transfer, Assistive Device --   rollator, grab bars  -AF      Recorded by [AF] Veda Shukla OTR [LB] Mitzy HELMS  Johnny, PT [PC] Hortencia Smith, PT    Gait Assessment/Treatment    Gait, [place] Level  contact guard assist  -LB minimum assist (75% patient effort)  -PC    Gait, Assistive Device  rollator  -LB rolling walker  -PC    Gait, Distance (Feet)  100   times 2 75 ft  -  -PC    Gait, Gait Deviations  bilateral:;[place] decreased;decreased heel strike;forward flexed posture;toe-to-floor clearance decreased  -LB antalgic;raymond decreased;forward flexed posture  -PC    Recorded by  [LB] Mitzy Turner, PT [PC] Hortencia Smith, PT    Functional Mobility    Functional Mobility- Comment pt walked from EOB to commode with rollator with CGA  -AF      Recorded by [AF] Veda Shukla OTR      Upper Body Bathing Assessment/Training    UB Bathing Assess/Train, Position sitting;sink side  -AF      UB Bathing Assess/Train, [place] Level set up required  -AF      Recorded by [AF] Veda Shukla OTR      Lower Body Bathing Assessment/Training    LB Bathing Assess/Train, Comment NSG assisted with LB  -AF      Recorded by [AF] Veda Shukla OTR      Lower Body Dressing Assessment/Training    LB Dressing Assess/Train, Comment donned TEDs bed level  -AF      Recorded by [AF] Veda Shukla OTR      Toileting Assessment/Training    Toileting Assess/Train, Assistive Device grab bars;raised toilet seat  -AF      Toileting Assess/Train, Position standing;sitting  -AF      Toileting Assess/Train, [place] Level contact guard assist   hygiene only  -AF      Recorded by [AF] CINDY Orosco      Grooming Assessment/Training    Grooming Assess/Train, Position sitting;sink side  -AF      Grooming Assess/Train, [place] Level supervision required  -AF      Grooming Assess/Train, Comment w/c level  -AF      Recorded by [AF] Veda Shukla OTR      Balance Skills Training    Gait Balance-Level of Assistance  Contact guard  -LB     Gait Balance Support  odell bar;Left upper extremity supported;Right upper extremity  supported  -LB     Gait Balance Activities  side-stepping  -LB     Recorded by  [LB] Mitzy Turner PT     Therapy Exercises    Bilateral Lower Extremities  AROM:;10 reps;sitting;hip ER;hip IR;LAQ;ankle pumps/circles  -LB     BLE Other Reps  10   standing at hemibars, rest break needed  -LB     Bilateral Upper Extremity AROM:;15 reps;sitting;elbow flexion/extension;hand pumps;pronation/supination   3 sets, #2 hand weight  -AF      Recorded by [AF] Veda Shukla, OTR [LB] Mitzy Turner PT     Functional Endurance    Detail (Functional Endurance) Fair plus  -AF      Recorded by [AF] Veda Shukla OTR      Orthosis Location    Orthosis Location/Type  neck/back  -LB     Orthosis, Neck/Back  LSO (lumbar sacral orthosis)  -LB     Recorded by  [LB] Mitzy Turner PT     Orthosis Management/Training    Orthosis Skills Training  donning orthosis;doffing orthosis;restrictions/precautions;clothing management related to orthosis  -LB     Orthosis Skills Training Comment  Min A to don/doff  -LB     Orthosis Wear Schedule  wear when out of bed only  -LB     Recorded by  [LB] Mitzy Turner PT     Positioning and Restraints    Pre-Treatment Position in bed  -AF sitting in chair/recliner  -LB in bed  -PC    Post Treatment Position wheelchair  -AF wheelchair  -LB chair  -PC    In Chair   sitting;call light within reach;encouraged to call for assist  -PC    In Wheelchair sitting;encouraged to call for assist;call light within reach;exit alarm on   set up with lunch after 2nd session  -AF call light within reach;encouraged to call for assist;exit alarm on  -LB     Bathroom sitting;encouraged to call for assist;call light within reach;notified nsg   in bathroom with Aide finishing ADL  -AF      Recorded by [AF] Veda Shukla, OTR [LB] Mitzy Turner PT [PC] Hortencia Smith, PT      01/18/18 1400 01/18/18 1019 01/17/18 1400    Rehab Assessment/Intervention    Discipline occupational therapist  -HC physical therapist  -PC  occupational therapist  -HC    Document Type therapy note (daily note)  -HC therapy note (daily note)  -PC therapy note (daily note)  -HC    Subjective Information agree to therapy;no complaints  -HC agree to therapy  -PC agree to therapy  -HC    Patient Effort, Rehab Treatment good  -HC good  -PC good  -HC    Patient Effort, Rehab Treatment Comment  good participation today  -PC     Symptoms Noted During/After Treatment none  -HC none  -PC fatigue  -HC    Precautions/Limitations fall precautions;brace on when up;spinal precautions  -HC brace on when up  -PC brace on when up;fall precautions;spinal precautions  -HC    Recorded by [HC] Margy Bruks, ESTELLAR [PC] Hortencia Smith, PT [HC] Margy Burks, ESTELLAR    Pain Assessment    Pain Assessment No/denies pain  -HC 0-10  -PC No/denies pain  -HC    Pain Score  3  -PC     Pain Location  Back  -PC     Pain Orientation  Lower  -PC     Pain Intervention(s)  Medication (See MAR);Repositioned  -PC     Recorded by [HC] CINDY Lott [PC] Hortencia Smith, PT [HC] Margy Burks, ESTELLAR    Vision Assessment/Intervention    Visual Impairment   WNL  -HC    Recorded by   [HC] Margy Burks OTR    Cognitive Assessment/Intervention    Current Cognitive/Communication Assessment functional  -HC functional  -PC impaired  -HC    Orientation Status oriented x 4  -HC oriented x 4  -PC oriented to;person;place;situation  -HC    Follows Commands/Answers Questions 100% of the time  -HC able to follow single-step instructions;100% of the time  -% of the time;able to follow single-step instructions;needs cueing;needs increased time;needs repetition  -HC    Personal Safety WNL/WFL  -HC WNL/WFL  -PC mild impairment  -HC    Personal Safety Interventions fall prevention program maintained;gait belt;nonskid shoes/slippers when out of bed  -HC fall prevention program maintained;gait belt  -PC fall prevention program maintained;gait belt;nonskid shoes/slippers when out of bed  -HC    Recorded by  [HC] Margy Burks, OTR [PC] Hortencia Smith, PT [HC] Margy Burks, OTR    ROM (Range of Motion)    General ROM Detail   BUE AROM increased to 3/4 range today  -HC    Recorded by   [HC] Margy Burks OTR    Bed Mobility, Assessment/Treatment    Bed Mobility, Roll Left, [place]  minimum assist (75% patient effort);verbal cues required  -PC     Bed Mob, Sidelying to Sit, [place]  minimum assist (75% patient effort);verbal cues required  -PC     Bed Mobility, Comment up in chair  -HC  up in chair  -HC    Recorded by [HC] Margy Burks OTR [PC] Hortencia Smith, PT [HC] Margy Burks OTR    Transfer Assessment/Treatment    Transfers, Sit-Stand [place] minimum assist (75% patient effort);2 person assist required  -HC minimum assist (75% patient effort)  -PC     Transfers, Stand-Sit [place] minimum assist (75% patient effort);2 person assist required;verbal cues required  -HC minimum assist (75% patient effort)  -PC     Transfers, Sit-Stand-Sit, Assist Device other (see comments)   rollator  -HC      Toilet Transfer, [place] minimum assist (75% patient effort);2 person assist required;verbal cues required;set up required  -HC      Toilet Transfer, Assistive Device bariatric toilet seat;other (see comments)   bariatric BSC over commode  -HC      Transfer, Comment  brace donned in sitting, pt needed assist, but worked on teaching pt how to do it  -PC deferred due increased fatigue  -HC    Recorded by [HC] Margy Burks OTR [PC] Hortencia Smith, PT [HC] Margy Burks OTR    Gait Assessment/Treatment    Gait, [place] Level  minimum assist (75% patient effort)  -PC     Gait, Assistive Device  rolling walker  -PC     Gait, Distance (Feet)  80  -PC     Gait, Gait Deviations  antalgic;raymond decreased  -PC     Gait, Comment  improved step length  -PC     Recorded by  [PC] Hortencia Smith, PT     Upper Body Bathing Assessment/Training    UB Bathing Assess/Train, Position sitting;sink side  -HC       UB Bathing Assess/Train, [place] Level supervision required;set up required  -HC      UB Bathing Assess/Train, Comment sponge at sink, ed on removing brace to wash then putting it back on  -  pt reports she completed ADLs with [facility] staff requiring assist with every step including LB bathing and dressing  -HC    Recorded by [HC] CINDY Lott  [HC] CINDY Lott    Lower Body Bathing Assessment/Training    LB Bathing Assess/Train, Position standing  -HC      LB Bathing Assess/Train, [place] Level supervision required;verbal cues required;set up required  -HC      LB Bathing Assess/Train, Comment assist to doff brace but pt able demo donning brace with supervision and cues  -HC      Recorded by [HC] CINDY Lott      Upper Body Dressing Assessment/Training    UB Dressing Assess/Train, Clothing Type doffing:;donning:;hospital gown   brace  -      UB Dressing Assess/Train, Position sitting  -      UB Dressing Assess/Train, [place] maximum assist (25% patient effort)  -      UB Dressing Assess/Train, Comment continued ed on wear and care of brace  -HC      Recorded by [HC] CINDY Lott      Lower Body Dressing Assessment/Training    LB Dressing Assess/Train, Clothing Type doffing:;donning:;socks  -HC      LB Dressing Assess/Train, Position sitting  -HC      LB Dressing Assess/Train, [place] maximum assist (25% patient effort)  -HC      Recorded by [HC] CINDY Lott      Toileting Assessment/Training    Toileting Assess/Train, Assistive Device bariatric bedside commode;other (see comments)   over commode  -      Toileting Assess/Train, Position sitting;standing  -      Toileting Assess/Train, [place] Level contact guard assist;verbal cues required;set up required  -HC      Recorded by [HC] CINDY Lott      Grooming Assessment/Training    Grooming Assess/Train, Position sitting;sink side  -      Grooming Assess/Train, [place] Level supervision required;set  up required  -      Grooming Assess/Train, Comment shaved chin and washed face  -HC      Recorded by [HC] CINDY Lott      Balance Skills Training    Sitting-Level of Assistance   Close supervision  -    Sitting-Balance Support   Feet supported  -HC    Recorded by   [HC] CINDY Lott    Therapy Exercises    Bilateral Upper Extremity   AROM:;30 reps;sitting;shoulder extension/flexion;elbow flexion/extension;hand pumps   3 sets of 10 reps to 3/4 shoulder range  -HC    Recorded by   [HC] CINDY Lott    Positioning and Restraints    Pre-Treatment Position sitting in chair/recliner  -HC in bed  -PC sitting in chair/recliner  -HC    Post Treatment Position bathroom  -HC chair  -PC chair  -HC    In Chair  sitting;call light within reach;encouraged to call for assist;exit alarm on  -PC sitting;call light within reach;encouraged to call for assist;exit alarm on;with family/caregiver  -    Bathroom sitting;notified nsg;call light within reach;encouraged to call for assist   om BSC in BR  -HC      Recorded by [HC] Margy Burks OTR [PC] Hortencia Smith, PT [HC] CINDY Lott      User Key  (r) = Recorded By, (t) = Taken By, (c) = Cosigned By    Initials Name Effective Dates    LB Mitzy Turner, PT 10/06/15 -     PC Hortencia Smith, PT 12/01/15 -     AF Veda Shukla, OTR 12/01/15 -      Margy Burks, OTR 08/17/17 -                 OT Goals       01/19/18 1530 01/16/18 1309 01/16/18 1219    Transfer Training OT STG    Transfer Training OT STG, Date Established 01/19/18  -AF      Transfer Training OT STG, Time to Achieve 1 wk  -AF      Transfer Training OT STG, Activity Type toilet  -AF      Transfer Training OT STG, [place] Level contact guard assist;verbal cues required  -AF      Transfer Training OT STG, Assist Device walker, rolling  -AF      Transfer Training OT STG, Additional Goal grab bars  -AF      Transfer Training OT STG, Outcome goal ongoing  -AF      Transfer Training OT LTG     Transfer Training OT LTG, Date Established 01/19/18  -AF  01/16/18  -HC    Transfer Training OT LTG, Time to Achieve by discharge  -AF  1 wk  -HC    Transfer Training OT LTG, Activity Type toilet  -AF  bed to chair /chair to bed;sit to stand/stand to sit;toilet  -HC    Transfer Training OT LTG, [place] Level supervision required;conditional independence  -AF  supervision required  -HC    Transfer Training OT LTG, Assist Device walker, rolling  -AF  walker, rolling  -HC    Transfer Training OT LTG, Additional Goal grab bars  -AF      Transfer Training OT LTG, Outcome goal ongoing  -AF  goal revised  -HC    Transfer Training 2 OT STG    Transfer Training 2 OT STG, Date Established 01/19/18  -AF      Transfer Training 2 OT STG, Time to Achieve 1 wk  -AF      Transfer Training 2 OT STG, Activity Type shower chair;walk-in shower  -AF      Transfer Training 2 OT STG, [place] Level contact guard assist;verbal cues required  -AF      Transfer Training 2 OT STG, Assist Device walker, rolling;shower chair  -AF      Transfer Training 2 OT STG, Outcome goal ongoing  -AF      Transfer Training 2 OT LTG    Transfer Training 2 OT LTG, Date Established 01/19/18  -AF      Transfer Training 2 OT LTG, Time to Achieve by discharge  -AF      Transfer Training 2 OT LTG, Activity Type shower chair;walk-in shower  -AF      Transfer Training 2 OT LTG, [place] Level supervision required  -AF      Transfer Training 2 OT LTG, Assist Device walker, rolling;shower chair  -AF      Transfer Training 2 OT LTG, Outcome goal ongoing  -AF      Strength OT LTG    Strength Goal OT LTG, Date Established   01/16/18  -HC    Strength Goal OT LTG, Time to Achieve   1 wk  -HC    Strength Goal OT LTG, Outcome   goal revised  -HC    Patient Education OT STG    Patient Education OT STG, Date Established 01/19/18  -AF      Patient Education OT STG, Time to Achieve 1 wk  -AF      Patient Education OT STG, Education Type precautions per  surgeon;brace use/care  -AF      Patient Education OT STG, Education Understanding demonstrates adequately  -AF      Patient Education OT STG, Additional Goal with ADLs  -AF      Patient Education OT STG Outcome goal ongoing  -AF      Patient Education OT LTG    Patient Education OT LTG, Date Established 01/19/18  -AF 01/16/18  -HC     Patient Education OT LTG, Time to Achieve by discharge  -AF 1 wk  -HC     Patient Education OT LTG, Education Type HEP;precautions per surgeon;brace use/care;home safety;adaptive equipment mgmt  -AF precautions per surgeon;brace use/care;positioning;posture/body mechanics;adaptive equipment mgmt  -HC     Patient Education OT LTG, Education Understanding demonstrates adequately;independent  -AF      Patient Education OT LTG Outcome goal ongoing  -AF      ADL OT STG    ADL OT STG, Date Established 01/19/18  -AF      ADL OT STG, Time to Achieve 1 wk  -AF      ADL OT STG, Activity Type ADL skills  -AF      ADL OT STG, [place] Level standby assist;min assist  -AF      ADL OT STG, Additional Goal with LH reacher  -AF      ADL OT STG, Outcome goal ongoing  -AF      ADL OT LTG    ADL OT LTG, Date Established 01/19/18  -AF 01/16/18  -HC     ADL OT LTG, Time to Achieve by discharge  -AF 1 wk  -HC     ADL OT LTG, Activity Type ADL skills  -AF ADL skills  -HC     ADL OT LTG, [place] Level modified independent;standby assist  -AF standby assist  -HC     ADL OT LTG, Additional Goal with AE  -AF      ADL OT LTG, Outcome goal ongoing  -AF      Functional Mobility OT STG    Functional Mobility Goal OT STG, Date Established 01/19/18  -AF      Functional Mobility Goal OT STG, Time to Achieve 1 wk  -AF      Functional Mobility Goal OT STG, [place] Level contact guard  -AF      Functional Mobility Goal OT STG, Assist Device rolling walker  -AF      Functional Mobility Goal OT STG, Distance to Achieve to the bathroom  -AF      Functional Mobility Goal OT STG, Outcome goal ongoing  -AF       Functional Mobility OT LTG    Functional Mobility Goal OT LTG, Date Established 01/19/18  -AF  01/16/18  -HC    Functional Mobility Goal OT LTG, Time to Achieve by discharge  -AF  1 wk  -HC    Functional Mobility Goal OT LTG, [place] Level modified independent;independent with device;supervision  -AF  supervision  -HC    Functional Mobility Goal OT LTG, Assist Device rolling walker  -AF      Functional Mobility Goal OT LTG, Distance to Achieve to the bathroom  -AF      Functional Mobility Goal OT LTG, Outcome goal ongoing  -AF  goal revised  -HC      01/09/18 1613          Transfer Training OT LTG    Transfer Training OT LTG, Date Established 01/09/18  -MR      Transfer Training OT LTG, Time to Achieve 1 wk  -MR      Transfer Training OT LTG, Activity Type bed to chair /chair to bed;toilet  -MR      Transfer Training OT LTG, [place] Level conditional independence  -MR      Transfer Training OT LTG, Assist Device walker, rolling  -MR      Strength OT LTG    Strength Goal OT LTG, Date Established 01/09/18  -MR      Strength Goal OT LTG, Time to Achieve 1 wk  -MR      Strength Goal OT LTG, Functional Goal Pt to perform BUE AROM x10 reps and 3 sets to increase UE strength for ADLs.  -MR      Functional Mobility OT LTG    Functional Mobility Goal OT LTG, Date Established 01/09/18  -MR      Functional Mobility Goal OT LTG, Time to Achieve 1 wk  -MR      Functional Mobility Goal OT LTG, [place] Level modified independent  -MR      Functional Mobility Goal OT LTG, Assist Device rolling walker  -MR      Functional Mobility Goal OT LTG, Distance to Achieve to the sink;to the bathroom  -MR        User Key  (r) = Recorded By, (t) = Taken By, (c) = Cosigned By    Initials Name Provider Type    AF Veda Shukla, OTR Occupational Therapist    MR Erin Genao, OT Occupational Therapist    HC Margy Burks, OTR Occupational Therapist          Occupational Therapy Education     Title: PT OT SLP Therapies (Done)      Topic: Occupational Therapy (Done)     Point: ADL training (Done)    Description: Instruct learner(s) on proper safety adaptation and remediation techniques during self care or transfers.   Instruct in proper use of assistive devices.    Learning Progress Summary    Learner Readiness Method Response Comment Documented by Status   Patient Acceptance E,D FRAN,NR edcuated on log rolling and spinal precautions during ADL tasks. will introduce AE with ADLs to increase her independence AF 01/19/18 1529 Done    Acceptance E [facility] 01/19/18 0029 Done               Point: Home exercise program (Done)    Description: Instruct learner(s) on appropriate technique for monitoring, assisting and/or progressing therapeutic exercises/activities.    Learning Progress Summary    Learner Readiness Method Response Comment Documented by Status   Patient Acceptance E [facility] 01/19/18 0029 Done               Point: Precautions (Done)    Description: Instruct learner(s) on prescribed precautions during self-care and functional transfers.    Learning Progress Summary    Learner Readiness Method Response Comment Documented by Status   Patient Acceptance E,ELAN PETERS,NR edcuated on log rolling and spinal precautions during ADL tasks. will introduce AE with ADLs to increase her independence AF 01/19/18 1529 Done    Acceptance E   MM 01/19/18 0029 Done               Point: Body mechanics (Done)    Description: Instruct learner(s) on proper positioning and spine alignment during self-care, functional mobility activities and/or exercises.    Learning Progress Summary    Learner Readiness Method Response Comment Documented by Status   Patient Acceptance E [facility] 01/19/18 0029 Done                      User Key     Initials Effective Dates Name Provider Type Discipline    AF 12/01/15 -  Veda Shukla OTR Occupational Therapist OT     10/30/17 -  Nano Lama RN Registered Nurse Nurse                  OT Recommendation and Plan  Anticipated  Discharge Disposition: home with home health, home with assist  Planned Therapy Interventions: adaptive equipment training, activity intolerance, ADL retraining, balance training, fine motor coordination training, home exercise program, strengthening, transfer training  Therapy Frequency: 3-5 times/wk           Outcome Measures       01/18/18 1500 01/17/18 1400       How much help from another is currently needed...    Putting on and taking off regular lower body clothing? 2  -HC 2  -HC     Bathing (including washing, rinsing, and drying) 2  -HC 2  -HC     Toileting (which includes using toilet bed pan or urinal) 2  -HC 1  -HC     Putting on and taking off regular upper body clothing 3  -HC 2  -HC     Taking care of personal grooming (such as brushing teeth) 3  -HC 3  -HC     Eating meals 4  -HC 3  -HC     Score 16  -HC 13  -HC     Functional Assessment    Outcome Measure Options AM-PAC 6 Clicks Daily Activity (OT)  -HC AM-PAC 6 Clicks Daily Activity (OT)  -HC       User Key  (r) = Recorded By, (t) = Taken By, (c) = Cosigned By    Initials Name Provider Type     CINDY Lott Occupational Therapist           Time Calculation:         Time Calculation- OT       01/20/18 1151 01/20/18 1149       Time Calculation- OT    OT Start Time 1100  -AF 0800  -AF     OT Stop Time 1130  -AF 0830  -AF     OT Time Calculation (min) 30 min  -AF 30 min  -AF       User Key  (r) = Recorded By, (t) = Taken By, (c) = Cosigned By    Initials Name Provider Type     CINDY Orosco Occupational Therapist           Therapy Charges for Today     Code Description Service Date Service Provider Modifiers Qty    95469934319  OT SELF CARE/MGMT/TRAIN EA 15 MIN 1/19/2018 CINDY Orosco GO 3    [id number]  OT THER PROC EA 15 MIN 1/19/2018 CINDY Orosco GO 1    [id number]  OT EVAL LOW COMPLEXITY 2 1/19/2018 CINDY Orosco GO 1    [id number]  OT SELF CARE/MGMT/TRAIN EA 15 MIN 1/20/2018 Veda Shukla  OTR GO 2    [id number]  OT THER PROC EA 15 MIN 1/20/2018 Veda Shukla, OTR GO 2               Veda Shukla, OTR  1/20/2018

## 2018-01-20 NOTE — PROGRESS NOTES
Occupational Therapy: Individual: 90 minutes.    Physical Therapy: Branch    Speech Language Pathology:  [place]    Signed by: Veda Shukla OT

## 2018-01-20 NOTE — PROGRESS NOTES
Inpatient Rehabilitation Plan of Care Note    Plan of Care  Care Plan Reviewed - No updates at this time.    Sphincter Control    Performed Intervention(s)  Monitor intake and output  Fluid restriction      Psychosocial    Performed Intervention(s)  Support/ Peer group  Verbalizes needs and concerns      Safety    Performed Intervention(s)  Falls precautions/ protocol  Safety monitoring during functional activities  Safety rounds  Bed alarm and/or chair alarm      Body Function Structure    Performed Intervention(s)  Dressing changes  Wound care      Body Systems    Performed Intervention(s)  Blood glucose testing  Medication    Signed by: Britt Haas RN

## 2018-01-20 NOTE — PROGRESS NOTES
Inpatient Rehabilitation Functional Measures Assessment    Functional Measures  DANUTA Eating:  [facility] Grooming: [facility] Bathing:  [facility] Upper Body Dressing:  [facility] Lower Body Dressing:  [facility] Toileting:  [facility] Bladder Management  Level of Assistance:  [place]  Frequency/Number of Accidents this Shift:  [facility] Bowel Management  Level of Assistance: [place]  Frequency/Number of Accidents this Shift: [facility] Bed/Chair/Wheelchair Transfer:  [facility] Toilet Transfer:  [facility] Tub/Shower Transfer:  [place]    Previously Documented Mode of Locomotion at Discharge: Field  DANUTA Expected Mode of Locomotion at Discharge: [facility] Walk/Wheelchair:  [facility] Stairs:  [facility] Comprehension:  Auditory comprehension is the usual mode. Comprehension  Score = 6, Modified [place].  Patient comprehends complex/abstract  information in their primary language with only mild difficulty.  DANUTA Expression:  Vocal expression is the usual mode. Expression Score = 6,  Modified Independent.  Patient expresses complex/abstract information in their  primary language with only mild difficulty with tasks.  DANUTA Social Interaction:  Social Interaction Score = 6, Modified Independent.  Patient is modified independent for social interaction, requiring: Requires  additional time.  DANUTA Problem Solving:  Problem Solving Score = 6, Modified [place].  Patient  makes appropriate decisions in order to solve complex problems with mild  difficulty but self-corrects.  DANUTA Memory:  Memory Score = 6, Modified [place].  Patient is modified  independent for memory, having only mild difficulty and using self-initiated or  environmental cues to remember.    Therapy Mode Minutes  Occupational Therapy: Branch  Physical Therapy: Branch  Speech Language Pathology:  Branch    Signed by: Ana Kim RN

## 2018-01-20 NOTE — PROGRESS NOTES
Inpatient Rehabilitation Plan of Care Note    Plan of Care  Care Plan Reviewed - No updates at this time.    Sphincter Control    Performed Intervention(s)  Monitor intake and output  Fluid restriction      Safety    Performed Intervention(s)  Bed alarm and/or chair alarm    Signed by: Ana Kim RN

## 2018-01-20 NOTE — THERAPY TREATMENT NOTE
Inpatient Rehabilitation - Physical Therapy Treatment Note  [facility]     Patient Name: Raquel Gonsales  : 1945  MRN: [id number]  Today's Date: 2018  Onset of Illness/Injury or Date of Surgery Date: 18  Date of Referral to PT: 18  Referring Physician: Charan    Admit Date: 2018    Visit Dx:  No diagnosis found.  Patient Active Problem List   Diagnosis   • Cervical spondylosis with radiculopathy   • Bilateral hip pain   • Trochanteric bursitis of both hips   • Status post bilateral total hip replacement   • S/P lumbar fusion   • Spinal stenosis of lumbar region with neurogenic claudication   • Lumbar spinal stenosis   • TIA (transient ischemic attack)   • Obesity   • Recurrent falls   • DM2 (diabetes mellitus, type 2)   • HTN (hypertension)   • COPD (chronic obstructive pulmonary disease)   • Decreased mobility   • Spinal stenosis               Adult Rehabilitation Note       18 1307 18 1147 18 1031    Rehab Assessment/Intervention    Discipline occupational therapist  -AF occupational therapist  -AF physical therapist  -LB    Document Type therapy note (daily note)  -AF therapy note (daily note)  -AF therapy note (daily note)  -LB    Subjective Information agree to therapy;complains of;fatigue  -AF agree to therapy;complains of;fatigue   fatigue in second session  -AF agree to therapy  -LB    Patient Effort, Rehab Treatment good  -AF good  -AF good  -LB    Precautions/Limitations brace on when up;fall precautions;spinal precautions  -AF brace on when up;fall precautions;spinal precautions  -AF fall precautions;brace on when up;spinal precautions  -LB    Recorded by [AF] CINDY Orosco [AF] Veda Shukla OTR [LB] Mitzy Turner PT    Pain Assessment    Pain Assessment No/denies pain  -AF No/denies pain  -AF 0-10  -LB    Pain Score   5  -LB    Post Pain Score   5  -LB    Pain Type   Chronic pain  -LB    Pain Location   Hip  -LB    Pain Orientation   Right   -LB    Recorded by [AF] Veda Shukla OTR [AF] CINDY Orosco [LB] Mitzy Turner PT    Cognitive Assessment/Intervention    Current Cognitive/Communication Assessment functional  -AF functional  -AF     Orientation Status oriented x 4  -AF oriented x 4  -AF     Follows Commands/Answers Questions 100% of the time;able to follow single-step instructions  -% of the time;able to follow single-step instructions  -AF     Personal Safety WNL/WFL  -AF WNL/WFL  -AF WNL/WFL  -LB    Personal Safety Interventions fall prevention program maintained;gait belt;nonskid shoes/slippers when out of bed  -AF fall prevention program maintained;gait belt;nonskid shoes/slippers when out of bed  -AF fall prevention program maintained;gait belt;muscle strengthening facilitated;nonskid shoes/slippers when out of bed  -LB    Recorded by [AF] Veda Shukla OTR [AF] CINDY Orosco [LB] Mitzy Turner, PT    Bed Mobility, Assessment/Treatment    Bed Mobility, Assistive Device   bed rails  -LB    Bed Mob, Supine to Sit, [place]  minimum assist (75% patient effort);verbal cues required;nonverbal cues required (demo/gesture)  -AF     Bed Mob, Sit to Sidelying, [place]   verbal cues required;minimum assist (75% patient effort)  -LB    Bed Mobility, Comment sitting up in w/c   -AF      Recorded by [AF] CINDY Orosco [AF] CINDY Orosco [LB] Mitzy Turner PT    Transfer Assessment/Treatment    Transfers, Sit-Stand [place]  contact guard assist  -AF verbal cues required;contact guard assist  -LB    Transfers, Stand-Sit [place]  contact guard assist  -AF verbal cues required;contact guard assist  -LB    Transfers, Sit-Stand-Sit, Assist Device   rolling walker  -LB    Toilet Transfer, [place]  minimum assist (75% patient effort);contact guard assist;verbal cues required  -AF     Toilet Transfer, Assistive Device  --   rollator, grab bars  -AF     Recorded by  [AF] CINDY Orosco  [LB] Mitzy Turner, PT    Gait Assessment/Treatment    Gait, [place] Level   contact guard assist  -LB    Gait, Assistive Device   rollator  -LB    Gait, Distance (Feet)   100   times 2 75 ft times 2  -LB    Gait, Gait Deviations   bilateral:;raymond decreased;decreased heel strike;forward flexed posture;toe-to-floor clearance decreased  -LB    Gait, Comment   backwards walking with rollator and CGA  -LB    Recorded by   [LB] Mitzy Turner PT    Stairs Assessment/Treatment    Stairs, Comment   up/down ramp with rollator and Min  -LB    Recorded by   [LB] Mitzy Turner, PT    Functional Mobility    Functional Mobility- Comment  pt walked from EOB to commode with rollator with CGA  -AF     Recorded by  [AF] Veda Shukla OTR     Upper Body Bathing Assessment/Training    UB Bathing Assess/Train, Position  sitting;sink side  -AF     UB Bathing Assess/Train, [place] Level  set up required  -AF     Recorded by  [AF] Veda Shukla OTR     Lower Body Bathing Assessment/Training    LB Bathing Assess/Train, Comment  NSG assisted with LB  -AF     Recorded by  [AF] Veda Shukla, ESTELLAR     Lower Body Dressing Assessment/Training    LB Dressing Assess/Train, Comment pt unlaced shoes, therapist laced with elastic shoe laces. pt donned shoes with min A  -AF donned TEDs bed level  -AF     Recorded by [AF] CINDY Orosco [AF] Veda Shukla OTR     Toileting Assessment/Training    Toileting Assess/Train, Assistive Device  grab bars;raised toilet seat  -AF     Toileting Assess/Train, Position  standing;sitting  -AF     Toileting Assess/Train, [place] Level  contact guard assist   hygiene only  -AF     Recorded by  [AF] CINDY Orosco     Grooming Assessment/Training    Grooming Assess/Train, Position  sitting;sink side  -AF     Grooming Assess/Train, [place] Level  supervision required  -AF     Grooming Assess/Train, Comment  w/c level  -AF     Recorded by  [AF] CINDY Orosco     Balance  Skills Training    Gait Balance-Level of Assistance   Contact guard  -LB    Gait Balance Support   odell bar;Left upper extremity supported;Right upper extremity supported  -LB    Gait Balance Activities   side-stepping  -LB    Recorded by   [LB] Mitzy Turner PT    Therapy Exercises    Bilateral Lower Extremities   AROM:;10 reps;sitting;hip ER;hip IR;LAQ;ankle pumps/circles  -LB    BLE Other Reps   10   standing at hemibars, rest break needed  -LB    Bilateral Upper Extremity AROM:;15 reps;sitting;shoulder protraction/retraction;shoulder extension/flexion   #1 dowel natalee, 3 sets, hand gripper red band  -AF AROM:;15 reps;sitting;elbow flexion/extension;hand pumps;pronation/supination   3 sets, #2 hand weight  -AF     Recorded by [AF] Veda Shukla OTR [AF] Veda Shukla OTR [LB] Mitzy Turner PT    Functional Endurance    Detail (Functional Endurance)  Fair plus  -AF     Recorded by  [AF] Veda Shukla OTR     Orthosis Location    Orthosis Location/Type   neck/back  -LB    Orthosis, Neck/Back   LSO (lumbar sacral orthosis)  -LB    Recorded by   [LB] Mitzy Turner PT    Orthosis Management/Training    Orthosis Skills Training   donning orthosis;doffing orthosis;restrictions/precautions;clothing management related to orthosis  -LB    Orthosis Skills Training Comment   Min A to don/doff  -LB    Orthosis Wear Schedule   wear when out of bed only  -LB    Recorded by   [LB] Mitzy Turner PT    Positioning and Restraints    Pre-Treatment Position sitting in chair/recliner  -AF in bed  -AF sitting in chair/recliner  -LB    Post Treatment Position wheelchair  -AF wheelchair  -AF wheelchair  -LB    In Wheelchair sitting;with PT  -AF sitting;encouraged to call for assist;call light within reach;exit alarm on   set up with lunch after 2nd session  -AF call light within reach;encouraged to call for assist;exit alarm on  -LB    Bathroom  sitting;encouraged to call for assist;call light within reach;notified nsg   in  bathroom with Aide finishing ADL  -AF     Recorded by [AF] Veda Shukla OTR [AF] Veda Shukla, OTR [LB] Mitzy Turner, PT      01/18/18 1643 01/18/18 1400 01/18/18 1019    Rehab Assessment/Intervention    Discipline physical therapist  -PC occupational therapist  -HC physical therapist  -PC    Document Type therapy note (daily note)  -PC therapy note (daily note)  -HC therapy note (daily note)  -PC    Subjective Information agree to therapy  -PC agree to therapy;no complaints  -HC agree to therapy  -PC    Patient Effort, Rehab Treatment good  -PC good  -HC good  -PC    Patient Effort, Rehab Treatment Comment   good participation today  -PC    Symptoms Noted During/After Treatment none  -PC none  -HC none  -PC    Precautions/Limitations brace on when up;fall precautions  -PC fall precautions;brace on when up;spinal precautions  -HC brace on when up  -PC    Recorded by [PC] Hortencia Smith, PT [HC] Margy Burks, OTR [PC] Hortencia Smith, PT    Pain Assessment    Pain Assessment 0-10  -PC No/denies pain  -HC 0-10  -PC    Pain Score 4  -PC  3  -PC    Pain Location Back  -PC  Back  -PC    Pain Orientation Lower  -PC  Lower  -PC    Pain Intervention(s) Medication (See MAR);Repositioned  -PC  Medication (See MAR);Repositioned  -PC    Recorded by [PC] Hortencia Smith, PT [HC] Margy Burks, OTR [PC] Hortencia Smith PT    Cognitive Assessment/Intervention    Current Cognitive/Communication Assessment functional  -PC functional  -HC functional  -PC    Orientation Status oriented x 4  -PC oriented x 4  -HC oriented x 4  -PC    Follows Commands/Answers Questions  100% of the time  -HC able to follow single-step instructions;100% of the time  -PC    Personal Safety  WNL/WFL  -HC WNL/WFL  -PC    Personal Safety Interventions  fall prevention program maintained;gait belt;nonskid shoes/slippers when out of bed  -HC fall prevention program maintained;gait belt  -PC    Recorded by [PC] Hortencia Smith, PT [HC] Margy Burks, ESTELLAR  [PC] Hortencia Smith PT    Bed Mobility, Assessment/Treatment    Bed Mobility, Roll Left, [place] minimum assist (75% patient effort);verbal cues required  -PC  minimum assist (75% patient effort);verbal cues required  -PC    Bed Mob, Sidelying to Sit, [place] minimum assist (75% patient effort);verbal cues required  -PC  minimum assist (75% patient effort);verbal cues required  -PC    Bed Mobility, Comment  up in chair  -HC     Recorded by [PC] Hortencia Smith, PT [HC] Margy Burks OTR [PC] Hortencia Smith, PT    Transfer Assessment/Treatment    Transfers, Sit-Stand [place] minimum assist (75% patient effort)  -PC minimum assist (75% patient effort);2 person assist required  -HC minimum assist (75% patient effort)  -PC    Transfers, Stand-Sit [place] minimum assist (75% patient effort)  -PC minimum assist (75% patient effort);2 person assist required;verbal cues required  -HC minimum assist (75% patient effort)  -PC    Transfers, Sit-Stand-Sit, Assist Device  other (see comments)   rollator  -HC     Toilet Transfer, [place]  minimum assist (75% patient effort);2 person assist required;verbal cues required;set up required  -     Toilet Transfer, Assistive Device  bariatric toilet seat;other (see comments)   bariatric BSC over commode  -HC     Transfer, Comment   brace donned in sitting, pt needed assist, but worked on teaching pt how to do it  -PC    Recorded by [PC] Hortencia Smith, PT [HC] Margy Burks, OTR [PC] Hortencia Smith, PT    Gait Assessment/Treatment    Gait, [place] Level minimum assist (75% patient effort)  -PC  minimum assist (75% patient effort)  -    Gait, Assistive Device rolling walker  -PC  rolling walker  -PC    Gait, Distance (Feet) 100  -PC  80  -PC    Gait, Gait Deviations antalgic;raymond decreased;forward flexed posture  -PC  antalgic;raymond decreased  -PC    Gait, Comment   improved step length  -PC    Recorded by [PC] Hortencia Smith, PT  [PC] Hortencia PILLAI  Sarah, PT    Upper Body Bathing Assessment/Training    UB Bathing Assess/Train, Position  sitting;sink side  -HC     UB Bathing Assess/Train, [place] Level  supervision required;set up required  -HC     UB Bathing Assess/Train, Comment  sponge at sink, ed on removing brace to wash then putting it back on  -HC     Recorded by  [HC] CINDY Lott     Lower Body Bathing Assessment/Training    LB Bathing Assess/Train, Position  standing  -HC     LB Bathing Assess/Train, [place] Level  supervision required;verbal cues required;set up required  -HC     LB Bathing Assess/Train, Comment  assist to doff brace but pt able demo donning brace with supervision and cues  -HC     Recorded by  [HC] CINDY Lott     Upper Body Dressing Assessment/Training    UB Dressing Assess/Train, Clothing Type  doffing:;donning:;hospital gown   brace  -     UB Dressing Assess/Train, Position  sitting  -     UB Dressing Assess/Train, [place]  maximum assist (25% patient effort)  -HC     UB Dressing Assess/Train, Comment  continued ed on wear and care of brace  -HC     Recorded by  [HC] CINDY Lott     Lower Body Dressing Assessment/Training    LB Dressing Assess/Train, Clothing Type  doffing:;donning:;socks  -     LB Dressing Assess/Train, Position  sitting  -     LB Dressing Assess/Train, [place]  maximum assist (25% patient effort)  -HC     Recorded by  [HC] CINDY Lott     Toileting Assessment/Training    Toileting Assess/Train, Assistive Device  bariatric bedside commode;other (see comments)   over commode  -     Toileting Assess/Train, Position  sitting;standing  -     Toileting Assess/Train, [place] Level  contact guard assist;verbal cues required;set up required  -HC     Recorded by  [HC] CINDY Lott     Grooming Assessment/Training    Grooming Assess/Train, Position  sitting;sink side  -HC     Grooming Assess/Train, [place] Level  supervision required;set up required  -HC      Grooming Assess/Train, Comment  shaved chin and washed face  -HC     Recorded by  [HC] Margy Burks, OTR     Positioning and Restraints    Pre-Treatment Position in bed  -PC sitting in chair/recliner  -HC in bed  -PC    Post Treatment Position chair  -PC bathroom  -HC chair  -PC    In Chair sitting;call light within reach;encouraged to call for assist  -PC  sitting;call light within reach;encouraged to call for assist;exit alarm on  -PC    Bathroom  sitting;notified nsg;call light within reach;encouraged to call for assist   om BSC in BR  -HC     Recorded by [PC] Hortencia Smith, PT [HC] Margy Burks, OTR [PC] Hortencia Smith, PT      User Key  (r) = Recorded By, (t) = Taken By, (c) = Cosigned By    Initials Name Effective Dates    LB Mitzy Turner, PT 10/06/15 -     PC Hortencia Smith, PT 12/01/15 -     AF Veda Shukla, OTR 12/01/15 -     HC Margy Burks, OTR 08/17/17 -                 IP PT Goals       01/19/18 1546 01/15/18 1620 01/09/18 0843    Bed Mobility PT LTG    Bed Mobility PT LTG, Date Established 01/19/18  -LB 01/15/18  -KH 01/09/18  -MG    Bed Mobility PT LTG, Time to Achieve 1 wk  -LB 1 wk  -KH 4 days  -MG    Bed Mobility PT LTG, Activity Type sidelying to sit/sit to sidelying;roll left/roll right  -LB all bed mobility  -KH all bed mobility  -MG    Bed Mobility PT LTG, [place] Level independent  -LB minimum assist (75% patient effort)  -KH independent  -MG    Transfer Training PT LTG    Transfer Training PT LTG, Date Established 01/19/18  -LB 01/15/18  -KH 01/09/18  -MG    Transfer Training PT LTG, Time to Achieve 1 wk  -LB 1 wk  -KH 4 days  -MG    Transfer Training PT LTG, Activity Type sit to stand/stand to sit  -LB all transfers  -KH bed to chair /chair to bed;sit to stand/stand to sit  -MG    Transfer Training PT LTG, [place] Level conditional independence  -LB minimum assist (75% patient effort)  -KH conditional independence  -MG    Transfer Training PT LTG, Assist Device walker,  rolling  -LB walker, rolling  -KH walker, rolling  -MG    Transfer Training 2 PT LTG    Transfer Training PT 2 LTG, Time to Achieve 1 wk  -LB      Transfer Training PT 2 LTG, Activity Type --   car transfer  -LB      Transfer Training PT 2 LTG, [place] Level supervision required  -LB      Transfer Training PT 2 LTG, Assist Device walker, rolling  -LB      Gait Training PT LTG    Gait Training Goal PT LTG, Date Established 01/19/18  -LB 01/15/18  -KH 01/09/18  -MG    Gait Training Goal PT LTG, Time to Achieve 1 wk  -LB 1 wk  -KH 4 days  -MG    Gait Training Goal PT LTG, [place] Level conditional independence  -LB minimum assist (75% patient effort)  -KH conditional independence  -MG    Gait Training Goal PT LTG, Assist Device walker, rolling  -LB walker, rolling  -KH walker, rolling  -MG    Gait Training Goal PT LTG, Distance to Achieve 200  -LB  50 ft  -  -MG    Stair Training PT LTG    Stair Training Goal PT LTG, Date Established 01/19/18  -LB      Stair Training Goal PT LTG, Time to Achieve 1 wk  -LB      Stair Training Goal PT LTG, Number of Steps 4  -LB      Stair Training Goal PT LTG, [place] Level contact guard assist  -LB      Stair Training Goal PT LTG, Assist Device 2 handrails  -LB      Patient Education PT LTG    Patient Education PT LTG, Date Established 01/19/18  -LB      Patient Education PT LTG, Time to Achieve 1 wk  -LB      Patient Education PT LTG, Education Type elva/doff brace  -LB      Patient Education PT LTG, Education Understanding demonstrate adequately  -LB        User Key  (r) = Recorded By, (t) = Taken By, (c) = Cosigned By    Initials Name Provider Type    OTTO Garvey, PT Physical Therapist    LB Mitzy Turner, PT Physical Therapist    MG Megan Gosselin, PT Physical Therapist          Physical Therapy Education     Title: PT OT SLP Therapies (Done)     Topic: Physical Therapy (Done)     Point: Mobility training (Done)    Learning Progress Summary     Learner Readiness Method Response Comment Documented by Status   Patient Acceptance E VU,NR  LB 01/20/18 1146 Done    Acceptance E VU,NR  LB 01/19/18 1231 Done    Acceptance E VU  MM 01/19/18 0029 Done               Point: Home exercise program (Done)    Learning Progress Summary    Learner Readiness Method Response Comment Documented by Status   Patient Acceptance E VU,NR  LB 01/20/18 1146 Done    Acceptance E VU  MM 01/19/18 0029 Done               Point: Body mechanics (Done)    Learning Progress Summary    Learner Readiness Method Response Comment Documented by Status   Patient Acceptance E VU  MM 01/19/18 0029 Done               Point: Precautions (Done)    Learning Progress Summary    Learner Readiness Method Response Comment Documented by Status   Patient Acceptance E VU  MM 01/19/18 0029 Done                      User Key     Initials Effective Dates Name Provider Type Discipline     10/06/15 -  Mitzy Turner, PT Physical Therapist PT     10/30/17 -  Nano Lama RN Registered Nurse Nurse                    PT Recommendation and Plan  Anticipated Discharge Disposition: home with home health  Planned Therapy Interventions: bed mobility training, gait training, home exercise program, strengthening, stair training, transfer training  PT Frequency: 2 times/day  Plan of Care Review  Plan Of Care Reviewed With: patient  Outcome Summary/Follow up Plan: In PT, pt did well with bed mobility , transfers and gait with use of rollator.  Pt needs vc for back precautions and assist for donning/doffing brace.          Outcome Measures       01/18/18 1500          How much help from another is currently needed...    Putting on and taking off regular lower body clothing? 2  -HC      Bathing (including washing, rinsing, and drying) 2  -HC      Toileting (which includes using toilet bed pan or urinal) 2  -HC      Putting on and taking off regular upper body clothing 3  -HC      Taking care of personal grooming (such as  brushing teeth) 3  -HC      Eating meals 4  -HC      Score 16  -HC      Functional Assessment    Outcome Measure Options AM-PAC 6 Clicks Daily Activity (OT)  -HC        User Key  (r) = Recorded By, (t) = Taken By, (c) = Cosigned By    Initials Name Provider Type    HC Margy Burks OTR Occupational Therapist           Time Calculation:         PT Charges       01/20/18 1526 01/20/18 1146       Time Calculation    Start Time 1300  -LB 1000  -LB     Stop Time 1330  -LB 1100  -LB     Time Calculation (min) 30 min  -LB 60 min  -LB     PT Received On 01/20/18  -LB 01/20/18  -LB     PT - Next Appointment 01/21/18  -LB        User Key  (r) = Recorded By, (t) = Taken By, (c) = Cosigned By    Initials Name Provider Type    LB Mitzy Turner, PT Physical Therapist          Therapy Charges for Today     Code Description Service Date Service Provider Modifiers Qty    25919946048 HC PT EVAL LOW COMPLEXITY 2 1/19/2018 Mitzydesmond Turner, PT GP 1    00639012937 HC PT THER PROC EA 15 MIN 1/19/2018 Mitzy ANALIA Turner, PT GP 5    13493283373 HC PT THER SUPP EA 15 MIN 1/19/2018 Mitzydesmond Turner, PT GP 1    74002454922 HC PT THER PROC EA 15 MIN 1/20/2018 Mitzy ANALIA Turner, PT GP 6    17224710866 HC PT THER SUPP EA 15 MIN 1/20/2018 Mitzy Turner, PT GP 1               Mitzy Turner, PT  1/20/2018

## 2018-01-20 NOTE — PROGRESS NOTES
LOS: 2 days   Patient Care Team:  Roger Patel MD as PCP - General    Chief Complaint: same    Subjective     History of Present Illness    Subjective Pt is awake and alert. Pt is near baseline with mobility    History taken from: patient    Objective     Vital Signs  Temp:  [98 °F (36.7 °C)-98.7 °F (37.1 °C)] 98 °F (36.7 °C)  Heart Rate:  [81-99] 81  Resp:  [18] 18  BP: (135-148)/(78-79) 148/79    Objective exam unchanged    Results Review:     I reviewed the patient's new clinical results.    Medication Review:     Assessment/Plan     Active Problems:    Spinal stenosis      Assessment & PlanContinue eval phase    Mehdi Augustine MD  01/20/18  1:46 PM    Time:

## 2018-01-20 NOTE — PROGRESS NOTES
Inpatient Rehabilitation Functional Measures Assessment    Functional Measures  DANUTA Eating:  Branch  DANUTA Grooming: Branch  DANUTA Bathing:  Branch  DANUTA Upper Body Dressing:  Branch  [facility] Lower Body Dressing:  Branch  [facility] Toileting:  Branch    [facility] Bladder Management  Level of Assistance:  Bladder Score = 5.  Patient is supervision/set-up for  bladder management, requiring: Stand by assistance. No assistive devices were  required.  Frequency/Number of Accidents this Shift:  Bladder accidents this shift:  0 .  Patient has not had an accident this shift.    DANUTA Bowel Management  Level of Assistance: Activity was not observed.  Frequency/Number of Accidents this Shift: Bowel accidents this shift: 0 .  Patient has not had an accident this shift.    DANUTA Bed/Chair/Wheelchair Transfer:  Bed/chair/wheelchair Transfer = 1.  Patient  performs less than 25% of effort, uses a mechanical device/total assistance for  transferring to and from the bed/chair/wheelchair. walker  DANUTA Toilet Transfer:  Toilet Transfer Score = 5.  Patient is supervision/set-up  for transferring to and from the toilet/commode, requiring: Stand by assistance.  Patient requires the following assistive device(s): Walker.  DANUTA Tub/Shower Transfer:  [place]    Previously Documented Mode of Locomotion at Discharge: Field  DANUTA Expected Mode of Locomotion at Discharge: Branch  [facility] Walk/Wheelchair:  [place]anch  [facility] Stairs:  Branch    [facility] Comprehension:  [place]anch  [facility] Expression:  [facility] Social Interaction:  [facility] Problem Solving:  [facility] Memory:  [place]    Therapy Mode Minutes  Occupational Therapy: [place]  Physical Therapy: Branch  Speech Language Pathology:  [place]    Signed by: DENNIS Bishop

## 2018-01-20 NOTE — PROGRESS NOTES
Inpatient Rehabilitation Functional Measures Assessment    Functional Measures  DANUTA Eating:  [facility] Grooming: [facility] Bathing:  [facility] Upper Body Dressing:  [facility] Lower Body Dressing:  [facility] Toileting:  [facility] Bladder Management  Level of Assistance:  [place]  Frequency/Number of Accidents this Shift:  [facility] Bowel Management  Level of Assistance: [place]  Frequency/Number of Accidents this Shift: [facility] Bed/Chair/Wheelchair Transfer:  [facility] Toilet Transfer:  [facility] Tub/Shower Transfer:  [place]    Previously Documented Mode of Locomotion at Discharge: Field  DANUTA Expected Mode of Locomotion at Discharge: [facility] Walk/Wheelchair:  [facility] Stairs:  [facility] Comprehension:  Auditory comprehension is the usual mode. Comprehension  Score = 6, Modified [place].  Patient comprehends complex/abstract  information in their primary language, requiring: Additional time.  DANUTA Expression:  Vocal expression is the usual mode. Expression Score = 6,  Modified Independent.  Patient expresses complex/abstract information in their  primary language, requiring: Additional time.  DANUTA Social Interaction:  Social Interaction Score = 6, Modified Independent.  Patient is modified independent for social interaction, requiring: Requires  additional time.  DANUTA Problem Solving:  Activity was not observed.  DANUTA Memory:  Memory Score = 6, Modified [place].  Patient is modified  independent for memory, requiring: Requires additional time.    Therapy Mode Minutes  Occupational Therapy: [facility]  Physical Therapy: [facility]  Speech Language Pathology:  [place]    Signed by: Britt Haas RN

## 2018-01-20 NOTE — THERAPY TREATMENT NOTE
Inpatient Rehabilitation - Occupational Therapy Treatment Note  [facility]     Patient Name: Raquel Gonsales  : 1945  MRN: [id number]  Today's Date: 2018  Onset of Illness/Injury or Date of Surgery Date: 18  Date of Referral to OT: 18  Referring Physician: Charan      Admit Date: 2018    Visit Dx:   No diagnosis found.  Patient Active Problem List   Diagnosis   • Cervical spondylosis with radiculopathy   • Bilateral hip pain   • Trochanteric bursitis of both hips   • Status post bilateral total hip replacement   • S/P lumbar fusion   • Spinal stenosis of lumbar region with neurogenic claudication   • Lumbar spinal stenosis   • TIA (transient ischemic attack)   • Obesity   • Recurrent falls   • DM2 (diabetes mellitus, type 2)   • HTN (hypertension)   • COPD (chronic obstructive pulmonary disease)   • Decreased mobility   • Spinal stenosis             Adult Rehabilitation Note       18 1307 18 1147 18 1031    Rehab Assessment/Intervention    Discipline occupational therapist  -AF occupational therapist  -AF physical therapist  -LB    Document Type therapy note (daily note)  -AF therapy note (daily note)  -AF therapy note (daily note)  -LB    Subjective Information agree to therapy;complains of;fatigue  -AF agree to therapy;complains of;fatigue   fatigue in second session  -AF agree to therapy  -LB    Patient Effort, Rehab Treatment good  -AF good  -AF good  -LB    Precautions/Limitations brace on when up;fall precautions;spinal precautions  -AF brace on when up;fall precautions;spinal precautions  -AF fall precautions;brace on when up;spinal precautions  -LB    Recorded by [AF] CINDY Orosco [AF] Veda Shukla OTR [LB] Mitzy Turner PT    Pain Assessment    Pain Assessment No/denies pain  -AF No/denies pain  -AF 0-10  -LB    Pain Score   5  -LB    Post Pain Score   5  -LB    Pain Type   Chronic pain  -LB    Pain Location   Hip  -LB    Pain Orientation    Right  -LB    Recorded by [AF] Veda Shukla OTR [AF] CINDY Orosco [LB] Mitzy Turner PT    Cognitive Assessment/Intervention    Current Cognitive/Communication Assessment functional  -AF functional  -AF     Orientation Status oriented x 4  -AF oriented x 4  -AF     Follows Commands/Answers Questions 100% of the time;able to follow single-step instructions  -% of the time;able to follow single-step instructions  -AF     Personal Safety WNL/WFL  -AF WNL/WFL  -AF WNL/WFL  -LB    Personal Safety Interventions fall prevention program maintained;gait belt;nonskid shoes/slippers when out of bed  -AF fall prevention program maintained;gait belt;nonskid shoes/slippers when out of bed  -AF fall prevention program maintained;gait belt;muscle strengthening facilitated;nonskid shoes/slippers when out of bed  -LB    Recorded by [AF] Veda Shukla OTR [AF] CINDY Orosco [LB] Mitzy Turner PT    Bed Mobility, Assessment/Treatment    Bed Mob, Supine to Sit, [place]  minimum assist (75% patient effort);verbal cues required;nonverbal cues required (demo/gesture)  -AF     Bed Mobility, Comment sitting up in w/c   -AF      Recorded by [AF] Veda Shukla OTR [AF] Veda Shukla OTR     Transfer Assessment/Treatment    Transfers, Sit-Stand [place]  contact guard assist  -AF verbal cues required;contact guard assist  -LB    Transfers, Stand-Sit [place]  contact guard assist  -AF verbal cues required;contact guard assist  -LB    Transfers, Sit-Stand-Sit, Assist Device   rolling walker  -LB    Toilet Transfer, [place]  minimum assist (75% patient effort);contact guard assist;verbal cues required  -AF     Toilet Transfer, Assistive Device  --   rollator, grab bars  -AF     Recorded by  [AF] CINDY Orosco [LB] Mitzy Turner PT    Gait Assessment/Treatment    Gait, [place] Level   contact guard assist  -LB    Gait, Assistive Device   rollator  -LB    Gait, Distance (Feet)    100   times 2 75 ft  -LB    Gait, Gait Deviations   bilateral:;raymond decreased;decreased heel strike;forward flexed posture;toe-to-floor clearance decreased  -LB    Recorded by   [LB] Mitzy Turner, PT    Functional Mobility    Functional Mobility- Comment  pt walked from EOB to commode with rollator with CGA  -AF     Recorded by  [AF] Veda Shukla OTR     Upper Body Bathing Assessment/Training    UB Bathing Assess/Train, Position  sitting;sink side  -AF     UB Bathing Assess/Train, [place]  set up required  -AF     Recorded by  [AF] Veda Shukla OTR     Lower Body Bathing Assessment/Training    LB Bathing Assess/Train, Comment  NSG assisted with LB  -AF     Recorded by  [AF] Veda Shukla OTR     Lower Body Dressing Assessment/Training    LB Dressing Assess/Train, Comment pt unlaced shoes, therapist laced with elastic shoe laces. pt donned shoes with min A  -AF donned TEDs bed level  -AF     Recorded by [AF] CINDY Orosco [AF] Veda Shukla OTR     Toileting Assessment/Training    Toileting Assess/Train, Assistive Device  grab bars;raised toilet seat  -AF     Toileting Assess/Train, Position  standing;sitting  -AF     Toileting Assess/Train, [place] Level  contact guard assist   hygiene only  -AF     Recorded by  [AF] CINDY Orosco     Grooming Assessment/Training    Grooming Assess/Train, Position  sitting;sink side  -AF     Grooming Assess/Train, [place] Level  supervision required  -AF     Grooming Assess/Train, Comment  w/c level  -AF     Recorded by  [AF] CINDY Orosco     Balance Skills Training    Gait Balance-Level of Assistance   Contact guard  -LB    Gait Balance Support   odell bar;Left upper extremity supported;Right upper extremity supported  -LB    Gait Balance Activities   side-stepping  -LB    Recorded by   [LB] Mitzy Turner, HIRAM    Therapy Exercises    Bilateral Lower Extremities   AROM:;10 reps;sitting;hip ER;hip IR;LAQ;ankle pumps/circles  -LB     BLE Other Reps   10   standing at hemibars, rest break needed  -LB    Bilateral Upper Extremity AROM:;15 reps;sitting;shoulder protraction/retraction;shoulder extension/flexion   #1 dowel natalee, 3 sets, hand gripper red band  -AF AROM:;15 reps;sitting;elbow flexion/extension;hand pumps;pronation/supination   3 sets, #2 hand weight  -AF     Recorded by [AF] Veda Shukla OTR [AF] Veda Shukla OTR [LB] Mitzy Turner, PT    Functional Endurance    Detail (Functional Endurance)  Fair plus  -AF     Recorded by  [AF] Veda Shukla OTR     Orthosis Location    Orthosis Location/Type   neck/back  -LB    Orthosis, Neck/Back   LSO (lumbar sacral orthosis)  -LB    Recorded by   [LB] Mitzy Turner PT    Orthosis Management/Training    Orthosis Skills Training   donning orthosis;doffing orthosis;restrictions/precautions;clothing management related to orthosis  -LB    Orthosis Skills Training Comment   Min A to don/doff  -LB    Orthosis Wear Schedule   wear when out of bed only  -LB    Recorded by   [LB] Mitzy Turner PT    Positioning and Restraints    Pre-Treatment Position sitting in chair/recliner  -AF in bed  -AF sitting in chair/recliner  -LB    Post Treatment Position wheelchair  -AF wheelchair  -AF wheelchair  -LB    In Wheelchair sitting;with PT  -AF sitting;encouraged to call for assist;call light within reach;exit alarm on   set up with lunch after 2nd session  -AF call light within reach;encouraged to call for assist;exit alarm on  -LB    Bathroom  sitting;encouraged to call for assist;call light within reach;notified nsg   in bathroom with Aide finishing ADL  -AF     Recorded by [AF] Veda Shukla OTR [AF] Veda Shukla OTR [LB] Mitzy Turner, PT      01/18/18 1643 01/18/18 1400 01/18/18 1019    Rehab Assessment/Intervention    Discipline physical therapist  -PC occupational therapist  -HC physical therapist  -PC    Document Type therapy note (daily note)  -PC therapy note (daily note)  -HC  therapy note (daily note)  -PC    Subjective Information agree to therapy  -PC agree to therapy;no complaints  -HC agree to therapy  -PC    Patient Effort, Rehab Treatment good  -PC good  -HC good  -PC    Patient Effort, Rehab Treatment Comment   good participation today  -PC    Symptoms Noted During/After Treatment none  -PC none  -HC none  -PC    Precautions/Limitations brace on when up;fall precautions  -PC fall precautions;brace on when up;spinal precautions  -HC brace on when up  -PC    Recorded by [PC] Hortencia Smith, PT [HC] Margy Burks OTR [PC] Hortencia Smith PT    Pain Assessment    Pain Assessment 0-10  -PC No/denies pain  -HC 0-10  -PC    Pain Score 4  -PC  3  -PC    Pain Location Back  -PC  Back  -PC    Pain Orientation Lower  -PC  Lower  -PC    Pain Intervention(s) Medication (See MAR);Repositioned  -PC  Medication (See MAR);Repositioned  -PC    Recorded by [PC] Hortencia Smith PT [HC] Margy Burks OTR [PC] Hortencia Smith PT    Cognitive Assessment/Intervention    Current Cognitive/Communication Assessment functional  -PC functional  -HC functional  -PC    Orientation Status oriented x 4  -PC oriented x 4  -HC oriented x 4  -PC    Follows Commands/Answers Questions  100% of the time  -HC able to follow single-step instructions;100% of the time  -PC    Personal Safety  WNL/WFL  -HC WNL/WFL  -PC    Personal Safety Interventions  fall prevention program maintained;gait belt;nonskid shoes/slippers when out of bed  -HC fall prevention program maintained;gait belt  -PC    Recorded by [PC] Hortencia Smith PT [HC] Margy Burks, OTR [PC] Hortencia Smith, PT    Bed Mobility, Assessment/Treatment    Bed Mobility, Roll Left, [place] minimum assist (75% patient effort);verbal cues required  -PC  minimum assist (75% patient effort);verbal cues required  -PC    Bed Mob, Sidelying to Sit, [place] minimum assist (75% patient effort);verbal cues required  -PC  minimum assist (75% patient effort);verbal  cues required  -PC    Bed Mobility, Comment  up in chair  -HC     Recorded by [PC] Hortencia Smith, PT [HC] Margy Burks OTR [PC] Hortencia Smith, PT    Transfer Assessment/Treatment    Transfers, Sit-Stand [place] minimum assist (75% patient effort)  -PC minimum assist (75% patient effort);2 person assist required  -HC minimum assist (75% patient effort)  -PC    Transfers, Stand-Sit [place] minimum assist (75% patient effort)  -PC minimum assist (75% patient effort);2 person assist required;verbal cues required  -HC minimum assist (75% patient effort)  -PC    Transfers, Sit-Stand-Sit, Assist Device  other (see comments)   rollator  -HC     Toilet Transfer, [place]  minimum assist (75% patient effort);2 person assist required;verbal cues required;set up required  -     Toilet Transfer, Assistive Device  bariatric toilet seat;other (see comments)   bariatric BSC over commode  -HC     Transfer, Comment   brace donned in sitting, pt needed assist, but worked on teaching pt how to do it  -PC    Recorded by [PC] Hortencia Smith, PT [HC] Margy Burks OTR [PC] Hortencia Smith, PT    Gait Assessment/Treatment    Gait, [place] Level minimum assist (75% patient effort)  -  minimum assist (75% patient effort)  -    Gait, Assistive Device rolling walker  -PC  rolling walker  -PC    Gait, Distance (Feet) 100  -  80  -PC    Gait, Gait Deviations antalgic;raymond decreased;forward flexed posture  -PC  antalgic;raymond decreased  -    Gait, Comment   improved step length  -PC    Recorded by [PC] Hortencia Smith, PT  [PC] Hortencia Smith, PT    Upper Body Bathing Assessment/Training    UB Bathing Assess/Train, Position  sitting;sink side  -     UB Bathing Assess/Train, [place] Level  supervision required;set up required  -     UB Bathing Assess/Train, Comment  sponge at sink, ed on removing brace to wash then putting it back on  -HC     Recorded by  [HC] Margy Burks OTR     Lower Body Bathing  Assessment/Training    LB Bathing Assess/Train, Position  standing  -HC     LB Bathing Assess/Train, [place] Level  supervision required;verbal cues required;set up required  -HC     LB Bathing Assess/Train, Comment  assist to doff brace but pt able demo donning brace with supervision and cues  -HC     Recorded by  [HC] CINDY Lott     Upper Body Dressing Assessment/Training    UB Dressing Assess/Train, Clothing Type  doffing:;donning:;hospital gown   brace  -     UB Dressing Assess/Train, Position  sitting  -     UB Dressing Assess/Train, [place]  maximum assist (25% patient effort)  -HC     UB Dressing Assess/Train, Comment  continued ed on wear and care of brace  -HC     Recorded by  [HC] CINDY Lott     Lower Body Dressing Assessment/Training    LB Dressing Assess/Train, Clothing Type  doffing:;donning:;socks  -     LB Dressing Assess/Train, Position  sitting  -HC     LB Dressing Assess/Train, [place]  maximum assist (25% patient effort)  -HC     Recorded by  [HC] CINDY Lott     Toileting Assessment/Training    Toileting Assess/Train, Assistive Device  bariatric bedside commode;other (see comments)   over commode  -     Toileting Assess/Train, Position  sitting;standing  -HC     Toileting Assess/Train, [place]ep[place] Level  contact guard assist;verbal cues required;set up required  -HC     Recorded by  [HC] CINDY Lott     Grooming Assessment/Training    Grooming Assess/Train, Position  sitting;sink side  -     Grooming Assess/Train, [place] Level  supervision required;set up required  -HC     Grooming Assess/Train, Comment  shaved chin and washed face  -HC     Recorded by  [HC] CINDY Lott     Positioning and Restraints    Pre-Treatment Position in bed  -PC sitting in chair/recliner  -HC in bed  -PC    Post Treatment Position chair  -PC bathroom  -HC chair  -PC    In Chair sitting;call light within reach;encouraged to call for assist  -PC  sitting;call light  within reach;encouraged to call for assist;exit alarm on  -PC    Bathroom  sitting;notified nsg;call light within reach;encouraged to call for assist   om BSC in BR  -HC     Recorded by [PC] Hortencia Smith, PT [HC] CINDY Lott [PC] Hortencia Smith, PT      01/17/18 1400          Rehab Assessment/Intervention    Discipline occupational therapist  -      Document Type therapy note (daily note)  -HC      Subjective Information agree to therapy  -HC      Patient Effort, Rehab Treatment good  -HC      Symptoms Noted During/After Treatment fatigue  -HC      Precautions/Limitations brace on when up;fall precautions;spinal precautions  -HC      Recorded by [HC] CINDY Lott      Pain Assessment    Pain Assessment No/denies pain  -HC      Recorded by [HC] CINDY Lott      Vision Assessment/Intervention    Visual Impairment WNL  -HC      Recorded by [HC] CINDY Lott      Cognitive Assessment/Intervention    Current Cognitive/Communication Assessment impaired  -HC      Orientation Status oriented to;person;place;situation  -HC      Follows Commands/Answers Questions 100% of the time;able to follow single-step instructions;needs cueing;needs increased time;needs repetition  -HC      Personal Safety mild impairment  -HC      Personal Safety Interventions fall prevention program maintained;gait belt;nonskid shoes/slippers when out of bed  -HC      Recorded by [HC] CINDY Lott      ROM (Range of Motion)    General ROM Detail BUE AROM increased to 3/4 range today  -HC      Recorded by [HC] CINDY Lott      Bed Mobility, Assessment/Treatment    Bed Mobility, Comment up in chair  -HC      Recorded by [HC] CINDY Lott      Transfer Assessment/Treatment    Transfer, Comment deferred due increased fatigue  -HC      Recorded by [HC] CINDY Lott      Upper Body Bathing Assessment/Training    UB Bathing Assess/Train, Comment pt reports she completed ADLs with [facility] staff requiring  assist with every step including LB bathing and dressing  -HC      Recorded by [HC] CINDY Lott      Balance Skills Training    Sitting-Level of Assistance Close supervision  -HC      Sitting-Balance Support Feet supported  -HC      Recorded by [HC] CINDY Lott      Therapy Exercises    Bilateral Upper Extremity AROM:;30 reps;sitting;shoulder extension/flexion;elbow flexion/extension;hand pumps   3 sets of 10 reps to 3/4 shoulder range  -HC      Recorded by [HC] CINDY Lott      Positioning and Restraints    Pre-Treatment Position sitting in chair/recliner  -HC      Post Treatment Position chair  -HC      In Chair sitting;call light within reach;encouraged to call for assist;exit alarm on;with family/caregiver  -HC      Recorded by [HC] CINDY Lott        User Key  (r) = Recorded By, (t) = Taken By, (c) = Cosigned By    Initials Name Effective Dates    LB Mitzy Turner, PT 10/06/15 -     PC Hortencia Smith, PT 12/01/15 -     AF Veda Shukla, OTR 12/01/15 -     HC Margy Burks, OTR 08/17/17 -                 OT Goals       01/19/18 1530 01/16/18 1309 01/16/18 1219    Transfer Training OT STG    Transfer Training OT STG, Date Established 01/19/18  -AF      Transfer Training OT STG, Time to Achieve 1 wk  -AF      Transfer Training OT STG, Activity Type toilet  -AF      Transfer Training OT STG, [place] Level contact guard assist;verbal cues required  -AF      Transfer Training OT STG, Assist Device walker, rolling  -AF      Transfer Training OT STG, Additional Goal grab bars  -AF      Transfer Training OT STG, Outcome goal ongoing  -AF      Transfer Training OT LTG    Transfer Training OT LTG, Date Established 01/19/18  -AF  01/16/18  -HC    Transfer Training OT LTG, Time to Achieve by discharge  -AF  1 wk  -HC    Transfer Training OT LTG, Activity Type toilet  -AF  bed to chair /chair to bed;sit to stand/stand to sit;toilet  -HC    Transfer Training OT LTG, [place] Level  supervision required;conditional independence  -AF  supervision required  -HC    Transfer Training OT LTG, Assist Device walker, rolling  -AF  walker, rolling  -HC    Transfer Training OT LTG, Additional Goal grab bars  -AF      Transfer Training OT LTG, Outcome goal ongoing  -AF  goal revised  -HC    Transfer Training 2 OT STG    Transfer Training 2 OT STG, Date Established 01/19/18  -AF      Transfer Training 2 OT STG, Time to Achieve 1 wk  -AF      Transfer Training 2 OT STG, Activity Type shower chair;walk-in shower  -AF      Transfer Training 2 OT STG, [place] Level contact guard assist;verbal cues required  -AF      Transfer Training 2 OT STG, Assist Device walker, rolling;shower chair  -AF      Transfer Training 2 OT STG, Outcome goal ongoing  -AF      Transfer Training 2 OT LTG    Transfer Training 2 OT LTG, Date Established 01/19/18  -AF      Transfer Training 2 OT LTG, Time to Achieve by discharge  -AF      Transfer Training 2 OT LTG, Activity Type shower chair;walk-in shower  -AF      Transfer Training 2 OT LTG, [place] Level supervision required  -AF      Transfer Training 2 OT LTG, Assist Device walker, rolling;shower chair  -AF      Transfer Training 2 OT LTG, Outcome goal ongoing  -AF      Strength OT LTG    Strength Goal OT LTG, Date Established   01/16/18  -HC    Strength Goal OT LTG, Time to Achieve   1 wk  -HC    Strength Goal OT LTG, Outcome   goal revised  -HC    Patient Education OT STG    Patient Education OT STG, Date Established 01/19/18  -AF      Patient Education OT STG, Time to Achieve 1 wk  -AF      Patient Education OT STG, Education Type precautions per surgeon;brace use/care  -AF      Patient Education OT STG, Education Understanding demonstrates adequately  -AF      Patient Education OT STG, Additional Goal with ADLs  -AF      Patient Education OT STG Outcome goal ongoing  -AF      Patient Education OT LTG    Patient Education OT LTG, Date Established 01/19/18  -AF 01/16/18   -HC     Patient Education OT LTG, Time to Achieve by discharge  -AF 1 wk  -HC     Patient Education OT LTG, Education Type HEP;precautions per surgeon;brace use/care;home safety;adaptive equipment mgmt  -AF precautions per surgeon;brace use/care;positioning;posture/body mechanics;adaptive equipment mgmt  -HC     Patient Education OT LTG, Education Understanding demonstrates adequately;independent  -AF      Patient Education OT LTG Outcome goal ongoing  -AF      ADL OT STG    ADL OT STG, Date Established 01/19/18  -AF      ADL OT STG, Time to Achieve 1 wk  -AF      ADL OT STG, Activity Type ADL skills  -AF      ADL OT STG, [place] Level standby assist;min assist  -AF      ADL OT STG, Additional Goal with LH reacher  -AF      ADL OT STG, Outcome goal ongoing  -AF      ADL OT LTG    ADL OT LTG, Date Established 01/19/18  -AF 01/16/18  -HC     ADL OT LTG, Time to Achieve by discharge  -AF 1 wk  -HC     ADL OT LTG, Activity Type ADL skills  -AF ADL skills  -HC     ADL OT LTG, [place] Level modified independent;standby assist  -AF standby assist  -HC     ADL OT LTG, Additional Goal with AE  -AF      ADL OT LTG, Outcome goal ongoing  -AF      Functional Mobility OT STG    Functional Mobility Goal OT STG, Date Established 01/19/18  -AF      Functional Mobility Goal OT STG, Time to Achieve 1 wk  -AF      Functional Mobility Goal OT STG, [place] Level contact guard  -AF      Functional Mobility Goal OT STG, Assist Device rolling walker  -AF      Functional Mobility Goal OT STG, Distance to Achieve to the bathroom  -AF      Functional Mobility Goal OT STG, Outcome goal ongoing  -AF      Functional Mobility OT LTG    Functional Mobility Goal OT LTG, Date Established 01/19/18  -AF  01/16/18  -HC    Functional Mobility Goal OT LTG, Time to Achieve by discharge  -AF  1 wk  -HC    Functional Mobility Goal OT LTG, [place] Level modified independent;independent with device;supervision  -AF  supervision  -HC     Functional Mobility Goal OT LTG, Assist Device rolling walker  -AF      Functional Mobility Goal OT LTG, Distance to Achieve to the bathroom  -AF      Functional Mobility Goal OT LTG, Outcome goal ongoing  -AF  goal revised  -      01/09/18 1613          Transfer Training OT LTG    Transfer Training OT LTG, Date Established 01/09/18  -MR      Transfer Training OT LTG, Time to Achieve 1 wk  -MR      Transfer Training OT LTG, Activity Type bed to chair /chair to bed;toilet  -MR      Transfer Training OT LTG, [place] Level conditional independence  -MR      Transfer Training OT LTG, Assist Device walker, rolling  -MR      Strength OT LTG    Strength Goal OT LTG, Date Established 01/09/18  -MR      Strength Goal OT LTG, Time to Achieve 1 wk  -MR      Strength Goal OT LTG, Functional Goal Pt to perform BUE AROM x10 reps and 3 sets to increase UE strength for ADLs.  -MR      Functional Mobility OT LTG    Functional Mobility Goal OT LTG, Date Established 01/09/18  -MR      Functional Mobility Goal OT LTG, Time to Achieve 1 wk  -MR      Functional Mobility Goal OT LTG, [place] Level modified independent  -MR      Functional Mobility Goal OT LTG, Assist Device rolling walker  -MR      Functional Mobility Goal OT LTG, Distance to Achieve to the sink;to the bathroom  -MR        User Key  (r) = Recorded By, (t) = Taken By, (c) = Cosigned By    Initials Name Provider Type    AF Veda Shukla, OTR Occupational Therapist    MR Erin Genao, OT Occupational Therapist     Margy Burks, OTR Occupational Therapist          Occupational Therapy Education     Title: PT OT SLP Therapies (Done)     Topic: Occupational Therapy (Done)     Point: ADL training (Done)    Description: Instruct learner(s) on proper safety adaptation and remediation techniques during self care or transfers.   Instruct in proper use of assistive devices.    Learning Progress Summary    Learner Readiness Method Response Comment Documented by  Status   Patient Acceptance E,D VU,NR edcuated on log rolling and spinal precautions during ADL tasks. will introduce AE with ADLs to increase her independence AF 01/19/18 1529 Done    Acceptance E [facility] 01/19/18 0029 Done               Point: Home exercise program (Done)    Description: Instruct learner(s) on appropriate technique for monitoring, assisting and/or progressing therapeutic exercises/activities.    Learning Progress Summary    Learner Readiness Method Response Comment Documented by Status   Patient Acceptance E   MM 01/19/18 0029 Done               Point: Precautions (Done)    Description: Instruct learner(s) on prescribed precautions during self-care and functional transfers.    Learning Progress Summary    Learner Readiness Method Response Comment Documented by Status   Patient Acceptance E,D VU,NR edcuated on log rolling and spinal precautions during ADL tasks. will introduce AE with ADLs to increase her independence AF 01/19/18 1529 Done    Acceptance E [place] [facility] 0029 Done               Point: Body mechanics (Done)    Description: Instruct learner(s) on proper positioning and spine alignment during self-care, functional mobility activities and/or exercises.    Learning Progress Summary    Learner Readiness Method Response Comment Documented by Status   Patient Acceptance [facility] 01/19/18 0029 Done                      User Key     Initials Effective Dates Name Provider Type Discipline    AF 12/01/15 -  Veda Shukla OTR Occupational Therapist OT     10/30/17 -  Nano Lama RN Registered Nurse Nurse                  OT Recommendation and Plan  Anticipated Discharge Disposition: home with home health, home with assist  Planned Therapy Interventions: adaptive equipment training, activity intolerance, ADL retraining, balance training, fine motor coordination training, home exercise program, strengthening, transfer training  Therapy Frequency: 3-5 times/wk           Outcome Measures        01/18/18 1500 01/17/18 1400       How much help from another is currently needed...    Putting on and taking off regular lower body clothing? 2  -HC 2  -HC     Bathing (including washing, rinsing, and drying) 2  -HC 2  -HC     Toileting (which includes using toilet bed pan or urinal) 2  -HC 1  -HC     Putting on and taking off regular upper body clothing 3  -HC 2  -HC     Taking care of personal grooming (such as brushing teeth) 3  -HC 3  -HC     Eating meals 4  -HC 3  -HC     Score 16  -HC 13  -HC     Functional Assessment    Outcome Measure Options AM-PAC 6 Clicks Daily Activity (OT)  -HC AM-PAC 6 Clicks Daily Activity (OT)  -HC       User Key  (r) = Recorded By, (t) = Taken By, (c) = Cosigned By    Initials Name Provider Type     CINDY Lott Occupational Therapist           Time Calculation:         Time Calculation- OT       01/20/18 1310 01/20/18 1151 01/20/18 1149    Time Calculation- OT    OT Start Time 1230  -AF 1100  -AF 0800  -AF    OT Stop Time 1300  -AF 1130  -AF 0830  -AF    OT Time Calculation (min) 30 min  -AF 30 min  -AF 30 min  -AF      User Key  (r) = Recorded By, (t) = Taken By, (c) = Cosigned By    Initials Name Provider Type     CINDY Orosco Occupational Therapist           Therapy Charges for Today     Code Description Service Date Service Provider Modifiers Qty    14967079661 HC OT SELF CARE/MGMT/TRAIN EA 15 MIN 1/19/2018 CINDY Orosco GO 3    13484137749 HC OT THER PROC EA 15 MIN 1/19/2018 CINDY Orosco GO 1    91260655904 HC OT EVAL LOW COMPLEXITY 2 1/19/2018 CINDY Orosco GO 1    37144196397 HC OT SELF CARE/MGMT/TRAIN EA 15 MIN 1/20/2018 CINDY Orosco GO 2    01550365076 HC OT THER PROC EA 15 MIN 1/20/2018 CINDY Orosco GO 2    97459396770 HC OT THER PROC EA 15 MIN 1/20/2018 CINDY Orosco GO 1    41561511725 HC OT SELF CARE/MGMT/TRAIN EA 15 MIN 1/20/2018 Veda Shukla, OTR GO 1               Veda Shukla,  OTR  1/20/2018

## 2018-01-21 LAB
GLUCOSE BLDC GLUCOMTR-MCNC: 200 MG/DL (ref 70–130)
GLUCOSE BLDC GLUCOMTR-MCNC: 202 MG/DL (ref 70–130)
GLUCOSE BLDC GLUCOMTR-MCNC: 207 MG/DL (ref 70–130)
GLUCOSE BLDC GLUCOMTR-MCNC: 219 MG/DL (ref 70–130)

## 2018-01-21 PROCEDURE — 63710000001 INSULIN ASPART PER 5 UNITS: Performed by: HOSPITALIST

## 2018-01-21 PROCEDURE — 97110 THERAPEUTIC EXERCISES: CPT

## 2018-01-21 PROCEDURE — 82962 GLUCOSE BLOOD TEST: CPT

## 2018-01-21 RX ADMIN — CALCIUM CARBONATE-VITAMIN D TAB 500 MG-200 UNIT 1000 MG: 500-200 TAB at 08:02

## 2018-01-21 RX ADMIN — INSULIN ASPART 5 UNITS: 100 INJECTION, SOLUTION INTRAVENOUS; SUBCUTANEOUS at 08:03

## 2018-01-21 RX ADMIN — CYANOCOBALAMIN TAB 500 MCG 1000 MCG: 500 TAB at 08:02

## 2018-01-21 RX ADMIN — DULOXETINE HYDROCHLORIDE 60 MG: 60 CAPSULE, DELAYED RELEASE ORAL at 08:02

## 2018-01-21 RX ADMIN — FAMOTIDINE 20 MG: 20 TABLET, FILM COATED ORAL at 21:05

## 2018-01-21 RX ADMIN — LOSARTAN POTASSIUM 50 MG: 50 TABLET, FILM COATED ORAL at 08:02

## 2018-01-21 RX ADMIN — FLUTICASONE PROPIONATE 1 SPRAY: 50 SPRAY, METERED NASAL at 08:03

## 2018-01-21 RX ADMIN — INSULIN ASPART 5 UNITS: 100 INJECTION, SOLUTION INTRAVENOUS; SUBCUTANEOUS at 21:06

## 2018-01-21 RX ADMIN — INSULIN ASPART 10 UNITS: 100 INJECTION, SOLUTION INTRAVENOUS; SUBCUTANEOUS at 16:50

## 2018-01-21 RX ADMIN — ACETAMINOPHEN 650 MG: 325 TABLET ORAL at 21:06

## 2018-01-21 RX ADMIN — INSULIN ASPART 5 UNITS: 100 INJECTION, SOLUTION INTRAVENOUS; SUBCUTANEOUS at 11:46

## 2018-01-21 RX ADMIN — INSULIN ASPART 10 UNITS: 100 INJECTION, SOLUTION INTRAVENOUS; SUBCUTANEOUS at 11:46

## 2018-01-21 RX ADMIN — INSULIN ASPART 10 UNITS: 100 INJECTION, SOLUTION INTRAVENOUS; SUBCUTANEOUS at 08:02

## 2018-01-21 RX ADMIN — VITAMIN D, TAB 1000IU (100/BT) 2000 UNITS: 25 TAB at 08:02

## 2018-01-21 RX ADMIN — ATORVASTATIN CALCIUM 10 MG: 10 TABLET, FILM COATED ORAL at 21:05

## 2018-01-21 RX ADMIN — FAMOTIDINE 20 MG: 20 TABLET, FILM COATED ORAL at 08:02

## 2018-01-21 RX ADMIN — CETIRIZINE HYDROCHLORIDE 10 MG: 10 TABLET, FILM COATED ORAL at 08:02

## 2018-01-21 RX ADMIN — INSULIN DETEMIR 40 UNITS: 100 INJECTION, SOLUTION SUBCUTANEOUS at 21:07

## 2018-01-21 RX ADMIN — INSULIN ASPART 5 UNITS: 100 INJECTION, SOLUTION INTRAVENOUS; SUBCUTANEOUS at 16:51

## 2018-01-21 NOTE — PROGRESS NOTES
LOS: 3 days   Patient Care Team:  Roger Patel MD as PCP - General    Chief Complaint: same    Subjective     History of Present Illness    Subjective pt is awake and alert. No new issues    History taken from: patient    Objective     Vital Signs  Temp:  [97.7 °F (36.5 °C)-98.2 °F (36.8 °C)] 97.7 °F (36.5 °C)  Heart Rate:  [] 97  Resp:  [18-20] 18  BP: (132-146)/() 135/95    Objective  Exam unchanged    Results Review:     I reviewed the patient's new clinical results.    Medication Review:     Assessment/Plan     Active Problems:    Spinal stenosis      Assessment & Plan Continue to prepare for dc.     Mehdi Augustine MD  01/21/18  12:48 PM    Time

## 2018-01-21 NOTE — PLAN OF CARE
Problem: Patient Care Overview (Adult)  Goal: Plan of Care Review  Outcome: Ongoing (interventions implemented as appropriate)   01/21/18 1853   Coping/Psychosocial Response Interventions   Plan Of Care Reviewed With patient   Patient Care Overview   Progress improving   Outcome Evaluation   Outcome Summary/Follow up Plan Patient doing well today back incision healing, no complaints of pain or discomfort at this time.        Problem: Pain, Chronic (Adult)  Goal: Acceptable Pain Control/Comfort Level  Outcome: Ongoing (interventions implemented as appropriate)   01/21/18 1853   Pain, Chronic (Adult)   Acceptable Pain Control/Comfort Level making progress toward outcome       Problem: Skin Integrity Impairment, Risk/Actual (Adult)  Goal: Skin Integrity/Wound Healing  Outcome: Ongoing (interventions implemented as appropriate)   01/21/18 1853   Skin Integrity Impairment, Risk/Actual (Adult)   Skin Integrity/Wound Healing making progress toward outcome

## 2018-01-21 NOTE — PROGRESS NOTES
Inpatient Rehabilitation Plan of Care Note    Plan of Care  Care Plan Reviewed - No updates at this time.    Sphincter Control    Performed Intervention(s)  Monitor intake and output  Fluid restriction      Psychosocial    Performed Intervention(s)  Support/ Peer group  Verbalizes needs and concerns      Safety    Performed Intervention(s)  Falls precautions/ protocol  Safety monitoring during functional activities  Safety rounds  Bed alarm and/or chair alarm      Body Function Structure    Performed Intervention(s)  Dressing changes  Wound care      Body Systems    Performed Intervention(s)  Blood glucose testing  Medication    Signed by: Ana Kim RN

## 2018-01-21 NOTE — PROGRESS NOTES
Inpatient Rehabilitation Functional Measures Assessment    Functional Measures  DANUTA Eating:  Branch  [facility] Grooming: Branch  [facility] Bathing:  Branch  [facility] Upper Body Dressing:  Branch  [facility] Lower Body Dressing:  [facility] Toileting:  [facility] Bladder Management  Level of Assistance:  [place]inkley  Frequency/Number of Accidents this Shift:  [facility] Bowel Management  Level of Assistance: [place]ink[place]  Frequency/Number of Accidents this Shift: Branch    [facility] Bed/Chair/Wheelchair Transfer:  Activity was not observed.  DANUTA Toilet Transfer:  [facility] Tub/Shower Transfer:  Brinkley    Previously Documented Mode of Locomotion at Discharge: Field  DANUTA Expected Mode of Locomotion at Discharge: [facility] Walk/Wheelchair:  WHEELCHAIR OBSERVATION   Activity was not observed.    WALK OBSERVATION   Walk Distance Scale = 3.  Distance walked is greater than 150 feet. Walk Score  = 4.  Patient performs 75% or more of effort and requires minimal assistance.  Incidental help/contact guard/steadying was provided. Patient walked a distance  of  160 feet. Patient requires the following assistive device(s): Rolling  walker.  DANUTA Stairs:  Activity was not observed.    DANUTA Comprehension:  [facility] Expression:  [facility] Social Interaction:  [facility] Problem Solving:  [facility] Memory:  Brinkley    Therapy Mode Minutes  Occupational Therapy: Brinkley  Physical Therapy: Individual: 17 minutes.  Speech Language Pathology:  Branch    Signed by: Megan Gosselin, DPT

## 2018-01-21 NOTE — THERAPY TREATMENT NOTE
Inpatient Rehabilitation - Physical Therapy Treatment Note  [facility]     Patient Name: Raquel Gonsales  : 1945  MRN: [id number]  Today's Date: 2018  Onset of Illness/Injury or Date of Surgery Date: 18  Date of Referral to PT: 18  Referring Physician: Charan    Admit Date: 2018    Visit Dx:  No diagnosis found.  Patient Active Problem List   Diagnosis   • Cervical spondylosis with radiculopathy   • Bilateral hip pain   • Trochanteric bursitis of both hips   • Status post bilateral total hip replacement   • S/P lumbar fusion   • Spinal stenosis of lumbar region with neurogenic claudication   • Lumbar spinal stenosis   • TIA (transient ischemic attack)   • Obesity   • Recurrent falls   • DM2 (diabetes mellitus, type 2)   • HTN (hypertension)   • COPD (chronic obstructive pulmonary disease)   • Decreased mobility   • Spinal stenosis               Adult Rehabilitation Note       18 0928 18 1307 18 1147    Rehab Assessment/Intervention    Discipline physical therapist  -MG occupational therapist  -AF occupational therapist  -AF    Document Type therapy note (daily note)  -MG therapy note (daily note)  -AF therapy note (daily note)  -AF    Subjective Information agree to therapy;no complaints  -MG agree to therapy;complains of;fatigue  -AF agree to therapy;complains of;fatigue   fatigue in second session  -AF    Patient Effort, Rehab Treatment good  -MG good  -AF good  -AF    Symptoms Noted During/After Treatment fatigue  -MG      Precautions/Limitations brace on when up;fall precautions;spinal precautions  -MG brace on when up;fall precautions;spinal precautions  -AF brace on when up;fall precautions;spinal precautions  -AF    Recorded by [MG] Megan Gosselin, PT [AF] Veda Shukla OTR [AF] Veda Shukla, OTR    Pain Assessment    Pain Assessment No/denies pain  -MG No/denies pain  -AF No/denies pain  -AF    Recorded by [MG] Megan Gosselin, PT [AF] Veda Orta  ESTELLA ShuklaR [AF] CINDY Orosco    Cognitive Assessment/Intervention    Current Cognitive/Communication Assessment functional  -MG functional  -AF functional  -AF    Orientation Status oriented x 4  -MG oriented x 4  -AF oriented x 4  -AF    Follows Commands/Answers Questions 100% of the time  -% of the time;able to follow single-step instructions  -% of the time;able to follow single-step instructions  -AF    Personal Safety WNL/WFL  -MG WNL/WFL  -AF WNL/WFL  -AF    Personal Safety Interventions fall prevention program maintained;gait belt  -MG fall prevention program maintained;gait belt;nonskid shoes/slippers when out of bed  -AF fall prevention program maintained;gait belt;nonskid shoes/slippers when out of bed  -AF    Recorded by [MG] Megan Gosselin, PT [AF] Veda Shukla, OTR [AF] Veda Shukla OTR    Bed Mobility, Assessment/Treatment    Bed Mob, Supine to Sit, [place]   minimum assist (75% patient effort);verbal cues required;nonverbal cues required (demo/gesture)  -AF    Bed Mobility, Comment not tested, up in chair   -MG sitting up in w/c   -AF     Recorded by [MG] Megan Gosselin, PT [AF] Veda Shukla, OTR [AF] Veda Shukla, ESTELLAR    Transfer Assessment/Treatment    Transfers, Sit-Stand [place] contact guard assist;verbal cues required  -MG  contact guard assist  -AF    Transfers, Stand-Sit [place] contact guard assist;verbal cues required  -MG  contact guard assist  -AF    Transfers, Sit-Stand-Sit, Assist Device --   4WW  -MG      Toilet Transfer, [place]   minimum assist (75% patient effort);contact guard assist;verbal cues required  -AF    Toilet Transfer, Assistive Device   --   rollator, grab bars  -AF    Transfer, Safety Issues step length decreased;balance decreased during turns  -MG      Transfer, Impairments strength decreased;impaired balance  -MG      Recorded by [MG] Megan Gosselin, PT  [AF] Veda Shukla, ESTELLAR    Gait Assessment/Treatment     Gait, [place] Level contact guard assist  -MG      Gait, Assistive Device rollator  -MG      Gait, Distance (Feet) 160  -MG      Gait, Gait Deviations raymond decreased;forward flexed posture;step length decreased  -MG      Gait, Safety Issues step length decreased;balance decreased during turns  -MG      Gait, Impairments strength decreased;impaired balance  -MG      Gait, Comment cues for upright posture  -MG      Recorded by [MG] Megan Gosselin, PT      Functional Mobility    Functional Mobility- Comment   pt walked from EOB to commode with rollator with CGA  -AF    Recorded by   [AF] Veda Shukla OTR    Upper Body Bathing Assessment/Training    UB Bathing Assess/Train, Position   sitting;sink side  -AF    UB Bathing Assess/Train, [place] Level   set up required  -AF    Recorded by   [AF] Veda Shukla OTR    Lower Body Bathing Assessment/Training    LB Bathing Assess/Train, Comment   NSG assisted with LB  -AF    Recorded by   [AF] Veda Shukla OTR    Lower Body Dressing Assessment/Training    LB Dressing Assess/Train, Comment  pt unlaced shoes, therapist laced with elastic shoe laces. pt donned shoes with min A  -AF donned TEDs bed level  -AF    Recorded by  [AF] Veda Shukla OTR [AF] Veda Shukla OTR    Toileting Assessment/Training    Toileting Assess/Train, Assistive Device   grab bars;raised toilet seat  -AF    Toileting Assess/Train, Position   standing;sitting  -AF    Toileting Assess/Train, [place] Level   contact guard assist   hygiene only  -AF    Recorded by   [AF] CINDY Orosco    Grooming Assessment/Training    Grooming Assess/Train, Position   sitting;sink side  -AF    Grooming Assess/Train, [place] Level   supervision required  -AF    Grooming Assess/Train, Comment   w/c level  -AF    Recorded by   [AF] CINDY Orosco    Therapy Exercises    Bilateral Lower Extremities AROM:;15 reps;sitting;hip abduction/adduction;LAQ;hip flexion  -MG       Bilateral Upper Extremity  AROM:;15 reps;sitting;shoulder protraction/retraction;shoulder extension/flexion   #1 dowel natalee, 3 sets, hand gripper red band  -AF AROM:;15 reps;sitting;elbow flexion/extension;hand pumps;pronation/supination   3 sets, #2 hand weight  -AF    Recorded by [MG] Megan Gosselin, PT [AF] Veda Shukla, OTR [AF] Veda Shukla, OTR    Functional Endurance    Detail (Functional Endurance)   Fair plus  -AF    Recorded by   [AF] Veda Shukla, OTR    Positioning and Restraints    Pre-Treatment Position sitting in chair/recliner  -MG sitting in chair/recliner  -AF in bed  -AF    Post Treatment Position wheelchair  -MG wheelchair  -AF wheelchair  -AF    In Wheelchair sitting;call light within reach;encouraged to call for assist  -MG sitting;with PT  -AF sitting;encouraged to call for assist;call light within reach;exit alarm on   set up with lunch after 2nd session  -AF    Bathroom   sitting;encouraged to call for assist;call light within reach;notified nsg   in bathroom with Aide finishing ADL  -AF    Recorded by [MG] Megan Gosselin, PT [AF] Veda Shukla, OTR [AF] Veda Shukla, OTR      01/20/18 1031 01/18/18 1643 01/18/18 1400    Rehab Assessment/Intervention    Discipline physical therapist  -LB physical therapist  -PC occupational therapist  -HC    Document Type therapy note (daily note)  -LB therapy note (daily note)  -PC therapy note (daily note)  -HC    Subjective Information agree to therapy  -LB agree to therapy  -PC agree to therapy;no complaints  -HC    Patient Effort, Rehab Treatment good  -LB good  -PC good  -HC    Symptoms Noted During/After Treatment  none  -PC none  -HC    Precautions/Limitations fall precautions;brace on when up;spinal precautions  -LB brace on when up;fall precautions  -PC fall precautions;brace on when up;spinal precautions  -HC    Recorded by [LB] Mitzy Turnre, PT [PC] Hortencia Smith, PT [HC] Margy Burks, OTR    Pain Assessment    Pain Assessment  0-10  -LB 0-10  -PC No/denies pain  -HC    Pain Score 5  -LB 4  -PC     Post Pain Score 5  -LB      Pain Type Chronic pain  -LB      Pain Location Hip  -LB Back  -PC     Pain Orientation Right  -LB Lower  -PC     Pain Intervention(s)  Medication (See MAR);Repositioned  -PC     Recorded by [LB] Mitzy Turner, PT [PC] Hortencia Smith, PT [HC] Margy Burks, ESTELLAR    Cognitive Assessment/Intervention    Current Cognitive/Communication Assessment  functional  -PC functional  -HC    Orientation Status  oriented x 4  -PC oriented x 4  -HC    Follows Commands/Answers Questions   100% of the time  -HC    Personal Safety WNL/WFL  -LB  WNL/WFL  -HC    Personal Safety Interventions fall prevention program maintained;gait belt;muscle strengthening facilitated;nonskid shoes/slippers when out of bed  -LB  fall prevention program maintained;gait belt;nonskid shoes/slippers when out of bed  -HC    Recorded by [LB] Mitzy Turner, PT [PC] Hortencia Smith, PT [HC] Margy Burks OTR    Bed Mobility, Assessment/Treatment    Bed Mobility, Assistive Device bed rails  -LB      Bed Mobility, Roll Left, [place]  minimum assist (75% patient effort);verbal cues required  -PC     Bed Mob, Sidelying to Sit, [place]  minimum assist (75% patient effort);verbal cues required  -PC     Bed Mob, Sit to Sidelying, [place] verbal cues required;minimum assist (75% patient effort)  -LB      Bed Mobility, Comment   up in chair  -HC    Recorded by [LB] Mitzy Turner, PT [PC] Hortencia Smith, PT [HC] Margy Burks OTR    Transfer Assessment/Treatment    Transfers, Sit-Stand [place] verbal cues required;contact guard assist  -LB minimum assist (75% patient effort)  -PC minimum assist (75% patient effort);2 person assist required  -HC    Transfers, Stand-Sit [place] verbal cues required;contact guard assist  -LB minimum assist (75% patient effort)  - minimum assist (75% patient effort);2 person assist required;verbal cues required  -     Transfers, Sit-Stand-Sit, Assist Device rolling walker  -LB  other (see comments)   rollator  -HC    Toilet Transfer, [place]   minimum assist (75% patient effort);2 person assist required;verbal cues required;set up required  -HC    Toilet Transfer, Assistive Device   bariatric toilet seat;other (see comments)   bariatric BSC over commode  -HC    Recorded by [LB] Mitzy Turner, PT [PC] Hortencia Smith, PT [HC] CINDY Lott    Gait Assessment/Treatment    Gait, [place] Level contact guard assist  -LB minimum assist (75% patient effort)  -PC     Gait, Assistive Device rollator  -LB rolling walker  -PC     Gait, Distance (Feet) 100   times 2 75 ft times 2  -  -PC     Gait, Gait Deviations bilateral:;raymond decreased;decreased heel strike;forward flexed posture;toe-to-floor clearance decreased  -LB antalgic;raymond decreased;forward flexed posture  -PC     Gait, Comment backwards walking with rollator and CGA  -LB      Recorded by [LB] Mitzy Turner, PT [PC] Hortencia Smith, PT     Stairs Assessment/Treatment    Stairs, Comment up/down ramp with rollator and Min  -LB      Recorded by [LB] Mitzy Turner, PT      Upper Body Bathing Assessment/Training    UB Bathing Assess/Train, Position   sitting;sink side  -    UB Bathing Assess/Train, [place] Level   supervision required;set up required  -    UB Bathing Assess/Train, Comment   sponge at sink, ed on removing brace to wash then putting it back on  -HC    Recorded by   [HC] CINDY Lott    Lower Body Bathing Assessment/Training    LB Bathing Assess/Train, Position   standing  -    LB Bathing Assess/Train, [place] Level   supervision required;verbal cues required;set up required  -    LB Bathing Assess/Train, Comment   assist to doff brace but pt able demo donning brace with supervision and cues  -HC    Recorded by   [HC] CINDY Lott    Upper Body Dressing Assessment/Training    UB Dressing Assess/Train, Clothing Type    doffing:;donning:;hospital gown   brace  -HC    UB Dressing Assess/Train, Position   sitting  -HC    UB Dressing Assess/Train, [place]   maximum assist (25% patient effort)  -HC    UB Dressing Assess/Train, Comment   continued ed on wear and care of brace  -HC    Recorded by   [HC] CINDY Lott    Lower Body Dressing Assessment/Training    LB Dressing Assess/Train, Clothing Type   doffing:;donning:;socks  -HC    LB Dressing Assess/Train, Position   sitting  -HC    LB Dressing Assess/Train, [place]   maximum assist (25% patient effort)  -HC    Recorded by   [HC] CINDY Lott    Toileting Assessment/Training    Toileting Assess/Train, Assistive Device   bariatric bedside commode;other (see comments)   over commode  -HC    Toileting Assess/Train, Position   sitting;standing  -HC    Toileting Assess/Train, [place] Level   contact guard assist;verbal cues required;set up required  -HC    Recorded by   [HC] CINDY Lott    Grooming Assessment/Training    Grooming Assess/Train, Position   sitting;sink side  -HC    Grooming Assess/Train, [place] Level   supervision required;set up required  -HC    Grooming Assess/Train, Comment   shaved chin and washed face  -HC    Recorded by   [HC] CINDY Lott    Balance Skills Training    Gait Balance-Level of Assistance Contact guard  -LB      Gait Balance Support odell bar;Left upper extremity supported;Right upper extremity supported  -LB      Gait Balance Activities side-stepping  -LB      Recorded by [LB] Mitzy Turner PT      Therapy Exercises    Bilateral Lower Extremities AROM:;10 reps;sitting;hip ER;hip IR;LAQ;ankle pumps/circles  -LB      BLE Other Reps 10   standing at hemibars, rest break needed  -LB      Recorded by [LB] Mitzy Turner, HIRAM      Orthosis Location    Orthosis Location/Type neck/back  -LB      Orthosis, Neck/Back LSO (lumbar sacral orthosis)  -LB      Recorded by [LB] Mitzy uTrner PT      Orthosis Management/Training    Orthosis  Skills Training donning orthosis;doffing orthosis;restrictions/precautions;clothing management related to orthosis  -LB      Orthosis Skills Training Comment Min A to don/doff  -LB      Orthosis Wear Schedule wear when out of bed only  -LB      Recorded by [LB] Mitzy Turner PT      Positioning and Restraints    Pre-Treatment Position sitting in chair/recliner  -LB in bed  -PC sitting in chair/recliner  -HC    Post Treatment Position wheelchair  -LB chair  -PC bathroom  -HC    In Chair  sitting;call light within reach;encouraged to call for assist  -PC     In Wheelchair call light within reach;encouraged to call for assist;exit alarm on  -LB      Bathroom   sitting;notified nsg;call light within reach;encouraged to call for assist   om BSC in BR  -HC    Recorded by [LB] Mitzy Turner, PT [PC] Hortencia Smith PT [HC] Margy Vitaliy, OTR      01/18/18 1019          Rehab Assessment/Intervention    Discipline physical therapist  -PC      Document Type therapy note (daily note)  -PC      Subjective Information agree to therapy  -PC      Patient Effort, Rehab Treatment good  -PC      Patient Effort, Rehab Treatment Comment good participation today  -PC      Symptoms Noted During/After Treatment none  -PC      Precautions/Limitations brace on when up  -PC      Recorded by [PC] Hortencia Smith PT      Pain Assessment    Pain Assessment 0-10  -PC      Pain Score 3  -PC      Pain Location Back  -PC      Pain Orientation Lower  -PC      Pain Intervention(s) Medication (See MAR);Repositioned  -PC      Recorded by [PC] Hortencia Smith PT      Cognitive Assessment/Intervention    Current Cognitive/Communication Assessment functional  -PC      Orientation Status oriented x 4  -PC      Follows Commands/Answers Questions able to follow single-step instructions;100% of the time  -PC      Personal Safety WNL/WFL  -PC      Personal Safety Interventions fall prevention program maintained;gait belt  -PC      Recorded by [PC] Hortencia PILLAI  Sarah, PT      Bed Mobility, Assessment/Treatment    Bed Mobility, Roll Left, [place] minimum assist (75% patient effort);verbal cues required  -PC      Bed Mob, Sidelying to Sit, [place] minimum assist (75% patient effort);verbal cues required  -PC      Recorded by [PC] Hortencia Smith, PT      Transfer Assessment/Treatment    Transfers, Sit-Stand [place] minimum assist (75% patient effort)  -PC      Transfers, Stand-Sit [place] minimum assist (75% patient effort)  -PC      Transfer, Comment brace donned in sitting, pt needed assist, but worked on teaching pt how to do it  -PC      Recorded by [PC] Hortencia Smith, PT      Gait Assessment/Treatment    Gait, [place] Level minimum assist (75% patient effort)  -PC      Gait, Assistive Device rolling walker  -PC      Gait, Distance (Feet) 80  -PC      Gait, Gait Deviations antalgic;raymond decreased  -PC      Gait, Comment improved step length  -PC      Recorded by [PC] Hortencia Smith, PT      Positioning and Restraints    Pre-Treatment Position in bed  -PC      Post Treatment Position chair  -PC      In Chair sitting;call light within reach;encouraged to call for assist;exit alarm on  -PC      Recorded by [PC] Hortencia Smith, PT        User Key  (r) = Recorded By, (t) = Taken By, (c) = Cosigned By    Initials Name Effective Dates    LB Mitzy Turner, PT 10/06/15 -     PC Hortencia Smith, PT 12/01/15 -     AF Veda Shukla, OTR 12/01/15 -     HC Margy Burks, OTR 08/17/17 -     MG Megan Gosselin, PT 09/13/17 -                 IP PT Goals       01/19/18 1546 01/15/18 1620 01/09/18 0843    Bed Mobility PT LTG    Bed Mobility PT LTG, Date Established 01/19/18  -LB 01/15/18  -KH 01/09/18  -MG    Bed Mobility PT LTG, Time to Achieve 1 wk  -LB 1 wk  -KH 4 days  -MG    Bed Mobility PT LTG, Activity Type sidelying to sit/sit to sidelying;roll left/roll right  -LB all bed mobility  -KH all bed mobility  -MG    Bed Mobility PT LTG, [place] Level  independent  -LB minimum assist (75% patient effort)  -KH independent  -MG    Transfer Training PT LTG    Transfer Training PT LTG, Date Established 01/19/18  -LB 01/15/18  -KH 01/09/18  -MG    Transfer Training PT LTG, Time to Achieve 1 wk  -LB 1 wk  -KH 4 days  -MG    Transfer Training PT LTG, Activity Type sit to stand/stand to sit  -LB all transfers  -KH bed to chair /chair to bed;sit to stand/stand to sit  -MG    Transfer Training PT LTG, [place] Level conditional independence  -LB minimum assist (75% patient effort)  -KH conditional independence  -MG    Transfer Training PT LTG, Assist Device walker, rolling  -LB walker, rolling  -KH walker, rolling  -MG    Transfer Training 2 PT LTG    Transfer Training PT 2 LTG, Time to Achieve 1 wk  -LB      Transfer Training PT 2 LTG, Activity Type --   car transfer  -LB      Transfer Training PT 2 LTG, [place] Level supervision required  -LB      Transfer Training PT 2 LTG, Assist Device walker, rolling  -LB      Gait Training PT LTG    Gait Training Goal PT LTG, Date Established 01/19/18  -LB 01/15/18  -KH 01/09/18  -MG    Gait Training Goal PT LTG, Time to Achieve 1 wk  -LB 1 wk  -KH 4 days  -MG    Gait Training Goal PT LTG, [place] Level conditional independence  -LB minimum assist (75% patient effort)  -KH conditional independence  -MG    Gait Training Goal PT LTG, Assist Device walker, rolling  -LB walker, rolling  -KH walker, rolling  -MG    Gait Training Goal PT LTG, Distance to Achieve 200  -LB  50 ft  -  -MG    Stair Training PT LTG    Stair Training Goal PT LTG, Date Established 01/19/18  -LB      Stair Training Goal PT LTG, Time to Achieve 1 wk  -LB      Stair Training Goal PT LTG, Number of Steps 4  -LB      Stair Training Goal PT LTG, [place] Level contact guard assist  -LB      Stair Training Goal PT LTG, Assist Device 2 handrails  -LB      Patient Education PT LTG    Patient Education PT LTG, Date Established 01/19/18  -LB       Patient Education PT LTG, Time to Achieve 1 wk  -LB      Patient Education PT LTG, Education Type elva/doff brace  -LB      Patient Education PT LTG, Education Understanding demonstrate adequately  -LB        User Key  (r) = Recorded By, (t) = Taken By, (c) = Cosigned By    Initials Name Provider Type    OTTO Garvey, PT Physical Therapist    LB Mitzy Turner, PT Physical Therapist    MG Megan Gosselin, PT Physical Therapist          Physical Therapy Education     Title: PT OT SLP Therapies (Done)     Topic: Physical Therapy (Done)     Point: Mobility training (Done)    Learning Progress Summary    Learner Readiness Method Response Comment Documented by Status   Patient Acceptance E VU,NR  MG 01/21/18 0932 Done    Acceptance E VU,NR  LB 01/20/18 1146 Done    Acceptance E VU,NR  LB 01/19/18 1231 Done    Acceptance E VU  MM 01/19/18 0029 Done               Point: Home exercise program (Done)    Learning Progress Summary    Learner Readiness Method Response Comment Documented by Status   Patient Acceptance E VU,NR  MG 01/21/18 0932 Done    Acceptance E VU,NR  LB 01/20/18 1146 Done    Acceptance E VU  MM 01/19/18 0029 Done               Point: Body mechanics (Done)    Learning Progress Summary    Learner Readiness Method Response Comment Documented by Status   Patient Acceptance E VU,NR  MG 01/21/18 0932 Done    Acceptance E VU  MM 01/19/18 0029 Done               Point: Precautions (Done)    Learning Progress Summary    Learner Readiness Method Response Comment Documented by Status   Patient Acceptance E VU,NR  MG 01/21/18 0932 Done    Acceptance E VU  MM 01/19/18 0029 Done                      User Key     Initials Effective Dates Name Provider Type Discipline    LB 10/06/15 -  Mitzy Turner, PT Physical Therapist PT    MG 09/13/17 -  Megan Gosselin, PT Physical Therapist PT    MM 10/30/17 -  Nano Lama, RN Registered Nurse Nurse                    PT Recommendation and Plan  Anticipated Discharge  Disposition: home with home health  Planned Therapy Interventions: bed mobility training, gait training, home exercise program, strengthening, stair training, transfer training  PT Frequency: 2 times/day             Outcome Measures       01/18/18 1500          How much help from another is currently needed...    Putting on and taking off regular lower body clothing? 2  -HC      Bathing (including washing, rinsing, and drying) 2  -HC      Toileting (which includes using toilet bed pan or urinal) 2  -HC      Putting on and taking off regular upper body clothing 3  -HC      Taking care of personal grooming (such as brushing teeth) 3  -HC      Eating meals 4  -HC      Score 16  -HC      Functional Assessment    Outcome Measure Options AM-PAC 6 Clicks Daily Activity (OT)  -HC        User Key  (r) = Recorded By, (t) = Taken By, (c) = Cosigned By    Initials Name Provider Type    LIANNE Burks OTR Occupational Therapist           Time Calculation:         PT Charges       01/21/18 0932          Time Calculation    Start Time 0918  -MG      Stop Time 0935  -MG      Time Calculation (min) 17 min  -MG      PT Received On 01/21/18  -MG      PT - Next Appointment 01/22/18  -MG        User Key  (r) = Recorded By, (t) = Taken By, (c) = Cosigned By    Initials Name Provider Type    MG Megan Gosselin, PT Physical Therapist          Therapy Charges for Today     Code Description Service Date Service Provider Modifiers Qty    36018214444 HC PT THER PROC EA 15 MIN 1/21/2018 Megan Gosselin, PT GP 1               Megan Gosselin, PT  1/21/2018

## 2018-01-21 NOTE — PLAN OF CARE
Problem: Patient Care Overview (Adult)  Goal: Plan of Care Review  Outcome: Ongoing (interventions implemented as appropriate)   01/20/18 5890   Coping/Psychosocial Response Interventions   Plan Of Care Reviewed With patient   Patient Care Overview   Progress improving   Outcome Evaluation   Outcome Summary/Follow up Plan Pt. doing fine tonight. No complaints. Back incision looks good just small drainage dressing changed tonight. pleasant and cooperative.     Goal: Adult Individualization and Mutuality  Outcome: Ongoing (interventions implemented as appropriate)    Goal: Discharge Needs Assessment  Outcome: Ongoing (interventions implemented as appropriate)      Problem: Pain, Chronic (Adult)  Goal: Acceptable Pain Control/Comfort Level  Outcome: Ongoing (interventions implemented as appropriate)      Problem: Skin Integrity Impairment, Risk/Actual (Adult)  Goal: Skin Integrity/Wound Healing  Outcome: Ongoing (interventions implemented as appropriate)      Problem: Mobility, Physical Impaired (Adult)  Goal: Enhanced Functionality Ability  Outcome: Ongoing (interventions implemented as appropriate)

## 2018-01-21 NOTE — PROGRESS NOTES
LOS: 3 days   Primary Care Physician: Roger Patel MD     Subjective   Feels okay.  Appetite is not great here.  Does not care for the food.  At home was on triseba as well as victoza and Levemir    Vital Signs  Body mass index is 40.21 kg/(m^2).  Temp:  [97.7 °F (36.5 °C)-98.2 °F (36.8 °C)] 98.1 °F (36.7 °C)  Heart Rate:  [] 92  Resp:  [18] 18  BP: (135-146)/() 138/72      Objective:  General Appearance:  In no acute distress.    Vital signs: (most recent): Blood pressure 138/72, pulse 92, temperature 98.1 °F (36.7 °C), temperature source Oral, resp. rate 18, weight 113 kg (249 lb 1.9 oz), SpO2 95 %.    Lungs:  There are decreased breath sounds.  No wheezes, rales or rhonchi.    Heart: Normal rate.  Regular rhythm.  No murmur.   Abdomen: Abdomen is soft and non-distended.  Bowel sounds are normal.   There is no abdominal tenderness.   There is no splenomegaly. There is no hepatomegaly.   Extremities: There is dependent edema.  (Trace)  Neurological: Patient is alert.          Results Review:    I reviewed the patient's new clinical results.      Results from last 7 days  Lab Units 01/15/18  0523 01/14/18  2205   WBC 10*3/mm3 12.95* 13.46*   HEMOGLOBIN g/dL 9.7* 10.0*   PLATELETS 10*3/mm3 245 234       Results from last 7 days  Lab Units 01/18/18  0506 01/17/18  0438   SODIUM mmol/L 136 136   POTASSIUM mmol/L 3.8 3.4*   CHLORIDE mmol/L 100 97*   CO2 mmol/L 25.0 24.0   BUN mg/dL 6* 10   CREATININE mg/dL 0.70 0.71   CALCIUM mg/dL 9.0 8.8   GLUCOSE mg/dL 166* 240*         Hemoglobin A1C:  Lab Results   Component Value Date    HGBA1C 9.39 (H) 01/09/2018       Glucose Range:  Glucose   Date/Time Value Ref Range Status   01/21/2018 1618 207 (H) 70 - 130 mg/dL Final   01/21/2018 1108 219 (H) 70 - 130 mg/dL Final   01/21/2018 0711 202 (H) 70 - 130 mg/dL Final   01/20/2018 2007 215 (H) 70 - 130 mg/dL Final   01/20/2018 1624 174 (H) 70 - 130 mg/dL Final   01/20/2018 1136 227 (H) 70 - 130 mg/dL  Final       No results found for: [id number]    Lab Results   Component Value Date    TSH 2.510 01/15/2018       Assessment & Plan      Medication Review: Yes    Active Hospital Problems (** Indicates Principal Problem)    Diagnosis Date Noted   • Spinal stenosis [M48.00] 01/19/2018      Resolved Hospital Problems    Diagnosis Date Noted Date Resolved   No resolved problems to display.       Assessment/Plan  1.  Diabetes mellitus type 2 with hyperglycemia.  Change Levemir to twice daily.  Start 10 units in the morning in addition to the 40 units at night.  Make further adjustments as needed.  Victoza is not available here.  Continue sliding scale  2.  Hypertension.  On Cozaar  3.  Hyperlipidemia.  On Lipitor    Melita Wooten MD  01/21/18  6:18 PM

## 2018-01-21 NOTE — PROGRESS NOTES
Inpatient Rehabilitation Functional Measures Assessment    Functional Measures  DANUTA Eating:  [facility] Grooming: [facility] Bathing:  [facility] Upper Body Dressing:  [facility] Lower Body Dressing:  [facility] Toileting:  [facility] Bladder Management  Level of Assistance:  [place]  Frequency/Number of Accidents this Shift:  [facility] Bowel Management  Level of Assistance: [place]  Frequency/Number of Accidents this Shift: [facility] Bed/Chair/Wheelchair Transfer:  [facility] Toilet Transfer:  [facility] Tub/Shower Transfer:  [place]    Previously Documented Mode of Locomotion at Discharge: Field  DANUTA Expected Mode of Locomotion at Discharge: [facility] Walk/Wheelchair:  [facility] Stairs:  [facility] Comprehension:  Auditory comprehension is the usual mode. Comprehension  Score = 6, Modified [place].  Patient comprehends complex/abstract  information in their primary language with only mild difficulty.  DANUTA Expression:  Vocal expression is the usual mode. Expression Score = 6,  Modified Independent.  Patient expresses complex/abstract information in their  primary language with only mild difficulty with tasks.  DANUTA Social Interaction:  Social Interaction Score = 6, Modified Independent.  Patient is modified independent for social interaction, requiring: Requires  additional time.  DANUTA Problem Solving:  Problem Solving Score = 6, Modified [place].  Patient  makes appropriate decisions in order to solve complex problems with mild  difficulty but self-corrects.  DANUTA Memory:  Memory Score = 6, Modified [place].  Patient is modified  independent for memory, requiring: Requires additional time.    Therapy Mode Minutes  Occupational Therapy: Branch  Physical Therapy: Branch  Speech Language Pathology:  Branch    Signed by: Ana Kim RN

## 2018-01-22 LAB
GLUCOSE BLDC GLUCOMTR-MCNC: 203 MG/DL (ref 70–130)
GLUCOSE BLDC GLUCOMTR-MCNC: 208 MG/DL (ref 70–130)
GLUCOSE BLDC GLUCOMTR-MCNC: 208 MG/DL (ref 70–130)
GLUCOSE BLDC GLUCOMTR-MCNC: 218 MG/DL (ref 70–130)

## 2018-01-22 PROCEDURE — 97110 THERAPEUTIC EXERCISES: CPT

## 2018-01-22 PROCEDURE — 82962 GLUCOSE BLOOD TEST: CPT

## 2018-01-22 PROCEDURE — 63710000001 INSULIN DETEMER PER 5 UNITS: Performed by: INTERNAL MEDICINE

## 2018-01-22 PROCEDURE — 97535 SELF CARE MNGMENT TRAINING: CPT

## 2018-01-22 PROCEDURE — 63710000001 INSULIN ASPART PER 5 UNITS: Performed by: HOSPITALIST

## 2018-01-22 RX ADMIN — FAMOTIDINE 20 MG: 20 TABLET, FILM COATED ORAL at 08:01

## 2018-01-22 RX ADMIN — INSULIN ASPART 5 UNITS: 100 INJECTION, SOLUTION INTRAVENOUS; SUBCUTANEOUS at 16:50

## 2018-01-22 RX ADMIN — INSULIN ASPART 10 UNITS: 100 INJECTION, SOLUTION INTRAVENOUS; SUBCUTANEOUS at 11:27

## 2018-01-22 RX ADMIN — CETIRIZINE HYDROCHLORIDE 10 MG: 10 TABLET, FILM COATED ORAL at 08:01

## 2018-01-22 RX ADMIN — FAMOTIDINE 20 MG: 20 TABLET, FILM COATED ORAL at 21:03

## 2018-01-22 RX ADMIN — INSULIN ASPART 10 UNITS: 100 INJECTION, SOLUTION INTRAVENOUS; SUBCUTANEOUS at 16:51

## 2018-01-22 RX ADMIN — ATORVASTATIN CALCIUM 10 MG: 10 TABLET, FILM COATED ORAL at 21:03

## 2018-01-22 RX ADMIN — INSULIN ASPART 5 UNITS: 100 INJECTION, SOLUTION INTRAVENOUS; SUBCUTANEOUS at 21:03

## 2018-01-22 RX ADMIN — INSULIN DETEMIR 10 UNITS: 100 INJECTION, SOLUTION SUBCUTANEOUS at 06:48

## 2018-01-22 RX ADMIN — CYANOCOBALAMIN TAB 500 MCG 1000 MCG: 500 TAB at 08:01

## 2018-01-22 RX ADMIN — VITAMIN D, TAB 1000IU (100/BT) 2000 UNITS: 25 TAB at 08:01

## 2018-01-22 RX ADMIN — LOSARTAN POTASSIUM 50 MG: 50 TABLET, FILM COATED ORAL at 08:01

## 2018-01-22 RX ADMIN — CALCIUM CARBONATE-VITAMIN D TAB 500 MG-200 UNIT 1000 MG: 500-200 TAB at 08:00

## 2018-01-22 RX ADMIN — INSULIN DETEMIR 40 UNITS: 100 INJECTION, SOLUTION SUBCUTANEOUS at 21:04

## 2018-01-22 RX ADMIN — INSULIN ASPART 5 UNITS: 100 INJECTION, SOLUTION INTRAVENOUS; SUBCUTANEOUS at 11:27

## 2018-01-22 RX ADMIN — INSULIN ASPART 10 UNITS: 100 INJECTION, SOLUTION INTRAVENOUS; SUBCUTANEOUS at 07:59

## 2018-01-22 RX ADMIN — FLUTICASONE PROPIONATE 1 SPRAY: 50 SPRAY, METERED NASAL at 08:01

## 2018-01-22 RX ADMIN — DULOXETINE HYDROCHLORIDE 60 MG: 60 CAPSULE, DELAYED RELEASE ORAL at 08:00

## 2018-01-22 RX ADMIN — INSULIN ASPART 5 UNITS: 100 INJECTION, SOLUTION INTRAVENOUS; SUBCUTANEOUS at 07:59

## 2018-01-22 NOTE — CONSULTS
Consult:  Pt positive and upbeat, looking toward her future with hope and optimism.  Spiritual assessment reveals pt's Voodoo jim, prayer, scripture and Confucianist are resources for her pervasive optimism and meaning making.  Pt misses attending Confucianist and hopes improved health will make attending possible again.  Contacted pt's Confucianist and requested a visit from the  per pt request.  Chaplain Oscar Almaraz

## 2018-01-22 NOTE — PROGRESS NOTES
Inpatient Rehabilitation Functional Measures Assessment    Functional Measures  DANUTA Eating:  Branch  [facility] Grooming: Branch  [facility] Bathing:  Branch  [facility] Upper Body Dressing:  Branch  [facility] Lower Body Dressing:  [facility] Toileting:  [facility] Bladder Management  Level of Assistance:  [place]  Frequency/Number of Accidents this Shift:  [facility] Bowel Management  Level of Assistance: [place]  Frequency/Number of Accidents this Shift: Branch    [facility] Bed/Chair/Wheelchair Transfer:  Bed/chair/wheelchair Transfer Score = 4.  Patient performs 75% or more of effort and minimal assistance (little/incidental  help/lifting of one limb/steadying) for transferring to and from the  bed/chair/wheelchair, requiring: Patient requires the following assistive  device(s): Walker.  DANUTA Toilet Transfer:  [facility] Tub/Shower Transfer:  [place]    Previously Documented Mode of Locomotion at Discharge: Field  DANUTA Expected Mode of Locomotion at Discharge: [facility] Walk/Wheelchair:  WHEELCHAIR OBSERVATION   Activity was not observed.    WALK OBSERVATION   Walk Distance Scale = 3.  Distance walked is greater than 150 feet. Walk Score  = 4.  Patient performs 75% or more of effort and requires minimal assistance.  Incidental help/contact guard/steadying was provided. Patient walked a distance  of  200 feet. Patient requires the following assistive device(s): Rolling  walker.  DANUTA Stairs:  Activity was not observed.    DANUTA Comprehension:  [facility] Expression:  [facility] Social Interaction:  [facility] Problem Solving:  [facility] Memory:  [place]    Therapy Mode Minutes  Occupational Therapy: [place]  Physical Therapy: Individual: 90 minutes.  Speech Language Pathology:  [place]    Signed by: Mitzy Turner PT

## 2018-01-22 NOTE — PROGRESS NOTES
Inpatient Rehabilitation Functional Measures Assessment    Functional Measures  DANUTA Eating:  Eating Score = 7. Patient is completely independent for eating.  There are no activity limitations.  DANUTA Grooming: Branch  [facility] Bathing:  [facility] Upper Body Dressing:  [facility] Lower Body Dressing:  [facility] Toileting:  Toileting Score = 4.  Patient requires minimal assistance for  toileting, such as steadying for balance while cleansing or adjusting clothes.  Patient requires the following assistive device(s): Grab bar.    DANUTA Bladder Management  Level of Assistance:  Bladder Score = 7.  Patient is completely independent for  bladder management. There are no activity limitations.  Frequency/Number of Accidents this Shift:  Bladder accidents this shift:  0 .  Patient has not had an accident this shift.    DANUTA Bowel Management  Level of Assistance: Bowel Score = 7.  Patient is completely independent for  bowel management. There are no activity limitations.  Frequency/Number of Accidents this Shift: Bowel accidents this shift: 0 .  Patient has not had an accident this shift.    DANUTA Bed/Chair/Wheelchair Transfer:  Bed/chair/wheelchair Transfer Score = 5.  Patient is supervision/set-up for transferring to and from the  bed/chair/wheelchair, requiring: Stand by assistance. Patient requires the  following assistive device(s): Bed rails. Grab bars. Arm rest. .  DANUTA Toilet Transfer:  Toilet Transfer Score = 4.  Patient performs 75% or more  of effort and minimal assistance (little/incidental help/steadying) for  transferring to and from the toilet/commode, requiring: Steadying. Patient  requires the following assistive device(s): Grab bars.  [facility] Tub/Shower Transfer:  [place]    Previously Documented Mode of Locomotion at Discharge: Field  DANUTA Expected Mode of Locomotion at Discharge: [facility] Walk/Wheelchair:  [facility] Stairs:  [facility] Comprehension:  [facility] Expression:  [facility] Social Interaction:   Branch  [facility] Problem Solving:  Branch  DANUTA Memory:  Branch    Therapy Mode Minutes  Occupational Therapy: Branch  Physical Therapy: Branch  Speech Language Pathology:  Branch    Signed by: DENNIS Devries

## 2018-01-22 NOTE — PLAN OF CARE
Problem: Patient Care Overview (Adult)  Goal: Plan of Care Review  Outcome: Ongoing (interventions implemented as appropriate)   01/22/18 0300   Coping/Psychosocial Response Interventions   Plan Of Care Reviewed With patient   Patient Care Overview   Progress improving   Outcome Evaluation   Outcome Summary/Follow up Plan Patient calm and cooperative tonight with staff tonight. C/o bilateral hip pain. Tylenol given. No unsafe behaviors.        Problem: Pain, Chronic (Adult)  Goal: Acceptable Pain Control/Comfort Level  Outcome: Ongoing (interventions implemented as appropriate)   01/22/18 0300   Pain, Chronic (Adult)   Acceptable Pain Control/Comfort Level making progress toward outcome       Problem: Skin Integrity Impairment, Risk/Actual (Adult)  Goal: Skin Integrity/Wound Healing  Outcome: Ongoing (interventions implemented as appropriate)   01/22/18 0300   Skin Integrity Impairment, Risk/Actual (Adult)   Skin Integrity/Wound Healing making progress toward outcome       Problem: Mobility, Physical Impaired (Adult)  Goal: Enhanced Functionality Ability  Outcome: Ongoing (interventions implemented as appropriate)   01/22/18 0300   Mobility, Physical Impaired (Adult)   Enhanced Functionality Ability making progress toward outcome

## 2018-01-22 NOTE — PROGRESS NOTES
Inpatient Rehabilitation Functional Measures Assessment    Functional Measures  DANUTA Eating:  [facility] Grooming: [facility] Bathing:  [facility] Upper Body Dressing:  [facility] Lower Body Dressing:  [facility] Toileting:  [facility] Bladder Management  Level of Assistance:  [place]  Frequency/Number of Accidents this Shift:  [facility] Bowel Management  Level of Assistance: [place]  Frequency/Number of Accidents this Shift: [facility] Bed/Chair/Wheelchair Transfer:  [facility] Toilet Transfer:  [facility] Tub/Shower Transfer:  [place]    Previously Documented Mode of Locomotion at Discharge: Field  DANUTA Expected Mode of Locomotion at Discharge: [facility] Walk/Wheelchair:  [facility] Stairs:  [facility] Comprehension:  Auditory comprehension is the usual mode. Comprehension  Score = 6, Modified [place].  Patient comprehends complex/abstract  information in their primary language, requiring: Additional time.  DANUTA Expression:  Vocal expression is the usual mode. Expression Score = 6,  Modified Independent.  Patient expresses complex/abstract information in their  primary language, requiring: Additional time.  DANUTA Social Interaction:  Social Interaction Score = 6, Modified Independent.  Patient is modified independent for social interaction, requiring: Requires  additional time.  DANUTA Problem Solving:  Problem Solving Score = 6, Modified [place].  Patient  makes appropriate decisions in order to solve complex problems, but requires  extra time.  DANUTA Memory:  Memory Score = 6, Modified [place].  Patient is modified  independent for memory, requiring: Requires additional time.    Therapy Mode Minutes  Occupational Therapy: Branch  Physical Therapy: Branch  Speech Language Pathology:  [place]    Signed by: Melissa Mc RN

## 2018-01-22 NOTE — PROGRESS NOTES
Inpatient Rehabilitation Plan of Care Note    Plan of Care  Care Plan Reviewed - Updates as Follows    Field    Signed by: Marjorie Domingo RN

## 2018-01-22 NOTE — THERAPY TREATMENT NOTE
Inpatient Rehabilitation - Occupational Therapy Treatment Note  [facility]     Patient Name: Raquel Gonsales  : 1945  MRN: [id number]  Today's Date: 2018  Onset of Illness/Injury or Date of Surgery Date: 18  Date of Referral to OT: 18  Referring Physician: Charan      Admit Date: 2018    Visit Dx:   No diagnosis found.  Patient Active Problem List   Diagnosis   • Cervical spondylosis with radiculopathy   • Bilateral hip pain   • Trochanteric bursitis of both hips   • Status post bilateral total hip replacement   • S/P lumbar fusion   • Spinal stenosis of lumbar region with neurogenic claudication   • Lumbar spinal stenosis   • TIA (transient ischemic attack)   • Obesity   • Recurrent falls   • DM2 (diabetes mellitus, type 2)   • HTN (hypertension)   • COPD (chronic obstructive pulmonary disease)   • Decreased mobility   • Spinal stenosis             Adult Rehabilitation Note       18 0910 18 0928 18 1307    Rehab Assessment/Intervention    Discipline occupational therapist  -AF physical therapist  -MG occupational therapist  -AF    Document Type therapy note (daily note)  -AF therapy note (daily note)  -MG therapy note (daily note)  -AF    Subjective Information agree to therapy;no complaints  -AF agree to therapy;no complaints  -MG agree to therapy;complains of;fatigue  -AF    Patient Effort, Rehab Treatment good  -AF good  -MG good  -AF    Symptoms Noted During/After Treatment  fatigue  -MG     Precautions/Limitations brace on when up;fall precautions;spinal precautions  -AF brace on when up;fall precautions;spinal precautions  -MG brace on when up;fall precautions;spinal precautions  -AF    Recorded by [AF] Veda Shukla OTR [MG] Megan Gosselin, PT [AF] Veda Shukla OTR    Pain Assessment    Pain Assessment No/denies pain  -AF No/denies pain  -MG No/denies pain  -AF    Recorded by [AF] CINDY Orosco [MG] Megan Gosselin, PT [AF] Veda Shukla,  OTR    Cognitive Assessment/Intervention    Current Cognitive/Communication Assessment functional  -AF functional  -MG functional  -AF    Orientation Status oriented x 4  -AF oriented x 4  -MG oriented x 4  -AF    Follows Commands/Answers Questions 100% of the time  -% of the time  -% of the time;able to follow single-step instructions  -AF    Personal Safety WNL/WFL  -AF WNL/WFL  -MG WNL/WFL  -AF    Personal Safety Interventions fall prevention program maintained;gait belt;nonskid shoes/slippers when out of bed  -AF fall prevention program maintained;gait belt  -MG fall prevention program maintained;gait belt;nonskid shoes/slippers when out of bed  -AF    Recorded by [AF] Veda Shukla, OTR [MG] Megan Gosselin, PT [AF] Veda Shukla, OTR    Bed Mobility, Assessment/Treatment    Bed Mob, Supine to Sit, [place] contact guard assist;verbal cues required   bed rail  -AF      Bed Mobility, Comment  not tested, up in chair   -MG sitting up in w/c   -AF    Recorded by [AF] Veda Shukla, OTR [MG] Megan Gosselin, PT [AF] Veda Shukla, OTR    Transfer Assessment/Treatment    Transfers, Sit-Stand [place] stand by assist;verbal cues required  -AF contact guard assist;verbal cues required  -MG     Transfers, Stand-Sit [place] stand by assist;verbal cues required  -AF contact guard assist;verbal cues required  -MG     Transfers, Sit-Stand-Sit, Assist Device --   rollator  -AF --   4WW  -MG     Toilet Transfer, [place] contact guard assist  -AF      Toilet Transfer, Assistive Device --   rollator and grab bar  -AF      Transfer, Safety Issues  step length decreased;balance decreased during turns  -MG     Transfer, Impairments  strength decreased;impaired balance  -MG     Recorded by [AF] Veda Shukla, OTR [MG] Megan Gosselin, PT     Gait Assessment/Treatment    Gait, [place] Level  contact guard assist  -MG     Gait, Assistive Device  rollator  -MG     Gait, Distance (Feet)   160  -MG     Gait, Gait Deviations  raymond decreased;forward flexed posture;step length decreased  -MG     Gait, Safety Issues  step length decreased;balance decreased during turns  -MG     Gait, Impairments  strength decreased;impaired balance  -MG     Gait, Comment  cues for upright posture  -MG     Recorded by  [MG] Megan Gosselin, PT     Functional Mobility    Functional Mobility- Comment walked from EOB to commode with rollator and CGA  -AF      Recorded by [AF] Veda Shukla OTR      Upper Body Bathing Assessment/Training    UB Bathing Assess/Train, Position sitting;sink side  -AF      UB Bathing Assess/Train, [place] Level set up required  -AF      Recorded by [AF] Veda Shukla OTR      Lower Body Bathing Assessment/Training    LB Bathing Assess/Train, Position sink side;standing;sitting  -AF      LB Bathing Assess/Train, [place] Level minimum assist (75% patient effort);verbal cues required  -AF      LB Bathing Assess/Train, Comment assist with feet  -AF      Recorded by [AF] Veda Shukla OTR      Upper Body Dressing Assessment/Training    UB Dressing Assess/Train, Position sitting;sink side  -AF      UB Dressing Assess/Train, [place] supervision required  -AF      UB Dressing Assess/Train, Comment pt able to don brace seated on EOB and w/c level with set up  -AF      Recorded by [AF] Veda Shukla OTR      Lower Body Dressing Assessment/Training    LB Dressing Assess/Train, Position sitting;standing;sink side  -AF      LB Dressing Assess/Train, [place] moderate assist (50% patient effort)  -AF      LB Dressing Assess/Train, Comment assist with TEDS, and heel management on shoes. edcuated on spinal precautions with ADLs  -AF  pt unlaced shoes, therapist laced with elastic shoe laces. pt donned shoes with min A  -AF    Recorded by [AF] Veda Shukla OTR  [AF] Veda Shukla OTR    Toileting Assessment/Training    Toileting Assess/Train, Assistive Device grab  bars;raised toilet seat  -AF      Toileting Assess/Train, Position standing;sitting  -AF      Toileting Assess/Train, [place] Level supervision required  -AF      Recorded by [AF] CINDY Orosco      Grooming Assessment/Training    Grooming Assess/Train, Position sitting;sink side  -AF      Grooming Assess/Train, [place] Level supervision required  -AF      Grooming Assess/Train, Comment w/c level  -AF      Recorded by [AF] CINDY Orosco      Balance Skills Training    Sitting-Level of Assistance Distant supervision;Independent  -AF      Standing-Level of Assistance Contact guard;Close supervision  -AF      Static Standing Balance Support assistive device  -AF      Standing-Balance Activities --   ADLs  -AF      Standing Balance # of Minutes 3-5 mins x 2  -AF      Recorded by [AF] CINDY Orosco      Therapy Exercises    Bilateral Lower Extremities  AROM:;15 reps;sitting;hip abduction/adduction;LAQ;hip flexion  -MG     Bilateral Upper Extremity   AROM:;15 reps;sitting;shoulder protraction/retraction;shoulder extension/flexion   #1 dowel natalee, 3 sets, hand gripper red band  -AF    Recorded by  [MG] Megan Gosselin, PT [AF] Veda Shukla OTR    Functional Endurance    Detail (Functional Endurance) good with ADLs  -AF      Recorded by [AF] CINDY Orosco      Positioning and Restraints    Pre-Treatment Position in bed  -AF sitting in chair/recliner  -MG sitting in chair/recliner  -AF    Post Treatment Position wheelchair  -AF wheelchair  -MG wheelchair  -AF    In Wheelchair sitting;with PT  -AF sitting;call light within reach;encouraged to call for assist  -MG sitting;with PT  -AF    Recorded by [AF] CINDY Orosco [MG] Megan Gosselin, PT [AF] Veda Shukla OTR      01/20/18 1147 01/20/18 1031       Rehab Assessment/Intervention    Discipline occupational therapist  -AF physical therapist  -LB     Document Type therapy note (daily note)  -AF therapy note (daily note)  -LB      Subjective Information agree to therapy;complains of;fatigue   fatigue in second session  -AF agree to therapy  -LB     Patient Effort, Rehab Treatment good  -AF good  -LB     Precautions/Limitations brace on when up;fall precautions;spinal precautions  -AF fall precautions;brace on when up;spinal precautions  -LB     Recorded by [AF] Veda Shukla, OTR [LB] Mitzy Turner, PT     Pain Assessment    Pain Assessment No/denies pain  -AF 0-10  -LB     Pain Score  5  -LB     Post Pain Score  5  -LB     Pain Type  Chronic pain  -LB     Pain Location  Hip  -LB     Pain Orientation  Right  -LB     Recorded by [AF] Veda Shukla, OTR [LB] Mitzy Turner, PT     Cognitive Assessment/Intervention    Current Cognitive/Communication Assessment functional  -AF      Orientation Status oriented x 4  -AF      Follows Commands/Answers Questions 100% of the time;able to follow single-step instructions  -AF      Personal Safety WNL/WFL  -AF WNL/WFL  -LB     Personal Safety Interventions fall prevention program maintained;gait belt;nonskid shoes/slippers when out of bed  -AF fall prevention program maintained;gait belt;muscle strengthening facilitated;nonskid shoes/slippers when out of bed  -LB     Recorded by [AF] Veda Shukla, OTR [LB] Mitzy Turner, PT     Bed Mobility, Assessment/Treatment    Bed Mobility, Assistive Device  bed rails  -LB     Bed Mob, Supine to Sit, [place] minimum assist (75% patient effort);verbal cues required;nonverbal cues required (demo/gesture)  -AF      Bed Mob, Sit to [place], [place]  verbal cues required;minimum assist (75% patient effort)  -LB     Recorded by [AF] Veda Shukla, OTR [LB] Mitzy Turner, PT     Transfer Assessment/Treatment    Transfers, Sit-Stand [place] contact guard assist  -AF verbal cues required;contact guard assist  -LB     Transfers, Stand-Sit [place] contact guard assist  -AF verbal cues required;contact guard assist  -LB     Transfers, Sit-Stand-Sit,  Assist Device  rolling walker  -LB     Toilet Transfer, [place] minimum assist (75% patient effort);contact guard assist;verbal cues required  -AF      Toilet Transfer, Assistive Device --   rollator, grab bars  -AF      Recorded by [AF] Veda Shukla OTR [LB] Mitzy Turner, PT     Gait Assessment/Treatment    Gait, [place] Level  contact guard assist  -LB     Gait, Assistive Device  rollator  -LB     Gait, Distance (Feet)  100   times 2 75 ft times 2  -LB     Gait, Gait Deviations  bilateral:;[place] decreased;decreased heel strike;forward flexed posture;toe-to-floor clearance decreased  -LB     Gait, Comment  backwards walking with rollator and CGA  -LB     Recorded by  [LB] Mitzy Turner, PT     Stairs Assessment/Treatment    Stairs, Comment  up/down ramp with rollator and Min  -LB     Recorded by  [LB] Mitzy Turner, PT     Functional Mobility    Functional Mobility- Comment pt walked from EOB to commode with rollator with CGA  -AF      Recorded by [AF] Veda Shukla, ESTELLAR      Upper Body Bathing Assessment/Training    UB Bathing Assess/Train, Position sitting;sink side  -AF      UB Bathing Assess/Train, [place] Level set up required  -AF      Recorded by [AF] Veda Shukla OTR      Lower Body Bathing Assessment/Training    LB Bathing Assess/Train, Comment NSG assisted with LB  -AF      Recorded by [AF] Veda Shukla, ESTELLAR      Lower Body Dressing Assessment/Training    LB Dressing Assess/Train, Comment donned TEDs bed level  -AF      Recorded by [AF] Veda Shukla OTR      Toileting Assessment/Training    Toileting Assess/Train, Assistive Device grab bars;raised toilet seat  -AF      Toileting Assess/Train, Position standing;sitting  -AF      Toileting Assess/Train, [place] Level contact guard assist   hygiene only  -AF      Recorded by [AF] Veda Shukla OTR      Grooming Assessment/Training    Grooming Assess/Train, Position sitting;sink side  -AF      Grooming Assess/Train,  [place] Level supervision required  -AF      Grooming Assess/Train, Comment w/c level  -AF      Recorded by [AF] Veda Shukla OTR      Balance Skills Training    Gait Balance-Level of Assistance  Contact guard  -LB     Gait Balance Support  odell bar;Left upper extremity supported;Right upper extremity supported  -LB     Gait Balance Activities  side-stepping  -LB     Recorded by  [LB] Mitzy Turner PT     Therapy Exercises    Bilateral Lower Extremities  AROM:;10 reps;sitting;hip ER;hip IR;LAQ;ankle pumps/circles  -LB     BLE Other Reps  10   standing at hemibars, rest break needed  -LB     Bilateral Upper Extremity AROM:;15 reps;sitting;elbow flexion/extension;hand pumps;pronation/supination   3 sets, #2 hand weight  -AF      Recorded by [AF] CINDY Orosco [LB] Mitzy Turner PT     Functional Endurance    Detail (Functional Endurance) Fair plus  -AF      Recorded by [AF] CINDY Orosco      Orthosis Location    Orthosis Location/Type  neck/back  -LB     Orthosis, Neck/Back  LSO (lumbar sacral orthosis)  -LB     Recorded by  [LB] Mitzy Turner PT     Orthosis Management/Training    Orthosis Skills Training  donning orthosis;doffing orthosis;restrictions/precautions;clothing management related to orthosis  -LB     Orthosis Skills Training Comment  Min A to don/doff  -LB     Orthosis Wear Schedule  wear when out of bed only  -LB     Recorded by  [LB] Mitzy Turner PT     Positioning and Restraints    Pre-Treatment Position in bed  -AF sitting in chair/recliner  -LB     Post Treatment Position wheelchair  -AF wheelchair  -LB     In Wheelchair sitting;encouraged to call for assist;call light within reach;exit alarm on   set up with lunch after 2nd session  -AF call light within reach;encouraged to call for assist;exit alarm on  -LB     Bathroom sitting;encouraged to call for assist;call light within reach;notified nsg   in bathroom with Aide finishing ADL  -AF      Recorded by [AF] Veda Shukla,  OTR [LB] Mitzy HELMS Johnny, PT       User Key  (r) = Recorded By, (t) = Taken By, (c) = Cosigned By    Initials Name Effective Dates    LB Mitzy B Arturoruben, PT 10/06/15 -     AF Veda Marivel Shukla, OTR 12/01/15 -     MG Finan Gosselin, PT 09/13/17 -                 OT Goals       01/19/18 1530 01/16/18 1309 01/16/18 1219    Transfer Training OT STG    Transfer Training OT STG, Date Established 01/19/18  -AF      Transfer Training OT STG, Time to Achieve 1 wk  -AF      Transfer Training OT STG, Activity Type toilet  -AF      Transfer Training OT STG, [place] Level contact guard assist;verbal cues required  -AF      Transfer Training OT STG, Assist Device walker, rolling  -AF      Transfer Training OT STG, Additional Goal grab bars  -AF      Transfer Training OT STG, Outcome goal ongoing  -AF      Transfer Training OT LTG    Transfer Training OT LTG, Date Established 01/19/18  -AF  01/16/18  -HC    Transfer Training OT LTG, Time to Achieve by discharge  -AF  1 wk  -HC    Transfer Training OT LTG, Activity Type toilet  -AF  bed to chair /chair to bed;sit to stand/stand to sit;toilet  -HC    Transfer Training OT LTG, [place] Level supervision required;conditional independence  -AF  supervision required  -HC    Transfer Training OT LTG, Assist Device walker, rolling  -AF  walker, rolling  -HC    Transfer Training OT LTG, Additional Goal grab bars  -AF      Transfer Training OT LTG, Outcome goal ongoing  -AF  goal revised  -HC    Transfer Training 2 OT STG    Transfer Training 2 OT STG, Date Established 01/19/18  -AF      Transfer Training 2 OT STG, Time to Achieve 1 wk  -AF      Transfer Training 2 OT STG, Activity Type shower chair;walk-in shower  -AF      Transfer Training 2 OT STG, [place] Level contact guard assist;verbal cues required  -AF      Transfer Training 2 OT STG, Assist Device walker, rolling;shower chair  -AF      Transfer Training 2 OT STG, Outcome goal ongoing  -AF      Transfer Training 2 OT LTG     Transfer Training 2 OT LTG, Date Established 01/19/18  -AF      Transfer Training 2 OT LTG, Time to Achieve by discharge  -AF      Transfer Training 2 OT LTG, Activity Type shower chair;walk-in shower  -AF      Transfer Training 2 OT LTG, [place] Level supervision required  -AF      Transfer Training 2 OT LTG, Assist Device walker, rolling;shower chair  -AF      Transfer Training 2 OT LTG, Outcome goal ongoing  -AF      Strength OT LTG    Strength Goal OT LTG, Date Established   01/16/18  -HC    Strength Goal OT LTG, Time to Achieve   1 wk  -HC    Strength Goal OT LTG, Outcome   goal revised  -HC    Patient Education OT STG    Patient Education OT STG, Date Established 01/19/18  -AF      Patient Education OT STG, Time to Achieve 1 wk  -AF      Patient Education OT STG, Education Type precautions per surgeon;brace use/care  -AF      Patient Education OT STG, Education Understanding demonstrates adequately  -AF      Patient Education OT STG, Additional Goal with ADLs  -AF      Patient Education OT STG Outcome goal ongoing  -AF      Patient Education OT LTG    Patient Education OT LTG, Date Established 01/19/18  -AF 01/16/18  -HC     Patient Education OT LTG, Time to Achieve by discharge  -AF 1 wk  -HC     Patient Education OT LTG, Education Type HEP;precautions per surgeon;brace use/care;home safety;adaptive equipment mgmt  -AF precautions per surgeon;brace use/care;positioning;posture/body mechanics;adaptive equipment mgmt  -HC     Patient Education OT LTG, Education Understanding demonstrates adequately;independent  -AF      Patient Education OT LTG Outcome goal ongoing  -AF      ADL OT STG    ADL OT STG, Date Established 01/19/18  -AF      ADL OT STG, Time to Achieve 1 wk  -AF      ADL OT STG, Activity Type ADL skills  -AF      ADL OT STG, [place] Level standby assist;min assist  -AF      ADL OT STG, Additional Goal with LH reacher  -AF      ADL OT STG, Outcome goal ongoing  -AF      ADL OT LTG    ADL OT  LTG, Date Established 01/19/18  -AF 01/16/18  -HC     ADL OT LTG, Time to Achieve by discharge  -AF 1 wk  -HC     ADL OT LTG, Activity Type ADL skills  -AF ADL skills  -HC     ADL OT LTG, [place] Level modified independent;standby assist  -AF standby assist  -HC     ADL OT LTG, Additional Goal with AE  -AF      ADL OT LTG, Outcome goal ongoing  -AF      Functional Mobility OT STG    Functional Mobility Goal OT STG, Date Established 01/19/18  -AF      Functional Mobility Goal OT STG, Time to Achieve 1 wk  -AF      Functional Mobility Goal OT STG, [place] Level contact guard  -AF      Functional Mobility Goal OT STG, Assist Device rolling walker  -AF      Functional Mobility Goal OT STG, Distance to Achieve to the bathroom  -AF      Functional Mobility Goal OT STG, Outcome goal ongoing  -AF      Functional Mobility OT LTG    Functional Mobility Goal OT LTG, Date Established 01/19/18  -AF  01/16/18  -HC    Functional Mobility Goal OT LTG, Time to Achieve by discharge  -AF  1 wk  -HC    Functional Mobility Goal OT LTG, [place] Level modified independent;independent with device;supervision  -AF  supervision  -HC    Functional Mobility Goal OT LTG, Assist Device rolling walker  -AF      Functional Mobility Goal OT LTG, Distance to Achieve to the bathroom  -AF      Functional Mobility Goal OT LTG, Outcome goal ongoing  -AF  goal revised  -HC      01/09/18 1613          Transfer Training OT LTG    Transfer Training OT LTG, Date Established 01/09/18  -MR      Transfer Training OT LTG, Time to Achieve 1 wk  -MR      Transfer Training OT LTG, Activity Type bed to chair /chair to bed;toilet  -MR      Transfer Training OT LTG, [place] Level conditional independence  -MR      Transfer Training OT LTG, Assist Device walker, rolling  -MR      Strength OT LTG    Strength Goal OT LTG, Date Established 01/09/18  -MR      Strength Goal OT LTG, Time to Achieve 1 wk  -MR      Strength Goal OT LTG, Functional Goal Pt  to perform BUE AROM x10 reps and 3 sets to increase UE strength for ADLs.  -MR      Functional Mobility OT LTG    Functional Mobility Goal OT LTG, Date Established 01/09/18  -MR      Functional Mobility Goal OT LTG, Time to Achieve 1 wk  -MR      Functional Mobility Goal OT LTG, [place] Level modified independent  -MR      Functional Mobility Goal OT LTG, Assist Device rolling walker  -MR      Functional Mobility Goal OT LTG, Distance to Achieve to the sink;to the bathroom  -MR        User Key  (r) = Recorded By, (t) = Taken By, (c) = Cosigned By    Initials Name Provider Type    AF Veda Shukla, OTR Occupational Therapist    MR Erin Genao, OT Occupational Therapist    HC Margy Burks, OTR Occupational Therapist          Occupational Therapy Education     Title: PT OT SLP Therapies (Done)     Topic: Occupational Therapy (Done)     Point: ADL training (Done)    Description: Instruct learner(s) on proper safety adaptation and remediation techniques during self care or transfers.   Instruct in proper use of assistive devices.    Learning Progress Summary    Learner Readiness Method Response Comment Documented by Status   Patient Acceptance E,ELAN PETERS,NR edcuated on log rolling and spinal precautions during ADL tasks. will introduce AE with ADLs to increase her independence AF 01/19/18 1529 Done    Acceptance E VU  MM 01/19/18 0029 Done               Point: Home exercise program (Done)    Description: Instruct learner(s) on appropriate technique for monitoring, assisting and/or progressing therapeutic exercises/activities.    Learning Progress Summary    Learner Readiness Method Response Comment Documented by Status   Patient Acceptance E VU  MM 01/19/18 0029 Done               Point: Precautions (Done)    Description: Instruct learner(s) on prescribed precautions during self-care and functional transfers.    Learning Progress Summary    Learner Readiness Method Response Comment Documented by Status    Patient Acceptance E FRAN edcuated on spinal precautions with ADLs AF 01/22/18 0914 Done    Acceptance E,D VU,NR edcuated on log rolling and spinal precautions during ADL tasks. will introduce AE with ADLs to increase her independence AF 01/19/18 1529 Done    Acceptance E FRAN  MM 01/19/18 0029 Done               Point: Body mechanics (Done)    Description: Instruct learner(s) on proper positioning and spine alignment during self-care, functional mobility activities and/or exercises.    Learning Progress Summary    Learner Readiness Method Response Comment Documented by Status   Patient Acceptance E FRAN  MM 01/19/18 0029 Done                      User Key     Initials Effective Dates Name Provider Type Discipline    AF 12/01/15 -  CINDY Orosco Occupational Therapist OT     10/30/17 -  Nano Lama RN Registered Nurse Nurse                  OT Recommendation and Plan  Anticipated Discharge Disposition: home with home health, home with assist  Planned Therapy Interventions: adaptive equipment training, activity intolerance, ADL retraining, balance training, fine motor coordination training, home exercise program, strengthening, transfer training  Therapy Frequency: 3-5 times/wk          Time Calculation:         Time Calculation- OT       01/22/18 0914          Time Calculation- OT    OT Start Time 0800  -AF      OT Stop Time 0900  -AF      OT Time Calculation (min) 60 min  -AF        User Key  (r) = Recorded By, (t) = Taken By, (c) = Cosigned By    Initials Name Provider Type    AF CINDY Orosco Occupational Therapist           Therapy Charges for Today     Code Description Service Date Service Provider Modifiers Qty    [id number] HC OT SELF CARE/MGMT/TRAIN EA 15 MIN 1/22/2018 CINDY Orosco GO 4               CINDY Orosco  1/22/2018

## 2018-01-22 NOTE — PROGRESS NOTES
Inpatient Rehabilitation Functional Measures Assessment    Functional Measures  DANUTA Eating:  [facility] Grooming: [facility] Bathing:  [facility] Upper Body Dressing:  [facility] Lower Body Dressing:  [facility] Toileting:  [facility] Bladder Management  Level of Assistance:  [place]  Frequency/Number of Accidents this Shift:  [facility] Bowel Management  Level of Assistance: [place]  Frequency/Number of Accidents this Shift: [facility] Bed/Chair/Wheelchair Transfer:  [facility] Toilet Transfer:  [facility] Tub/Shower Transfer:  [place]    Previously Documented Mode of Locomotion at Discharge: Field  DANUTA Expected Mode of Locomotion at Discharge: [facility] Walk/Wheelchair:  [facility] Stairs:  [facility] Comprehension:  Auditory comprehension is the usual mode. Comprehension  Score = 6, Modified [place].  Patient comprehends complex/abstract  information in their primary language, requiring:  DANUTA Expression:  Vocal expression is the usual mode. Expression Score = 6,  Modified Independent.  Patient expresses complex/abstract information in their  primary language, requiring:  DANUTA Social Interaction:  Social Interaction Score = 6, Modified Independent.  Patient is modified independent for social interaction, requiring:  [facility] Problem Solving:  Problem Solving Score = 6, Modified [employer] [place].  Patient  makes appropriate decisions in order to solve complex problems, but requires  extra time.  DANUTA Memory:  Memory Score = 6, Modified [employer] [place].  Patient is modified  independent for memory, requiring:    Therapy Mode Minutes  Occupational Therapy: [facility]  Physical Therapy: [place]  Speech Language Pathology:  [place]    Signed by: Marjorie Domingo RN

## 2018-01-22 NOTE — PROGRESS NOTES
LOS: 4 days   Patient Care Team:  Roger Patel MD as PCP - General    Chief Complaint:   S/p L2-3 repeat laminectomy, medial facetectomy foraminotomy and neural lysis with bilateral posterior lateral fusion with [employer]ro[employer] expedient segmental instrumentation with local bone graft, right iliac marrow aspiration, and Mastergraft bone graft substitute on Jan 11, 2017  H/o L2-3 recurrent spinal stenosis status post L3 to S1 laminectomy and fusion with instrumentation  H/o   C3-4, 4-5, 5-6, 6-7 Anterior cervical discectomy and fusion April 2017  H/o B KAREN and B TKA  DM  HTN  HLD      Subjective     History of Present Illness    Subjective  The patient complains of back soreness but no leg pain.  She reports that strength in the legs is stable.  She has chronic left hip flexion weakness going back years.  She is tolerating therapies.  Occupational therapy does not note any upper extremity functional deficits.  Patient reviewed with orthopedics spine today.   History taken from: patient    Objective     Vital Signs  Temp:  [97.9 °F (36.6 °C)-98.4 °F (36.9 °C)] 97.9 °F (36.6 °C)  Heart Rate:  [79-92] 79  Resp:  [16-18] 18  BP: (138-156)/(72-90) 156/90    Objective  Mental status-awake alert  HEENT-sclera nonicteric, conjunctiva pink.  Oropharynx moist.  Lungs-clear to auscultation without wheezes rales or rhonchi.  -Regular rate and rhythm without rub murmur or gallop.  An positive bowel sounds.  Soft.  Back incision dressed.  Extremities-no edema.  Logic-weakness with left hip flexion not able to do antigravity movement.  She is able to do at least antigravity right hip flexion and takes resistance with bilateral knee extension, ankle dorsiflexion, elbow flexion, finger flexion.  Results Review:     I reviewed the patient's new clinical results.  Glucose   Date/Time Value Ref Range Status   01/22/2018 0721 208 (H) 70 - 130 mg/dL Final   01/21/2018 2036 200 (H) 70 - 130 mg/dL Final   01/21/2018 1618 207 (H) 70 -  130 mg/dL Final   01/21/2018 1108 219 (H) 70 - 130 mg/dL Final   01/21/2018 0711 202 (H) 70 - 130 mg/dL Final   01/20/2018 2007 215 (H) 70 - 130 mg/dL Final   01/20/2018 1624 174 (H) 70 - 130 mg/dL Final   01/20/2018 1136 227 (H) 70 - 130 mg/dL Final             Results from last 7 days  Lab Units 01/18/18  0506 01/17/18  0438 01/16/18  1908 01/16/18  0817   SODIUM mmol/L 136 136 134* 128*   POTASSIUM mmol/L 3.8 3.4*  --  3.6   CHLORIDE mmol/L 100 97*  --  92*   CO2 mmol/L 25.0 24.0  --  27.3   BUN mg/dL 6* 10  --  8   CREATININE mg/dL 0.70 0.71  --  0.74   CALCIUM mg/dL 9.0 8.8  --  8.7   BILIRUBIN mg/dL 0.4  --   --   --    ALK PHOS U/L 87  --   --   --    ALT (SGPT) U/L 20  --   --   --    AST (SGOT) U/L 24  --   --   --    GLUCOSE mg/dL 166* 240*  --  242*       Medication Review: done  Scheduled Meds:  atorvastatin 10 mg Oral Nightly   calcium-vitamin D 1,000 mg Oral Daily   cetirizine 10 mg Oral Daily   cholecalciferol 2,000 Units Oral Daily   DULoxetine 60 mg Oral Daily   famotidine 20 mg Oral BID   fluticasone 1 spray Nasal Daily   insulin aspart 0-14 Units Subcutaneous 4x Daily With Meals & Nightly   insulin aspart 10 Units Subcutaneous TID With Meals   insulin detemir 10 Units Subcutaneous QAM   insulin detemir 40 Units Subcutaneous Nightly   losartan 50 mg Oral Daily   vitamin B-12 1,000 mcg Oral Daily     Continuous Infusions:   PRN Meds:.•  acetaminophen  •  albuterol  •  bisacodyl  •  dextrose  •  diphenhydrAMINE-zinc acetate  •  glucagon (human recombinant)  •  ondansetron **OR** ondansetron ODT **OR** ondansetron  •  ondansetron  •  polyethylene glycol      Assessment/Plan   S/p L2-3 repeat laminectomy, medial facetectomy foraminotomy and neural lysis with bilateral posterior lateral fusion with AcroMed expedient segmental instrumentation with local bone graft, right iliac marrow aspiration, and Mastergraft bone graft substitute on Jan 11, 2017  H/o L2-3 recurrent spinal stenosis status post L3 to S1  laminectomy and fusion with instrumentation  H/o   C3-4, 4-5, 5-6, 6-7 Anterior cervical discectomy and fusion April 2017  H/o B KAREN and B TKA  DM- Levemir/ Novolog-January 22-regimen recently adjusted  HTN-losartan  HLD-atorvastatin  DVT prophylaxis - SCDs  Status post encephalopathy secondary medications-neurology did not feel that she had TIA    Jan 22- transfers SBA. Gait 100 feet CTG RW.   May shower.  May sit to don the brace, which is for comfort.      Active Problems:    Spinal stenosis      Assessment & Plan    Rigoberto Curran MD  01/22/18  9:32 AM    Time:

## 2018-01-22 NOTE — PROGRESS NOTES
Inpatient Rehabilitation Plan of Care Note    Plan of Care  Care Plan Reviewed - No updates at this time.    Sphincter Control    Performed Intervention(s)  Monitor intake and output  Fluid restriction      Psychosocial    Performed Intervention(s)  Support/ Peer group  Verbalizes needs and concerns      Safety    Performed Intervention(s)  Falls precautions/ protocol  Safety monitoring during functional activities  Safety rounds  Bed alarm and/or chair alarm      Body Function Structure    Performed Intervention(s)  Wound care      Body Systems    Performed Intervention(s)  Blood glucose testing  Medication    Signed by: Melissa Mc RN

## 2018-01-22 NOTE — PROGRESS NOTES
Occupational Therapy: Individual: 105 minutes.    Physical Therapy: [place]    Speech Language Pathology:  [place]    Signed by: Veda Shukla OT

## 2018-01-22 NOTE — PLAN OF CARE
Problem: Patient Care Overview (Adult)  Goal: Plan of Care Review  Outcome: Ongoing (interventions implemented as appropriate)   01/22/18 0300 01/22/18 0800 01/22/18 1447   Coping/Psychosocial Response Interventions   Plan Of Care Reviewed With --  patient --    Patient Care Overview   Progress improving --  --    Outcome Evaluation   Outcome Summary/Follow up Plan --  --  Patient doing well today, no comaplints of pain, incision looks good     Goal: Adult Individualization and Mutuality  Outcome: Ongoing (interventions implemented as appropriate)    Goal: Discharge Needs Assessment  Outcome: Ongoing (interventions implemented as appropriate)      Problem: Pain, Chronic (Adult)  Goal: Acceptable Pain Control/Comfort Level  Outcome: Ongoing (interventions implemented as appropriate)      Problem: Skin Integrity Impairment, Risk/Actual (Adult)  Goal: Skin Integrity/Wound Healing  Outcome: Ongoing (interventions implemented as appropriate)      Problem: Mobility, Physical Impaired (Adult)  Goal: Enhanced Functionality Ability  Outcome: Ongoing (interventions implemented as appropriate)

## 2018-01-22 NOTE — THERAPY TREATMENT NOTE
Inpatient Rehabilitation - Occupational Therapy Treatment Note  [facility]     Patient Name: Raquel Gonsales  : 1945  MRN: [id number]  Today's Date: 2018  Onset of Illness/Injury or Date of Surgery Date: 18  Date of Referral to OT: 18  Referring Physician: Charan      Admit Date: 2018    Visit Dx:   No diagnosis found.  Patient Active Problem List   Diagnosis   • Cervical spondylosis with radiculopathy   • Bilateral hip pain   • Trochanteric bursitis of both hips   • Status post bilateral total hip replacement   • S/P lumbar fusion   • Spinal stenosis of lumbar region with neurogenic claudication   • Lumbar spinal stenosis   • TIA (transient ischemic attack)   • Obesity   • Recurrent falls   • DM2 (diabetes mellitus, type 2)   • HTN (hypertension)   • COPD (chronic obstructive pulmonary disease)   • Decreased mobility   • Spinal stenosis             Adult Rehabilitation Note       18 1402 18 0923 18 0910    Rehab Assessment/Intervention    Discipline occupational therapist  -AF physical therapist  -LB occupational therapist  -AF    Document Type therapy note (daily note)  -AF therapy note (daily note)  -LB therapy note (daily note)  -AF    Subjective Information agree to therapy  -AF agree to therapy  -LB agree to therapy;no complaints  -AF    Patient Effort, Rehab Treatment good  -AF good  -LB good  -AF    Precautions/Limitations brace on when up;fall precautions;spinal precautions  -AF brace on when up;fall precautions;spinal precautions  -LB brace on when up;fall precautions;spinal precautions  -AF    Recorded by [AF] CINDY Orosco [LB] Mitzy Turner PT [AF] Veda Shukla OTR    Pain Assessment    Pain Assessment No/denies pain  -AF 0-10  -LB No/denies pain  -AF    Pain Score  6  -LB     Post Pain Score  6  -LB     Pain Type  Chronic pain  -LB     Pain Location  Hip  -LB     Pain Orientation  Left;Right  -LB     Pain Intervention(s)   Ambulation/increased activity  -LB     Response to Interventions  tolerated  -LB     Recorded by [AF] Veda Shukla OTR [LB] Mitzy Turner, PT [AF] Veda Shukla OTR    Vision Assessment/Intervention    Visual Impairment WNL  -AF      Recorded by [AF] Veda Shukla OTR      Cognitive Assessment/Intervention    Current Cognitive/Communication Assessment functional  -AF  functional  -AF    Orientation Status oriented x 4  -AF  oriented x 4  -AF    Follows Commands/Answers Questions 100% of the time  -AF  100% of the time  -AF    Personal Safety WNL/WFL  -AF WNL/WFL  -LB WNL/WFL  -AF    Personal Safety Interventions fall prevention program maintained;gait belt;nonskid shoes/slippers when out of bed  -AF fall prevention program maintained;gait belt;muscle strengthening facilitated;nonskid shoes/slippers when out of bed  -LB fall prevention program maintained;gait belt;nonskid shoes/slippers when out of bed  -AF    Recorded by [AF] CINDY Orosco [LB] Mitzy Turner, PT [AF] Veda Shukla OTR    Bed Mobility, Assessment/Treatment    Bed Mob, Supine to Sit, [place]   contact guard assist;verbal cues required   bed rail  -AF    Recorded by   [AF] CINDY Orosco    Transfer Assessment/Treatment    Transfers, Sit-Stand [place] stand by assist  -AF stand by assist  -LB stand by assist;verbal cues required  -AF    Transfers, Stand-Sit [place] stand by assist  -AF stand by assist  -LB stand by assist;verbal cues required  -AF    Transfers, Sit-Stand-Sit, Assist Device  rolling walker  -LB --   rollator  -AF    Toilet Transfer, [place] contact guard assist;verbal cues required;supervision required  -AF  contact guard assist  -AF    Toilet Transfer, Assistive Device --   rollator  -AF  --   rollator and grab bar  -AF    Recorded by [AF] CINDY Orosco [LB] Mitzy Turner, PT [AF] Veda Shukla OTR    Gait Assessment/Treatment    Gait, [place] Level  stand by  assist;contact guard assist  -LB     Gait, Assistive Device  rollator  -LB     Gait, Distance (Feet)  200   160, 100  -LB     Gait, Gait Deviations  decreased heel strike;forward flexed posture  -LB     Gait, Safety Issues  step length decreased  -LB     Gait, Impairments  strength decreased  -LB     Gait, Comment  cues for upright posture  -LB     Recorded by  [LB] Mitzy Turner, PT     Functional Mobility    Functional Mobility- Comment walked to and from bathroom with rollaotr with SBA/CGA  -AF  walked from EOB to commode with rollator and CGA  -AF    Recorded by [AF] Veda Shukla OTR  [AF] Veda Shukla OTR    Upper Body Bathing Assessment/Training    UB Bathing Assess/Train, Position   sitting;sink side  -AF    UB Bathing Assess/Train, [place] Level   set up required  -AF    Recorded by   [AF] Veda Shukla, ESTELLAR    Lower Body Bathing Assessment/Training    LB Bathing Assess/Train, Position   sink side;standing;sitting  -AF    LB Bathing Assess/Train, [place] Level   minimum assist (75% patient effort);verbal cues required  -AF    LB Bathing Assess/Train, Comment   assist with feet  -AF    Recorded by   [AF] Veda Shukla OTR    Upper Body Dressing Assessment/Training    UB Dressing Assess/Train, Position   sitting;sink side  -AF    UB Dressing Assess/Train, [place]   supervision required  -AF    UB Dressing Assess/Train, Comment   pt able to don brace seated on EOB and w/c level with set up  -AF    Recorded by   [AF] Veda Suhkla OTR    Lower Body Dressing Assessment/Training    LB Dressing Assess/Train, Position   sitting;standing;sink side  -AF    LB Dressing Assess/Train, [place]   moderate assist (50% patient effort)  -AF    LB Dressing Assess/Train, Comment   assist with TEDS, and heel management on shoes. edcuated on spinal precautions with ADLs  -AF    Recorded by   [AF] CINDY Orosco    Toileting Assessment/Training    Toileting Assess/Train, Assistive  Device   grab bars;raised toilet seat  -AF    Toileting Assess/Train, Position   standing;sitting  -AF    Toileting Assess/Train, [place] Level   supervision required  -AF    Recorded by   [AF] CINDY Orosco    Grooming Assessment/Training    Grooming Assess/Train, Position   sitting;sink side  -AF    Grooming Assess/Train, [place] Level   supervision required  -AF    Grooming Assess/Train, Comment stood at sink to wash hands  -AF  w/c level  -AF    Recorded by [AF] CINDY Orosco  [AF] CINDY Orosco    Balance Skills Training    Sitting-Level of Assistance   Distant supervision;Independent  -AF    Standing-Level of Assistance Close supervision  -AF  Contact guard;Close supervision  -AF    Static Standing Balance Support assistive device  -AF  assistive device  -AF    Standing-Balance Activities Ball toss  -AF  --   ADLs  -AF    Standing Balance # of Minutes 2mins x 2 sets  -AF  3-5 mins x 2  -AF    Recorded by [AF] CINDY Orosco  [AF] CINDY Orosco    Therapy Exercises    Bilateral Lower Extremities  AROM:;10 reps;sitting  -LB     Bilateral Upper Extremity AROM:;15 reps;sitting;elbow flexion/extension;hand pumps;pronation/supination;shoulder protraction/retraction   #1 dowel natalee, hand weight, hand gripper, 3 sets  -AF      Recorded by [AF] CINDY Orosco [LB] Mitzy Turner PT     Functional Endurance    Detail (Functional Endurance) arm bike standing 2 mins   -AF  good with ADLs  -AF    Recorded by [AF] CINDY Orosco  [AF] CINDY Orosco    Orthosis Location    Orthosis, Neck/Back  LSO (lumbar sacral orthosis)  -LB     Recorded by  [LB] Mitzy Turner PT     Orthosis Management/Training    Orthosis Wear Schedule  wear when out of bed only  -LB     Recorded by  [LB] Mitzy Turner PT     Positioning and Restraints    Pre-Treatment Position sitting in chair/recliner  -AF sitting in chair/recliner  -LB in bed  -AF    Post Treatment Position wheelchair  -AF  wheelchair  -LB wheelchair  -AF    In Bed  with other staff  -LB     In Wheelchair sitting;with PT  -AF call light within reach;encouraged to call for assist  -LB sitting;with PT  -AF    Recorded by [AF] Veda Shukla, OTR [LB] Mitzy Turner, PT [AF] Veda Shukla, OTR      01/21/18 0928 01/20/18 1307 01/20/18 1147    Rehab Assessment/Intervention    Discipline physical therapist  -MG occupational therapist  -AF occupational therapist  -AF    Document Type therapy note (daily note)  -MG therapy note (daily note)  -AF therapy note (daily note)  -AF    Subjective Information agree to therapy;no complaints  -MG agree to therapy;complains of;fatigue  -AF agree to therapy;complains of;fatigue   fatigue in second session  -AF    Patient Effort, Rehab Treatment good  -MG good  -AF good  -AF    Symptoms Noted During/After Treatment fatigue  -MG      Precautions/Limitations brace on when up;fall precautions;spinal precautions  -MG brace on when up;fall precautions;spinal precautions  -AF brace on when up;fall precautions;spinal precautions  -AF    Recorded by [MG] Megan Gosselin, PT [AF] Veda Shukla, OTR [AF] Veda Shukla, OTR    Pain Assessment    Pain Assessment No/denies pain  -MG No/denies pain  -AF No/denies pain  -AF    Recorded by [MG] Megan Gosselin, PT [AF] Veda Shukla, OTR [AF] Veda Shukla, OTR    Cognitive Assessment/Intervention    Current Cognitive/Communication Assessment functional  -MG functional  -AF functional  -AF    Orientation Status oriented x 4  -MG oriented x 4  -AF oriented x 4  -AF    Follows Commands/Answers Questions 100% of the time  -% of the time;able to follow single-step instructions  -% of the time;able to follow single-step instructions  -AF    Personal Safety WNL/WFL  -MG WNL/WFL  -AF WNL/WFL  -AF    Personal Safety Interventions fall prevention program maintained;gait belt  -MG fall prevention program maintained;gait belt;nonskid shoes/slippers when out  of bed  -AF fall prevention program maintained;gait belt;nonskid shoes/slippers when out of bed  -AF    Recorded by [MG] Megan Gosselin, PT [AF] Veda Shukla OTR [AF] Veda Shukla OTR    Bed Mobility, Assessment/Treatment    Bed Mob, Supine to Sit, [place]   minimum assist (75% patient effort);verbal cues required;nonverbal cues required (demo/gesture)  -AF    Bed Mobility, Comment not tested, up in chair   -MG sitting up in w/c   -AF     Recorded by [MG] Megan Gosselin, PT [AF] Veda Shukla OTR [AF] Veda Shukla OTR    Transfer Assessment/Treatment    Transfers, Sit-Stand [place] contact guard assist;verbal cues required  -MG  contact guard assist  -AF    Transfers, Stand-Sit [place] contact guard assist;verbal cues required  -MG  contact guard assist  -AF    Transfers, Sit-Stand-Sit, Assist Device --   4WW  -MG      Toilet Transfer, [place]   minimum assist (75% patient effort);contact guard assist;verbal cues required  -AF    Toilet Transfer, Assistive Device   --   rollator, grab bars  -AF    Transfer, Safety Issues step length decreased;balance decreased during turns  -MG      Transfer, Impairments strength decreased;impaired balance  -MG      Recorded by [MG] Megan Gosselin, PT  [AF] Veda Shukla OTR    Gait Assessment/Treatment    Gait, [place] Level contact guard assist  -MG      Gait, Assistive Device rollator  -MG      Gait, Distance (Feet) 160  -MG      Gait, Gait Deviations ray[place] decreased;forward flexed posture;step length decreased  -MG      Gait, Safety Issues step length decreased;balance decreased during turns  -MG      Gait, Impairments strength decreased;impaired balance  -MG      Gait, Comment cues for upright posture  -MG      Recorded by [MG] Megan Gosselin, PT      Functional Mobility    Functional Mobility- Comment   pt walked from EOB to commode with rollator with CGA  -AF    Recorded by   [AF] Veda Shukla OTR    Upper Body Bathing  Assessment/Training    UB Bathing Assess/Train, Position   sitting;sink side  -AF    UB Bathing Assess/Train, [place] Level   set up required  -AF    Recorded by   [AF] Veda Shukla OTR    Lower Body Bathing Assessment/Training    LB Bathing Assess/Train, Comment   NSG assisted with LB  -AF    Recorded by   [AF] Veda Shukla OTR    Lower Body Dressing Assessment/Training    LB Dressing Assess/Train, Comment  pt unlaced shoes, therapist laced with elastic shoe laces. pt donned shoes with min A  -AF donned TEDs bed level  -AF    Recorded by  [AF] Veda Shukla OTR [AF] Veda Shukla OTR    Toileting Assessment/Training    Toileting Assess/Train, Assistive Device   grab bars;raised toilet seat  -AF    Toileting Assess/Train, Position   standing;sitting  -AF    Toileting Assess/Train, [place] Level   contact guard assist   hygiene only  -AF    Recorded by   [AF] CINDY Orosco    Grooming Assessment/Training    Grooming Assess/Train, Position   sitting;sink side  -AF    Grooming Assess/Train, [place] Level   supervision required  -AF    Grooming Assess/Train, Comment   w/c level  -AF    Recorded by   [AF] Veda Shukla OTR    Therapy Exercises    Bilateral Lower Extremities AROM:;15 reps;sitting;hip abduction/adduction;LAQ;hip flexion  -MG      Bilateral Upper Extremity  AROM:;15 reps;sitting;shoulder protraction/retraction;shoulder extension/flexion   #1 dowel natalee, 3 sets, hand gripper red band  -AF AROM:;15 reps;sitting;elbow flexion/extension;hand pumps;pronation/supination   3 sets, #2 hand weight  -AF    Recorded by [MG] Megan Gosselin, PT [AF] Veda Shukla OTR [AF] Veda Shukla OTR    Functional Endurance    Detail (Functional Endurance)   Fair plus  -AF    Recorded by   [AF] CINDY Orosco    Positioning and Restraints    Pre-Treatment Position sitting in chair/recliner  -MG sitting in chair/recliner  -AF in bed  -AF    Post Treatment Position wheelchair  -MG  wheelchair  -AF wheelchair  -AF    In Wheelchair sitting;call light within reach;encouraged to call for assist  -MG sitting;with PT  -AF sitting;encouraged to call for assist;call light within reach;exit alarm on   set up with lunch after 2nd session  -AF    Bathroom   sitting;encouraged to call for assist;call light within reach;notified nsg   in bathroom with Aide finishing ADL  -AF    Recorded by [MG] Megan Gosselin, PT [AF] Veda Shukla, OTR [AF] Veda Shukla, OTR      01/20/18 1031          Rehab Assessment/Intervention    Discipline physical therapist  -LB      Document Type therapy note (daily note)  -LB      Subjective Information agree to therapy  -LB      Patient Effort, Rehab Treatment good  -LB      Precautions/Limitations fall precautions;brace on when up;spinal precautions  -LB      Recorded by [LB] Mitzy uTrner PT      Pain Assessment    Pain Assessment 0-10  -LB      Pain Score 5  -LB      Post Pain Score 5  -LB      Pain Type Chronic pain  -LB      Pain Location Hip  -LB      Pain Orientation Right  -LB      Recorded by [LB] Mitzy Turner PT      Cognitive Assessment/Intervention    Personal Safety WNL/WFL  -LB      Personal Safety Interventions fall prevention program maintained;gait belt;muscle strengthening facilitated;nonskid shoes/slippers when out of bed  -LB      Recorded by [LB] Mitzy Turner PT      Bed Mobility, Assessment/Treatment    Bed Mobility, Assistive Device bed rails  -LB      Bed Mob, Sit to [place], [place] verbal cues required;minimum assist (75% patient effort)  -LB      Recorded by [LB] Mitzy Turner PT      Transfer Assessment/Treatment    Transfers, Sit-Stand [place] verbal cues required;contact guard assist  -LB      Transfers, Stand-Sit [place] verbal cues required;contact guard assist  -LB      Transfers, Sit-Stand-Sit, Assist Device rolling walker  -LB      Recorded by [LB] Mitzy Turner PT      Gait Assessment/Treatment    Gait, [place]  Level contact guard assist  -LB      Gait, Assistive Device rollator  -LB      Gait, Distance (Feet) 100   times 2 75 ft times 2  -LB      Gait, Gait Deviations bilateral:;[place] decreased;decreased heel strike;forward flexed posture;toe-to-floor clearance decreased  -LB      Gait, Comment backwards walking with rollator and CGA  -LB      Recorded by [LB] Mitzy Turner PT      Stairs Assessment/Treatment    Stairs, Comment up/down ramp with rollator and Min  -LB      Recorded by [LB] Mitzy Turner PT      Balance Skills Training    Gait Balance-Level of Assistance Contact guard  -LB      Gait Balance Support odell bar;Left upper extremity supported;Right upper extremity supported  -LB      Gait Balance Activities side-stepping  -LB      Recorded by [PAYTON] Mitzy uTrner PT      Therapy Exercises    Bilateral Lower Extremities AROM:;10 reps;sitting;hip ER;hip IR;LAQ;ankle pumps/circles  -LB      BLE Other Reps 10   standing at hemibars, rest break needed  -LB      Recorded by [LB] Mitzy Turner PT      Orthosis Location    Orthosis Location/Type neck/back  -LB      Orthosis, Neck/Back LSO (lumbar sacral orthosis)  -LB      Recorded by [LB] Mitzy Turner PT      Orthosis Management/Training    Orthosis Skills Training donning orthosis;doffing orthosis;restrictions/precautions;clothing management related to orthosis  -LB      Orthosis Skills Training Comment Min A to don/doff  -LB      Orthosis Wear Schedule wear when out of bed only  -LB      Recorded by [LB] Mitzy Turner PT      Positioning and Restraints    Pre-Treatment Position sitting in chair/recliner  -LB      Post Treatment Position wheelchair  -LB      In Wheelchair call light within reach;encouraged to call for assist;exit alarm on  -LB      Recorded by [LB] Mitzy Turner PT        User Key  (r) = Recorded By, (t) = Taken By, (c) = Cosigned By    Initials Name Effective Dates    PAYTON Turner PT 10/06/15 -     AF Veda Shukla, OTR 12/01/15 -     MG  Megan Gosselin, PT 09/13/17 -                 OT Goals       01/19/18 1530 01/16/18 1309 01/16/18 1219    Transfer Training OT STG    Transfer Training OT STG, Date Established 01/19/18  -AF      Transfer Training OT STG, Time to Achieve 1 wk  -AF      Transfer Training OT STG, Activity Type toilet  -AF      Transfer Training OT STG, [place] Level contact guard assist;verbal cues required  -AF      Transfer Training OT STG, Assist Device walker, rolling  -AF      Transfer Training OT STG, Additional Goal grab bars  -AF      Transfer Training OT STG, Outcome goal ongoing  -AF      Transfer Training OT LTG    Transfer Training OT LTG, Date Established 01/19/18  -AF  01/16/18  -HC    Transfer Training OT LTG, Time to Achieve by discharge  -AF  1 wk  -HC    Transfer Training OT LTG, Activity Type toilet  -AF  bed to chair /chair to bed;sit to stand/stand to sit;toilet  -HC    Transfer Training OT LTG, [place] Level supervision required;conditional independence  -AF  supervision required  -HC    Transfer Training OT LTG, Assist Device walker, rolling  -AF  walker, rolling  -HC    Transfer Training OT LTG, Additional Goal grab bars  -AF      Transfer Training OT LTG, Outcome goal ongoing  -AF  goal revised  -HC    Transfer Training 2 OT STG    Transfer Training 2 OT STG, Date Established 01/19/18  -AF      Transfer Training 2 OT STG, Time to Achieve 1 wk  -AF      Transfer Training 2 OT STG, Activity Type shower chair;walk-in shower  -AF      Transfer Training 2 OT STG, [place] Level contact guard assist;verbal cues required  -AF      Transfer Training 2 OT STG, Assist Device walker, rolling;shower chair  -AF      Transfer Training 2 OT STG, Outcome goal ongoing  -AF      Transfer Training 2 OT LTG    Transfer Training 2 OT LTG, Date Established 01/19/18  -AF      Transfer Training 2 OT LTG, Time to Achieve by discharge  -AF      Transfer Training 2 OT LTG, Activity Type shower chair;walk-in shower  -AF       Transfer Training 2 OT LTG, [place] Level supervision required  -AF      Transfer Training 2 OT LTG, Assist Device walker, rolling;shower chair  -AF      Transfer Training 2 OT LTG, Outcome goal ongoing  -AF      Strength OT LTG    Strength Goal OT LTG, Date Established   01/16/18  -HC    Strength Goal OT LTG, Time to Achieve   1 wk  -HC    Strength Goal OT LTG, Outcome   goal revised  -HC    Patient Education OT STG    Patient Education OT STG, Date Established 01/19/18  -AF      Patient Education OT STG, Time to Achieve 1 wk  -AF      Patient Education OT STG, Education Type precautions per surgeon;brace use/care  -AF      Patient Education OT STG, Education Understanding demonstrates adequately  -AF      Patient Education OT STG, Additional Goal with ADLs  -AF      Patient Education OT STG Outcome goal ongoing  -AF      Patient Education OT LTG    Patient Education OT LTG, Date Established 01/19/18  -AF 01/16/18  -HC     Patient Education OT LTG, Time to Achieve by discharge  -AF 1 wk  -HC     Patient Education OT LTG, Education Type HEP;precautions per surgeon;brace use/care;home safety;adaptive equipment mgmt  -AF precautions per surgeon;brace use/care;positioning;posture/body mechanics;adaptive equipment mgmt  -HC     Patient Education OT LTG, Education Understanding demonstrates adequately;independent  -AF      Patient Education OT LTG Outcome goal ongoing  -AF      ADL OT STG    ADL OT STG, Date Established 01/19/18  -AF      ADL OT STG, Time to Achieve 1 wk  -AF      ADL OT STG, Activity Type ADL skills  -AF      ADL OT STG, [place] Level standby assist;min assist  -AF      ADL OT STG, Additional Goal with LH reacher  -AF      ADL OT STG, Outcome goal ongoing  -AF      ADL OT LTG    ADL OT LTG, Date Established 01/19/18  -AF 01/16/18  -HC     ADL OT LTG, Time to Achieve by discharge  -AF 1 wk  -HC     ADL OT LTG, Activity Type ADL skills  -AF ADL skills  -HC     ADL OT LTG, [place] Level  modified independent;standby assist  -AF standby assist  -HC     ADL OT LTG, Additional Goal with AE  -AF      ADL OT LTG, Outcome goal ongoing  -AF      Functional Mobility OT STG    Functional Mobility Goal OT STG, Date Established 01/19/18  -AF      Functional Mobility Goal OT STG, Time to Achieve 1 wk  -AF      Functional Mobility Goal OT STG, [place] Level contact guard  -AF      Functional Mobility Goal OT STG, Assist Device rolling walker  -AF      Functional Mobility Goal OT STG, Distance to Achieve to the bathroom  -AF      Functional Mobility Goal OT STG, Outcome goal ongoing  -AF      Functional Mobility OT LTG    Functional Mobility Goal OT LTG, Date Established 01/19/18  -AF  01/16/18  -HC    Functional Mobility Goal OT LTG, Time to Achieve by discharge  -AF  1 wk  -HC    Functional Mobility Goal OT LTG, [place] Level modified independent;independent with device;supervision  -AF  supervision  -HC    Functional Mobility Goal OT LTG, Assist Device rolling walker  -AF      Functional Mobility Goal OT LTG, Distance to Achieve to the bathroom  -AF      Functional Mobility Goal OT LTG, Outcome goal ongoing  -AF  goal revised  -HC      01/09/18 1613          Transfer Training OT LTG    Transfer Training OT LTG, Date Established 01/09/18  -MR      Transfer Training OT LTG, Time to Achieve 1 wk  -MR      Transfer Training OT LTG, Activity Type bed to chair /chair to bed;toilet  -MR      Transfer Training OT LTG, [place] Level conditional independence  -MR      Transfer Training OT LTG, Assist Device walker, rolling  -MR      Strength OT LTG    Strength Goal OT LTG, Date Established 01/09/18  -MR      Strength Goal OT LTG, Time to Achieve 1 wk  -MR      Strength Goal OT LTG, Functional Goal Pt to perform BUE AROM x10 reps and 3 sets to increase UE strength for ADLs.  -MR      Functional Mobility OT LTG    Functional Mobility Goal OT LTG, Date Established 01/09/18  -MR      Functional Mobility Goal  OT LTG, Time to Achieve 1 wk  -MR      Functional Mobility Goal OT LTG, [place] Level modified independent  -MR      Functional Mobility Goal OT LTG, Assist Device rolling walker  -MR      Functional Mobility Goal OT LTG, Distance to Achieve to the sink;to the bathroom  -MR        User Key  (r) = Recorded By, (t) = Taken By, (c) = Cosigned By    Initials Name Provider Type    AF Veda Shukla, OTR Occupational Therapist    MR Erin Genao, OT Occupational Therapist    HC Margy Burks, OTR Occupational Therapist          Occupational Therapy Education     Title: PT OT SLP Therapies (Done)     Topic: Occupational Therapy (Done)     Point: ADL training (Done)    Description: Instruct learner(s) on proper safety adaptation and remediation techniques during self care or transfers.   Instruct in proper use of assistive devices.    Learning Progress Summary    Learner Readiness Method Response Comment Documented by Status   Patient Acceptance EELAN NR edcuated on log rolling and spinal precautions during ADL tasks. will introduce AE with ADLs to increase her independence AF 01/19/18 1529 Done    Acceptance E VU  MM 01/19/18 0029 Done               Point: Home exercise program (Done)    Description: Instruct learner(s) on appropriate technique for monitoring, assisting and/or progressing therapeutic exercises/activities.    Learning Progress Summary    Learner Readiness Method Response Comment Documented by Status   Patient Acceptance E VU  MM 01/19/18 0029 Done               Point: Precautions (Done)    Description: Instruct learner(s) on prescribed precautions during self-care and functional transfers.    Learning Progress Summary    Learner Readiness Method Response Comment Documented by Status   Patient Acceptance E VU edcuated on spinal precautions with ADLs AF 01/22/18 0914 Done    Acceptance E,ELAN PETERSNR edcuated on log rolling and spinal precautions during ADL tasks. will introduce AE with ADLs to  increase her independence  01/19/18 1529 Done    Acceptance E [facility] 01/19/18 0029 Done               Point: Body mechanics (Done)    Description: Instruct learner(s) on proper positioning and spine alignment during self-care, functional mobility activities and/or exercises.    Learning Progress Summary    Learner Readiness Method Response Comment Documented by Status   Patient Acceptance E [facility] 01/19/18 0029 Done                      User Key     Initials Effective Dates Name Provider Type Discipline     12/01/15 -  CINDY Orosco Occupational Therapist OT     10/30/17 -  Nano Lama RN Registered Nurse Nurse                  OT Recommendation and Plan  Anticipated Discharge Disposition: home with home health, home with assist  Planned Therapy Interventions: adaptive equipment training, activity intolerance, ADL retraining, balance training, fine motor coordination training, home exercise program, strengthening, transfer training  Therapy Frequency: 3-5 times/wk          Time Calculation:         Time Calculation- OT       01/22/18 1405 01/22/18 0914       Time Calculation- OT    OT Start Time 1245  -AF 0800  -AF     OT Stop Time 1330  -AF 0900  -AF     OT Time Calculation (min) 45 min  -AF 60 min  -AF       User Key  (r) = Recorded By, (t) = Taken By, (c) = Cosigned By    Initials Name Provider Type    AF CINDY Orosco Occupational Therapist           Therapy Charges for Today     Code Description Service Date Service Provider Modifiers Qty    69688869040 HC OT SELF CARE/MGMT/TRAIN EA 15 MIN 1/22/2018 CINDY Orosco GO 4    73879587386 HC OT SELF CARE/MGMT/TRAIN EA 15 MIN 1/22/2018 CINDY Orosco GO 1    14323667773 HC OT THER PROC EA 15 MIN 1/22/2018 CINDY Orosco GO 3               CINDY Orosco  1/22/2018

## 2018-01-22 NOTE — THERAPY TREATMENT NOTE
Inpatient Rehabilitation - Physical Therapy Treatment Note  [facility]     Patient Name: Raquel Gonsales  : 1945  MRN: [id number]  Today's Date: 2018  Onset of Illness/Injury or Date of Surgery Date: 18  Date of Referral to PT: 18  Referring Physician: Charan    Admit Date: 2018    Visit Dx:  No diagnosis found.  Patient Active Problem List   Diagnosis   • Cervical spondylosis with radiculopathy   • Bilateral hip pain   • Trochanteric bursitis of both hips   • Status post bilateral total hip replacement   • S/P lumbar fusion   • Spinal stenosis of lumbar region with neurogenic claudication   • Lumbar spinal stenosis   • TIA (transient ischemic attack)   • Obesity   • Recurrent falls   • DM2 (diabetes mellitus, type 2)   • HTN (hypertension)   • COPD (chronic obstructive pulmonary disease)   • Decreased mobility   • Spinal stenosis               Adult Rehabilitation Note       18 1402 18 0923 18 0910    Rehab Assessment/Intervention    Discipline occupational therapist  -AF physical therapist  -LB occupational therapist  -AF    Document Type therapy note (daily note)  -AF therapy note (daily note)  -LB therapy note (daily note)  -AF    Subjective Information agree to therapy  -AF agree to therapy  -LB agree to therapy;no complaints  -AF    Patient Effort, Rehab Treatment good  -AF good  -LB good  -AF    Precautions/Limitations brace on when up;fall precautions;spinal precautions  -AF brace on when up;fall precautions;spinal precautions  -LB brace on when up;fall precautions;spinal precautions  -AF    Recorded by [AF] Veda Shukla OTR [LB] Mitzy Turner PT [AF] Veda Shukla OTR    Pain Assessment    Pain Assessment No/denies pain  -AF 0-10  -LB No/denies pain  -AF    Pain Score  6  -LB     Post Pain Score  6  -LB     Pain Type  Chronic pain  -LB     Pain Location  Hip  -LB     Pain Orientation  Left;Right  -LB     Pain Intervention(s)  Ambulation/increased  activity  -LB     Response to Interventions  tolerated  -LB     Recorded by [AF] Veda Shukla, OTR [LB] Mitzy Turner, PT [AF] Veda Shukla OTR    Vision Assessment/Intervention    Visual Impairment WNL  -AF      Recorded by [AF] Veda Shukla OTR      Cognitive Assessment/Intervention    Current Cognitive/Communication Assessment functional  -AF  functional  -AF    Orientation Status oriented x 4  -AF  oriented x 4  -AF    Follows Commands/Answers Questions 100% of the time  -AF  100% of the time  -AF    Personal Safety WNL/WFL  -AF WNL/WFL  -LB WNL/WFL  -AF    Personal Safety Interventions fall prevention program maintained;gait belt;nonskid shoes/slippers when out of bed  -AF fall prevention program maintained;gait belt;muscle strengthening facilitated;nonskid shoes/slippers when out of bed  -LB fall prevention program maintained;gait belt;nonskid shoes/slippers when out of bed  -AF    Recorded by [AF] Veda Shukla, OTR [LB] Mitzy Turner, PT [AF] Veda Shukla, ESTELLAR    Bed Mobility, Assessment/Treatment    Bed Mob, Supine to Sit, [place]   contact guard assist;verbal cues required   bed rail  -AF    Bed Mob, Sit to Sidelying, [place]  verbal cues required;minimum assist (75% patient effort)  -LB     Bed Mobility, Comment  pt needs cues to maintain back precautions  -LB     Recorded by  [LB] Mitzy Turner, PT [AF] Veda Shukla, ESTELLAR    Transfer Assessment/Treatment    Transfers, Bed-Chair [place]  contact guard assist  -LB     Transfers, Sit-Stand [place] stand by assist  -AF stand by assist  -LB stand by assist;verbal cues required  -AF    Transfers, Stand-Sit [place] stand by assist  -AF stand by assist  -LB stand by assist;verbal cues required  -AF    Transfers, Sit-Stand-Sit, Assist Device  rolling walker  -LB --   rollator  -AF    Toilet Transfer, [place] contact guard assist;verbal cues required;supervision required  -AF  contact guard assist  -AF    Toilet  Transfer, Assistive Device --   rollator  -AF  --   rollator and grab bar  -AF    Recorded by [AF] CINDY Orosco [LB] Mitzy Turner, PT [AF] CINDY Orosco    Gait Assessment/Treatment    Gait, [place] Level  stand by assist;contact guard assist  -LB     Gait, Assistive Device  rollator  -LB     Gait, Distance (Feet)  200   160, 100  -LB     Gait, Gait Deviations  decreased heel strike;forward flexed posture  -LB     Gait, Safety Issues  step length decreased  -LB     Gait, Impairments  strength decreased  -LB     Gait, Comment  cues for upright posture  -LB     Recorded by  [LB] Mitzy Turner, PT     Functional Mobility    Functional Mobility- Comment walked to and from bathroom with rollaotr with SBA/CGA  -AF  walked from EOB to commode with rollator and CGA  -AF    Recorded by [AF] CINDY Orosco  [AF] CINDY Orosco    Upper Body Bathing Assessment/Training    UB Bathing Assess/Train, Position   sitting;sink side  -AF    UB Bathing Assess/Train, [place] Level   set up required  -AF    Recorded by   [AF] CINDY Orosco    Lower Body Bathing Assessment/Training    LB Bathing Assess/Train, Position   sink side;standing;sitting  -AF    LB Bathing Assess/Train, [place] Level   minimum assist (75% patient effort);verbal cues required  -AF    LB Bathing Assess/Train, Comment   assist with feet  -AF    Recorded by   [AF] CINDY Orosco    Upper Body Dressing Assessment/Training    UB Dressing Assess/Train, Position   sitting;sink side  -AF    UB Dressing Assess/Train, [place]   supervision required  -AF    UB Dressing Assess/Train, Comment   pt able to don brace seated on EOB and w/c level with set up  -AF    Recorded by   [AF] CINDY Orosco    Lower Body Dressing Assessment/Training    LB Dressing Assess/Train, Position   sitting;standing;sink side  -AF    LB Dressing Assess/Train, [place]   moderate assist (50% patient effort)  -AF    LB  Dressing Assess/Train, Comment   assist with TEDS, and heel management on shoes. edcuated on spinal precautions with ADLs  -AF    Recorded by   [AF] CINDY Orosco    Toileting Assessment/Training    Toileting Assess/Train, Assistive Device   grab bars;raised toilet seat  -AF    Toileting Assess/Train, Position   standing;sitting  -AF    Toileting Assess/Train, [place] Level   supervision required  -AF    Recorded by   [AF] CINDY Orosco    Grooming Assessment/Training    Grooming Assess/Train, Position   sitting;sink side  -AF    Grooming Assess/Train, [place] Level   supervision required  -AF    Grooming Assess/Train, Comment stood at sink to wash hands  -AF  w/c level  -AF    Recorded by [AF] CINDY Orosco  [AF] CINDY Orosco    Balance Skills Training    Sitting-Level of Assistance   Distant supervision;Independent  -AF    Standing-Level of Assistance Close supervision  -AF  Contact guard;Close supervision  -AF    Static Standing Balance Support assistive device  -AF  assistive device  -AF    Standing-Balance Activities Ball toss  -AF  --   ADLs  -AF    Standing Balance # of Minutes 2mins x 2 sets  -AF  3-5 mins x 2  -AF    Gait Balance-Level of Assistance  Contact guard  -LB     Gait Balance Support  odell bar  -LB     Gait Balance Activities  side-stepping;backwards  -LB     Recorded by [AF] CINDY Orosco [LB] Mitzy Turner, PT [AF] CINDY Orosco    Therapy Exercises    Bilateral Lower Extremities  AROM:;10 reps;sitting  -LB     BLE Other Reps  15  -LB     Bilateral Upper Extremity AROM:;15 reps;sitting;elbow flexion/extension;hand pumps;pronation/supination;shoulder protraction/retraction   #1 dowel natalee, hand weight, hand gripper, 3 sets  -AF      Recorded by [AF] CINDY Orosco [LB] Mitzy Turner, PT     Functional Endurance    Detail (Functional Endurance) arm bike standing 2 mins   -AF  good with ADLs  -AF    Recorded by [AF] CINDY Orosco  [AF]  Veda Shukla, OTR    Orthosis Location    Orthosis, Neck/Back  LSO (lumbar sacral orthosis)  -LB     Recorded by  [LB] Mitzy Turner PT     Orthosis Management/Training    Orthosis Wear Schedule  wear when out of bed only  -LB     Recorded by  [LB] Mitzy Turner PT     Positioning and Restraints    Pre-Treatment Position sitting in chair/recliner  -AF sitting in chair/recliner  -LB in bed  -AF    Post Treatment Position wheelchair  -AF wheelchair  -LB wheelchair  -AF    In Bed  with other staff  -LB     In Wheelchair sitting;with PT  -AF call light within reach;encouraged to call for assist  -LB sitting;with PT  -AF    Recorded by [AF] CINDY Orosco [LB] Mitzy Turner PT [AF] Veda Shukla OTR      01/21/18 0928 01/20/18 1307 01/20/18 1147    Rehab Assessment/Intervention    Discipline physical therapist  -MG occupational therapist  -AF occupational therapist  -AF    Document Type therapy note (daily note)  -MG therapy note (daily note)  -AF therapy note (daily note)  -AF    Subjective Information agree to therapy;no complaints  -MG agree to therapy;complains of;fatigue  -AF agree to therapy;complains of;fatigue   fatigue in second session  -AF    Patient Effort, Rehab Treatment good  -MG good  -AF good  -AF    Symptoms Noted During/After Treatment fatigue  -MG      Precautions/Limitations brace on when up;fall precautions;spinal precautions  -MG brace on when up;fall precautions;spinal precautions  -AF brace on when up;fall precautions;spinal precautions  -AF    Recorded by [MG] Megan Gosselin, PT [AF] Veda Shukla, OTR [AF] Veda Shukla OTR    Pain Assessment    Pain Assessment No/denies pain  -MG No/denies pain  -AF No/denies pain  -AF    Recorded by [MG] Megan Gosselin, PT [AF] Veda Shukla, OTR [AF] Veda Shukla OTR    Cognitive Assessment/Intervention    Current Cognitive/Communication Assessment functional  -MG functional  -AF functional  -AF    Orientation Status oriented x  4  -MG oriented x 4  -AF oriented x 4  -AF    Follows Commands/Answers Questions 100% of the time  -% of the time;able to follow single-step instructions  -% of the time;able to follow single-step instructions  -AF    Personal Safety WNL/WFL  -MG WNL/WFL  -AF WNL/WFL  -AF    Personal Safety Interventions fall prevention program maintained;gait belt  -MG fall prevention program maintained;gait belt;nonskid shoes/slippers when out of bed  -AF fall prevention program maintained;gait belt;nonskid shoes/slippers when out of bed  -AF    Recorded by [MG] Megan Gosselin, PT [AF] Veda Shukla, OTR [AF] Veda Shukla, OTR    Bed Mobility, Assessment/Treatment    Bed Mob, Supine to Sit, [place]   minimum assist (75% patient effort);verbal cues required;nonverbal cues required (demo/gesture)  -AF    Bed Mobility, Comment not tested, up in chair   -MG sitting up in w/c   -AF     Recorded by [MG] Megan Gosselin, PT [AF] Veda Shukla, OTR [AF] Veda Shukla, OTR    Transfer Assessment/Treatment    Transfers, Sit-Stand [place] contact guard assist;verbal cues required  -MG  contact guard assist  -AF    Transfers, Stand-Sit [place] contact guard assist;verbal cues required  -MG  contact guard assist  -AF    Transfers, Sit-Stand-Sit, Assist Device --   4WW  -MG      Toilet Transfer, [place]   minimum assist (75% patient effort);contact guard assist;verbal cues required  -AF    Toilet Transfer, Assistive Device   --   rollator, grab bars  -AF    Transfer, Safety Issues step length decreased;balance decreased during turns  -MG      Transfer, Impairments strength decreased;impaired balance  -MG      Recorded by [MG] Megan Gosselin, PT  [AF] Veda Shukla, OTR    Gait Assessment/Treatment    Gait, [place] Level contact guard assist  -MG      Gait, Assistive Device rollator  -MG      Gait, Distance (Feet) 160  -MG      Gait, Gait Deviations raymond decreased;forward flexed posture;step  length decreased  -MG      Gait, Safety Issues step length decreased;balance decreased during turns  -MG      Gait, Impairments strength decreased;impaired balance  -MG      Gait, Comment cues for upright posture  -MG      Recorded by [MG] Megan Gosselin, PT      Functional Mobility    Functional Mobility- Comment   pt walked from EOB to commode with rollator with CGA  -AF    Recorded by   [AF] Veda Shukla OTR    Upper Body Bathing Assessment/Training    UB Bathing Assess/Train, Position   sitting;sink side  -AF    UB Bathing Assess/Train, [place] Level   set up required  -AF    Recorded by   [AF] Veda Shukla OTR    Lower Body Bathing Assessment/Training    LB Bathing Assess/Train, Comment   NSG assisted with LB  -AF    Recorded by   [AF] Veda Shukla OTR    Lower Body Dressing Assessment/Training    LB Dressing Assess/Train, Comment  pt unlaced shoes, therapist laced with elastic shoe laces. pt donned shoes with min A  -AF donned TEDs bed level  -AF    Recorded by  [AF] Veda Shukla OTR [AF] Veda Shukla OTR    Toileting Assessment/Training    Toileting Assess/Train, Assistive Device   grab bars;raised toilet seat  -AF    Toileting Assess/Train, Position   standing;sitting  -AF    Toileting Assess/Train, [place] Level   contact guard assist   hygiene only  -AF    Recorded by   [AF] CINDY Orosco    Grooming Assessment/Training    Grooming Assess/Train, Position   sitting;sink side  -AF    Grooming Assess/Train, [place] Level   supervision required  -AF    Grooming Assess/Train, Comment   w/c level  -AF    Recorded by   [AF] CINDY Orosco    Therapy Exercises    Bilateral Lower Extremities AROM:;15 reps;sitting;hip abduction/adduction;LAQ;hip flexion  -MG      Bilateral Upper Extremity  AROM:;15 reps;sitting;shoulder protraction/retraction;shoulder extension/flexion   #1 dowel natalee, 3 sets, hand gripper red band  -AF AROM:;15 reps;sitting;elbow flexion/extension;hand  pumps;pronation/supination   3 sets, #2 hand weight  -AF    Recorded by [MG] Megan Gosselin, PT [AF] Veda Shukla, OTR [AF] Veda Shukla, OTR    Functional Endurance    Detail (Functional Endurance)   Fair plus  -AF    Recorded by   [AF] Veda Shukla OTR    Positioning and Restraints    Pre-Treatment Position sitting in chair/recliner  -MG sitting in chair/recliner  -AF in bed  -AF    Post Treatment Position wheelchair  -MG wheelchair  -AF wheelchair  -AF    In Wheelchair sitting;call light within reach;encouraged to call for assist  -MG sitting;with PT  -AF sitting;encouraged to call for assist;call light within reach;exit alarm on   set up with lunch after 2nd session  -AF    Bathroom   sitting;encouraged to call for assist;call light within reach;notified nsg   in bathroom with Aide finishing ADL  -AF    Recorded by [MG] Megan Gosselin, HIRAM [AF] Veda Shukla, OTR [AF] Veda Shukla, OTR      01/20/18 1031          Rehab Assessment/Intervention    Discipline physical therapist  -LB      Document Type therapy note (daily note)  -LB      Subjective Information agree to therapy  -LB      Patient Effort, Rehab Treatment good  -LB      Precautions/Limitations fall precautions;brace on when up;spinal precautions  -LB      Recorded by [LB] Mitzy Turner PT      Pain Assessment    Pain Assessment 0-10  -LB      Pain Score 5  -LB      Post Pain Score 5  -LB      Pain Type Chronic pain  -LB      Pain Location Hip  -LB      Pain Orientation Right  -LB      Recorded by [LB] Mitzy Turner PT      Cognitive Assessment/Intervention    Personal Safety WNL/WFL  -LB      Personal Safety Interventions fall prevention program maintained;gait belt;muscle strengthening facilitated;nonskid shoes/slippers when out of bed  -LB      Recorded by [LB] Mitzy Turner PT      Bed Mobility, Assessment/Treatment    Bed Mobility, Assistive Device bed rails  -LB      Bed Mob, Sit to Sidelying, [place] verbal cues  required;minimum assist (75% patient effort)  -LB      Recorded by [LB] Mitzy Turner PT      Transfer Assessment/Treatment    Transfers, Sit-Stand [place] verbal cues required;contact guard assist  -LB      Transfers, Stand-Sit [place] verbal cues required;contact guard assist  -LB      Transfers, Sit-Stand-Sit, Assist Device rolling walker  -LB      Recorded by [LB] Mitzy Turner PT      Gait Assessment/Treatment    Gait, [place] Level contact guard assist  -LB      Gait, Assistive Device rollator  -LB      Gait, Distance (Feet) 100   times 2 75 ft times 2  -LB      Gait, Gait Deviations bilateral:;[place] decreased;decreased heel strike;forward flexed posture;toe-to-floor clearance decreased  -LB      Gait, Comment backwards walking with rollator and CGA  -LB      Recorded by [LB] Mitzy Turner PT      Stairs Assessment/Treatment    Stairs, Comment up/down ramp with rollator and Min  -LB      Recorded by [LB] Mitzy Turner PT      Balance Skills Training    Gait Balance-Level of Assistance Contact guard  -LB      Gait Balance Support odell bar;Left upper extremity supported;Right upper extremity supported  -LB      Gait Balance Activities side-stepping  -LB      Recorded by [LB] Mitzy Turner PT      Therapy Exercises    Bilateral Lower Extremities AROM:;10 reps;sitting;hip ER;hip IR;LAQ;ankle pumps/circles  -LB      BLE Other Reps 10   standing at hemibars, rest break needed  -LB      Recorded by [LB] Mitzy Tunrer PT      Orthosis Location    Orthosis Location/Type neck/back  -LB      Orthosis, Neck/Back LSO (lumbar sacral orthosis)  -LB      Recorded by [LB] Mitzy Turner PT      Orthosis Management/Training    Orthosis Skills Training donning orthosis;doffing orthosis;restrictions/precautions;clothing management related to orthosis  -LB      Orthosis Skills Training Comment Min A to don/doff  -LB      Orthosis Wear Schedule wear when out of bed only  -LB      Recorded by [LB] Mitzy Turner, PT       Positioning and Restraints    Pre-Treatment Position sitting in chair/recliner  -LB      Post Treatment Position wheelchair  -LB      In Wheelchair call light within reach;encouraged to call for assist;exit alarm on  -LB      Recorded by [LB] Mitzy Turner, PT        User Key  (r) = Recorded By, (t) = Taken By, (c) = Cosigned By    Initials Name Effective Dates    LB Mitzy Turner, PT 10/06/15 -     AF Veda Shukla, OTR 12/01/15 -     MG Megan Gosselin, PT 09/13/17 -                 IP PT Goals       01/19/18 1546 01/15/18 1620 01/09/18 0843    Bed Mobility PT LTG    Bed Mobility PT LTG, Date Established 01/19/18  -LB 01/15/18  -KH 01/09/18  -MG    Bed Mobility PT LTG, Time to Achieve 1 wk  -LB 1 wk  -KH 4 days  -MG    Bed Mobility PT LTG, Activity Type sidelying to sit/sit to sidelying;roll left/roll right  -LB all bed mobility  -KH all bed mobility  -MG    Bed Mobility PT LTG, [place] Level independent  -LB minimum assist (75% patient effort)  -KH independent  -MG    Transfer Training PT LTG    Transfer Training PT LTG, Date Established 01/19/18  -LB 01/15/18  -KH 01/09/18  -MG    Transfer Training PT LTG, Time to Achieve 1 wk  -LB 1 wk  -KH 4 days  -MG    Transfer Training PT LTG, Activity Type sit to stand/stand to sit  -LB all transfers  -KH bed to chair /chair to bed;sit to stand/stand to sit  -MG    Transfer Training PT LTG, [place] Level conditional independence  -LB minimum assist (75% patient effort)  -KH conditional independence  -MG    Transfer Training PT LTG, Assist Device walker, rolling  -LB walker, rolling  -KH walker, rolling  -MG    Transfer Training 2 PT LTG    Transfer Training PT 2 LTG, Time to Achieve 1 wk  -LB      Transfer Training PT 2 LTG, Activity Type --   car transfer  -LB      Transfer Training PT 2 LTG, [place] Level supervision required  -LB      Transfer Training PT 2 LTG, Assist Device walker, rolling  -LB      Gait Training PT LTG    Gait Training Goal PT LTG,  Date Established 01/19/18  -LB 01/15/18  -KH 01/09/18  -MG    Gait Training Goal PT LTG, Time to Achieve 1 wk  -LB 1 wk  -KH 4 days  -MG    Gait Training Goal PT LTG, [place] Level conditional independence  -LB minimum assist (75% patient effort)  -KH conditional independence  -MG    Gait Training Goal PT LTG, Assist Device walker, rolling  -LB walker, rolling  -KH walker, rolling  -MG    Gait Training Goal PT LTG, Distance to Achieve 200  -LB  50 ft  -  -MG    Stair Training PT LTG    Stair Training Goal PT LTG, Date Established 01/19/18  -LB      Stair Training Goal PT LTG, Time to Achieve 1 wk  -LB      Stair Training Goal PT LTG, Number of Steps 4  -LB      Stair Training Goal PT LTG, [place] Level contact guard assist  -LB      Stair Training Goal PT LTG, Assist Device 2 handrails  -LB      Patient Education PT LTG    Patient Education PT LTG, Date Established 01/19/18  -LB      Patient Education PT LTG, Time to Achieve 1 wk  -LB      Patient Education PT LTG, Education Type elva/doff brace  -LB      Patient Education PT LTG, Education Understanding demonstrate adequately  -LB        User Key  (r) = Recorded By, (t) = Taken By, (c) = Cosigned By    Initials Name Provider Type    OTTO Garvey, PT Physical Therapist    LB Mitzy Turner, PT Physical Therapist    MG Megan Gosselin, PT Physical Therapist          Physical Therapy Education     Title: PT OT SLP Therapies (Done)     Topic: Physical Therapy (Done)     Point: Mobility training (Done)    Learning Progress Summary    Learner Readiness Method Response Comment Documented by Status   Patient Acceptance E VU sit to stand LB 01/22/18 0925 Done    Acceptance E VU,NR  MG 01/21/18 0932 Done    Acceptance E VU,NR  LB 01/20/18 1146 Done    Acceptance E VU,NR  LB 01/19/18 1231 Done    Acceptance E VU  MM 01/19/18 0029 Done               Point: Home exercise program (Done)    Learning Progress Summary    Learner Readiness Method Response  Comment Documented by Status   Patient Acceptance E VU,NR  MG 01/21/18 0932 Done    Acceptance E VU,NR  LB 01/20/18 1146 Done    Acceptance E VU  MM 01/19/18 0029 Done               Point: Body mechanics (Done)    Learning Progress Summary    Learner Readiness Method Response Comment Documented by Status   Patient Acceptance E VU,NR  MG 01/21/18 0932 Done    Acceptance E VU  MM 01/19/18 0029 Done               Point: Precautions (Done)    Learning Progress Summary    Learner Readiness Method Response Comment Documented by Status   Patient Acceptance E VU,NR  MG 01/21/18 0932 Done    Acceptance E VU  MM 01/19/18 0029 Done                      User Key     Initials Effective Dates Name Provider Type Discipline    LB 10/06/15 -  Mitzy Turner PT Physical Therapist PT    MG 09/13/17 -  Megan Gosselin, PT Physical Therapist PT    MM 10/30/17 -  Nano Lama RN Registered Nurse Nurse                    PT Recommendation and Plan  Anticipated Discharge Disposition: home with home health  Planned Therapy Interventions: bed mobility training, gait training, home exercise program, strengthening, stair training, transfer training  PT Frequency: 2 times/day  Plan of Care Review  Plan Of Care Reviewed With: patient  Outcome Summary/Follow up Plan: In PT, pt did well with bed mobility , transfers and gait with use of rollator.  Pt needs vc for back precautions and assist for donning/doffing brace.         Time Calculation:         PT Charges       01/22/18 1528 01/22/18 1001       Time Calculation    Start Time 1330  -LB 0900  -LB     Stop Time 1400  -LB 1000  -LB     Time Calculation (min) 30 min  -LB 60 min  -LB     PT Received On 01/22/18  -LB 01/22/18  -LB     PT - Next Appointment 01/23/18  -LB        User Key  (r) = Recorded By, (t) = Taken By, (c) = Cosigned By    Initials Name Provider Type    LB Mitzy Turner PT Physical Therapist          Therapy Charges for Today     Code Description Service Date Service Provider  Modifiers Qty    [id number]  PT THER PROC EA 15 MIN 1/22/2018 Mitzy Turner, PT GP 6    [id number] HC PT THER SUPP EA 15 MIN 1/22/2018 Mitzy Turner, PT GP 2               Mitzy Turner, PT  1/22/2018

## 2018-01-23 LAB
GLUCOSE BLDC GLUCOMTR-MCNC: 201 MG/DL (ref 70–130)
GLUCOSE BLDC GLUCOMTR-MCNC: 210 MG/DL (ref 70–130)
GLUCOSE BLDC GLUCOMTR-MCNC: 222 MG/DL (ref 70–130)
GLUCOSE BLDC GLUCOMTR-MCNC: 234 MG/DL (ref 70–130)

## 2018-01-23 PROCEDURE — 97110 THERAPEUTIC EXERCISES: CPT

## 2018-01-23 PROCEDURE — 63710000001 INSULIN ASPART PER 5 UNITS: Performed by: HOSPITALIST

## 2018-01-23 PROCEDURE — 97535 SELF CARE MNGMENT TRAINING: CPT

## 2018-01-23 PROCEDURE — 97110 THERAPEUTIC EXERCISES: CPT | Performed by: PHYSICAL THERAPIST

## 2018-01-23 PROCEDURE — 82962 GLUCOSE BLOOD TEST: CPT

## 2018-01-23 RX ADMIN — ACETAMINOPHEN 650 MG: 325 TABLET ORAL at 20:43

## 2018-01-23 RX ADMIN — CALCIUM CARBONATE-VITAMIN D TAB 500 MG-200 UNIT 1000 MG: 500-200 TAB at 08:11

## 2018-01-23 RX ADMIN — INSULIN DETEMIR 10 UNITS: 100 INJECTION, SOLUTION SUBCUTANEOUS at 06:50

## 2018-01-23 RX ADMIN — INSULIN ASPART 10 UNITS: 100 INJECTION, SOLUTION INTRAVENOUS; SUBCUTANEOUS at 08:12

## 2018-01-23 RX ADMIN — FAMOTIDINE 20 MG: 20 TABLET, FILM COATED ORAL at 20:40

## 2018-01-23 RX ADMIN — INSULIN ASPART 5 UNITS: 100 INJECTION, SOLUTION INTRAVENOUS; SUBCUTANEOUS at 12:13

## 2018-01-23 RX ADMIN — DULOXETINE HYDROCHLORIDE 60 MG: 60 CAPSULE, DELAYED RELEASE ORAL at 08:10

## 2018-01-23 RX ADMIN — LOSARTAN POTASSIUM 50 MG: 50 TABLET, FILM COATED ORAL at 08:11

## 2018-01-23 RX ADMIN — FAMOTIDINE 20 MG: 20 TABLET, FILM COATED ORAL at 08:11

## 2018-01-23 RX ADMIN — INSULIN ASPART 5 UNITS: 100 INJECTION, SOLUTION INTRAVENOUS; SUBCUTANEOUS at 17:19

## 2018-01-23 RX ADMIN — ATORVASTATIN CALCIUM 10 MG: 10 TABLET, FILM COATED ORAL at 20:40

## 2018-01-23 RX ADMIN — CYANOCOBALAMIN TAB 500 MCG 1000 MCG: 500 TAB at 08:10

## 2018-01-23 RX ADMIN — INSULIN ASPART 5 UNITS: 100 INJECTION, SOLUTION INTRAVENOUS; SUBCUTANEOUS at 08:13

## 2018-01-23 RX ADMIN — INSULIN ASPART 10 UNITS: 100 INJECTION, SOLUTION INTRAVENOUS; SUBCUTANEOUS at 12:12

## 2018-01-23 RX ADMIN — CETIRIZINE HYDROCHLORIDE 10 MG: 10 TABLET, FILM COATED ORAL at 08:11

## 2018-01-23 RX ADMIN — INSULIN ASPART 10 UNITS: 100 INJECTION, SOLUTION INTRAVENOUS; SUBCUTANEOUS at 17:19

## 2018-01-23 RX ADMIN — INSULIN ASPART 5 UNITS: 100 INJECTION, SOLUTION INTRAVENOUS; SUBCUTANEOUS at 20:40

## 2018-01-23 RX ADMIN — VITAMIN D, TAB 1000IU (100/BT) 2000 UNITS: 25 TAB at 08:10

## 2018-01-23 NOTE — PROGRESS NOTES
Inpatient Rehabilitation Functional Measures Assessment and Plan of Care    Plan of Care  Updated Problems/Interventions  Mobility    [PT] Bed/Chair/Wheelchair(Active)  Current Status(01/23/2018): SBA/CGA with rollator  Weekly Goal(01/26/2018): SBA with Rollator  Discharge Goal: Mod Indep rollator    [PT] Bed Mobility(Active)  Current Status(01/23/2018): CGA  Weekly Goal(01/26/2018): Indep  Discharge Goal: Indep    [PT] Walk(Active)  Current Status(01/23/2018): 150ft rollator and SBA/CGA  Weekly Goal(01/26/2018): BR Rollator CGA  Discharge Goal: 150 ft Rollator Mod Indep    Functional Measures  DANUTA Eating:  Branch  DANUTA Grooming: Branch  [facility] Bathing:  Branch  DANUTA Upper Body Dressing:  Branch  DANUTA Lower Body Dressing:  Branch  [facility] Toileting:  Branch    DANUTA Bladder Management  Level of Assistance:  Branch  Frequency/Number of Accidents this Shift:  Branch    DANUTA Bowel Management  Level of Assistance: Branch  Frequency/Number of Accidents this Shift: Branch    [facility] Bed/Chair/Wheelchair Transfer:  Activity was not observed.  DANUTA Toilet Transfer:  Branch  [facility] Tub/Shower Transfer:  Branch    Previously Documented Mode of Locomotion at Discharge: Field  DANUTA Expected Mode of Locomotion at Discharge: Branch  DANUTA Walk/Wheelchair:  WHEELCHAIR OBSERVATION   Activity was not observed.    WALK OBSERVATION   Walk Distance Scale = 3.  Distance walked is greater than 150 feet. Walk Score  = 4.  Patient performs 75% or more of effort and requires minimal assistance.  Incidental help/contact guard/steadying was provided. Patient walked a distance  of  200 feet. Patient requires the following assistive device(s): Rolling  walker.  DANUTA Stairs:  Stairs Score = 2.  Incidental assistance with lifting or lowering,  contact guard or steadying was provided. Patient performs 75% or more of effort  and requires minimal contact assistance. Patient negotiated  4 stairs. Patient  requires the following assistive device(s): Handrail(s).    DANUTA  Comprehension:  Branch  DANUTA Expression:  Branch  DANUTA Social Interaction:  Branch  [facility] Problem Solving:  Branch  [facility] Memory:  Branch    Therapy Mode Minutes  Occupational Therapy: Branch  Physical Therapy: Individual: 90 minutes.  Speech Language Pathology:  Branch    Signed by: Mitzy Turner PT

## 2018-01-23 NOTE — PROGRESS NOTES
QUALITY INDICATORS  Active Diagnosis: Comorbidities and Co-existing Conditions:   Diabetes Mellitus  (DM) - e.g., diabetic retinopathy, nephropathy, and neuropathy).  Health Conditions: Patient has had two or more falls, or a fall with injury, in  the past year. Patient has had major surgery during the 100 days prior to  admission.  Skin Conditions: Unhealed Pressure Ulcer(s) at Stage 1 or Higher on Admission:  No.    Signed by: Ishaan Gonzalez RN

## 2018-01-23 NOTE — PLAN OF CARE
Problem: Patient Care Overview (Adult)  Goal: Plan of Care Review  Outcome: Ongoing (interventions implemented as appropriate)   01/23/18 0152   Coping/Psychosocial Response Interventions   Plan Of Care Reviewed With patient   Patient Care Overview   Progress improving   Outcome Evaluation   Outcome Summary/Follow up Plan Patient calm and cooperative. She states she is worn out from therapy and slept most of the shift. No c/o pain, no unsafe behaviors.        Problem: Pain, Chronic (Adult)  Goal: Acceptable Pain Control/Comfort Level  Outcome: Ongoing (interventions implemented as appropriate)   01/23/18 0152   Pain, Chronic (Adult)   Acceptable Pain Control/Comfort Level making progress toward outcome       Problem: Skin Integrity Impairment, Risk/Actual (Adult)  Goal: Skin Integrity/Wound Healing  Outcome: Ongoing (interventions implemented as appropriate)   01/23/18 0152   Skin Integrity Impairment, Risk/Actual (Adult)   Skin Integrity/Wound Healing making progress toward outcome       Problem: Mobility, Physical Impaired (Adult)  Goal: Enhanced Functionality Ability  Outcome: Ongoing (interventions implemented as appropriate)   01/23/18 0152   Mobility, Physical Impaired (Adult)   Enhanced Functionality Ability making progress toward outcome

## 2018-01-23 NOTE — PROGRESS NOTES
LOS: 4 days   Primary Care Physician: Roger Patel MD     Subjective   Doing well.  Had a good day today.    Vital Signs  Body mass index is 40.21 kg/(m^2).  Temp:  [97.3 °F (36.3 °C)-98.4 °F (36.9 °C)] 97.3 °F (36.3 °C)  Heart Rate:  [79-94] 94  Resp:  [16-18] 18  BP: (142-156)/(72-90) 145/72      Objective:  General Appearance:  In no acute distress.    Vital signs: (most recent): Blood pressure 145/72, pulse 94, temperature 97.3 °F (36.3 °C), temperature source Oral, resp. rate 18, weight 113 kg (249 lb 1.9 oz), SpO2 96 %.    Lungs:  There are decreased breath sounds.  No wheezes, rales or rhonchi.    Heart: Normal rate.  Regular rhythm.  No murmur.   Abdomen: Abdomen is soft and non-distended.  (Obese)Bowel sounds are normal.   There is no abdominal tenderness.   There is no splenomegaly. There is no hepatomegaly.   Extremities: There is dependent edema.  (Trace to 1+)  Neurological: Patient is alert.          Results Review:    I reviewed the patient's new clinical results.          Results from last 7 days  Lab Units 01/18/18  0506 01/17/18  0438   SODIUM mmol/L 136 136   POTASSIUM mmol/L 3.8 3.4*   CHLORIDE mmol/L 100 97*   CO2 mmol/L 25.0 24.0   BUN mg/dL 6* 10   CREATININE mg/dL 0.70 0.71   CALCIUM mg/dL 9.0 8.8   GLUCOSE mg/dL 166* 240*         Hemoglobin A1C:  Lab Results   Component Value Date    HGBA1C 9.39 (H) 01/09/2018       Glucose Range:  Glucose   Date/Time Value Ref Range Status   01/22/2018 1559 203 (H) 70 - 130 mg/dL Final   01/22/2018 1115 218 (H) 70 - 130 mg/dL Final   01/22/2018 0721 208 (H) 70 - 130 mg/dL Final   01/21/2018 2036 200 (H) 70 - 130 mg/dL Final   01/21/2018 1618 207 (H) 70 - 130 mg/dL Final   01/21/2018 1108 219 (H) 70 - 130 mg/dL Final       No results found for: [id number]    Lab Results   Component Value Date    TSH 2.510 01/15/2018       Assessment & Plan      Medication Review: Yes    Active Hospital Problems (** Indicates Principal Problem)    Diagnosis  Date Noted   • Spinal stenosis [M48.00] 01/19/2018      Resolved Hospital Problems    Diagnosis Date Noted Date Resolved   No resolved problems to display.       Assessment/Plan  1.  Diabetes mellitus type 2.  Levemir adjusted yesterday.  Continue same with sliding scale.  Avoid hypoglycemia so she can participate fully with therapy.  If Accu-Cheks remained greater than 200 over the next day, will increase Levemir further.  Discussed with patient  2.  Hypertension, continue Cozaar  3.  Hyperlipidemia, continue Lipitor    Melita Wooten MD  01/22/18  7:01 PM

## 2018-01-23 NOTE — PROGRESS NOTES
Inpatient Rehabilitation Functional Measures Assessment    Functional Measures  DANUTA Eating:  [facility] Grooming: [facility] Bathing:  [facility] Upper Body Dressing:  [facility] Lower Body Dressing:  [facility] Toileting:  [facility] Bladder Management  Level of Assistance:  [place]  Frequency/Number of Accidents this Shift:  [facility] Bowel Management  Level of Assistance: [place]  Frequency/Number of Accidents this Shift: [facility] Bed/Chair/Wheelchair Transfer:  [facility] Toilet Transfer:  [facility] Tub/Shower Transfer:  [place]    Previously Documented Mode of Locomotion at Discharge: Field  DANUTA Expected Mode of Locomotion at Discharge: [facility] Walk/Wheelchair:  [facility] Stairs:  [facility] Comprehension:  Auditory comprehension is the usual mode. Comprehension  Score = 6, Modified [place].  Patient comprehends complex/abstract  information in their primary language with only mild difficulty.  DANUTA Expression:  Vocal expression is the usual mode. Expression Score = 6,  Modified Independent.  Patient expresses complex/abstract information in their  primary language with only mild difficulty with tasks.  DANUTA Social Interaction:  Social Interaction Score = 6, Modified Independent.  Patient is modified independent for social interaction, requiring: Requires  additional time.  DANUTA Problem Solving:  Problem Solving Score = 6, Modified [place].  Patient  makes appropriate decisions in order to solve complex problems, but requires  extra time.  DANUTA Memory:  Memory Score = 6, Modified [place].  Patient is modified  independent for memory, requiring: Requires additional time.    Therapy Mode Minutes  Occupational Therapy: Branch  Physical Therapy: Branch  Speech Language Pathology:  [place]    Signed by: Melissa Mc RN

## 2018-01-23 NOTE — PROGRESS NOTES
Case Management  Inpatient Rehabilitation Plan of Care and Discharge Plan Note    Rehabilitation Diagnosis:  Branch  Date of Onset:  Branch    Medical Summary:  Branch  Past Medical History: Branch    Plan of Care  Updated Problems/Interventions  Field    Expected Intensity:  Branch  Interdisciplinary Team:  Branch  Estimated Length of Stay/Anticipated Discharge Date: Branch  Anticipated Discharge Destination:  Anticipated discharge destination from inpatient rehabilitation is community  discharge with assistance. Pt lives alone in a first floor apartment, no steps  to enter.  Pt will have only intermittent assiatance at discharge.      Based on the patient's medical and functional status, their prognosis and  expected level of functional improvement is:  Branch    Signed by: SILAS Conde

## 2018-01-23 NOTE — PROGRESS NOTES
Inpatient Rehabilitation Functional Measures Assessment    Functional Measures  DANUTA Eating:  Branch  [facility] Grooming: Branch  [facility] Bathing:  Branch  [facility] Upper Body Dressing:  Branch  [facility] Lower Body Dressing:  [facility] Toileting:  [facility] Bladder Management  Level of Assistance:  Bladder Score = 5.  Patient is supervision/set-up for  bladder management, requiring: Stand by assistance. No assistive devices were  required.  Frequency/Number of Accidents this Shift:  Bladder accidents this shift:  0 .  Patient has not had an accident this shift.    DANUTA Bowel Management  Level of Assistance: Activity was not observed.  Frequency/Number of Accidents this Shift: Bowel accidents this shift: 0 .  Patient has not had an accident this shift.    DANUTA Bed/Chair/Wheelchair Transfer:  [facility] Toilet Transfer:  [facility] Tub/Shower Transfer:  [place]    Previously Documented Mode of Locomotion at Discharge: Field  DANUTA Expected Mode of Locomotion at Discharge: [facility] Walk/Wheelchair:  [facility] Stairs:  [facility] Comprehension:  [facility] Expression:  [facility] Social Interaction:  [facility] Problem Solving:  [facility] Memory:  [place]    Therapy Mode Minutes  Occupational Therapy: Branch  Physical Therapy: Branch  Speech Language Pathology:  Branch    Signed by: DENNIS Bishop

## 2018-01-23 NOTE — PROGRESS NOTES
Case Management  Inpatient Rehabilitation Plan of Care and Discharge Plan Note    Rehabilitation Diagnosis:  Immobility secondary spinal surgery  Date of Onset:  1/11/18    Medical Summary:  While pre-op for lumbar spinal stenosis surgery 1/8 informed  med staff that she had been having intermittent numbness of LUE/LLE/face and hx  of falls without warning - most recently 5 days PTA; c/o severe LLE pain, MRI of  head neg for stroke; confusion post-op, 1 unit PRBCs, urinary retention -  straight cath, mild compensated hypercapnic resp failure, hyponatremia,  thrombophlebitis  Past Medical History: DESHAWN cataracts; HLD, HTN; asthma, COPD; low back pain, RA,  BTHR, BTKR, right wrist surgery; hyst; diverticulitis, appy, richard, left hernia  repair; incision; migraines, neuropathy, chronic back apin, sciatic nerve pain;  DM; anterior cervical disc with fusion 4/17    Plan of Care  Updated Problems/Interventions      Expected Intensity:  Average of 3 hours of therapy 5 days/week.  Interdisciplinary Team:  Interdisciplinary Team: Medical Supervision and 24 Hour Rehabilitation Nursing.,  Physical Therapy:, Occupational Therapy:, Social Work, Therapeutic Recreation.  Physical Therapy Intensity/Duration: 90 minutes/day, 5 days/week  Occupational Therapy Intensity/Duration: 90 minutes/day, 5 days/week  Estimated Length of Stay/Anticipated Discharge Date: ELOS: 10 - 14 days  Anticipated Discharge Destination:  Anticipated discharge destination from inpatient rehabilitation is community  discharge with assistance. Home independently      Based on the patient's medical and functional status, their prognosis and  expected level of functional improvement is:  MOD I    Signed by: Ishaan Gonzalez RN

## 2018-01-23 NOTE — PROGRESS NOTES
Inpatient Rehabilitation Plan of Care Note    Plan of Care  Care Plan Reviewed - No updates at this time.    Sphincter Control    Performed Intervention(s)  Monitor intake and output      Psychosocial    Performed Intervention(s)  Support/ Peer group  Verbalizes needs and concerns      Safety    Performed Intervention(s)  Falls precautions/ protocol  Safety monitoring during functional activities  Safety rounds  Bed alarm and/or chair alarm      Body Function Structure    Performed Intervention(s)  Dressing changes  Wound care      Body Systems    Performed Intervention(s)  Blood glucose testing  Medication    Signed by: Melissa Mc RN

## 2018-01-23 NOTE — PROGRESS NOTES
Inpatient Rehabilitation Functional Measures Assessment    Functional Measures  DANUTA Eating:  Eating Score = 7. Patient is completely independent for eating.  There are no activity limitations.  DANUTA Grooming: Branch  DANUTA Bathing:  Branch  DANUTA Upper Body Dressing:  Branch  [facility] Lower Body Dressing:  Branch  [facility] Toileting:  Toileting Score = 5.  Patient is supervision/set-up for  toileting, requiring: Patient requires the following assistive device(s): Grab  bar.    DANUTA Bladder Management  Level of Assistance:  Bladder Score = 7.  Patient is completely independent for  bladder management. There are no activity limitations.  Frequency/Number of Accidents this Shift:  Bladder accidents this shift:  0 .  Patient has not had an accident this shift.    DANUTA Bowel Management  Level of Assistance: Bowel Score = 7.  Patient is completely independent for  bowel management. There are no activity limitations.  Frequency/Number of Accidents this Shift: Bowel accidents this shift: 0 .  Patient has not had an accident this shift.    DANUTA Bed/Chair/Wheelchair Transfer:  Bed/chair/wheelchair Transfer Score = 5.  Patient is supervision/set-up for transferring to and from the  bed/chair/wheelchair, requiring: Stand by assistance. Patient requires the  following assistive device(s): Walker. Bed rails. Arm rest.  DANUTA Toilet Transfer:  Toilet Transfer Score = 5.  Patient is supervision/set-up  for transferring to and from the toilet/commode, requiring: Stand by assistance.  Patient requires the following assistive device(s): Walker. Grab bars.  DANUTA Tub/Shower Transfer:  Branch    Previously Documented Mode of Locomotion at Discharge: Field  DANUTA Expected Mode of Locomotion at Discharge: Branch  [facility] Walk/Wheelchair:  Branch  [facility] Stairs:  Branch    [facility] Comprehension:  Branch  [facility] Expression:  Branch  [facility] Social Interaction:  Branch  [facility] Problem Solving:  Branch  DANUTA Memory:  Branch    Therapy Mode Minutes  Occupational Therapy: Branch  Physical  Therapy: Branch  Speech Language Pathology:  Branch    Signed by: DENNIS Devries

## 2018-01-23 NOTE — PROGRESS NOTES
Adult Nutrition  Assessment/PES    Patient Name:  Raquel Gonsales  YOB: 1945  MRN: [id number]  Admit Date:  1/18/2018    Assessment Date:  1/23/2018            Reason for Assessment       01/23/18 1547    Reason for Assessment    Reason For Assessment/Visit follow up protocol                Anthropometrics       01/23/18 1547    Anthropometrics    RD Documented Current Weight  110 kg (243 lb)            Labs/Tests/Procedures/Meds       01/23/18 1548    Labs/Tests/Procedures/Meds    Diagnostic Test/Procedure Review reviewed    Labs/Tests Review Reviewed;Glucose   consistently in 200s'    Medication Review Reviewed, pertinent    Significant Vitals reviewed            Physical Findings       01/23/18 1548    Physical Appearance    Skin surgical wound              Nutrition Prescription Ordered       01/23/18 1549    Nutrition Prescription PO    Supplement Ensure Compact    Supplement Frequency 2 times a day    Common Modifiers Consistent Carbohydrate    Fluid Restriction mL per Day Other (comment)   1200            Evaluation of Received Nutrient/Fluid Intake       01/23/18 1551    PO Evaluation    Number of Days PO Intake Evaluated 2 days    Number of Meals 6    % PO Intake 79%        Problem/Interventions:          Intervention Goal       01/23/18 1551    Intervention Goal    General Maintain nutrition;Improved nutrition related lab(s);Disease management/therapy    PO Maintain intake    PO Intake % 75 %    Weight Appropriate weight loss            Nutrition Intervention       01/23/18 1552    Nutrition Intervention    RD/Tech Action Menu provided;Care plan reviewd;Follow Tx progress              Education/Evaluation       01/23/18 1557    Monitor/Evaluation    Monitor Per protocol        Electronically signed by:  Umu Fair RD  01/23/18 3:57 PM

## 2018-01-23 NOTE — PROGRESS NOTES
LOS: 5 days   Primary Care Physician: Roger Patel MD     Subjective   Feels okay.  Took a shower earlier today.  Thirsty    Vital Signs  Body mass index is 39.25 kg/(m^2).  Temp:  [98.3 °F (36.8 °C)-98.4 °F (36.9 °C)] 98.4 °F (36.9 °C)  Heart Rate:  [82-94] 82  Resp:  [16-18] 16  BP: (146-157)/(60-82) 157/82      Objective:  General Appearance:  In no acute distress.    Vital signs: (most recent): Blood pressure 157/82, pulse 82, temperature 98.4 °F (36.9 °C), temperature source Oral, resp. rate 16, weight 110 kg (243 lb 2.7 oz), SpO2 95 %.    Lungs:  There are decreased breath sounds.  No wheezes, rales or rhonchi.    Heart: Normal rate.  Regular rhythm.  Positive for murmur.  (Grade 2/6 systolic ejection murmur left upper sternal border)  Abdomen: Abdomen is soft and non-distended.  Bowel sounds are normal.   There is no abdominal tenderness.   There is no splenomegaly. There is no hepatomegaly.   Extremities: There is dependent edema.  (Trace)  Neurological: Patient is alert.    Skin:  Warm and dry.          Results Review:    I reviewed the patient's new clinical results.          Results from last 7 days  Lab Units 01/18/18  0506 01/17/18  0438   SODIUM mmol/L 136 136   POTASSIUM mmol/L 3.8 3.4*   CHLORIDE mmol/L 100 97*   CO2 mmol/L 25.0 24.0   BUN mg/dL 6* 10   CREATININE mg/dL 0.70 0.71   CALCIUM mg/dL 9.0 8.8   GLUCOSE mg/dL 166* 240*         Hemoglobin A1C:  Lab Results   Component Value Date    HGBA1C 9.39 (H) 01/09/2018       Glucose Range:  Glucose   Date/Time Value Ref Range Status   01/23/2018 1559 234 (H) 70 - 130 mg/dL Final   01/23/2018 1116 210 (H) 70 - 130 mg/dL Final   01/23/2018 0732 201 (H) 70 - 130 mg/dL Final   01/22/2018 2037 208 (H) 70 - 130 mg/dL Final   01/22/2018 1559 203 (H) 70 - 130 mg/dL Final   01/22/2018 1115 218 (H) 70 - 130 mg/dL Final       No results found for: [id number]    Lab Results   Component Value Date    TSH 2.510 01/15/2018       Assessment &  Plan      Medication Review: Yes    Active Hospital Problems (** Indicates Principal Problem)    Diagnosis Date Noted   • Spinal stenosis [M48.00] 01/19/2018      Resolved Hospital Problems    Diagnosis Date Noted Date Resolved   No resolved problems to display.       Assessment/Plan  1.  Diabetes mellitus type 2.  Increase morning and evening Levemir.  Stop scheduled mealtime insulin for patient convenience and comfort.  Continue sliding scale.  We'll adjust further as needed.  2.  Hypertension on Cozaar.  Numbers acceptable  3.  Hyperlipidemia, Lipitor    Melita Wooten MD  01/23/18  6:54 PM

## 2018-01-23 NOTE — PROGRESS NOTES
LOS: 5 days   Patient Care Team:  Roger Patel MD as PCP - General    Chief Complaint:   S/p L2-3 repeat laminectomy, medial facetectomy foraminotomy and neural lysis with bilateral posterior lateral fusion with [employer] expedient segmental instrumentation with local bone graft, right iliac marrow aspiration, and Mastergraft bone graft substitute on Jan 11, 2017  H/o L2-3 recurrent spinal stenosis status post L3 to S1 laminectomy and fusion with instrumentation  H/o   C3-4, 4-5, 5-6, 6-7 Anterior cervical discectomy and fusion April 2017  H/o B KAREN and B TKA  DM  HTN  HLD      Subjective     History of Present Illness    Subjective   No new pain complaints.  She feels she is making progress in therapies.  No drainage from her incision.  History taken from: patient    Objective     Vital Signs  Temp:  [98.3 °F (36.8 °C)-98.4 °F (36.9 °C)] 98.4 °F (36.9 °C)  Heart Rate:  [82-94] 82  Resp:  [16-18] 16  BP: (146-157)/(60-82) 157/82    Objective:  Vital signs: (most recent): Blood pressure 157/82, pulse 82, temperature 98.4 °F (36.9 °C), temperature source Oral, resp. rate 16, weight 110 kg (243 lb 2.7 oz), SpO2 95 %.            Mental status-awake alert  HEENT-sclera nonicteric, conjunctiva pink.  Oropharynx moist.  Lungs-clear to auscultation without wheezes rales or rhonchi.  Heart-Regular rate and rhythm without rub murmur or gallop.  Abdomen-positive bowel sounds.  Soft.  Back incision  -clean dry and intact  Extremities-no edema.  Neurologic-weakness with left hip flexion  .  She is able to do at least antigravity right hip flexion and takes resistance with bilateral knee extension, ankle dorsiflexion, elbow flexion, finger flexion.  Results Review:     I reviewed the patient's new clinical results.  Glucose   Date/Time Value Ref Range Status   01/23/2018 1559 234 (H) 70 - 130 mg/dL Final   01/23/2018 1116 210 (H) 70 - 130 mg/dL Final   01/23/2018 0732 201 (H) 70 - 130 mg/dL Final   01/22/2018 2037 208  (H) 70 - 130 mg/dL Final   01/22/2018 1559 203 (H) 70 - 130 mg/dL Final   01/22/2018 1115 218 (H) 70 - 130 mg/dL Final   01/22/2018 0721 208 (H) 70 - 130 mg/dL Final   01/21/2018 2036 200 (H) 70 - 130 mg/dL Final             Results from last 7 days  Lab Units 01/18/18  0506 01/17/18  0438 01/16/18  1908   SODIUM mmol/L 136 136 134*   POTASSIUM mmol/L 3.8 3.4*  --    CHLORIDE mmol/L 100 97*  --    CO2 mmol/L 25.0 24.0  --    BUN mg/dL 6* 10  --    CREATININE mg/dL 0.70 0.71  --    CALCIUM mg/dL 9.0 8.8  --    BILIRUBIN mg/dL 0.4  --   --    ALK PHOS U/L 87  --   --    ALT (SGPT) U/L 20  --   --    AST (SGOT) U/L 24  --   --    GLUCOSE mg/dL 166* 240*  --        Medication Review: done  Scheduled Meds:    atorvastatin 10 mg Oral Nightly   calcium-vitamin D 1,000 mg Oral Daily   cetirizine 10 mg Oral Daily   cholecalciferol 2,000 Units Oral Daily   DULoxetine 60 mg Oral Daily   famotidine 20 mg Oral BID   fluticasone 1 spray Nasal Daily   insulin aspart 0-14 Units Subcutaneous 4x Daily With Meals & Nightly   insulin aspart 10 Units Subcutaneous TID With Meals   insulin detemir 10 Units Subcutaneous QAM   insulin detemir 40 Units Subcutaneous Nightly   losartan 50 mg Oral Daily   vitamin B-12 1,000 mcg Oral Daily     Continuous Infusions:   PRN Meds:.•  acetaminophen  •  albuterol  •  bisacodyl  •  dextrose  •  diphenhydrAMINE-zinc acetate  •  glucagon (human recombinant)  •  ondansetron **OR** ondansetron ODT **OR** ondansetron  •  ondansetron  •  polyethylene glycol      Assessment/Plan   S/p L2-3 repeat laminectomy, medial facetectomy foraminotomy and neural lysis with bilateral posterior lateral fusion with [employer] expedient segmental instrumentation with local bone graft, right iliac marrow aspiration, and Mastergraft bone graft substitute on Jan 11, 2017  H/o L2-3 recurrent spinal stenosis status post L3 to S1 laminectomy and fusion with instrumentation  H/o   C3-4, 4-5, 5-6, 6-7 Anterior cervical discectomy and  fusion April 2017  H/o B KAREN and B TKA  DM- Levemir/ Novolog-January 22-regimen recently adjusted  HTN-losartan  HLD-atorvastatin  DVT prophylaxis - SCDs  Status post encephalopathy secondary medications-neurology did not feel that she had TIA  Status post hyponatremia-discontinued fluid restrictions on January 23 and recheck BMP on January 26 Jan 22- transfers SBA. Gait 100 feet CTG RW.   May shower.  May sit to don the brace, which is for comfort.      Active Problems:    Spinal stenosis      Assessment & Plan    Rigoberto Curran MD  01/23/18  5:03 PM    Time:

## 2018-01-23 NOTE — PROGRESS NOTES
Inpatient Rehabilitation Plan of Care Note    Plan of Care  Care Plan Reviewed - No updates at this time.    Body Function Structure    [RN] Skin Integrity(Active)  Current Status(01/19/2018): Incision low back  Weekly Goal(01/24/2018): Teach family/ patient dressing changes  Discharge Goal: Incision healed    Performed Intervention(s)  Dressing changes  Wound care      Sphincter Control    [RN] Bladder Management(Active)  Current Status(01/19/2018): Continent 100%  Weekly Goal(01/25/2018): Continent 100%  Discharge Goal: Continent 100%    [RN] Bowel Management(Active)  Current Status(01/23/2018): Continent of bowel  Weekly Goal(01/25/2018): Continent 100% with regular movements every 1-3 days  Discharge Goal: Same as weekly    Performed Intervention(s)  Monitor intake and output      Psychosocial    Performed Intervention(s)  Support/ Peer group  Verbalizes needs and concerns      Safety    Performed Intervention(s)  Falls precautions/ protocol  Safety monitoring during functional activities  Safety rounds  Bed alarm and/or chair alarm      Body Systems    Performed Intervention(s)  Blood glucose testing  Medication    Signed by: Roberta Sheikh RN

## 2018-01-23 NOTE — THERAPY TREATMENT NOTE
Inpatient Rehabilitation - Physical Therapy Treatment Note  [facility]     Patient Name: Raquel Gonsales  : 1945  MRN: [id number]  Today's Date: 2018  Onset of Illness/Injury or Date of Surgery Date: 18  Date of Referral to PT: 18  Referring Physician: Charan    Admit Date: 2018    Visit Dx:  No diagnosis found.  Patient Active Problem List   Diagnosis   • Cervical spondylosis with radiculopathy   • Bilateral hip pain   • Trochanteric bursitis of both hips   • Status post bilateral total hip replacement   • S/P lumbar fusion   • Spinal stenosis of lumbar region with neurogenic claudication   • Lumbar spinal stenosis   • TIA (transient ischemic attack)   • Obesity   • Recurrent falls   • DM2 (diabetes mellitus, type 2)   • HTN (hypertension)   • COPD (chronic obstructive pulmonary disease)   • Decreased mobility   • Spinal stenosis               Adult Rehabilitation Note       18 1452 18 1353 18 0922    Rehab Assessment/Intervention    Discipline occupational therapist  -AF physical therapist  -JK physical therapist  -LB    Document Type therapy note (daily note)  -AF therapy note (daily note)  -JK therapy note (daily note)  -LB    Subjective Information agree to therapy;no complaints  -AF agree to therapy;no complaints  -JK agree to therapy  -LB    Patient Effort, Rehab Treatment good  -AF good  -JK good  -LB    Symptoms Noted During/After Treatment  none  -JK     Precautions/Limitations brace on when up;fall precautions;spinal precautions  -AF brace on when up;fall precautions  -JK brace on when up;spinal precautions;fall precautions  -LB    Precautions/Limitations, Vision  WFL with corrective lenses  -JK     Precautions/Limitations, Hearing  WFL  -JK     Recorded by [AF] Veda Shukla, OTR [JK] Terri Woo, PT [LB] Mitzy Turner, PT    Pain Assessment    Pain Assessment No/denies pain  -AF No/denies pain  -JK     Recorded by [AF] Veda Shukla, OTR [JK]  Terri Woo, PT     Vision Assessment/Intervention    Visual Impairment  WFL with corrective lenses  -JK     Recorded by  [JK] Terri Woo PT     Cognitive Assessment/Intervention    Current Cognitive/Communication Assessment functional  -AF functional  -JK     Orientation Status oriented x 4  -AF oriented x 4  -JK     Follows Commands/Answers Questions 100% of the time  -% of the time;able to follow multi-step instructions  -JK     Personal Safety WNL/WFL  -AF WNL/WFL  -JK     Personal Safety Interventions fall prevention program maintained;gait belt;nonskid shoes/slippers when out of bed  -AF fall prevention program maintained;gait belt  -JK     Recorded by [AF] Veda Shukla, OTR [JK] Terri Woo PT     Bed Mobility, Assessment/Treatment    Bed Mob, Supine to Sit, [place] contact guard assist  -AF      Bed Mob, Sit to Supine, [place] moderate assist (50% patient effort)  -AF      Bed Mobility, Comment  up in chair  -JK     Recorded by [AF] Veda Shukla, OTR [JK] Terri Woo, PT     Transfer Assessment/Treatment    Transfers, Bed-Chair [place] contact guard assist;stand by assist  -AF      Transfers, Chair-Bed [place] contact guard assist;stand by assist  -AF      Transfers, Sit-Stand [place] stand by assist  -AF supervision required  -JK supervision required  -LB    Transfers, Stand-Sit [place] stand by assist  -AF supervision required  -JK supervision required  -LB    Transfers, Sit-Stand-Sit, Assist Device  rolling walker  -JK --   rollator  -LB    Walk-In Shower Transfer, [place] contact guard assist  -AF      Walk-In Shower Transfer, Assist Device --   rollator, tub transfer bench, grab bars  -AF      Transfer, Safety Issues  step length decreased;balance decreased during turns  -JK     Transfer, Impairments  strength decreased;impaired balance  -JK     Transfer, Comment  car tsf min assist for L foot with seat back reclined  -JK     Recorded by  [AF] Veda Shukla, OTR [JK] Terri Woo, PT [LB] Mitzy Turner, PT    Gait Assessment/Treatment    Gait, [place] Level  stand by assist;contact guard assist  -JK stand by assist;contact guard assist  -LB    Gait, Assistive Device  rollator  -JK rollator  -LB    Gait, Distance (Feet)  80   40' x 2  -  -LB    Gait, Gait Deviations  bilateral:;raymond decreased;forward flexed posture;step length decreased  -JK bilateral:;decreased heel strike;forward flexed posture  -LB    Gait, Safety Issues  step length decreased  -JK     Gait, Impairments  strength decreased  -JK     Gait, Comment   cues for upright posture  -LB    Recorded by  [JK] Terri Woo, PT [LB] Mitzy Turner PT    Stairs Assessment/Treatment    Number of Stairs   6   small 4 inch steps  -LB    Stairs, Handrail Location   both sides  -LB    Stairs, [place] Level   verbal cues required;contact guard assist  -LB    Stairs, Technique Used   step to step (ascending);step to step (descending)  -LB    Recorded by   [LB] Mitzy Turner, PT    Functional Mobility    Functional Mobility- Comment walked from EOB to shower chair with rollator with SBA  -AF      Recorded by [AF] CINDY Orosco      Upper Body Bathing Assessment/Training    UB Bathing Assess/Train Assistive Device grab bars;hand-held shower head;shower chair with back  -AF      UB Bathing Assess/Train, Position sitting  -AF      UB Bathing Assess/Train, [place] Level supervision required  -AF      Recorded by [AF] CINDY Orosco      Lower Body Bathing Assessment/Training    LB Bathing Assess/Train Assistive Device grab bars;hand-held shower head;long-handled sponge;shower chair with back  -AF      LB Bathing Assess/Train, Position sitting;standing  -AF      LB Bathing Assess/Train, [place] Level supervision required  -AF      Recorded by [AF] Veda Shukla OTR      Upper Body Dressing Assessment/Training    UB Dressing Assess/Train, Position sitting   -AF      UB Dressing Assess/Train, [place] supervision required  -AF      Recorded by [AF] Veda Shukla OTR      Lower Body Dressing Assessment/Training    LB Dressing Assess/Train, Position sitting;standing  -AF      LB Dressing Assess/Train, [place] moderate assist (50% patient effort)  -AF      LB Dressing Assess/Train, Comment Assist with socks and shoes  -AF      Recorded by [AF] Veda Shukla OTR      Grooming Assessment/Training    Grooming Assess/Train, Position sitting;sink side  -AF      Grooming Assess/Train, [place] Level supervision required  -AF      Grooming Assess/Train, Comment sitting at sink on locked rollator like she does at home  -AF      Recorded by [AF] CINDY Orosco      Balance Skills Training    Sitting-Level of Assistance Independent  -AF      Standing-Level of Assistance Close supervision  -AF      Recorded by [AF] Veda Shukla OTR      Therapy Exercises    Bilateral Lower Extremities   AROM:;20 reps;sitting;ankle pumps/circles;LAQ;hip flexion;hip abduction/adduction  -LB    Bilateral Upper Extremity AROM:;15 reps;sitting;elbow flexion/extension;hand pumps;pronation/supination;shoulder protraction/retraction   #1 hand weights, 3 sets   -AF      Recorded by [AF] Veda Shukla OTR  [LB] Mitzy Turner, PT    Functional Endurance    Detail (Functional Endurance) stood for arm bike 2 mins  -AF      Recorded by [AF] Veda Shukla OTR      Orthosis Location    Orthosis, Neck/Back   LSO (lumbar sacral orthosis)  -LB    Recorded by   [LB] Mitzy Turner PT    Orthosis Management/Training    Orthosis Skills Training   purpose/goals of orthosis;sleeping without orthosis  -LB    Orthosis Wear Schedule   wear when out of bed only  -LB    Recorded by   [LB] Mitzy Turner PT    Positioning and Restraints    Pre-Treatment Position in bed  -AF sitting in chair/recliner  -JK sitting in chair/recliner  -LB    Post Treatment Position bed  -AF wheelchair  -JK     In Bed  supine;call light within reach;encouraged to call for assist;exit alarm on   in PM  -AF      In Wheelchair sitting;with PT   in AM  -AF sitting;with OT;heels elevated  -JK     Recorded by [AF] CINDY Orosco [JK] Terri Woo, PT [LB] Mitzy Turner PT      01/22/18 1402 01/22/18 0923 01/22/18 0910    Rehab Assessment/Intervention    Discipline occupational therapist  -AF physical therapist  -LB occupational therapist  -AF    Document Type therapy note (daily note)  -AF therapy note (daily note)  -LB therapy note (daily note)  -AF    Subjective Information agree to therapy  -AF agree to therapy  -LB agree to therapy;no complaints  -AF    Patient Effort, Rehab Treatment good  -AF good  -LB good  -AF    Precautions/Limitations brace on when up;fall precautions;spinal precautions  -AF brace on when up;fall precautions;spinal precautions  -LB brace on when up;fall precautions;spinal precautions  -AF    Recorded by [AF] CINDY Orosco [LB] Mitzy Turner, PT [AF] Veda Shukla OTR    Pain Assessment    Pain Assessment No/denies pain  -AF 0-10  -LB No/denies pain  -AF    Pain Score  6  -LB     Post Pain Score  6  -LB     Pain Type  Chronic pain  -LB     Pain Location  Hip  -LB     Pain Orientation  Left;Right  -LB     Pain Intervention(s)  Ambulation/increased activity  -LB     Response to Interventions  tolerated  -LB     Recorded by [AF] CINDY Orosco [LB] Mitzy Turner, PT [AF] CINDY Orosco    Vision Assessment/Intervention    Visual Impairment WNL  -AF      Recorded by [AF] CINDY Orosco      Cognitive Assessment/Intervention    Current Cognitive/Communication Assessment functional  -AF  functional  -AF    Orientation Status oriented x 4  -AF  oriented x 4  -AF    Follows Commands/Answers Questions 100% of the time  -AF  100% of the time  -AF    Personal Safety WNL/WFL  -AF WNL/WFL  -LB WNL/WFL  -AF    Personal Safety Interventions fall prevention program maintained;gait  belt;nonskid shoes/slippers when out of bed  -AF fall prevention program maintained;gait belt;muscle strengthening facilitated;nonskid shoes/slippers when out of bed  -LB fall prevention program maintained;gait belt;nonskid shoes/slippers when out of bed  -AF    Recorded by [AF] Veda Shukla, OTR [LB] Mitzy Turner, PT [AF] Veda Shukla, OTR    Bed Mobility, Assessment/Treatment    Bed Mob, Supine to Sit, [place]   contact guard assist;verbal cues required   bed rail  -AF    Bed Mob, Sit to Sidelying, [place]  verbal cues required;minimum assist (75% patient effort)  -LB     Bed Mobility, Comment  pt needs cues to maintain back precautions  -LB     Recorded by  [LB] Mitzy Turner, PT [AF] Veda Shukla, OTR    Transfer Assessment/Treatment    Transfers, Bed-Chair [place]  contact guard assist  -LB     Transfers, Sit-Stand [place] stand by assist  -AF stand by assist  -LB stand by assist;verbal cues required  -AF    Transfers, Stand-Sit [place] stand by assist  -AF stand by assist  -LB stand by assist;verbal cues required  -AF    Transfers, Sit-Stand-Sit, Assist Device  rolling walker  -LB --   rollator  -AF    Toilet Transfer, [place] contact guard assist;verbal cues required;supervision required  -AF  contact guard assist  -AF    Toilet Transfer, Assistive Device --   rollator  -AF  --   rollator and grab bar  -AF    Recorded by [AF] Veda Shukla, OTR [LB] Mitzy Turner, PT [AF] Veda Shukla, OTR    Gait Assessment/Treatment    Gait, [place] Level  stand by assist;contact guard assist  -LB     Gait, Assistive Device  rollator  -LB     Gait, Distance (Feet)  200   160, 100  -LB     Gait, Gait Deviations  decreased heel strike;forward flexed posture  -LB     Gait, Safety Issues  step length decreased  -LB     Gait, Impairments  strength decreased  -LB     Gait, Comment  cues for upright posture  -LB     Recorded by  [LB] Mitzy Turner, PT     Functional Mobility     Functional Mobility- Comment walked to and from bathroom with rollaotr with SBA/CGA  -AF  walked from EOB to commode with rollator and CGA  -AF    Recorded by [AF] Veda Shukla OTR  [AF] Veda Shukla OTR    Upper Body Bathing Assessment/Training    UB Bathing Assess/Train, Position   sitting;sink side  -AF    UB Bathing Assess/Train, [place] Level   set up required  -AF    Recorded by   [AF] Veda Shukla OTR    Lower Body Bathing Assessment/Training    LB Bathing Assess/Train, Position   sink side;standing;sitting  -AF    LB Bathing Assess/Train, [place] Level   minimum assist (75% patient effort);verbal cues required  -AF    LB Bathing Assess/Train, Comment   assist with feet  -AF    Recorded by   [AF] Veda Shukla OTR    Upper Body Dressing Assessment/Training    UB Dressing Assess/Train, Position   sitting;sink side  -AF    UB Dressing Assess/Train, [place]   supervision required  -AF    UB Dressing Assess/Train, Comment   pt able to don brace seated on EOB and w/c level with set up  -AF    Recorded by   [AF] CINDY Orosco    Lower Body Dressing Assessment/Training    LB Dressing Assess/Train, Position   sitting;standing;sink side  -AF    LB Dressing Assess/Train, [place]   moderate assist (50% patient effort)  -AF    LB Dressing Assess/Train, Comment   assist with TEDS, and heel management on shoes. edcuated on spinal precautions with ADLs  -AF    Recorded by   [AF] CINDY Orosco    Toileting Assessment/Training    Toileting Assess/Train, Assistive Device   grab bars;raised toilet seat  -AF    Toileting Assess/Train, Position   standing;sitting  -AF    Toileting Assess/Train, [place] Level   supervision required  -AF    Recorded by   [AF] Veda Shukla OTR    Grooming Assessment/Training    Grooming Assess/Train, Position   sitting;sink side  -AF    Grooming Assess/Train, [place] Level   supervision required  -AF    Grooming Assess/Train, Comment stood  at sink to wash hands  -AF  w/c level  -AF    Recorded by [AF] CINDY Orosco  [AF] CINDY Orosco    Balance Skills Training    Sitting-Level of Assistance   Distant supervision;Independent  -AF    Standing-Level of Assistance Close supervision  -AF  Contact guard;Close supervision  -AF    Static Standing Balance Support assistive device  -AF  assistive device  -AF    Standing-Balance Activities Ball toss  -AF  --   ADLs  -AF    Standing Balance # of Minutes 2mins x 2 sets  -AF  3-5 mins x 2  -AF    Gait Balance-Level of Assistance  Contact guard  -LB     Gait Balance Support  odell bar  -LB     Gait Balance Activities  side-stepping;backwards  -LB     Recorded by [AF] CINDY Orosco [LB] Mitzy Turner PT [AF] CINDY Orosco    Therapy Exercises    Bilateral Lower Extremities  AROM:;10 reps;sitting  -LB     BLE Other Reps  15  -LB     Bilateral Upper Extremity AROM:;15 reps;sitting;elbow flexion/extension;hand pumps;pronation/supination;shoulder protraction/retraction   #1 dowel natalee, hand weight, hand gripper, 3 sets  -AF      Recorded by [AF] CINDY Orosco [LB] Mitzy Turner PT     Functional Endurance    Detail (Functional Endurance) arm bike standing 2 mins   -AF  good with ADLs  -AF    Recorded by [AF] CINDY Orosco  [AF] CINDY Orosco    Orthosis Location    Orthosis, Neck/Back  LSO (lumbar sacral orthosis)  -LB     Recorded by  [LB] Mitzy Turner PT     Orthosis Management/Training    Orthosis Wear Schedule  wear when out of bed only  -LB     Recorded by  [LB] Mitzy Turner PT     Positioning and Restraints    Pre-Treatment Position sitting in chair/recliner  -AF sitting in chair/recliner  -LB in bed  -AF    Post Treatment Position wheelchair  -AF wheelchair  -LB wheelchair  -AF    In Bed  with other staff  -LB     In Wheelchair sitting;with PT  -AF call light within reach;encouraged to call for assist  -LB sitting;with PT  -AF    Recorded by [AF] Veda Orta  Vazquez, OTR [LB] Mitzy Turner, PT [AF] Veda Marivelamanda Shukla, OTR      01/21/18 0928          Rehab Assessment/Intervention    Discipline physical therapist  -MG      Document Type therapy note (daily note)  -MG      Subjective Information agree to therapy;no complaints  -MG      Patient Effort, Rehab Treatment good  -MG      Symptoms Noted During/After Treatment fatigue  -MG      Precautions/Limitations brace on when up;fall precautions;spinal precautions  -MG      Recorded by [MG] Megan Gosselin, PT      Pain Assessment    Pain Assessment No/denies pain  -MG      Recorded by [MG] Megan Gosselin, PT      Cognitive Assessment/Intervention    Current Cognitive/Communication Assessment functional  -MG      Orientation Status oriented x 4  -MG      Follows Commands/Answers Questions 100% of the time  -MG      Personal Safety WNL/WFL  -MG      Personal Safety Interventions fall prevention program maintained;gait belt  -MG      Recorded by [MG] Megan Gosselin, PT      Bed Mobility, Assessment/Treatment    Bed Mobility, Comment not tested, up in chair   -MG      Recorded by [MG] Megan Gosselin, PT      Transfer Assessment/Treatment    Transfers, Sit-Stand [place] contact guard assist;verbal cues required  -MG      Transfers, Stand-Sit [place] contact guard assist;verbal cues required  -MG      Transfers, Sit-Stand-Sit, Assist Device --   4WW  -MG      Transfer, Safety Issues step length decreased;balance decreased during turns  -MG      Transfer, Impairments strength decreased;impaired balance  -MG      Recorded by [MG] Megan Gosselin, PT      Gait Assessment/Treatment    Gait, [place] Level contact guard assist  -MG      Gait, Assistive Device rollator  -MG      Gait, Distance (Feet) 160  -MG      Gait, Gait Deviations ray[place] decreased;forward flexed posture;step length decreased  -MG      Gait, Safety Issues step length decreased;balance decreased during turns  -MG      Gait, Impairments strength  decreased;impaired balance  -MG      Gait, Comment cues for upright posture  -MG      Recorded by [MG] Megan Gosselin, PT      Therapy Exercises    Bilateral Lower Extremities AROM:;15 reps;sitting;hip abduction/adduction;LAQ;hip flexion  -MG      Recorded by [MG] Megan Gosselin, PT      Positioning and Restraints    Pre-Treatment Position sitting in chair/recliner  -MG      Post Treatment Position wheelchair  -MG      In Wheelchair sitting;call light within reach;encouraged to call for assist  -MG      Recorded by [MG] Megan Gosselin, PT        User Key  (r) = Recorded By, (t) = Taken By, (c) = Cosigned By    Initials Name Effective Dates    LB Mitzy Turner, PT 10/06/15 -     AF Veda Shukla, OTR 12/01/15 -     JK Terri Woo, PT 12/01/15 -     MG Megan Gosselin, PT 09/13/17 -                 IP PT Goals       01/19/18 1546 01/15/18 1620       Bed Mobility PT LTG    Bed Mobility PT LTG, Date Established 01/19/18  -LB 01/15/18  -KH     Bed Mobility PT LTG, Time to Achieve 1 wk  -LB 1 wk  -KH     Bed Mobility PT LTG, Activity Type sidelying to sit/sit to sidelying;roll left/roll right  -LB all bed mobility  -KH     Bed Mobility PT LTG, [place] Level independent  -LB minimum assist (75% patient effort)  -KH     Transfer Training PT LTG    Transfer Training PT LTG, Date Established 01/19/18  -LB 01/15/18  -KH     Transfer Training PT LTG, Time to Achieve 1 wk  -LB 1 wk  -KH     Transfer Training PT LTG, Activity Type sit to stand/stand to sit  -LB all transfers  -KH     Transfer Training PT LTG, [place] Level conditional independence  -LB minimum assist (75% patient effort)  -KH     Transfer Training PT LTG, Assist Device walker, rolling  -LB walker, rolling  -KH     Transfer Training 2 PT LTG    Transfer Training PT 2 LTG, Time to Achieve 1 wk  -LB      Transfer Training PT 2 LTG, Activity Type --   car transfer  -LB      Transfer Training PT 2 LTG, [place] Level supervision required  -LB       Transfer Training PT 2 LTG, Assist Device walker, rolling  -LB      Gait Training PT LTG    Gait Training Goal PT LTG, Date Established 01/19/18  -LB 01/15/18  -KH     Gait Training Goal PT LTG, Time to Achieve 1 wk  -LB 1 wk  -KH     Gait Training Goal PT LTG, [place] Level conditional independence  -LB minimum assist (75% patient effort)  -KH     Gait Training Goal PT LTG, Assist Device walker, rolling  -LB walker, rolling  -KH     Gait Training Goal PT LTG, Distance to Achieve 200  -LB  50 ft  -KH     Stair Training PT LTG    Stair Training Goal PT LTG, Date Established 01/19/18  -LB      Stair Training Goal PT LTG, Time to Achieve 1 wk  -LB      Stair Training Goal PT LTG, Number of Steps 4  -LB      Stair Training Goal PT LTG, [place] Level contact guard assist  -LB      Stair Training Goal PT LTG, Assist Device 2 handrails  -LB      Patient Education PT LTG    Patient Education PT LTG, Date Established 01/19/18  -LB      Patient Education PT LTG, Time to Achieve 1 wk  -LB      Patient Education PT LTG, Education Type elva/doff brace  -LB      Patient Education PT LTG, Education Understanding demonstrate adequately  -LB        User Key  (r) = Recorded By, (t) = Taken By, (c) = Cosigned By    Initials Name Provider Type    OTTO Garvey, PT Physical Therapist    PAYTON Turner, PT Physical Therapist          Physical Therapy Education     Title: PT OT SLP Therapies (Done)     Topic: Physical Therapy (Done)     Point: Mobility training (Done)    Learning Progress Summary    Learner Readiness Method Response Comment Documented by Status   Patient Acceptance E VU,NR steps LB 01/23/18 0941 Done    Acceptance E VU sit to stand LB 01/22/18 0925 Done    Acceptance E VU,NR  MG 01/21/18 0932 Done    Acceptance E VU,NR  LB 01/20/18 1146 Done    Acceptance E VU,NR  LB 01/19/18 1231 Done    Acceptance E VU  MM 01/19/18 0029 Done               Point: Home exercise program (Done)    Learning Progress  Summary    Learner Readiness Method Response Comment Documented by Status   Patient Acceptance E VU,NR  MG 01/21/18 0932 Done    Acceptance E VU,NR  LB 01/20/18 1146 Done    Acceptance E VU  MM 01/19/18 0029 Done               Point: Body mechanics (Done)    Learning Progress Summary    Learner Readiness Method Response Comment Documented by Status   Patient Acceptance E VU,NR  MG 01/21/18 0932 Done    Acceptance E VU  MM 01/19/18 0029 Done               Point: Precautions (Done)    Learning Progress Summary    Learner Readiness Method Response Comment Documented by Status   Patient Acceptance E VU,NR  MG 01/21/18 0932 Done    Acceptance E VU  MM 01/19/18 0029 Done                      User Key     Initials Effective Dates Name Provider Type Discipline    LB 10/06/15 -  Mitzy Turner, PT Physical Therapist PT    MG 09/13/17 -  Megan Gosselin, PT Physical Therapist PT    MM 10/30/17 -  Nano Lama RN Registered Nurse Nurse                    PT Recommendation and Plan  Anticipated Discharge Disposition: home with home health  Planned Therapy Interventions: bed mobility training, gait training, home exercise program, strengthening, stair training, transfer training  PT Frequency: 2 times/day  Plan of Care Review  Plan Of Care Reviewed With: patient  Outcome Summary/Follow up Plan: In PT, pt did well with bed mobility , transfers and gait with use of rollator.  Pt needs vc for back precautions and assist for donning/doffing brace.         Time Calculation:         PT Charges       01/23/18 1510 01/23/18 1230       Time Calculation    Start Time 1330  -JK 0900  -LB     Stop Time 1400  -JK 1000  -LB     Time Calculation (min) 30 min  -JK 60 min  -LB     PT Received On  01/23/18  -LB     PT - Next Appointment  01/23/18  -LB       User Key  (r) = Recorded By, (t) = Taken By, (c) = Cosigned By    Initials Name Provider Type    LB Mitzy Turner, PT Physical Therapist    JAMAL Woo, PT Physical Therapist           Therapy Charges for Today     Code Description Service Date Service Provider Modifiers Qty    [id number]  PT THER PROC EA 15 MIN 1/22/2018 Mitzy Turner, PT GP 6    33101129598 HC PT THER SUPP EA 15 MIN 1/22/2018 Mitzy Turner, PT GP 2    [id number]  PT THER PROC EA 15 MIN 1/23/2018 Mitzy Turner, PT GP 4               Mitzy Turner, PT  1/23/2018

## 2018-01-23 NOTE — THERAPY TREATMENT NOTE
Inpatient Rehabilitation - Physical Therapy Treatment Note  [facility]     Patient Name: Raquel Gonsales  : 1945  MRN: [id number]  Today's Date: 2018  Onset of Illness/Injury or Date of Surgery Date: 18  Date of Referral to PT: 18  Referring Physician: Charan    Admit Date: 2018    Visit Dx:  No diagnosis found.  Patient Active Problem List   Diagnosis   • Cervical spondylosis with radiculopathy   • Bilateral hip pain   • Trochanteric bursitis of both hips   • Status post bilateral total hip replacement   • S/P lumbar fusion   • Spinal stenosis of lumbar region with neurogenic claudication   • Lumbar spinal stenosis   • TIA (transient ischemic attack)   • Obesity   • Recurrent falls   • DM2 (diabetes mellitus, type 2)   • HTN (hypertension)   • COPD (chronic obstructive pulmonary disease)   • Decreased mobility   • Spinal stenosis               Adult Rehabilitation Note       18 1452 18 1353 18 0922    Rehab Assessment/Intervention    Discipline occupational therapist  -AF physical therapist  -JK physical therapist  -LB    Document Type therapy note (daily note)  -AF therapy note (daily note)  -JK therapy note (daily note)  -LB    Subjective Information agree to therapy;no complaints  -AF agree to therapy;no complaints  -JK agree to therapy  -LB    Patient Effort, Rehab Treatment good  -AF good  -JK good  -LB    Symptoms Noted During/After Treatment  none  -JK     Precautions/Limitations brace on when up;fall precautions;spinal precautions  -AF brace on when up;fall precautions  -JK brace on when up;spinal precautions;fall precautions  -LB    Precautions/Limitations, Vision  WFL with corrective lenses  -JK     Precautions/Limitations, Hearing  WFL  -JK     Recorded by [AF] Veda Shukla, OTR [JK] Terri Woo, PT [LB] Mitzy Turner, PT    Pain Assessment    Pain Assessment No/denies pain  -AF No/denies pain  -JK     Recorded by [AF] Veda Shukla, OTR [JK]  Terri Woo, PT     Vision Assessment/Intervention    Visual Impairment  WFL with corrective lenses  -JK     Recorded by  [JK] Terri Woo PT     Cognitive Assessment/Intervention    Current Cognitive/Communication Assessment functional  -AF functional  -JK     Orientation Status oriented x 4  -AF oriented x 4  -JK     Follows Commands/Answers Questions 100% of the time  -% of the time;able to follow multi-step instructions  -JK     Personal Safety WNL/WFL  -AF WNL/WFL  -JK     Personal Safety Interventions fall prevention program maintained;gait belt;nonskid shoes/slippers when out of bed  -AF fall prevention program maintained;gait belt  -JK     Recorded by [AF] Veda Shukla, OTR [JK] Terri Woo PT     Bed Mobility, Assessment/Treatment    Bed Mob, Supine to Sit, [place] contact guard assist  -AF      Bed Mob, Sit to Supine, [place] moderate assist (50% patient effort)  -AF      Bed Mobility, Comment  up in chair  -JK     Recorded by [AF] Veda Shukla, OTR [JK] Terri Woo, PT     Transfer Assessment/Treatment    Transfers, Bed-Chair [place] contact guard assist;stand by assist  -AF      Transfers, Chair-Bed [place] contact guard assist;stand by assist  -AF      Transfers, Sit-Stand [place] stand by assist  -AF supervision required  -JK supervision required  -LB    Transfers, Stand-Sit [place] stand by assist  -AF supervision required  -JK supervision required  -LB    Transfers, Sit-Stand-Sit, Assist Device  rolling walker  -JK --   rollator  -LB    Walk-In Shower Transfer, [place] contact guard assist  -AF      Walk-In Shower Transfer, Assist Device --   rollator, tub transfer bench, grab bars  -AF      Transfer, Safety Issues  step length decreased;balance decreased during turns  -JK     Transfer, Impairments  strength decreased;impaired balance  -JK     Transfer, Comment  car tsf min assist for L foot with seat back reclined  -JK     Recorded by  [AF] Veda Shukla, OTR [JK] Terri Woo, PT [LB] Mitzy Turner, PT    Gait Assessment/Treatment    Gait, [place] Level  stand by assist;contact guard assist  -JK stand by assist;contact guard assist  -LB    Gait, Assistive Device  rollator  -JK rollator  -LB    Gait, Distance (Feet)  80   40' x 2  -  -LB    Gait, Gait Deviations  bilateral:;raymond decreased;forward flexed posture;step length decreased  -JK bilateral:;decreased heel strike;forward flexed posture  -LB    Gait, Safety Issues  step length decreased  -JK     Gait, Impairments  strength decreased  -JK     Gait, Comment   cues for upright posture  -LB    Recorded by  [JK] Terri Woo, PT [LB] Mitzy Turner PT    Stairs Assessment/Treatment    Number of Stairs   6   small 4 inch steps  -LB    Stairs, Handrail Location   both sides  -LB    Stairs, [place] Level   verbal cues required;contact guard assist  -LB    Stairs, Technique Used   step to step (ascending);step to step (descending)  -LB    Recorded by   [LB] Mitzy Turner, PT    Functional Mobility    Functional Mobility- Comment walked from EOB to shower chair with rollator with SBA  -AF      Recorded by [AF] CINDY Orosco      Upper Body Bathing Assessment/Training    UB Bathing Assess/Train Assistive Device grab bars;hand-held shower head;shower chair with back  -AF      UB Bathing Assess/Train, Position sitting  -AF      UB Bathing Assess/Train, [place] Level supervision required  -AF      Recorded by [AF] CINDY Orosco      Lower Body Bathing Assessment/Training    LB Bathing Assess/Train Assistive Device grab bars;hand-held shower head;long-handled sponge;shower chair with back  -AF      LB Bathing Assess/Train, Position sitting;standing  -AF      LB Bathing Assess/Train, [place] Level supervision required  -AF      Recorded by [AF] Veda Shukla OTR      Upper Body Dressing Assessment/Training    UB Dressing Assess/Train, Position sitting   -AF      UB Dressing Assess/Train, [place] supervision required  -AF      Recorded by [AF] Veda Shukla OTR      Lower Body Dressing Assessment/Training    LB Dressing Assess/Train, Position sitting;standing  -AF      LB Dressing Assess/Train, [place] moderate assist (50% patient effort)  -AF      LB Dressing Assess/Train, Comment Assist with socks and shoes  -AF      Recorded by [AF] Veda Shukla OTR      Grooming Assessment/Training    Grooming Assess/Train, Position sitting;sink side  -AF      Grooming Assess/Train, [place]ep[place] Level supervision required  -AF      Grooming Assess/Train, Comment sitting at sink on locked rollator like she does at home  -AF      Recorded by [AF] CINDY Orosco      Balance Skills Training    Sitting-Level of Assistance Independent  -AF      Standing-Level of Assistance Close supervision  -AF      Recorded by [AF] Veda Shukla OTR      Therapy Exercises    Bilateral Lower Extremities   AROM:;20 reps;sitting;ankle pumps/circles;LAQ;hip flexion;hip abduction/adduction  -LB    Bilateral Upper Extremity AROM:;15 reps;sitting;elbow flexion/extension;hand pumps;pronation/supination;shoulder protraction/retraction   #1 hand weights, 3 sets   -AF      Recorded by [AF] Veda Shukla OTR  [LB] Mitzy Turner, PT    Functional Endurance    Detail (Functional Endurance) stood for arm bike 2 mins  -AF      Recorded by [AF] Veda Shukla OTR      Orthosis Location    Orthosis, Neck/Back   LSO (lumbar sacral orthosis)  -LB    Recorded by   [LB] Mitzy Turner PT    Orthosis Management/Training    Orthosis Skills Training   purpose/goals of orthosis;sleeping without orthosis  -LB    Orthosis Wear Schedule   wear when out of bed only  -LB    Recorded by   [LB] Mitzy Turner PT    Positioning and Restraints    Pre-Treatment Position in bed  -AF sitting in chair/recliner  -JK sitting in chair/recliner  -LB    Post Treatment Position bed  -AF wheelchair  -JK     In Bed  supine;call light within reach;encouraged to call for assist;exit alarm on   in PM  -AF      In Wheelchair sitting;with PT   in AM  -AF sitting;with OT;heels elevated  -JK     Recorded by [AF] CINDY Orosco [JK] Terri Woo, PT [LB] Mitzy Turner PT      01/22/18 1402 01/22/18 0923 01/22/18 0910    Rehab Assessment/Intervention    Discipline occupational therapist  -AF physical therapist  -LB occupational therapist  -AF    Document Type therapy note (daily note)  -AF therapy note (daily note)  -LB therapy note (daily note)  -AF    Subjective Information agree to therapy  -AF agree to therapy  -LB agree to therapy;no complaints  -AF    Patient Effort, Rehab Treatment good  -AF good  -LB good  -AF    Precautions/Limitations brace on when up;fall precautions;spinal precautions  -AF brace on when up;fall precautions;spinal precautions  -LB brace on when up;fall precautions;spinal precautions  -AF    Recorded by [AF] CINDY Orosco [LB] Mitzy Turner, PT [AF] Veda Shukla OTR    Pain Assessment    Pain Assessment No/denies pain  -AF 0-10  -LB No/denies pain  -AF    Pain Score  6  -LB     Post Pain Score  6  -LB     Pain Type  Chronic pain  -LB     Pain Location  Hip  -LB     Pain Orientation  Left;Right  -LB     Pain Intervention(s)  Ambulation/increased activity  -LB     Response to Interventions  tolerated  -LB     Recorded by [AF] CINDY Orosco [LB] Mitzy Turner, PT [AF] CINDY Orosco    Vision Assessment/Intervention    Visual Impairment WNL  -AF      Recorded by [AF] CINDY Orosco      Cognitive Assessment/Intervention    Current Cognitive/Communication Assessment functional  -AF  functional  -AF    Orientation Status oriented x 4  -AF  oriented x 4  -AF    Follows Commands/Answers Questions 100% of the time  -AF  100% of the time  -AF    Personal Safety WNL/WFL  -AF WNL/WFL  -LB WNL/WFL  -AF    Personal Safety Interventions fall prevention program maintained;gait  belt;nonskid shoes/slippers when out of bed  -AF fall prevention program maintained;gait belt;muscle strengthening facilitated;nonskid shoes/slippers when out of bed  -LB fall prevention program maintained;gait belt;nonskid shoes/slippers when out of bed  -AF    Recorded by [AF] Veda Shukla, OTR [LB] Mitzy Turner, PT [AF] Veda Shukla, OTR    Bed Mobility, Assessment/Treatment    Bed Mob, Supine to Sit, [place]   contact guard assist;verbal cues required   bed rail  -AF    Bed Mob, Sit to Sidelying, [place]  verbal cues required;minimum assist (75% patient effort)  -LB     Bed Mobility, Comment  pt needs cues to maintain back precautions  -LB     Recorded by  [LB] Mitzy Turner, PT [AF] Veda Shukla, OTR    Transfer Assessment/Treatment    Transfers, Bed-Chair [place]  contact guard assist  -LB     Transfers, Sit-Stand [place] stand by assist  -AF stand by assist  -LB stand by assist;verbal cues required  -AF    Transfers, Stand-Sit [place] stand by assist  -AF stand by assist  -LB stand by assist;verbal cues required  -AF    Transfers, Sit-Stand-Sit, Assist Device  rolling walker  -LB --   rollator  -AF    Toilet Transfer, [place] contact guard assist;verbal cues required;supervision required  -AF  contact guard assist  -AF    Toilet Transfer, Assistive Device --   rollator  -AF  --   rollator and grab bar  -AF    Recorded by [AF] Veda Shukla, OTR [LB] Mitzy Turner, PT [AF] Veda Shukla, OTR    Gait Assessment/Treatment    Gait, [place] Level  stand by assist;contact guard assist  -LB     Gait, Assistive Device  rollator  -LB     Gait, Distance (Feet)  200   160, 100  -LB     Gait, Gait Deviations  decreased heel strike;forward flexed posture  -LB     Gait, Safety Issues  step length decreased  -LB     Gait, Impairments  strength decreased  -LB     Gait, Comment  cues for upright posture  -LB     Recorded by  [LB] Mitzy Turner, PT     Functional Mobility     Functional Mobility- Comment walked to and from bathroom with rollaotr with SBA/CGA  -AF  walked from EOB to commode with rollator and CGA  -AF    Recorded by [AF] Veda Shukla OTR  [AF] Veda Shukla OTR    Upper Body Bathing Assessment/Training    UB Bathing Assess/Train, Position   sitting;sink side  -AF    UB Bathing Assess/Train, [place] Level   set up required  -AF    Recorded by   [AF] Veda Shukla OTR    Lower Body Bathing Assessment/Training    LB Bathing Assess/Train, Position   sink side;standing;sitting  -AF    LB Bathing Assess/Train, [place] Level   minimum assist (75% patient effort);verbal cues required  -AF    LB Bathing Assess/Train, Comment   assist with feet  -AF    Recorded by   [AF] Veda Shukla OTR    Upper Body Dressing Assessment/Training    UB Dressing Assess/Train, Position   sitting;sink side  -AF    UB Dressing Assess/Train, [place]   supervision required  -AF    UB Dressing Assess/Train, Comment   pt able to don brace seated on EOB and w/c level with set up  -AF    Recorded by   [AF] CINDY Orosco    Lower Body Dressing Assessment/Training    LB Dressing Assess/Train, Position   sitting;standing;sink side  -AF    LB Dressing Assess/Train, [place]   moderate assist (50% patient effort)  -AF    LB Dressing Assess/Train, Comment   assist with TEDS, and heel management on shoes. edcuated on spinal precautions with ADLs  -AF    Recorded by   [AF] CINDY Orosco    Toileting Assessment/Training    Toileting Assess/Train, Assistive Device   grab bars;raised toilet seat  -AF    Toileting Assess/Train, Position   standing;sitting  -AF    Toileting Assess/Train, [place]ep[place] Level   supervision required  -AF    Recorded by   [AF] Veda Shukla OTR    Grooming Assessment/Training    Grooming Assess/Train, Position   sitting;sink side  -AF    Grooming Assess/Train, [place]ep[place] Level   supervision required  -AF    Grooming Assess/Train, Comment stood  at sink to wash hands  -AF  w/c level  -AF    Recorded by [AF] CINDY Orosco  [AF] CINDY Orosco    Balance Skills Training    Sitting-Level of Assistance   Distant supervision;Independent  -AF    Standing-Level of Assistance Close supervision  -AF  Contact guard;Close supervision  -AF    Static Standing Balance Support assistive device  -AF  assistive device  -AF    Standing-Balance Activities Ball toss  -AF  --   ADLs  -AF    Standing Balance # of Minutes 2mins x 2 sets  -AF  3-5 mins x 2  -AF    Gait Balance-Level of Assistance  Contact guard  -LB     Gait Balance Support  odell bar  -LB     Gait Balance Activities  side-stepping;backwards  -LB     Recorded by [AF] CINDY Orosco [LB] Mitzy Turner PT [AF] CINDY rOosco    Therapy Exercises    Bilateral Lower Extremities  AROM:;10 reps;sitting  -LB     BLE Other Reps  15  -LB     Bilateral Upper Extremity AROM:;15 reps;sitting;elbow flexion/extension;hand pumps;pronation/supination;shoulder protraction/retraction   #1 dowel natalee, hand weight, hand gripper, 3 sets  -AF      Recorded by [AF] CINDY Orosco [LB] Mitzy Turner PT     Functional Endurance    Detail (Functional Endurance) arm bike standing 2 mins   -AF  good with ADLs  -AF    Recorded by [AF] CINDY Orosco  [AF] CINDY Orosco    Orthosis Location    Orthosis, Neck/Back  LSO (lumbar sacral orthosis)  -LB     Recorded by  [LB] Mitzy Turner PT     Orthosis Management/Training    Orthosis Wear Schedule  wear when out of bed only  -LB     Recorded by  [LB] Mitzy Turner PT     Positioning and Restraints    Pre-Treatment Position sitting in chair/recliner  -AF sitting in chair/recliner  -LB in bed  -AF    Post Treatment Position wheelchair  -AF wheelchair  -LB wheelchair  -AF    In Bed  with other staff  -LB     In Wheelchair sitting;with PT  -AF call light within reach;encouraged to call for assist  -LB sitting;with PT  -AF    Recorded by [AF] Veda Orta  Vazquez, OTR [LB] Mitzy Turner, PT [AF] Veda Marivelamanda Shukla, OTR      01/21/18 0928          Rehab Assessment/Intervention    Discipline physical therapist  -MG      Document Type therapy note (daily note)  -MG      Subjective Information agree to therapy;no complaints  -MG      Patient Effort, Rehab Treatment good  -MG      Symptoms Noted During/After Treatment fatigue  -MG      Precautions/Limitations brace on when up;fall precautions;spinal precautions  -MG      Recorded by [MG] Megan Gosselin, PT      Pain Assessment    Pain Assessment No/denies pain  -MG      Recorded by [MG] Megan Gosselin, PT      Cognitive Assessment/Intervention    Current Cognitive/Communication Assessment functional  -MG      Orientation Status oriented x 4  -MG      Follows Commands/Answers Questions 100% of the time  -MG      Personal Safety WNL/WFL  -MG      Personal Safety Interventions fall prevention program maintained;gait belt  -MG      Recorded by [MG] Megan Gosselin, PT      Bed Mobility, Assessment/Treatment    Bed Mobility, Comment not tested, up in chair   -MG      Recorded by [MG] Megan Gosselin, PT      Transfer Assessment/Treatment    Transfers, Sit-Stand [place] contact guard assist;verbal cues required  -MG      Transfers, Stand-Sit [place] contact guard assist;verbal cues required  -MG      Transfers, Sit-Stand-Sit, Assist Device --   4WW  -MG      Transfer, Safety Issues step length decreased;balance decreased during turns  -MG      Transfer, Impairments strength decreased;impaired balance  -MG      Recorded by [MG] Megan Gosselin, PT      Gait Assessment/Treatment    Gait, [place] Level contact guard assist  -MG      Gait, Assistive Device rollator  -MG      Gait, Distance (Feet) 160  -MG      Gait, Gait Deviations raymond decreased;forward flexed posture;step length decreased  -MG      Gait, Safety Issues step length decreased;balance decreased during turns  -MG      Gait, Impairments strength  decreased;impaired balance  -MG      Gait, Comment cues for upright posture  -MG      Recorded by [MG] Megan Gosselin, PT      Therapy Exercises    Bilateral Lower Extremities AROM:;15 reps;sitting;hip abduction/adduction;LAQ;hip flexion  -MG      Recorded by [MG] Megan Gosselin, PT      Positioning and Restraints    Pre-Treatment Position sitting in chair/recliner  -MG      Post Treatment Position wheelchair  -MG      In Wheelchair sitting;call light within reach;encouraged to call for assist  -MG      Recorded by [MG] Megan Gosselin, PT        User Key  (r) = Recorded By, (t) = Taken By, (c) = Cosigned By    Initials Name Effective Dates    LB Mitzy Turner, PT 10/06/15 -     AF Veda Shukla, OTR 12/01/15 -     JK Terri Woo, PT 12/01/15 -     MG Megan Gosselin, PT 09/13/17 -                 IP PT Goals       01/19/18 1546 01/15/18 1620       Bed Mobility PT LTG    Bed Mobility PT LTG, Date Established 01/19/18  -LB 01/15/18  -KH     Bed Mobility PT LTG, Time to Achieve 1 wk  -LB 1 wk  -KH     Bed Mobility PT LTG, Activity Type sidelying to sit/sit to sidelying;roll left/roll right  -LB all bed mobility  -KH     Bed Mobility PT LTG, [place] Level independent  -LB minimum assist (75% patient effort)  -KH     Transfer Training PT LTG    Transfer Training PT LTG, Date Established 01/19/18  -LB 01/15/18  -KH     Transfer Training PT LTG, Time to Achieve 1 wk  -LB 1 wk  -KH     Transfer Training PT LTG, Activity Type sit to stand/stand to sit  -LB all transfers  -KH     Transfer Training PT LTG, [place] Level conditional independence  -LB minimum assist (75% patient effort)  -KH     Transfer Training PT LTG, Assist Device walker, rolling  -LB walker, rolling  -KH     Transfer Training 2 PT LTG    Transfer Training PT 2 LTG, Time to Achieve 1 wk  -LB      Transfer Training PT 2 LTG, Activity Type --   car transfer  -LB      Transfer Training PT 2 LTG, [place] Level supervision required  -LB       Transfer Training PT 2 LTG, Assist Device walker, rolling  -LB      Gait Training PT LTG    Gait Training Goal PT LTG, Date Established 01/19/18  -LB 01/15/18  -KH     Gait Training Goal PT LTG, Time to Achieve 1 wk  -LB 1 wk  -KH     Gait Training Goal PT LTG, [place] Level conditional independence  -LB minimum assist (75% patient effort)  -KH     Gait Training Goal PT LTG, Assist Device walker, rolling  -LB walker, rolling  -KH     Gait Training Goal PT LTG, Distance to Achieve 200  -LB  50 ft  -KH     Stair Training PT LTG    Stair Training Goal PT LTG, Date Established 01/19/18  -LB      Stair Training Goal PT LTG, Time to Achieve 1 wk  -LB      Stair Training Goal PT LTG, Number of Steps 4  -LB      Stair Training Goal PT LTG, [place] Level contact guard assist  -LB      Stair Training Goal PT LTG, Assist Device 2 handrails  -LB      Patient Education PT LTG    Patient Education PT LTG, Date Established 01/19/18  -LB      Patient Education PT LTG, Time to Achieve 1 wk  -LB      Patient Education PT LTG, Education Type elva/doff brace  -LB      Patient Education PT LTG, Education Understanding demonstrate adequately  -LB        User Key  (r) = Recorded By, (t) = Taken By, (c) = Cosigned By    Initials Name Provider Type    OTTO Garvey, PT Physical Therapist    PAYTON Turner, PT Physical Therapist          Physical Therapy Education     Title: PT OT SLP Therapies (Done)     Topic: Physical Therapy (Done)     Point: Mobility training (Done)    Learning Progress Summary    Learner Readiness Method Response Comment Documented by Status   Patient Acceptance E VU,NR steps LB 01/23/18 0941 Done    Acceptance E VU sit to stand LB 01/22/18 0925 Done    Acceptance E VU,NR  MG 01/21/18 0932 Done    Acceptance E VU,NR  LB 01/20/18 1146 Done    Acceptance E VU,NR  LB 01/19/18 1231 Done    Acceptance E VU  MM 01/19/18 0029 Done               Point: Home exercise program (Done)    Learning Progress  Summary    Learner Readiness Method Response Comment Documented by Status   Patient Acceptance E VU,NR  MG 01/21/18 0932 Done    Acceptance E VU,NR  LB 01/20/18 1146 Done    Acceptance E VU  MM 01/19/18 0029 Done               Point: Body mechanics (Done)    Learning Progress Summary    Learner Readiness Method Response Comment Documented by Status   Patient Acceptance E VU,NR  MG 01/21/18 0932 Done    Acceptance E VU  MM 01/19/18 0029 Done               Point: Precautions (Done)    Learning Progress Summary    Learner Readiness Method Response Comment Documented by Status   Patient Acceptance E VU,NR  MG 01/21/18 0932 Done    Acceptance E VU  MM 01/19/18 0029 Done                      User Key     Initials Effective Dates Name Provider Type Discipline    LB 10/06/15 -  Mitzy Turner, PT Physical Therapist PT    MG 09/13/17 -  Megan Gosselin, PT Physical Therapist PT    MM 10/30/17 -  Nano Lama RN Registered Nurse Nurse                    PT Recommendation and Plan  Anticipated Discharge Disposition: home with home health  Planned Therapy Interventions: bed mobility training, gait training, home exercise program, strengthening, stair training, transfer training  PT Frequency: 2 times/day            Time Calculation:         PT Charges       01/23/18 1510 01/23/18 1230       Time Calculation    Start Time 1330  -JK 0900  -LB     Stop Time 1400  -JK 1000  -LB     Time Calculation (min) 30 min  -JK 60 min  -LB     PT Received On  01/23/18  -LB     PT - Next Appointment  01/23/18  -LB       User Key  (r) = Recorded By, (t) = Taken By, (c) = Cosigned By    Initials Name Provider Type    LB Mitzy Turner, PT Physical Therapist    JK Terri Woo, PT Physical Therapist          Therapy Charges for Today     Code Description Service Date Service Provider Modifiers Qty    [id number] HC PT THER PROC EA 15 MIN 1/23/2018 Terri Woo, PT GP 2               Terri Woo, PT  1/23/2018

## 2018-01-23 NOTE — PROGRESS NOTES
[facility] Coordinator  Inpatient Rehabilitation Admission    Ethnic Group: Black/.  Marital Status:  Marital Status: Never .    IRF Admission Date:  01/18/2018  Admission Class: Initial Rehab.  Admit From:  [facility]    Pre-Hospital Living: Home. Pre-Hospital Living  With: (1) Alone.    Payment Sources: Primary: Not Listed.  Secondary: Not Listed.  Impairment Group: 08.9 Other Orthopedic  Date of Onset of Impairment: 01/11/2018    Etiologic Diagnosis Code(s):  Rank Code      Description  1    M48.00    Spinal stenosis, site unspecified    Comorbidities:      Are there any arthritis conditions recorded for Impairment Group, Etiologic  Diagnosis, or Comorbid Conditions that meet all of the regulatory requirements  for IRF classification (in 42 .29(b)(2)(x), (xi), and xii))? No    DANUTA Bladder Accidents:  0 - Accidents.  Bladder Score = 7. Patient has not had an accident.  DANUTA Bowel Accident: 0 -Accidents.  Bowel Score = 7. Patient has no accidents.    Presence of Pressure Ulcer:  No observed/documented pressure ulcers.    MEDICAL NEEDS  Height on Admission:  66 inches.  Weight on Admission:  243 pounds.    QUALITY INDICATORS  Prior Functioning:  Self Care: Patient completed the activities by him/herself, with or without an  assistive device, with no assistance from a helper.  Indoor Mobility: Patient completed the activities by him/herself, with or  without an assistive device, with no assistance from a helper.  Stairs: Patient completed the activities by him/herself, with or without an  assistive device, with no assistance from a helper.  Functional Cognition: Patient completed the activities by him/herself, with or  without an assistive device, with no assistance from a helper.  Prior Device Use: Walker    Bladder and Bowel: Bladder Continence: Always continent (no documented  incontinence).  Bowel Continence: Always continent (no documented  incontinence).  Swallowing/Nutritional Status: Regular food (solids and liquids swallowed safely  without supervision or modified food or liquid consistency).  Special Conditions: Patient did not receive total parenteral nutrition treatment  at the time of admission.    Signed by: Ishaan Gonzalez RN

## 2018-01-23 NOTE — THERAPY TREATMENT NOTE
Inpatient Rehabilitation - Occupational Therapy Treatment Note  [facility]     Patient Name: Raquel Gonsales  : 1945  MRN: [id number]  Today's Date: 2018  Onset of Illness/Injury or Date of Surgery Date: 18  Date of Referral to OT: 18  Referring Physician: Charan      Admit Date: 2018    Visit Dx:   No diagnosis found.  Patient Active Problem List   Diagnosis   • Cervical spondylosis with radiculopathy   • Bilateral hip pain   • Trochanteric bursitis of both hips   • Status post bilateral total hip replacement   • S/P lumbar fusion   • Spinal stenosis of lumbar region with neurogenic claudication   • Lumbar spinal stenosis   • TIA (transient ischemic attack)   • Obesity   • Recurrent falls   • DM2 (diabetes mellitus, type 2)   • HTN (hypertension)   • COPD (chronic obstructive pulmonary disease)   • Decreased mobility   • Spinal stenosis             Adult Rehabilitation Note       18 1452 18 1353 18 0922    Rehab Assessment/Intervention    Discipline occupational therapist  -AF physical therapist  -JK physical therapist  -LB    Document Type therapy note (daily note)  -AF therapy note (daily note)  -JK therapy note (daily note)  -LB    Subjective Information agree to therapy;no complaints  -AF agree to therapy;no complaints  -JK agree to therapy  -LB    Patient Effort, Rehab Treatment good  -AF good  -JK good  -LB    Symptoms Noted During/After Treatment  none  -JK     Precautions/Limitations brace on when up;fall precautions;spinal precautions  -AF brace on when up;fall precautions  -JK brace on when up;spinal precautions;fall precautions  -LB    Precautions/Limitations, Vision  WFL with corrective lenses  -JK     Precautions/Limitations, Hearing  WFL  -JK     Recorded by [AF] Veda Shukla, OTR [JK] Terri Woo, PT [LB] Mitzy Turner, PT    Pain Assessment    Pain Assessment No/denies pain  -AF No/denies pain  -JK     Recorded by [AF] Veda Shukla OTR  [JK] Terri Woo, PT     Vision Assessment/Intervention    Visual Impairment  WFL with corrective lenses  -JK     Recorded by  [JK] Terri Woo PT     Cognitive Assessment/Intervention    Current Cognitive/Communication Assessment functional  -AF functional  -JK     Orientation Status oriented x 4  -AF oriented x 4  -JK     Follows Commands/Answers Questions 100% of the time  -% of the time;able to follow multi-step instructions  -JK     Personal Safety WNL/WFL  -AF WNL/WFL  -JK     Personal Safety Interventions fall prevention program maintained;gait belt;nonskid shoes/slippers when out of bed  -AF fall prevention program maintained;gait belt  -JK     Recorded by [AF] Veda Shukla, OTR [JK] Terri Woo, PT     Bed Mobility, Assessment/Treatment    Bed Mob, Supine to Sit, [place] contact guard assist  -AF      Bed Mob, Sit to Supine, [place] moderate assist (50% patient effort)  -AF      Bed Mobility, Comment  up in chair  -JK     Recorded by [AF] Veda Shukla, OTR [JK] Terri Woo, PT     Transfer Assessment/Treatment    Transfers, Bed-Chair [place] contact guard assist;stand by assist  -AF      Transfers, Chair-Bed [place] contact guard assist;stand by assist  -AF      Transfers, Sit-Stand [place] stand by assist  -AF supervision required  -JK supervision required  -LB    Transfers, Stand-Sit [place] stand by assist  -AF supervision required  -JK supervision required  -LB    Transfers, Sit-Stand-Sit, Assist Device  rolling walker  -JK --   rollator  -LB    Walk-In Shower Transfer, [place] contact guard assist  -AF      Walk-In Shower Transfer, Assist Device --   rollator, tub transfer bench, grab bars  -AF      Transfer, Safety Issues  step length decreased;balance decreased during turns  -JK     Transfer, Impairments  strength decreased;impaired balance  -JK     Transfer, Comment  car tsf min assist for L foot  -JK     Recorded by [AF] Veda Orta  Vazquez, OTR [JK] Terri Woo, PT [LB] Mitzy Turner, PT    Gait Assessment/Treatment    Gait, [place] Level  stand by assist;contact guard assist  -JK stand by assist;contact guard assist  -LB    Gait, Assistive Device  rollator  -JK rollator  -LB    Gait, Distance (Feet)  80   40' x 2  -  -LB    Gait, Gait Deviations  bilateral:;raymond decreased;forward flexed posture;step length decreased  -JK bilateral:;decreased heel strike;forward flexed posture  -LB    Gait, Comment   cues for upright posture  -LB    Recorded by  [JK] Terri Woo, PT [LB] Mitzy Turner, PT    Stairs Assessment/Treatment    Number of Stairs   6   small 4 inch steps  -LB    Stairs, Handrail Location   both sides  -LB    Stairs, [place] Level   verbal cues required;contact guard assist  -LB    Stairs, Technique Used   step to step (ascending);step to step (descending)  -LB    Recorded by   [LB] Mitzy Turner, PT    Functional Mobility    Functional Mobility- Comment walked from EOB to shower chair with rollator with SBA  -AF      Recorded by [AF] Veda Shukla, OTR      Upper Body Bathing Assessment/Training    UB Bathing Assess/Train Assistive Device grab bars;hand-held shower head;shower chair with back  -AF      UB Bathing Assess/Train, Position sitting  -AF      UB Bathing Assess/Train, [place] Level supervision required  -AF      Recorded by [AF] Veda Shukla OTR      Lower Body Bathing Assessment/Training    LB Bathing Assess/Train Assistive Device grab bars;hand-held shower head;long-handled sponge;shower chair with back  -AF      LB Bathing Assess/Train, Position sitting;standing  -AF      LB Bathing Assess/Train, [place] Level supervision required  -AF      Recorded by [AF] Veda Shukla OTR      Upper Body Dressing Assessment/Training    UB Dressing Assess/Train, Position sitting  -AF      UB Dressing Assess/Train, [place] supervision required  -AF      Recorded by [AF] Veda Shukla,  OTR      Lower Body Dressing Assessment/Training    LB Dressing Assess/Train, Position sitting;standing  -AF      LB Dressing Assess/Train, [place] moderate assist (50% patient effort)  -AF      LB Dressing Assess/Train, Comment Assist with socks and shoes  -AF      Recorded by [AF] Veda Shukla OTR      Grooming Assessment/Training    Grooming Assess/Train, Position sitting;sink side  -AF      Grooming Assess/Train, [place] Level supervision required  -AF      Grooming Assess/Train, Comment sitting at sink on locked rollator like she does at home  -AF      Recorded by [AF] Veda Shukla OTR      Balance Skills Training    Sitting-Level of Assistance Independent  -AF      Standing-Level of Assistance Close supervision  -AF      Recorded by [AF] Veda Shukla, ESTELLAR      Therapy Exercises    Bilateral Lower Extremities   AROM:;20 reps;sitting;ankle pumps/circles;LAQ;hip flexion;hip abduction/adduction  -LB    Bilateral Upper Extremity AROM:;15 reps;sitting;elbow flexion/extension;hand pumps;pronation/supination;shoulder protraction/retraction   #1 hand weights, 3 sets   -AF      Recorded by [AF] Veda Shukla, OTR  [LB] Mitzy Turner, PT    Functional Endurance    Detail (Functional Endurance) stood for arm bike 2 mins  -AF      Recorded by [AF] Veda Shukla OTR      Orthosis Location    Orthosis, Neck/Back   LSO (lumbar sacral orthosis)  -LB    Recorded by   [LB] Mitzy Turner, PT    Orthosis Management/Training    Orthosis Skills Training   purpose/goals of orthosis;sleeping without orthosis  -LB    Orthosis Wear Schedule   wear when out of bed only  -LB    Recorded by   [LB] Mitzy Turner, PT    Positioning and Restraints    Pre-Treatment Position in bed  -AF  sitting in chair/recliner  -LB    Post Treatment Position bed  -AF      In Bed supine;call light within reach;encouraged to call for assist;exit alarm on   in PM  -AF      In Wheelchair sitting;with PT   in AM  -AF      Recorded by [AF]  CINDY Orosco  [LB] Mitzy Turner, PT      01/22/18 1402 01/22/18 0923 01/22/18 0910    Rehab Assessment/Intervention    Discipline occupational therapist  -AF physical therapist  -LB occupational therapist  -AF    Document Type therapy note (daily note)  -AF therapy note (daily note)  -LB therapy note (daily note)  -AF    Subjective Information agree to therapy  -AF agree to therapy  -LB agree to therapy;no complaints  -AF    Patient Effort, Rehab Treatment good  -AF good  -LB good  -AF    Precautions/Limitations brace on when up;fall precautions;spinal precautions  -AF brace on when up;fall precautions;spinal precautions  -LB brace on when up;fall precautions;spinal precautions  -AF    Recorded by [AF] CINDY Orosco [LB] Mitzy Turner, PT [AF] Veda Shukla OTR    Pain Assessment    Pain Assessment No/denies pain  -AF 0-10  -LB No/denies pain  -AF    Pain Score  6  -LB     Post Pain Score  6  -LB     Pain Type  Chronic pain  -LB     Pain Location  Hip  -LB     Pain Orientation  Left;Right  -LB     Pain Intervention(s)  Ambulation/increased activity  -LB     Response to Interventions  tolerated  -LB     Recorded by [AF] CINDY Orosco [LB] Mitzy Turner, PT [AF] Veda Shukla OTR    Vision Assessment/Intervention    Visual Impairment WNL  -AF      Recorded by [AF] CINDY Orosco      Cognitive Assessment/Intervention    Current Cognitive/Communication Assessment functional  -AF  functional  -AF    Orientation Status oriented x 4  -AF  oriented x 4  -AF    Follows Commands/Answers Questions 100% of the time  -AF  100% of the time  -AF    Personal Safety WNL/WFL  -AF WNL/WFL  -LB WNL/WFL  -AF    Personal Safety Interventions fall prevention program maintained;gait belt;nonskid shoes/slippers when out of bed  -AF fall prevention program maintained;gait belt;muscle strengthening facilitated;nonskid shoes/slippers when out of bed  -LB fall prevention program maintained;gait  belt;nonskid shoes/slippers when out of bed  -AF    Recorded by [AF] Veda Shukla, OTR [LB] Mitzy Turner, PT [AF] Veda Shukla, ESTELLAR    Bed Mobility, Assessment/Treatment    Bed Mob, Supine to Sit, [place]   contact guard assist;verbal cues required   bed rail  -AF    Bed Mob, Sit to [place], [place]  verbal cues required;minimum assist (75% patient effort)  -LB     Bed Mobility, Comment  pt needs cues to maintain back precautions  -LB     Recorded by  [LB] Mitzy Turner, PT [AF] Veda Shukla, ESTELLAR    Transfer Assessment/Treatment    Transfers, Bed-Chair [place]  contact guard assist  -LB     Transfers, Sit-Stand [place] stand by assist  -AF stand by assist  -LB stand by assist;verbal cues required  -AF    Transfers, Stand-Sit [place] stand by assist  -AF stand by assist  -LB stand by assist;verbal cues required  -AF    Transfers, Sit-Stand-Sit, Assist Device  rolling walker  -LB --   rollator  -AF    Toilet Transfer, [place] contact guard assist;verbal cues required;supervision required  -AF  contact guard assist  -AF    Toilet Transfer, Assistive Device --   rollator  -AF  --   rollator and grab bar  -AF    Recorded by [AF] Veda Shukla, OTR [LB] Mitzy Turner, PT [AF] Veda Shukla, ESTELLAR    Gait Assessment/Treatment    Gait, [place] Level  stand by assist;contact guard assist  -LB     Gait, Assistive Device  rollator  -LB     Gait, Distance (Feet)  200   160, 100  -LB     Gait, Gait Deviations  decreased heel strike;forward flexed posture  -LB     Gait, Safety Issues  step length decreased  -LB     Gait, Impairments  strength decreased  -LB     Gait, Comment  cues for upright posture  -LB     Recorded by  [LB] Mitzy Turner, PT     Functional Mobility    Functional Mobility- Comment walked to and from bathroom with rollaotr with SBA/CGA  -AF  walked from EOB to commode with rollator and CGA  -AF    Recorded by [AF] Veda Shukla, OTR  [AF] Veda Shukla, OTR     Upper Body Bathing Assessment/Training    UB Bathing Assess/Train, Position   sitting;sink side  -AF    UB Bathing Assess/Train, [place] Level   set up required  -AF    Recorded by   [AF] Veda Shukla OTR    Lower Body Bathing Assessment/Training    LB Bathing Assess/Train, Position   sink side;standing;sitting  -AF    LB Bathing Assess/Train, [place] Level   minimum assist (75% patient effort);verbal cues required  -AF    LB Bathing Assess/Train, Comment   assist with feet  -AF    Recorded by   [AF] CINDY Orosco    Upper Body Dressing Assessment/Training    UB Dressing Assess/Train, Position   sitting;sink side  -AF    UB Dressing Assess/Train, [place]   supervision required  -AF    UB Dressing Assess/Train, Comment   pt able to don brace seated on EOB and w/c level with set up  -AF    Recorded by   [AF] CINDY Orosco    Lower Body Dressing Assessment/Training    LB Dressing Assess/Train, Position   sitting;standing;sink side  -AF    LB Dressing Assess/Train, [place]   moderate assist (50% patient effort)  -AF    LB Dressing Assess/Train, Comment   assist with TEDS, and heel management on shoes. edcuated on spinal precautions with ADLs  -AF    Recorded by   [AF] CINDY Orosco    Toileting Assessment/Training    Toileting Assess/Train, Assistive Device   grab bars;raised toilet seat  -AF    Toileting Assess/Train, Position   standing;sitting  -AF    Toileting Assess/Train, [place] Level   supervision required  -AF    Recorded by   [AF] CINDY Orosco    Grooming Assessment/Training    Grooming Assess/Train, Position   sitting;sink side  -AF    Grooming Assess/Train, [place] Level   supervision required  -AF    Grooming Assess/Train, Comment stood at sink to wash hands  -AF  w/c level  -AF    Recorded by [AF] CINDY Orosco  [AF] CINDY Orosco    Balance Skills Training    Sitting-Level of Assistance   Distant supervision;Independent  -AF     Standing-Level of Assistance Close supervision  -AF  Contact guard;Close supervision  -AF    Static Standing Balance Support assistive device  -AF  assistive device  -AF    Standing-Balance Activities Ball toss  -AF  --   ADLs  -AF    Standing Balance # of Minutes 2mins x 2 sets  -AF  3-5 mins x 2  -AF    Gait Balance-Level of Assistance  Contact guard  -LB     Gait Balance Support  odell bar  -LB     Gait Balance Activities  side-stepping;backwards  -LB     Recorded by [AF] CINDY Orosco [LB] Mitzy Turner PT [AF] CINDY Orosco    Therapy Exercises    Bilateral Lower Extremities  AROM:;10 reps;sitting  -LB     BLE Other Reps  15  -LB     Bilateral Upper Extremity AROM:;15 reps;sitting;elbow flexion/extension;hand pumps;pronation/supination;shoulder protraction/retraction   #1 dowel natalee, hand weight, hand gripper, 3 sets  -AF      Recorded by [AF] CINDY Orosco [LB] Mitzy Turner PT     Functional Endurance    Detail (Functional Endurance) arm bike standing 2 mins   -AF  good with ADLs  -AF    Recorded by [AF] CINDY Orosco  [AF] CINDY Orosco    Orthosis Location    Orthosis, Neck/Back  LSO (lumbar sacral orthosis)  -LB     Recorded by  [LB] Mitzy Turner PT     Orthosis Management/Training    Orthosis Wear Schedule  wear when out of bed only  -LB     Recorded by  [LB] Mitzy Turner PT     Positioning and Restraints    Pre-Treatment Position sitting in chair/recliner  -AF sitting in chair/recliner  -LB in bed  -AF    Post Treatment Position wheelchair  -AF wheelchair  -LB wheelchair  -AF    In Bed  with other staff  -LB     In Wheelchair sitting;with PT  -AF call light within reach;encouraged to call for assist  -LB sitting;with PT  -AF    Recorded by [AF] CINDY Orosco [LB] Mitzy Turner PT [AF] CINDY Orosco      01/21/18 3722          Rehab Assessment/Intervention    Discipline physical therapist  -MG      Document Type therapy note (daily note)  -MG       Subjective Information agree to therapy;no complaints  -MG      Patient Effort, Rehab Treatment good  -MG      Symptoms Noted During/After Treatment fatigue  -MG      Precautions/Limitations brace on when up;fall precautions;spinal precautions  -MG      Recorded by [MG] Megan Gosselin, PT      Pain Assessment    Pain Assessment No/denies pain  -MG      Recorded by [MG] Megan Gosselin, PT      Cognitive Assessment/Intervention    Current Cognitive/Communication Assessment functional  -MG      Orientation Status oriented x 4  -MG      Follows Commands/Answers Questions 100% of the time  -MG      Personal Safety WNL/WFL  -MG      Personal Safety Interventions fall prevention program maintained;gait belt  -MG      Recorded by [MG] Megan Gosselin, PT      Bed Mobility, Assessment/Treatment    Bed Mobility, Comment not tested, up in chair   -MG      Recorded by [MG] Megan Gosselin, PT      Transfer Assessment/Treatment    Transfers, Sit-Stand [place] contact guard assist;verbal cues required  -MG      Transfers, Stand-Sit [place] contact guard assist;verbal cues required  -MG      Transfers, Sit-Stand-Sit, Assist Device --   4WW  -MG      Transfer, Safety Issues step length decreased;balance decreased during turns  -MG      Transfer, Impairments strength decreased;impaired balance  -MG      Recorded by [MG] Megan Gosselin, PT      Gait Assessment/Treatment    Gait, [place] Level contact guard assist  -MG      Gait, Assistive Device rollator  -MG      Gait, Distance (Feet) 160  -MG      Gait, Gait Deviations [place] decreased;forward flexed posture;step length decreased  -MG      Gait, Safety Issues step length decreased;balance decreased during turns  -MG      Gait, Impairments strength decreased;impaired balance  -MG      Gait, Comment cues for upright posture  -MG      Recorded by [MG] Megan Gosselin, PT      Therapy Exercises    Bilateral Lower Extremities AROM:;15 reps;sitting;hip  abduction/adduction;LAQ;hip flexion  -MG      Recorded by [MG] Megan Gosselin, PT      Positioning and Restraints    Pre-Treatment Position sitting in chair/recliner  -MG      Post Treatment Position wheelchair  -MG      In Wheelchair sitting;call light within reach;encouraged to call for assist  -MG      Recorded by [MG] Megan Gosselin, PT        User Key  (r) = Recorded By, (t) = Taken By, (c) = Cosigned By    Initials Name Effective Dates    LB Mitzy Turner, PT 10/06/15 -     AF Veda Shukla, OTR 12/01/15 -     JK Terri STERLING Chilo, PT 12/01/15 -     MG Megan Gosselin, PT 09/13/17 -                 OT Goals       01/19/18 1530 01/16/18 1309 01/16/18 1219    Transfer Training OT STG    Transfer Training OT STG, Date Established 01/19/18  -AF      Transfer Training OT STG, Time to Achieve 1 wk  -AF      Transfer Training OT STG, Activity Type toilet  -AF      Transfer Training OT STG, [place] Level contact guard assist;verbal cues required  -AF      Transfer Training OT STG, Assist Device walker, rolling  -AF      Transfer Training OT STG, Additional Goal grab bars  -AF      Transfer Training OT STG, Outcome goal ongoing  -AF      Transfer Training OT LTG    Transfer Training OT LTG, Date Established 01/19/18  -AF  01/16/18  -HC    Transfer Training OT LTG, Time to Achieve by discharge  -AF  1 wk  -HC    Transfer Training OT LTG, Activity Type toilet  -AF  bed to chair /chair to bed;sit to stand/stand to sit;toilet  -HC    Transfer Training OT LTG, [place] Level supervision required;conditional independence  -AF  supervision required  -HC    Transfer Training OT LTG, Assist Device walker, rolling  -AF  walker, rolling  -HC    Transfer Training OT LTG, Additional Goal grab bars  -AF      Transfer Training OT LTG, Outcome goal ongoing  -AF  goal revised  -HC    Transfer Training 2 OT STG    Transfer Training 2 OT STG, Date Established 01/19/18  -AF      Transfer Training 2 OT STG, Time to Achieve 1 wk  -AF       Transfer Training 2 OT STG, Activity Type shower chair;walk-in shower  -AF      Transfer Training 2 OT STG, [place] Level contact guard assist;verbal cues required  -AF      Transfer Training 2 OT STG, Assist Device walker, rolling;shower chair  -AF      Transfer Training 2 OT STG, Outcome goal ongoing  -AF      Transfer Training 2 OT LTG    Transfer Training 2 OT LTG, Date Established 01/19/18  -AF      Transfer Training 2 OT LTG, Time to Achieve by discharge  -AF      Transfer Training 2 OT LTG, Activity Type shower chair;walk-in shower  -AF      Transfer Training 2 OT LTG, [place] Level supervision required  -AF      Transfer Training 2 OT LTG, Assist Device walker, rolling;shower chair  -AF      Transfer Training 2 OT LTG, Outcome goal ongoing  -AF      Strength OT LTG    Strength Goal OT LTG, Date Established   01/16/18  -HC    Strength Goal OT LTG, Time to Achieve   1 wk  -HC    Strength Goal OT LTG, Outcome   goal revised  -HC    Patient Education OT STG    Patient Education OT STG, Date Established 01/19/18  -AF      Patient Education OT STG, Time to Achieve 1 wk  -AF      Patient Education OT STG, Education Type precautions per surgeon;brace use/care  -AF      Patient Education OT STG, Education Understanding demonstrates adequately  -AF      Patient Education OT STG, Additional Goal with ADLs  -AF      Patient Education OT STG Outcome goal ongoing  -AF      Patient Education OT LTG    Patient Education OT LTG, Date Established 01/19/18  -AF 01/16/18  -HC     Patient Education OT LTG, Time to Achieve by discharge  -AF 1 wk  -HC     Patient Education OT LTG, Education Type HEP;precautions per surgeon;brace use/care;home safety;adaptive equipment mgmt  -AF precautions per surgeon;brace use/care;positioning;posture/body mechanics;adaptive equipment mgmt  -HC     Patient Education OT LTG, Education Understanding demonstrates adequately;independent  -AF      Patient Education OT LTG Outcome goal  ongoing  -AF      ADL OT STG    ADL OT STG, Date Established 01/19/18  -AF      ADL OT STG, Time to Achieve 1 wk  -AF      ADL OT STG, Activity Type ADL skills  -AF      ADL OT STG, [place] Level standby assist;min assist  -AF      ADL OT STG, Additional Goal with LH reacher  -AF      ADL OT STG, Outcome goal ongoing  -AF      ADL OT LTG    ADL OT LTG, Date Established 01/19/18  -AF 01/16/18  -HC     ADL OT LTG, Time to Achieve by discharge  -AF 1 wk  -HC     ADL OT LTG, Activity Type ADL skills  -AF ADL skills  -HC     ADL OT LTG, [place] Level modified independent;standby assist  -AF standby assist  -HC     ADL OT LTG, Additional Goal with AE  -AF      ADL OT LTG, Outcome goal ongoing  -AF      Functional Mobility OT STG    Functional Mobility Goal OT STG, Date Established 01/19/18  -AF      Functional Mobility Goal OT STG, Time to Achieve 1 wk  -AF      Functional Mobility Goal OT STG, [place] Level contact guard  -AF      Functional Mobility Goal OT STG, Assist Device rolling walker  -AF      Functional Mobility Goal OT STG, Distance to Achieve to the bathroom  -AF      Functional Mobility Goal OT STG, Outcome goal ongoing  -AF      Functional Mobility OT LTG    Functional Mobility Goal OT LTG, Date Established 01/19/18  -AF  01/16/18  -HC    Functional Mobility Goal OT LTG, Time to Achieve by discharge  -AF  1 wk  -HC    Functional Mobility Goal OT LTG, [place] Level modified independent;independent with device;supervision  -AF  supervision  -HC    Functional Mobility Goal OT LTG, Assist Device rolling walker  -AF      Functional Mobility Goal OT LTG, Distance to Achieve to the bathroom  -AF      Functional Mobility Goal OT LTG, Outcome goal ongoing  -AF  goal revised  -HC      User Key  (r) = Recorded By, (t) = Taken By, (c) = Cosigned By    Initials Name Provider Type    AF Veda Shukla, OTR Occupational Therapist    LIANNE Burks OTR Occupational Therapist          Occupational  Therapy Education     Title: PT OT SLP Therapies (Done)     Topic: Occupational Therapy (Done)     Point: ADL training (Done)    Description: Instruct learner(s) on proper safety adaptation and remediation techniques during self care or transfers.   Instruct in proper use of assistive devices.    Learning Progress Summary    Learner Readiness Method Response Comment Documented by Status   Patient Acceptance E,D FRANNR edcuated on log rolling and spinal precautions during ADL tasks. will introduce AE with ADLs to increase her independence AF 01/19/18 1529 Done    Acceptance E VU  MM 01/19/18 0029 Done               Point: Home exercise program (Done)    Description: Instruct learner(s) on appropriate technique for monitoring, assisting and/or progressing therapeutic exercises/activities.    Learning Progress Summary    Learner Readiness Method Response Comment Documented by Status   Patient Acceptance E VU  MM 01/19/18 0029 Done               Point: Precautions (Done)    Description: Instruct learner(s) on prescribed precautions during self-care and functional transfers.    Learning Progress Summary    Learner Readiness Method Response Comment Documented by Status   Patient Acceptance E VU edcuated on spinal precautions with ADLs AF 01/22/18 0914 Done    Acceptance E,D FRANNR edcuated on log rolling and spinal precautions during ADL tasks. will introduce AE with ADLs to increase her independence AF 01/19/18 1529 Done    Acceptance E VU  MM 01/19/18 0029 Done               Point: Body mechanics (Done)    Description: Instruct learner(s) on proper positioning and spine alignment during self-care, functional mobility activities and/or exercises.    Learning Progress Summary    Learner Readiness Method Response Comment Documented by Status   Patient Acceptance E VU  MM 01/19/18 0029 Done                      User Key     Initials Effective Dates Name Provider Type Discipline    AF 12/01/15 -  CINDY Orosco  Occupational Therapist OT    MM 10/30/17 -  Nano Lama RN Registered Nurse Nurse                  OT Recommendation and Plan  Anticipated Discharge Disposition: home with home health, home with assist  Planned Therapy Interventions: adaptive equipment training, activity intolerance, ADL retraining, balance training, fine motor coordination training, home exercise program, strengthening, transfer training  Therapy Frequency: 3-5 times/wk          Time Calculation:         Time Calculation- OT       01/23/18 1458 01/23/18 1455       Time Calculation- OT    OT Start Time 1400  -AF 0800  -AF     OT Stop Time 1430  -AF 0900  -AF     OT Time Calculation (min) 30 min  -AF 60 min  -AF       User Key  (r) = Recorded By, (t) = Taken By, (c) = Cosigned By    Initials Name Provider Type    AF CINDY Orosco Occupational Therapist           Therapy Charges for Today     Code Description Service Date Service Provider Modifiers Qty    72627087415 HC OT SELF CARE/MGMT/TRAIN EA 15 MIN 1/22/2018 Veda Shukla OTR GO 4    09482728829 HC OT SELF CARE/MGMT/TRAIN EA 15 MIN 1/22/2018 Veda Shukla OTR GO 1    79652786065 HC OT THER PROC EA 15 MIN 1/22/2018 CINDY Orosco GO 3    97706482315 HC OT SELF CARE/MGMT/TRAIN EA 15 MIN 1/23/2018 CINDY Orosco GO 2    86302990819 HC OT THER PROC EA 15 MIN 1/23/2018 CINDY Orosco GO 2               CINDY Orosco  1/23/2018

## 2018-01-23 NOTE — PROGRESS NOTES
Inpatient Rehabilitation Functional Measures Assessment    Functional Measures  DANUTA Eating:  [facility] Grooming: [facility] Bathing:  [facility] Upper Body Dressing:  [facility] Lower Body Dressing:  [facility] Toileting:  [facility] Bladder Management  Level of Assistance:  [place]  Frequency/Number of Accidents this Shift:  [facility] Bowel Management  Level of Assistance: [place]  Frequency/Number of Accidents this Shift: [facility] Bed/Chair/Wheelchair Transfer:  [facility] Toilet Transfer:  [facility] Tub/Shower Transfer:  [place]    Previously Documented Mode of Locomotion at Discharge: Field  DANUTA Expected Mode of Locomotion at Discharge: [facility] Walk/Wheelchair:  [facility] Stairs:  [facility] Comprehension:  Auditory comprehension is the usual mode. Comprehension  Score = 7, Independent.  Patient comprehends complex/abstract information in  their primary language.  Patient is completely independent for auditory  comprehension.  There are no activity limitations.  DANUTA Expression:  Vocal expression is the usual mode. Expression Score = 7,  Independent.  Patient expresses complex/abstract information in their primary  language.  Patient is completely independent for vocal expression.  There are no  activity limitations.  DANUTA Social Interaction:  Social Interaction Score = 6, Modified Independent.  Patient is modified independent for social interaction, requiring: Medications.    DANUTA Problem Solving:  Activity was not observed.  DANUTA Memory:  Memory Score = 6, Modified [place].  Patient is modified  independent for memory, having only mild difficulty and using self-initiated or  environmental cues to remember.    Therapy Mode Minutes  Occupational Therapy: [place]  Physical Therapy: [place]  Speech Language Pathology:  [place]    Signed by: Roberta Sheikh RN

## 2018-01-24 LAB
GLUCOSE BLDC GLUCOMTR-MCNC: 185 MG/DL (ref 70–130)
GLUCOSE BLDC GLUCOMTR-MCNC: 257 MG/DL (ref 70–130)
GLUCOSE BLDC GLUCOMTR-MCNC: 276 MG/DL (ref 70–130)
GLUCOSE BLDC GLUCOMTR-MCNC: 298 MG/DL (ref 70–130)
GLUCOSE BLDC GLUCOMTR-MCNC: 322 MG/DL (ref 70–130)

## 2018-01-24 PROCEDURE — 97110 THERAPEUTIC EXERCISES: CPT

## 2018-01-24 PROCEDURE — 97530 THERAPEUTIC ACTIVITIES: CPT

## 2018-01-24 PROCEDURE — 97535 SELF CARE MNGMENT TRAINING: CPT

## 2018-01-24 PROCEDURE — 63710000001 INSULIN ASPART PER 5 UNITS: Performed by: HOSPITALIST

## 2018-01-24 PROCEDURE — 82962 GLUCOSE BLOOD TEST: CPT

## 2018-01-24 RX ADMIN — INSULIN ASPART 10 UNITS: 100 INJECTION, SOLUTION INTRAVENOUS; SUBCUTANEOUS at 21:14

## 2018-01-24 RX ADMIN — CALCIUM CARBONATE-VITAMIN D TAB 500 MG-200 UNIT 1000 MG: 500-200 TAB at 07:56

## 2018-01-24 RX ADMIN — LOSARTAN POTASSIUM 50 MG: 50 TABLET, FILM COATED ORAL at 07:56

## 2018-01-24 RX ADMIN — ATORVASTATIN CALCIUM 10 MG: 10 TABLET, FILM COATED ORAL at 21:14

## 2018-01-24 RX ADMIN — CETIRIZINE HYDROCHLORIDE 10 MG: 10 TABLET, FILM COATED ORAL at 07:57

## 2018-01-24 RX ADMIN — INSULIN ASPART 8 UNITS: 100 INJECTION, SOLUTION INTRAVENOUS; SUBCUTANEOUS at 17:35

## 2018-01-24 RX ADMIN — INSULIN ASPART 8 UNITS: 100 INJECTION, SOLUTION INTRAVENOUS; SUBCUTANEOUS at 11:35

## 2018-01-24 RX ADMIN — INSULIN ASPART 3 UNITS: 100 INJECTION, SOLUTION INTRAVENOUS; SUBCUTANEOUS at 07:52

## 2018-01-24 RX ADMIN — DULOXETINE HYDROCHLORIDE 60 MG: 60 CAPSULE, DELAYED RELEASE ORAL at 07:56

## 2018-01-24 RX ADMIN — FLUTICASONE PROPIONATE 1 SPRAY: 50 SPRAY, METERED NASAL at 07:56

## 2018-01-24 RX ADMIN — INSULIN DETEMIR 20 UNITS: 100 INJECTION, SOLUTION SUBCUTANEOUS at 07:53

## 2018-01-24 RX ADMIN — FAMOTIDINE 20 MG: 20 TABLET, FILM COATED ORAL at 21:14

## 2018-01-24 RX ADMIN — FAMOTIDINE 20 MG: 20 TABLET, FILM COATED ORAL at 07:56

## 2018-01-24 RX ADMIN — VITAMIN D, TAB 1000IU (100/BT) 2000 UNITS: 25 TAB at 07:56

## 2018-01-24 RX ADMIN — CYANOCOBALAMIN TAB 500 MCG 1000 MCG: 500 TAB at 07:55

## 2018-01-24 NOTE — THERAPY TREATMENT NOTE
Inpatient Rehabilitation - Occupational Therapy Treatment Note  [facility]     Patient Name: Raquel Gonsales  : 1945  MRN: [id number]  Today's Date: 2018  Onset of Illness/Injury or Date of Surgery Date: 18  Date of Referral to OT: 18  Referring Physician: Charan      Admit Date: 2018    Visit Dx:   No diagnosis found.  Patient Active Problem List   Diagnosis   • Cervical spondylosis with radiculopathy   • Bilateral hip pain   • Trochanteric bursitis of both hips   • Status post bilateral total hip replacement   • S/P lumbar fusion   • Spinal stenosis of lumbar region with neurogenic claudication   • Lumbar spinal stenosis   • TIA (transient ischemic attack)   • Obesity   • Recurrent falls   • DM2 (diabetes mellitus, type 2)   • HTN (hypertension)   • COPD (chronic obstructive pulmonary disease)   • Decreased mobility   • Spinal stenosis             Adult Rehabilitation Note       18 [id number] 18 1452 18 1353    Rehab Assessment/Intervention    Discipline occupational therapist  -AF occupational therapist  -AF physical therapist  -JK    Document Type therapy note (daily note)  -AF therapy note (daily note)  -AF therapy note (daily note)  -JK    Subjective Information agree to therapy;no complaints  -AF agree to therapy;no complaints  -AF agree to therapy;no complaints  -JK    Patient Effort, Rehab Treatment good  -AF good  -AF good  -JK    Patient Effort, Rehab Treatment Comment pt stated she feels like she is ready to go home  -AF      Symptoms Noted During/After Treatment   none  -JK    Precautions/Limitations fall precautions;spinal precautions;brace on when up  -AF brace on when up;fall precautions;spinal precautions  -AF brace on when up;fall precautions  -JK    Precautions/Limitations, Vision   WFL with corrective lenses  -JK    Precautions/Limitations, Hearing   WFL  -JK    Recorded by [AF] Veda Shukla, OTR [AF] Veda Shukla, OTR [JK] Terri Woo, PT     Pain Assessment    Pain Assessment No/denies pain  -AF No/denies pain  -AF No/denies pain  -JK    Recorded by [AF] Veda Shukla OTR [AF] Veda Shukla OTR [JK] Terri Woo, PT    Vision Assessment/Intervention    Visual Impairment   WFL with corrective lenses  -JK    Recorded by   [JK] Terri Woo PT    Cognitive Assessment/Intervention    Current Cognitive/Communication Assessment functional  -AF functional  -AF functional  -JK    Orientation Status oriented x 4  -AF oriented x 4  -AF oriented x 4  -JK    Follows Commands/Answers Questions 100% of the time  -% of the time  -% of the time;able to follow multi-step instructions  -JK    Personal Safety WNL/WFL  -AF WNL/WFL  -AF WNL/WFL  -JK    Personal Safety Interventions fall prevention program maintained;gait belt;nonskid shoes/slippers when out of bed  -AF fall prevention program maintained;gait belt;nonskid shoes/slippers when out of bed  -AF fall prevention program maintained;gait belt  -JK    Recorded by [AF] Veda Shukla OTR [AF] Veda Shukla OTR [JK] Terri Woo, PT    Bed Mobility, Assessment/Treatment    Bed Mob, Supine to Sit, [place]  contact guard assist  -AF     Bed Mob, Sit to Supine, [place]  moderate assist (50% patient effort)  -AF     Bed Mobility, Comment   up in chair  -JK    Recorded by  [AF] CINDY Orosco [JK] Terri Woo, PT    Transfer Assessment/Treatment    Transfers, Bed-Chair [place]  contact guard assist;stand by assist  -AF     Transfers, Chair-Bed [place]  contact guard assist;stand by assist  -AF     Transfers, Sit-Stand [place] supervision required  -AF stand by assist  -AF supervision required  -JK    Transfers, Stand-Sit [place] supervision required  -AF stand by assist  -AF supervision required  -JK    Transfers, Sit-Stand-Sit, Assist Device   rolling walker  -JK    Toilet Transfer, [place] supervision required  -AF      Toilet Transfer,  Assistive Device --   rollator  -AF      Walk-In Shower Transfer, [place]  contact guard assist  -AF     Walk-In Shower Transfer, Assist Device  --   rollator, tub transfer bench, grab bars  -AF     Transfer, Safety Issues   step length decreased;balance decreased during turns  -JK    Transfer, Impairments   strength decreased;impaired balance  -JK    Transfer, Comment   car tsf min assist for L foot with seat back reclined  -JK    Recorded by [AF] Veda Shukla OTR [AF] Veda Shukla OTR [JK] Treri Woo, PT    Gait Assessment/Treatment    Gait, [place] Level   stand by assist;contact guard assist  -JK    Gait, Assistive Device   rollator  -JK    Gait, Distance (Feet)   80   40' x 2  -JK    Gait, Gait Deviations   bilateral:;ray[place] decreased;forward flexed posture;step length decreased  -JK    Gait, Safety Issues   step length decreased  -JK    Gait, Impairments   strength decreased  -JK    Recorded by   [JK] Terri Woo, PT    Functional Mobility    Functional Mobility- Comment  walked from EOB to shower chair with rollator with SBA  -AF     Recorded by  [AF] CINDY Orosco     Upper Body Bathing Assessment/Training    UB Bathing Assess/Train Assistive Device  grab bars;hand-held shower head;shower chair with back  -AF     UB Bathing Assess/Train, Position sitting;sink side  -AF sitting  -AF     UB Bathing Assess/Train, [place] Level set up required  -AF supervision required  -AF     Recorded by [AF] CINDY Orosco [AF] CINDY Orosco     Lower Body Bathing Assessment/Training    LB Bathing Assess/Train Assistive Device  grab bars;hand-held shower head;long-handled sponge;shower chair with back  -AF     LB Bathing Assess/Train, Position sitting;sink side;standing  -AF sitting;standing  -AF     LB Bathing Assess/Train, [place] Level set up required;supervision required  -AF supervision required  -AF     Recorded by [AF] Veda Shukla OTR [AF] Veda Orta  CINDY Shukla     Upper Body Dressing Assessment/Training    UB Dressing Assess/Train, Position sitting;sink side  -AF sitting  -AF     UB Dressing Assess/Train, [place] supervision required  -AF supervision required  -AF     UB Dressing Assess/Train, Comment able to don back brace I'ly  -AF      Recorded by [AF] Veda Shukla OTR [AF] Veda Shukla OTR     Lower Body Dressing Assessment/Training    LB Dressing Assess/Train, Position standing;sitting;sink side  -AF sitting;standing  -AF     LB Dressing Assess/Train, [place] supervision required  -AF moderate assist (50% patient effort)  -AF     LB Dressing Assess/Train, Comment sat on EOB and pt was able to don socks and shoes with elastic laces  -AF Assist with socks and shoes  -AF     Recorded by [AF] Veda Shukla OTR [AF] CINDY Orosco     Toileting Assessment/Training    Toileting Assess/Train, Assistive Device grab bars;raised toilet seat  -AF      Toileting Assess/Train, Position sitting;standing  -AF      Toileting Assess/Train, [place] Level supervision required  -AF      Recorded by [AF] CINDY Orosco      Grooming Assessment/Training    Grooming Assess/Train, Position sitting;sink side  -AF sitting;sink side  -AF     Grooming Assess/Train, [place] Level independent  -AF supervision required  -AF     Grooming Assess/Train, Comment  sitting at sink on locked rollator like she does at home  -AF     Recorded by [AF] CINDY Orosco [AF] CINDY Orosco     Balance Skills Training    Sitting-Level of Assistance Independent  -AF Independent  -AF     Standing-Level of Assistance Distant supervision  -AF Close supervision  -AF     Recorded by [AF] CINDY Orosco [AF] CINDY Orosco     Therapy Exercises    Bilateral Upper Extremity  AROM:;15 reps;sitting;elbow flexion/extension;hand pumps;pronation/supination;shoulder protraction/retraction   #1 hand weights, 3 sets   -AF     Recorded by  [AF] Veda  CINDY Carroll     Functional Endurance    Detail (Functional Endurance)  stood for arm bike 2 mins  -AF     Recorded by  [AF] CINDY Orosco     Positioning and Restraints    Pre-Treatment Position sitting in chair/recliner  -AF in bed  -AF sitting in chair/recliner  -JK    Post Treatment Position wheelchair  -AF bed  -AF wheelchair  -JK    In Bed  supine;call light within reach;encouraged to call for assist;exit alarm on   in PM  -AF     In Wheelchair sitting;with PT  -AF sitting;with PT   in AM  -AF sitting;with OT;heels elevated  -JK    Recorded by [AF] Veda Shukla OTR [AF] CINDY Orosco [JK] Terri STERLING Merrillalfonso, PT      01/23/18 0922 01/22/18 1402 01/22/18 0923    Rehab Assessment/Intervention    Discipline physical therapist  -LB occupational therapist  -AF physical therapist  -LB    Document Type therapy note (daily note)  -LB therapy note (daily note)  -AF therapy note (daily note)  -LB    Subjective Information agree to therapy  -LB agree to therapy  -AF agree to therapy  -LB    Patient Effort, Rehab Treatment good  -LB good  -AF good  -LB    Precautions/Limitations brace on when up;spinal precautions;fall precautions  -LB brace on when up;fall precautions;spinal precautions  -AF brace on when up;fall precautions;spinal precautions  -LB    Recorded by [LB] Mitzy Turner, PT [AF] Veda Shukla OTR [LB] Mitzy Turner, PT    Pain Assessment    Pain Assessment  No/denies pain  -AF 0-10  -LB    Pain Score   6  -LB    Post Pain Score   6  -LB    Pain Type   Chronic pain  -LB    Pain Location   Hip  -LB    Pain Orientation   Left;Right  -LB    Pain Intervention(s)   Ambulation/increased activity  -LB    Response to Interventions   tolerated  -LB    Recorded by  [AF] CINDY Orosco [LB] Mitzy Turner PT    Vision Assessment/Intervention    Visual Impairment  WNL  -AF     Recorded by  [AF] Veda Shukla OTR     Cognitive Assessment/Intervention    Current Cognitive/Communication  Assessment  functional  -AF     Orientation Status  oriented x 4  -AF     Follows Commands/Answers Questions  100% of the time  -AF     Personal Safety  WNL/WFL  -AF WNL/WFL  -LB    Personal Safety Interventions  fall prevention program maintained;gait belt;nonskid shoes/slippers when out of bed  -AF fall prevention program maintained;gait belt;muscle strengthening facilitated;nonskid shoes/slippers when out of bed  -LB    Recorded by  [AF] Veda Shukla, OTR [LB] Mitzy Turner, PT    Bed Mobility, Assessment/Treatment    Bed Mob, Sit to [place]ing, [place]   verbal cues required;minimum assist (75% patient effort)  -LB    Bed Mobility, Comment   pt needs cues to maintain back precautions  -LB    Recorded by   [LB] Mitzy Turner, PT    Transfer Assessment/Treatment    Transfers, Bed-Chair [place]   contact guard assist  -LB    Transfers, Sit-Stand [place] supervision required  -LB stand by assist  -AF stand by assist  -LB    Transfers, Stand-Sit [place] supervision required  -LB stand by assist  -AF stand by assist  -LB    Transfers, Sit-Stand-Sit, Assist Device --   rollator  -LB  rolling walker  -LB    Toilet Transfer, [place]  contact guard assist;verbal cues required;supervision required  -AF     Toilet Transfer, Assistive Device  --   rollator  -AF     Recorded by [LB] Mitzy Turner, PT [AF] Veda Shukla, OTR [LB] Mitzy Turner, PT    Gait Assessment/Treatment    Gait, [place] Level stand by assist;contact guard assist  -LB  stand by assist;contact guard assist  -LB    Gait, Assistive Device rollator  -LB  rollator  -LB    Gait, Distance (Feet) 200  -LB  200   160, 100  -LB    Gait, Gait Deviations bilateral:;decreased heel strike;forward flexed posture  -LB  decreased heel strike;forward flexed posture  -LB    Gait, Safety Issues   step length decreased  -LB    Gait, Impairments   strength decreased  -LB    Gait, Comment cues for upright posture  -LB  cues for upright posture   -LB    Recorded by [LB] Mitzy Turner PT  [LB] Mitzy Turner PT    Stairs Assessment/Treatment    Number of Stairs 6   small 4 inch steps  -LB      Stairs, Handrail Location both sides  -LB      Stairs, [place] Level verbal cues required;contact guard assist  -LB      Stairs, Technique Used step to step (ascending);step to step (descending)  -LB      Recorded by [LB] Mitzy Turner PT      Functional Mobility    Functional Mobility- Comment  walked to and from bathroom with rollaotr with SBA/CGA  -AF     Recorded by  [AF] CINDY Orosco     Grooming Assessment/Training    Grooming Assess/Train, Comment  stood at sink to wash hands  -AF     Recorded by  [AF] CINDY Orosco     Balance Skills Training    Standing-Level of Assistance  Close supervision  -AF     Static Standing Balance Support  assistive device  -AF     Standing-Balance Activities  Ball toss  -AF     Standing Balance # of Minutes  2mins x 2 sets  -AF     Gait Balance-Level of Assistance   Contact guard  -LB    Gait Balance Support   odell bar  -LB    Gait Balance Activities   side-stepping;backwards  -LB    Recorded by  [AF] Vead Shukla OTR [LB] Mitzy Turner PT    Therapy Exercises    Bilateral Lower Extremities AROM:;20 reps;sitting;ankle pumps/circles;LAQ;hip flexion;hip abduction/adduction  -LB  AROM:;10 reps;sitting  -LB    BLE Other Reps   15  -LB    Bilateral Upper Extremity  AROM:;15 reps;sitting;elbow flexion/extension;hand pumps;pronation/supination;shoulder protraction/retraction   #1 dowel natalee, hand weight, hand gripper, 3 sets  -AF     Recorded by [LB] Mitzy Turner PT [AF] Veda Shukla OTR [LB] Mitzy Turner PT    Functional Endurance    Detail (Functional Endurance)  arm bike standing 2 mins   -AF     Recorded by  [AF] CINDY Orosco     Orthosis Location    Orthosis, Neck/Back LSO (lumbar sacral orthosis)  -LB  LSO (lumbar sacral orthosis)  -LB    Recorded by [LB] Mitzy Turner PT  [LB] Mitzy Turner, PT     Orthosis Management/Training    Orthosis Skills Training purpose/goals of orthosis;sleeping without orthosis  -LB      Orthosis Wear Schedule wear when out of bed only  -LB  wear when out of bed only  -LB    Recorded by [LB] Mitzy Turner PT  [LB] Mitzy Turner, PT    Positioning and Restraints    Pre-Treatment Position sitting in chair/recliner  -LB sitting in chair/recliner  -AF sitting in chair/recliner  -LB    Post Treatment Position  wheelchair  -AF wheelchair  -LB    In Bed   with other staff  -LB    In Wheelchair  sitting;with PT  -AF call light within reach;encouraged to call for assist  -LB    Recorded by [LB] Mitzy Turner PT [AF] Veda Shukla, OTR [LB] Mitzy Turner, PT      01/22/18 0910 01/21/18 0928       Rehab Assessment/Intervention    Discipline occupational therapist  -AF physical therapist  -MG     Document Type therapy note (daily note)  -AF therapy note (daily note)  -MG     Subjective Information agree to therapy;no complaints  -AF agree to therapy;no complaints  -MG     Patient Effort, Rehab Treatment good  -AF good  -MG     Symptoms Noted During/After Treatment  fatigue  -MG     Precautions/Limitations brace on when up;fall precautions;spinal precautions  -AF brace on when up;fall precautions;spinal precautions  -MG     Recorded by [AF] Veda Shukla, OTR [MG] Megan Gosselin, PT     Pain Assessment    Pain Assessment No/denies pain  -AF No/denies pain  -MG     Recorded by [AF] Veda Shukla OTR [MG] Megan Gosselin, PT     Cognitive Assessment/Intervention    Current Cognitive/Communication Assessment functional  -AF functional  -MG     Orientation Status oriented x 4  -AF oriented x 4  -MG     Follows Commands/Answers Questions 100% of the time  -% of the time  -MG     Personal Safety WNL/WFL  -AF WNL/WFL  -MG     Personal Safety Interventions fall prevention program maintained;gait belt;nonskid shoes/slippers when out of bed  -AF fall prevention program maintained;gait belt   -MG     Recorded by [AF] Veda Shukla OTR [MG] Megan Gosselin, PT     Bed Mobility, Assessment/Treatment    Bed Mob, Supine to Sit, [place] contact guard assist;verbal cues required   bed rail  -AF      Bed Mobility, Comment  not tested, up in chair   -MG     Recorded by [AF] Veda Shukla OTR [MG] Megan Gosselin, PT     Transfer Assessment/Treatment    Transfers, Sit-Stand [place] stand by assist;verbal cues required  -AF contact guard assist;verbal cues required  -MG     Transfers, Stand-Sit [place] stand by assist;verbal cues required  -AF contact guard assist;verbal cues required  -MG     Transfers, Sit-Stand-Sit, Assist Device --   rollator  -AF --   4WW  -MG     Toilet Transfer, [place] contact guard assist  -AF      Toilet Transfer, Assistive Device --   rollator and grab bar  -AF      Transfer, Safety Issues  step length decreased;balance decreased during turns  -MG     Transfer, Impairments  strength decreased;impaired balance  -MG     Recorded by [AF] Veda Shukla, ESTELLAR [MG] Megan Gosselin, PT     Gait Assessment/Treatment    Gait, [place] Level  contact guard assist  -MG     Gait, Assistive Device  rollator  -MG     Gait, Distance (Feet)  160  -MG     Gait, Gait Deviations  [place] decreased;forward flexed posture;step length decreased  -MG     Gait, Safety Issues  step length decreased;balance decreased during turns  -MG     Gait, Impairments  strength decreased;impaired balance  -MG     Gait, Comment  cues for upright posture  -MG     Recorded by  [MG] Megan Gosselin, PT     Functional Mobility    Functional Mobility- Comment walked from EOB to commode with rollator and CGA  -AF      Recorded by [AF] Veda Shukla OTR      Upper Body Bathing Assessment/Training    UB Bathing Assess/Train, Position sitting;sink side  -AF      UB Bathing Assess/Train, [place] Level set up required  -AF      Recorded by [AF] Veda Shukla OTR      Lower Body Bathing  Assessment/Training    LB Bathing Assess/Train, Position sink side;standing;sitting  -AF      LB Bathing Assess/Train, [place] Level minimum assist (75% patient effort);verbal cues required  -AF      LB Bathing Assess/Train, Comment assist with feet  -AF      Recorded by [AF] Veda Shukla OTR      Upper Body Dressing Assessment/Training    UB Dressing Assess/Train, Position sitting;sink side  -AF      UB Dressing Assess/Train, [place] supervision required  -AF      UB Dressing Assess/Train, Comment pt able to don brace seated on EOB and w/c level with set up  -AF      Recorded by [AF] Veda Shukla OTR      Lower Body Dressing Assessment/Training    LB Dressing Assess/Train, Position sitting;standing;sink side  -AF      LB Dressing Assess/Train, [place] moderate assist (50% patient effort)  -AF      LB Dressing Assess/Train, Comment assist with TEDS, and heel management on shoes. edcuated on spinal precautions with ADLs  -AF      Recorded by [AF] CINDY Orosco      Toileting Assessment/Training    Toileting Assess/Train, Assistive Device grab bars;raised toilet seat  -AF      Toileting Assess/Train, Position standing;sitting  -AF      Toileting Assess/Train, [place] Level supervision required  -AF      Recorded by [AF] CINDY Orosco      Grooming Assessment/Training    Grooming Assess/Train, Position sitting;sink side  -AF      Grooming Assess/Train, [place] Level supervision required  -AF      Grooming Assess/Train, Comment w/c level  -AF      Recorded by [AF] CINDY Orosco      Balance Skills Training    Sitting-Level of Assistance Distant supervision;Independent  -AF      Standing-Level of Assistance Contact guard;Close supervision  -AF      Static Standing Balance Support assistive device  -AF      Standing-Balance Activities --   ADLs  -AF      Standing Balance # of Minutes 3-5 mins x 2  -AF      Recorded by [AF] CINDY Orosco      Therapy Exercises     Bilateral Lower Extremities  AROM:;15 reps;sitting;hip abduction/adduction;LAQ;hip flexion  -MG     Recorded by  [MG] Megan Gosselin, PT     Functional Endurance    Detail (Functional Endurance) good with ADLs  -AF      Recorded by [AF] Veda Shukla OTR      Positioning and Restraints    Pre-Treatment Position in bed  -AF sitting in chair/recliner  -MG     Post Treatment Position wheelchair  -AF wheelchair  -MG     In Wheelchair sitting;with PT  -AF sitting;call light within reach;encouraged to call for assist  -MG     Recorded by [AF] Veda Shukla, OTR [MG] Megan Gosselin, HIRAM       User Key  (r) = Recorded By, (t) = Taken By, (c) = Cosigned By    Initials Name Effective Dates    LB Mitzy Turner, PT 10/06/15 -     AF Veda Shukla, OTR 12/01/15 -     JAMAL Woo, PT 12/01/15 -     MG Megan Gosselin, PT 09/13/17 -                 OT Goals       01/19/18 1530 01/16/18 1309 01/16/18 1219    Transfer Training OT STG    Transfer Training OT STG, Date Established 01/19/18  -AF      Transfer Training OT STG, Time to Achieve 1 wk  -AF      Transfer Training OT STG, Activity Type toilet  -AF      Transfer Training OT STG, [place] Level contact guard assist;verbal cues required  -AF      Transfer Training OT STG, Assist Device walker, rolling  -AF      Transfer Training OT STG, Additional Goal grab bars  -AF      Transfer Training OT STG, Outcome goal ongoing  -AF      Transfer Training OT LTG    Transfer Training OT LTG, Date Established 01/19/18  -AF  01/16/18  -HC    Transfer Training OT LTG, Time to Achieve by discharge  -AF  1 wk  -HC    Transfer Training OT LTG, Activity Type toilet  -AF  bed to chair /chair to bed;sit to stand/stand to sit;toilet  -HC    Transfer Training OT LTG, [place] Level supervision required;conditional independence  -AF  supervision required  -HC    Transfer Training OT LTG, Assist Device walker, rolling  -AF  walker, rolling  -HC    Transfer Training OT LTG,  Additional Goal grab bars  -AF      Transfer Training OT LTG, Outcome goal ongoing  -AF  goal revised  -HC    Transfer Training 2 OT STG    Transfer Training 2 OT STG, Date Established 01/19/18  -AF      Transfer Training 2 OT STG, Time to Achieve 1 wk  -AF      Transfer Training 2 OT STG, Activity Type shower chair;walk-in shower  -AF      Transfer Training 2 OT STG, [place] Level contact guard assist;verbal cues required  -AF      Transfer Training 2 OT STG, Assist Device walker, rolling;shower chair  -AF      Transfer Training 2 OT STG, Outcome goal ongoing  -AF      Transfer Training 2 OT LTG    Transfer Training 2 OT LTG, Date Established 01/19/18  -AF      Transfer Training 2 OT LTG, Time to Achieve by discharge  -AF      Transfer Training 2 OT LTG, Activity Type shower chair;walk-in shower  -AF      Transfer Training 2 OT LTG, [place] Level supervision required  -AF      Transfer Training 2 OT LTG, Assist Device walker, rolling;shower chair  -AF      Transfer Training 2 OT LTG, Outcome goal ongoing  -AF      Strength OT LTG    Strength Goal OT LTG, Date Established   01/16/18  -HC    Strength Goal OT LTG, Time to Achieve   1 wk  -HC    Strength Goal OT LTG, Outcome   goal revised  -HC    Patient Education OT STG    Patient Education OT STG, Date Established 01/19/18  -AF      Patient Education OT STG, Time to Achieve 1 wk  -AF      Patient Education OT STG, Education Type precautions per surgeon;brace use/care  -AF      Patient Education OT STG, Education Understanding demonstrates adequately  -AF      Patient Education OT STG, Additional Goal with ADLs  -AF      Patient Education OT STG Outcome goal ongoing  -AF      Patient Education OT LTG    Patient Education OT LTG, Date Established 01/19/18  -AF 01/16/18  -HC     Patient Education OT LTG, Time to Achieve by discharge  -AF 1 wk  -HC     Patient Education OT LTG, Education Type HEP;precautions per surgeon;brace use/care;home safety;adaptive  equipment mgmt  -AF precautions per surgeon;brace use/care;positioning;posture/body mechanics;adaptive equipment mgmt  -HC     Patient Education OT LTG, Education Understanding demonstrates adequately;independent  -AF      Patient Education OT LTG Outcome goal ongoing  -AF      ADL OT STG    ADL OT STG, Date Established 01/19/18  -AF      ADL OT STG, Time to Achieve 1 wk  -AF      ADL OT STG, Activity Type ADL skills  -AF      ADL OT STG, [place] Level standby assist;min assist  -AF      ADL OT STG, Additional Goal with LH reacher  -AF      ADL OT STG, Outcome goal ongoing  -AF      ADL OT LTG    ADL OT LTG, Date Established 01/19/18  -AF 01/16/18  -HC     ADL OT LTG, Time to Achieve by discharge  -AF 1 wk  -HC     ADL OT LTG, Activity Type ADL skills  -AF ADL skills  -HC     ADL OT LTG, [place] Level modified independent;standby assist  -AF standby assist  -HC     ADL OT LTG, Additional Goal with AE  -AF      ADL OT LTG, Outcome goal ongoing  -AF      Functional Mobility OT STG    Functional Mobility Goal OT STG, Date Established 01/19/18  -AF      Functional Mobility Goal OT STG, Time to Achieve 1 wk  -AF      Functional Mobility Goal OT STG, [place] Level contact guard  -AF      Functional Mobility Goal OT STG, Assist Device rolling walker  -AF      Functional Mobility Goal OT STG, Distance to Achieve to the bathroom  -AF      Functional Mobility Goal OT STG, Outcome goal ongoing  -AF      Functional Mobility OT LTG    Functional Mobility Goal OT LTG, Date Established 01/19/18  -AF  01/16/18  -HC    Functional Mobility Goal OT LTG, Time to Achieve by discharge  -AF  1 wk  -HC    Functional Mobility Goal OT LTG, [place] Level modified independent;independent with device;supervision  -AF  supervision  -HC    Functional Mobility Goal OT LTG, Assist Device rolling walker  -AF      Functional Mobility Goal OT LTG, Distance to Achieve to the bathroom  -AF      Functional Mobility Goal OT LTG,  Outcome goal ongoing  -AF  goal revised  -HC      User Key  (r) = Recorded By, (t) = Taken By, (c) = Cosigned By    Initials Name Provider Type    AF Veda Shukla, OTR Occupational Therapist    HC Margy Burks OTKHAI Occupational Therapist          Occupational Therapy Education     Title: PT OT SLP Therapies (Done)     Topic: Occupational Therapy (Done)     Point: ADL training (Done)    Description: Instruct learner(s) on proper safety adaptation and remediation techniques during self care or transfers.   Instruct in proper use of assistive devices.    Learning Progress Summary    Learner Readiness Method Response Comment Documented by Status   Patient Acceptance E VU,DU spinal precautions with donning socks and shoes AF 01/24/18 0913 Done    Acceptance E,D VU,NR edcuated on log rolling and spinal precautions during ADL tasks. will introduce AE with ADLs to increase her independence AF 01/19/18 1529 Done    Acceptance E VU  MM 01/19/18 0029 Done               Point: Home exercise program (Done)    Description: Instruct learner(s) on appropriate technique for monitoring, assisting and/or progressing therapeutic exercises/activities.    Learning Progress Summary    Learner Readiness Method Response Comment Documented by Status   Patient Acceptance E VU  MM 01/19/18 0029 Done               Point: Precautions (Done)    Description: Instruct learner(s) on prescribed precautions during self-care and functional transfers.    Learning Progress Summary    Learner Readiness Method Response Comment Documented by Status   Patient Acceptance E VU edcuated on spinal precautions with ADLs AF 01/22/18 0914 Done    Acceptance E,D VU,NR edcuated on log rolling and spinal precautions during ADL tasks. will introduce AE with ADLs to increase her independence AF 01/19/18 1529 Done    Acceptance E VU  MM 01/19/18 0029 Done               Point: Body mechanics (Done)    Description: Instruct learner(s) on proper positioning and spine  alignment during self-care, functional mobility activities and/or exercises.    Learning Progress Summary    Learner Readiness Method Response Comment Documented by Status   Patient Acceptance E VU  MM 01/19/18 0029 Done                      User Key     Initials Effective Dates Name Provider Type Discipline    AF 12/01/15 -  CINDY Orosco Occupational Therapist OT    MM 10/30/17 -  Nano Lama RN Registered Nurse Nurse                  OT Recommendation and Plan  Anticipated Discharge Disposition: home with home health, home with assist  Planned Therapy Interventions: adaptive equipment training, activity intolerance, ADL retraining, balance training, fine motor coordination training, home exercise program, strengthening, transfer training  Therapy Frequency: 3-5 times/wk          Time Calculation:         Time Calculation- OT       01/24/18 0914          Time Calculation- OT    OT Start Time 0800  -AF      OT Stop Time 0900  -AF      OT Time Calculation (min) 60 min  -AF        User Key  (r) = Recorded By, (t) = Taken By, (c) = Cosigned By    Initials Name Provider Type    AF CINDY Orosco Occupational Therapist           Therapy Charges for Today     Code Description Service Date Service Provider Modifiers Qty    61652979310 HC OT SELF CARE/MGMT/TRAIN EA 15 MIN 1/23/2018 CINDY Orosco GO 2    33710391098 HC OT THER PROC EA 15 MIN 1/23/2018 CINDY Orosco GO 2    76732527745 HC OT SELF CARE/MGMT/TRAIN EA 15 MIN 1/24/2018 CINDY Orosco GO 4               CINDY Orosco  1/24/2018

## 2018-01-24 NOTE — PROGRESS NOTES
LOS: 6 days   Patient Care Team:  Roger Patel MD as PCP - General    Chief Complaint: same    Subjective     History of Present Illness    Subjective Pt is awake and alert. No new issues. Pt feels hat she is near baseline    History taken from: patient    Objective     Vital Signs  Temp:  [98 °F (36.7 °C)-98.8 °F (37.1 °C)] 98.8 °F (37.1 °C)  Heart Rate:  [91] 91  Resp:  [18] 18  BP: (138-153)/(79-81) 153/81    Objective exam unchanged    Results Review:     I reviewed the patient's new clinical results.    Medication Review:     Assessment/Plan     Active Problems:    Spinal stenosis      Assessment & Plan Continue to prepare for dc. We will finalize dc plans in team mtg tomorrow am.     Mehdi Augustine MD  01/24/18  7:40 AM    Time:

## 2018-01-24 NOTE — PROGRESS NOTES
Inpatient Rehabilitation Functional Measures Assessment and Plan of Care    Plan of Care  Updated Problems/Interventions  Mobility    [OT] Toilet Transfers(Active)  Current Status(01/24/2018): SBA with rollator  Weekly Goal(01/26/2018): MOD I  Discharge Goal: MOD I    [OT] Tub/Shower Transfers(Active)  Current Status(01/24/2018): CGA  Weekly Goal(01/26/2018): Supervision  Discharge Goal: Supervision        Self Care    [OT] Bathing(Active)  Current Status(01/24/2018): Supervision with AE  Weekly Goal(01/26/2018): MOD I  Discharge Goal: MOD I    [OT] Dressing (Lower)(Active)  Current Status(01/24/2018): Supervision  Weekly Goal(01/26/2018): MOD I  Discharge Goal: MOD I    [OT] Dressing (Upper)(Active)  Current Status(01/24/2018): MOD I  Weekly Goal(01/26/2018): MOD I  Discharge Goal: MOD I    [OT] Grooming(Active)  Current Status(01/24/2018): MOD I  Weekly Goal(01/26/2018): MOD I  Discharge Goal: MOD I    [OT] Toileting(Active)  Current Status(01/24/2018): Supervision  Weekly Goal(01/26/2018): MOD I  Discharge Goal: MOD I    Functional Measures  DANUTA Eating:  Branch  DANUTA Grooming: Branch  DANUTA Bathing:  Branch  DANUTA Upper Body Dressing:  Branch  DANUTA Lower Body Dressing:  Branch  DANUTA Toileting:  Branch    DANUTA Bladder Management  Level of Assistance:  Branch  Frequency/Number of Accidents this Shift:  Branch    DANUTA Bowel Management  Level of Assistance: Branch  Frequency/Number of Accidents this Shift: Branch    DANUTA Bed/Chair/Wheelchair Transfer:  Branch  DANUTA Toilet Transfer:  Branch  DANUTA Tub/Shower Transfer:  Branch    Previously Documented Mode of Locomotion at Discharge: Field  DANUTA Expected Mode of Locomotion at Discharge: Branch  DANUTA Walk/Wheelchair:  Branch  DANUTA Stairs:  Branch    DANUTA Comprehension:  Branch  DANUTA Expression:  Branch  DANUTA Social Interaction:  Branch  DANUTA Problem Solving:  Branch  DANUTA Memory:  Branch    Therapy Mode Minutes  Occupational Therapy: Branch  Physical Therapy: Branch  Speech Language  Pathology:  Branch    Signed by: Veda Shukla, OT

## 2018-01-24 NOTE — THERAPY TREATMENT NOTE
Inpatient Rehabilitation - Physical Therapy Treatment Note  [facility]     Patient Name: Raquel Gonsales  : 1945  MRN: [id number]  Today's Date: 2018  Onset of Illness/Injury or Date of Surgery Date: 18  Date of Referral to PT: 18  Referring Physician: Charan    Admit Date: 2018    Visit Dx:  No diagnosis found.  Patient Active Problem List   Diagnosis   • Cervical spondylosis with radiculopathy   • Bilateral hip pain   • Trochanteric bursitis of both hips   • Status post bilateral total hip replacement   • S/P lumbar fusion   • Spinal stenosis of lumbar region with neurogenic claudication   • Lumbar spinal stenosis   • TIA (transient ischemic attack)   • Obesity   • Recurrent falls   • DM2 (diabetes mellitus, type 2)   • HTN (hypertension)   • COPD (chronic obstructive pulmonary disease)   • Decreased mobility   • Spinal stenosis               Adult Rehabilitation Note       18 1505 18 0921 18 [id number]    Rehab Assessment/Intervention    Discipline occupational therapist  -AF physical therapist  -LB occupational therapist  -AF    Document Type therapy note (daily note)  -AF therapy note (daily note)  -LB therapy note (daily note)  -AF    Subjective Information agree to therapy;no complaints  -AF agree to therapy  -LB agree to therapy;no complaints  -AF    Patient Effort, Rehab Treatment good  -AF good  -LB good  -AF    Patient Effort, Rehab Treatment Comment   pt stated she feels like she is ready to go home  -AF    Precautions/Limitations brace on when up;fall precautions;spinal precautions  -AF fall precautions;brace on when up;spinal precautions  -LB fall precautions;spinal precautions;brace on when up  -AF    Recorded by [AF] Veda Shukla, OTR [LB] Mitzy Turner, PT [AF] Veda Shkula OTR    Pain Assessment    Pain Assessment No/denies pain  -AF 0-10  -LB No/denies pain  -AF    Pain Score  5  -LB     Post Pain Score  5  -LB     Pain Type  Chronic pain  -LB      Pain Location  Hip  -LB     Pain Orientation  Left;Right  -LB     Pain Intervention(s)  Ambulation/increased activity  -LB     Response to Interventions  tolerated  -LB     Recorded by [AF] Veda Shukla OTR [LB] Mitzy Turner PT [AF] Veda Shukla OTR    Cognitive Assessment/Intervention    Current Cognitive/Communication Assessment functional  -AF  functional  -AF    Orientation Status oriented x 4  -AF  oriented x 4  -AF    Follows Commands/Answers Questions 100% of the time  -AF  100% of the time  -AF    Personal Safety WNL/WFL  -AF WNL/WFL  -LB WNL/WFL  -AF    Personal Safety Interventions fall prevention program maintained;gait belt;nonskid shoes/slippers when out of bed  -AF fall prevention program maintained;gait belt;muscle strengthening facilitated;nonskid shoes/slippers when out of bed  -LB fall prevention program maintained;gait belt;nonskid shoes/slippers when out of bed  -AF    Recorded by [AF] Veda Shulka OTR [LB] Mitzy Turner PT [AF] Veda Shukla OTR    Bed Mobility, Assessment/Treatment    Bed Mobility, Roll Left, [place]  verbal cues required;supervision required  -LB     Bed Mobility, Roll Right, [place]  supervision required;verbal cues required  -LB     Bed Mob, Supine to Sit, [place]  independent  -LB     Bed Mob, Sit to Supine, [place]  independent  -LB     Bed Mobility, Comment  Pt was educated to complete sidelying but pt states she cannot perform the activity without lying back onto her back and lifiting her left leg into the bed.  Pt told therapist that she crawls into bed forward but her daughter was present in the room in the PM and states that the patient no longer does that.  Educated on bed mobility   -LB     Recorded by  [LB] Mitzy Turner PT     Transfer Assessment/Treatment    Transfers, Bed-Chair [place]  conditional independence  -LB     Transfers, Chair-Bed [place]  conditional independence  -LB     Transfers, Bed-Chair-Bed,  Assist Device  rolling walker  -LB     Transfers, Sit-Stand [place] conditional independence  -AF conditional independence  -LB supervision required  -AF    Transfers, Stand-Sit [place] conditional independence  -AF conditional independence  -LB supervision required  -AF    Transfers, Sit-Stand-Sit, Assist Device  rolling walker  -LB     Toilet Transfer, [place]  supervision required  -LB supervision required  -AF    Toilet Transfer, Assistive Device   --   rollator  -AF    Recorded by [AF] Veda Shukla, OTR [LB] Mitzy Turner, PT [AF] Veda Shukla OTR    Gait Assessment/Treatment    Gait, [place] Level  stand by assist  -LB     Gait, Assistive Device  rollator  -LB     Gait, Distance (Feet)  200   100; 80  -LB     Recorded by  [LB] Mitzy Turner PT     Stairs Assessment/Treatment    Number of Stairs  4  -LB     Stairs, Handrail Location  both sides  -LB     Stairs, [place] Level  contact guard assist  -LB     Stairs, Technique Used  step to step (ascending);step to step (descending)  -LB     Stairs, Impairments  strength decreased  -LB     Recorded by  [LB] Mitzy Turner, PT     Upper Body Bathing Assessment/Training    UB Bathing Assess/Train, Position   sitting;sink side  -AF    UB Bathing Assess/Train, [place] Level   set up required  -AF    Recorded by   [AF] Veda Shukla OTR    Lower Body Bathing Assessment/Training    LB Bathing Assess/Train, Position   sitting;sink side;standing  -AF    LB Bathing Assess/Train, [place] Level   set up required;supervision required  -AF    Recorded by   [AF] Veda Shukla OTR    Upper Body Dressing Assessment/Training    UB Dressing Assess/Train, Position   sitting;sink side  -AF    UB Dressing Assess/Train, [place]   supervision required  -AF    UB Dressing Assess/Train, Comment   able to don back brace I'ly  -AF    Recorded by   [AF] Veda Shukla OTR    Lower Body Dressing Assessment/Training    LB Dressing  Assess/Train, Position   standing;sitting;sink side  -AF    LB Dressing Assess/Train, [place]   supervision required  -AF    LB Dressing Assess/Train, Comment   sat on EOB and pt was able to don socks and shoes with elastic laces  -AF    Recorded by   [AF] CINDY Orosco    Toileting Assessment/Training    Toileting Assess/Train, Assistive Device   grab bars;raised toilet seat  -AF    Toileting Assess/Train, Position   sitting;standing  -AF    Toileting Assess/Train, [place] Level   supervision required  -AF    Recorded by   [AF] CINDY Orosco    Grooming Assessment/Training    Grooming Assess/Train, Position   sitting;sink side  -AF    Grooming Assess/Train, [place] Level   independent  -AF    Recorded by   [AF] CINDY Orosco    Balance Skills Training    Sitting-Level of Assistance   Independent  -AF    Standing-Level of Assistance Distant supervision  -AF  Distant supervision  -AF    Static Standing Balance Support No upper extremity supported  -AF      Standing-Balance Activities --   FMC cognitive task  -AF      Standing Balance # of Minutes 4 mins x 2 sets  -AF      Recorded by [AF] CINDY Orosco  [AF] CINDY Orosco    Therapy Exercises    Bilateral Lower Extremities  AROM:;20 reps;sitting;ankle pumps/circles;LAQ;hip flexion;hip abduction/adduction  -LB     Bilateral Upper Extremity AROM:;15 reps;sitting;elbow flexion/extension;hand pumps;pronation/supination;shoulder protraction/retraction   #1 hand weigt and dowel natalee, 3 sets  -AF      Recorded by [AF] CINDY Orosco [LB] Mitzy Turner, PT     Functional Endurance    Detail (Functional Endurance) standign arm bike 3 mins  -AF      Recorded by [AF] CINDY Orosco      Orthosis Location    Orthosis, Neck/Back  LSO (lumbar sacral orthosis)  -LB     Recorded by  [LB] Mitzy Turner, HIRAM     Orthosis Management/Training    Orthosis Skills Training  donning orthosis;doffing orthosis  -LB     Orthosis Skills  Training Comment  setup to don/doff brace  -LB     Orthosis Wear Schedule  wear when out of bed only  -LB     Recorded by  [LB] Mitzy Turner, PT     Positioning and Restraints    Pre-Treatment Position sitting in chair/recliner  -AF sitting in chair/recliner  -LB sitting in chair/recliner  -AF    Post Treatment Position wheelchair  -AF wheelchair  -LB wheelchair  -AF    In Wheelchair sitting;call light within reach;encouraged to call for assist;notified nsg  -AF with OT  -LB sitting;with PT  -AF    Recorded by [AF] Veda Shukla, OTR [LB] Mitzy Turner, PT [AF] Veda Shukla, OTR      01/23/18 1452 01/23/18 1353 01/23/18 0922    Rehab Assessment/Intervention    Discipline occupational therapist  -AF physical therapist  -JK physical therapist  -LB    Document Type therapy note (daily note)  -AF therapy note (daily note)  -JK therapy note (daily note)  -LB    Subjective Information agree to therapy;no complaints  -AF agree to therapy;no complaints  -JK agree to therapy  -LB    Patient Effort, Rehab Treatment good  -AF good  -JK good  -LB    Symptoms Noted During/After Treatment  none  -JK     Precautions/Limitations brace on when up;fall precautions;spinal precautions  -AF brace on when up;fall precautions  -JK brace on when up;spinal precautions;fall precautions  -LB    Precautions/Limitations, Vision  WFL with corrective lenses  -JK     Precautions/Limitations, Hearing  WFL  -JK     Recorded by [AF] Veda Shukla, OTR [JK] Terri Woo, PT [LB] Mitzy Turner, PT    Pain Assessment    Pain Assessment No/denies pain  -AF No/denies pain  -JK     Recorded by [AF] Veda Shukla, OTR [JK] Terri Woo, PT     Vision Assessment/Intervention    Visual Impairment  WFL with corrective lenses  -JK     Recorded by  [JK] Terri Woo, PT     Cognitive Assessment/Intervention    Current Cognitive/Communication Assessment functional  -AF functional  -JK     Orientation Status oriented x 4  -AF oriented x 4  -JK      Follows Commands/Answers Questions 100% of the time  -% of the time;able to follow multi-step instructions  -JK     Personal Safety WNL/WFL  -AF WNL/WFL  -JK     Personal Safety Interventions fall prevention program maintained;gait belt;nonskid shoes/slippers when out of bed  -AF fall prevention program maintained;gait belt  -JK     Recorded by [AF] Veda Shukla, OTR [JK] Terri Woo, PT     Bed Mobility, Assessment/Treatment    Bed Mob, Supine to Sit, [place] contact guard assist  -AF      Bed Mob, Sit to Supine, [place] moderate assist (50% patient effort)  -AF      Bed Mobility, Comment  up in chair  -JK     Recorded by [AF] CINDY Orosco [JK] Terri Woo, PT     Transfer Assessment/Treatment    Transfers, Bed-Chair [place] contact guard assist;stand by assist  -AF      Transfers, Chair-Bed [place] contact guard assist;stand by assist  -AF      Transfers, Sit-Stand [place] stand by assist  -AF supervision required  -JK supervision required  -LB    Transfers, Stand-Sit [place] stand by assist  -AF supervision required  -JK supervision required  -LB    Transfers, Sit-Stand-Sit, Assist Device  rolling walker  -JK --   rollator  -LB    Walk-In Shower Transfer, [place] contact guard assist  -AF      Walk-In Shower Transfer, Assist Device --   rollator, tub transfer bench, grab bars  -AF      Transfer, Safety Issues  step length decreased;balance decreased during turns  -JK     Transfer, Impairments  strength decreased;impaired balance  -JK     Transfer, Comment  car tsf min assist for L foot with seat back reclined  -JK     Recorded by [AF] Veda Shukla, OTR [JK] Terri Woo, PT [LB] Mitzy Turner, PT    Gait Assessment/Treatment    Gait, [place] Level  stand by assist;contact guard assist  -JK stand by assist;contact guard assist  -LB    Gait, Assistive Device  rollator  -JK rollator  -LB    Gait, Distance (Feet)  80   40' x 2  -  -LB     Gait, Gait Deviations  bilateral:;[place] decreased;forward flexed posture;step length decreased  -JK bilateral:;decreased heel strike;forward flexed posture  -LB    Gait, Safety Issues  step length decreased  -JK     Gait, Impairments  strength decreased  -JK     Gait, Comment   cues for upright posture  -LB    Recorded by  [JK] Terri Woo, PT [LB] Mitzy Turner, PT    Stairs Assessment/Treatment    Number of Stairs   6   small 4 inch steps  -LB    Stairs, Handrail Location   both sides  -LB    Stairs, [place] Level   verbal cues required;contact guard assist  -LB    Stairs, Technique Used   step to step (ascending);step to step (descending)  -LB    Recorded by   [LB] Mitzy Turner PT    Functional Mobility    Functional Mobility- Comment walked from EOB to shower chair with rollator with SBA  -AF      Recorded by [AF] CINDY Orosco      Upper Body Bathing Assessment/Training    UB Bathing Assess/Train Assistive Device grab bars;hand-held shower head;shower chair with back  -AF      UB Bathing Assess/Train, Position sitting  -AF      UB Bathing Assess/Train, [place] Level supervision required  -AF      Recorded by [AF] CINDY Orosco      Lower Body Bathing Assessment/Training    LB Bathing Assess/Train Assistive Device grab bars;hand-held shower head;long-handled sponge;shower chair with back  -AF      LB Bathing Assess/Train, Position sitting;standing  -AF      LB Bathing Assess/Train, [place] Level supervision required  -AF      Recorded by [AF] CINDY Orosco      Upper Body Dressing Assessment/Training    UB Dressing Assess/Train, Position sitting  -AF      UB Dressing Assess/Train, [place] supervision required  -AF      Recorded by [AF] CINDY Orosco      Lower Body Dressing Assessment/Training    LB Dressing Assess/Train, Position sitting;standing  -AF      LB Dressing Assess/Train, [place] moderate assist (50% patient effort)  -AF      LB Dressing  Assess/Train, Comment Assist with socks and shoes  -AF      Recorded by [AF] Veda Shukla OTR      Grooming Assessment/Training    Grooming Assess/Train, Position sitting;sink side  -AF      Grooming Assess/Train, [place] Level supervision required  -AF      Grooming Assess/Train, Comment sitting at sink on locked rollator like she does at home  -AF      Recorded by [AF] Veda Shukla OTR      Balance Skills Training    Sitting-Level of Assistance Independent  -AF      Standing-Level of Assistance Close supervision  -AF      Recorded by [AF] Veda Shukla OTR      Therapy Exercises    Bilateral Lower Extremities   AROM:;20 reps;sitting;ankle pumps/circles;LAQ;hip flexion;hip abduction/adduction  -LB    Bilateral Upper Extremity AROM:;15 reps;sitting;elbow flexion/extension;hand pumps;pronation/supination;shoulder protraction/retraction   #1 hand weights, 3 sets   -AF      Recorded by [AF] Veda Shukla OTR  [LB] Mitzy Turner PT    Functional Endurance    Detail (Functional Endurance) stood for arm bike 2 mins  -AF      Recorded by [AF] Veda Shukla OTR      Orthosis Location    Orthosis, Neck/Back   LSO (lumbar sacral orthosis)  -LB    Recorded by   [LB] Mitzy Turner PT    Orthosis Management/Training    Orthosis Skills Training   purpose/goals of orthosis;sleeping without orthosis  -LB    Orthosis Wear Schedule   wear when out of bed only  -LB    Recorded by   [LB] Mitzy Turner PT    Positioning and Restraints    Pre-Treatment Position in bed  -AF sitting in chair/recliner  -JK sitting in chair/recliner  -LB    Post Treatment Position bed  -AF wheelchair  -JK     In Bed supine;call light within reach;encouraged to call for assist;exit alarm on   in PM  -AF      In Wheelchair sitting;with PT   in AM  -AF sitting;with OT;heels elevated  -JK     Recorded by [AF] Veda Shukla OTR [JK] Terri Woo, PT [LB] Mitzy Turner PT      01/22/18 1402 01/22/18 0923 01/22/18 0910    Rehab  Assessment/Intervention    Discipline occupational therapist  -AF physical therapist  -LB occupational therapist  -AF    Document Type therapy note (daily note)  -AF therapy note (daily note)  -LB therapy note (daily note)  -AF    Subjective Information agree to therapy  -AF agree to therapy  -LB agree to therapy;no complaints  -AF    Patient Effort, Rehab Treatment good  -AF good  -LB good  -AF    Precautions/Limitations brace on when up;fall precautions;spinal precautions  -AF brace on when up;fall precautions;spinal precautions  -LB brace on when up;fall precautions;spinal precautions  -AF    Recorded by [AF] Veda Shukla OTR [LB] Mitzy Turner, PT [AF] Veda Shukla OTR    Pain Assessment    Pain Assessment No/denies pain  -AF 0-10  -LB No/denies pain  -AF    Pain Score  6  -LB     Post Pain Score  6  -LB     Pain Type  Chronic pain  -LB     Pain Location  Hip  -LB     Pain Orientation  Left;Right  -LB     Pain Intervention(s)  Ambulation/increased activity  -LB     Response to Interventions  tolerated  -LB     Recorded by [AF] CINDY Orosco [LB] Mitzy Turner, PT [AF] Veda Shukla OTR    Vision Assessment/Intervention    Visual Impairment WNL  -AF      Recorded by [AF] CINDY Orosco      Cognitive Assessment/Intervention    Current Cognitive/Communication Assessment functional  -AF  functional  -AF    Orientation Status oriented x 4  -AF  oriented x 4  -AF    Follows Commands/Answers Questions 100% of the time  -AF  100% of the time  -AF    Personal Safety WNL/WFL  -AF WNL/WFL  -LB WNL/WFL  -AF    Personal Safety Interventions fall prevention program maintained;gait belt;nonskid shoes/slippers when out of bed  -AF fall prevention program maintained;gait belt;muscle strengthening facilitated;nonskid shoes/slippers when out of bed  -LB fall prevention program maintained;gait belt;nonskid shoes/slippers when out of bed  -AF    Recorded by [AF] CINDY Orosco [LB] Mitzy Turner, PT  [AF] Veda Shukla OTR    Bed Mobility, Assessment/Treatment    Bed Mob, Supine to Sit, [place]   contact guard assist;verbal cues required   bed rail  -AF    Bed Mob, Sit to Sidelying, [place]  verbal cues required;minimum assist (75% patient effort)  -LB     Bed Mobility, Comment  pt needs cues to maintain back precautions  -LB     Recorded by  [LB] Mitzy Turner, PT [AF] Veda Shukla OTR    Transfer Assessment/Treatment    Transfers, Bed-Chair [place]  contact guard assist  -LB     Transfers, Sit-Stand [place] stand by assist  -AF stand by assist  -LB stand by assist;verbal cues required  -AF    Transfers, Stand-Sit [place] stand by assist  -AF stand by assist  -LB stand by assist;verbal cues required  -AF    Transfers, Sit-Stand-Sit, Assist Device  rolling walker  -LB --   rollator  -AF    Toilet Transfer, [place] contact guard assist;verbal cues required;supervision required  -AF  contact guard assist  -AF    Toilet Transfer, Assistive Device --   rollator  -AF  --   rollator and grab bar  -AF    Recorded by [AF] Veda Shukla, OTR [LB] Mitzy Turner, PT [AF] Veda Shukla, ESTELLAR    Gait Assessment/Treatment    Gait, [place] Level  stand by assist;contact guard assist  -LB     Gait, Assistive Device  rollator  -LB     Gait, Distance (Feet)  200   160, 100  -LB     Gait, Gait Deviations  decreased heel strike;forward flexed posture  -LB     Gait, Safety Issues  step length decreased  -LB     Gait, Impairments  strength decreased  -LB     Gait, Comment  cues for upright posture  -LB     Recorded by  [LB] Mitzy Turner, PT     Functional Mobility    Functional Mobility- Comment walked to and from bathroom with rollaotr with SBA/CGA  -AF  walked from EOB to commode with rollator and CGA  -AF    Recorded by [AF] Veda Shukla, OTR  [AF] Veda Shukla OTR    Upper Body Bathing Assessment/Training    UB Bathing Assess/Train, Position   sitting;sink side  -AF    UB  Bathing Assess/Train, [place] Level   set up required  -AF    Recorded by   [AF] Veda Shukla OTR    Lower Body Bathing Assessment/Training    LB Bathing Assess/Train, Position   sink side;standing;sitting  -AF    LB Bathing Assess/Train, [place] Level   minimum assist (75% patient effort);verbal cues required  -AF    LB Bathing Assess/Train, Comment   assist with feet  -AF    Recorded by   [AF] Veda Shukla OTR    Upper Body Dressing Assessment/Training    UB Dressing Assess/Train, Position   sitting;sink side  -AF    UB Dressing Assess/Train, [place]   supervision required  -AF    UB Dressing Assess/Train, Comment   pt able to don brace seated on EOB and w/c level with set up  -AF    Recorded by   [AF] CINDY Orosco    Lower Body Dressing Assessment/Training    LB Dressing Assess/Train, Position   sitting;standing;sink side  -AF    LB Dressing Assess/Train, [place]   moderate assist (50% patient effort)  -AF    LB Dressing Assess/Train, Comment   assist with TEDS, and heel management on shoes. edcuated on spinal precautions with ADLs  -AF    Recorded by   [AF] CINDY Orosco    Toileting Assessment/Training    Toileting Assess/Train, Assistive Device   grab bars;raised toilet seat  -AF    Toileting Assess/Train, Position   standing;sitting  -AF    Toileting Assess/Train, [place] Level   supervision required  -AF    Recorded by   [AF] CINDY Orosco    Grooming Assessment/Training    Grooming Assess/Train, Position   sitting;sink side  -AF    Grooming Assess/Train, [place] Level   supervision required  -AF    Grooming Assess/Train, Comment stood at sink to wash hands  -AF  w/c level  -AF    Recorded by [AF] CINDY Orosco  [AF] CINDY Orosco    Balance Skills Training    Sitting-Level of Assistance   Distant supervision;Independent  -AF    Standing-Level of Assistance Close supervision  -AF  Contact guard;Close supervision  -AF    Static Standing  Balance Support assistive device  -AF  assistive device  -AF    Standing-Balance Activities Ball toss  -AF  --   ADLs  -AF    Standing Balance # of Minutes 2mins x 2 sets  -AF  3-5 mins x 2  -AF    Gait Balance-Level of Assistance  Contact guard  -LB     Gait Balance Support  odell bar  -LB     Gait Balance Activities  side-stepping;backwards  -LB     Recorded by [AF] Veda Shukla OTR [LB] Mitzy Turner PT [AF] CINDY Orosco    Therapy Exercises    Bilateral Lower Extremities  AROM:;10 reps;sitting  -LB     BLE Other Reps  15  -LB     Bilateral Upper Extremity AROM:;15 reps;sitting;elbow flexion/extension;hand pumps;pronation/supination;shoulder protraction/retraction   #1 dowel natalee, hand weight, hand gripper, 3 sets  -AF      Recorded by [AF] CINDY Orosco [LB] Mitzy Turner PT     Functional Endurance    Detail (Functional Endurance) arm bike standing 2 mins   -AF  good with ADLs  -AF    Recorded by [AF] CINDY Orosco  [AF] Veda Shukla OTR    Orthosis Location    Orthosis, Neck/Back  LSO (lumbar sacral orthosis)  -LB     Recorded by  [LB] Mitzy Turner PT     Orthosis Management/Training    Orthosis Wear Schedule  wear when out of bed only  -LB     Recorded by  [LB] Mitzy Turner PT     Positioning and Restraints    Pre-Treatment Position sitting in chair/recliner  -AF sitting in chair/recliner  -LB in bed  -AF    Post Treatment Position wheelchair  -AF wheelchair  -LB wheelchair  -AF    In Bed  with other staff  -LB     In Wheelchair sitting;with PT  -AF call light within reach;encouraged to call for assist  -LB sitting;with PT  -AF    Recorded by [AF] CINDY Orosco [LB] Mitzy Turner PT [AF] CINDY Orosco      User Key  (r) = Recorded By, (t) = Taken By, (c) = Cosigned By    Initials Name Effective Dates    LB Mitzy Turner, PT 10/06/15 -     AF CINDY Orosco 12/01/15 -     JAMAL Woo, PT 12/01/15 -                 IP PT Goals       01/19/18 1546  01/15/18 1620       Bed Mobility PT LTG    Bed Mobility PT LTG, Date Established 01/19/18  -LB 01/15/18  -KH     Bed Mobility PT LTG, Time to Achieve 1 wk  -LB 1 wk  -KH     Bed Mobility PT LTG, Activity Type sidelying to sit/sit to sidelying;roll left/roll right  -LB all bed mobility  -KH     Bed Mobility PT LTG, [place] Level independent  -LB minimum assist (75% patient effort)  -KH     Transfer Training PT LTG    Transfer Training PT LTG, Date Established 01/19/18  -LB 01/15/18  -KH     Transfer Training PT LTG, Time to Achieve 1 wk  -LB 1 wk  -KH     Transfer Training PT LTG, Activity Type sit to stand/stand to sit  -LB all transfers  -KH     Transfer Training PT LTG, [place] Level conditional independence  -LB minimum assist (75% patient effort)  -KH     Transfer Training PT LTG, Assist Device walker, rolling  -LB walker, rolling  -KH     Transfer Training 2 PT LTG    Transfer Training PT 2 LTG, Time to Achieve 1 wk  -LB      Transfer Training PT 2 LTG, Activity Type --   car transfer  -LB      Transfer Training PT 2 LTG, [place] Level supervision required  -LB      Transfer Training PT 2 LTG, Assist Device walker, rolling  -LB      Gait Training PT LTG    Gait Training Goal PT LTG, Date Established 01/19/18  -LB 01/15/18  -KH     Gait Training Goal PT LTG, Time to Achieve 1 wk  -LB 1 wk  -KH     Gait Training Goal PT LTG, Miner Level conditional independence  -LB minimum assist (75% patient effort)  -KH     Gait Training Goal PT LTG, Assist Device walker, rolling  -LB walker, rolling  -KH     Gait Training Goal PT LTG, Distance to Achieve 200  -LB  50 ft  -KH     Stair Training PT LTG    Stair Training Goal PT LTG, Date Established 01/19/18  -LB      Stair Training Goal PT LTG, Time to Achieve 1 wk  -LB      Stair Training Goal PT LTG, Number of Steps 4  -LB      Stair Training Goal PT LTG, Miner Level contact guard assist  -LB      Stair Training Goal PT LTG, Assist Device 2  handrails  -LB      Patient Education PT LTG    Patient Education PT LTG, Date Established 01/19/18  -LB      Patient Education PT LTG, Time to Achieve 1 wk  -LB      Patient Education PT LTG, Education Type elva/doff brace  -LB      Patient Education PT LTG, Education Understanding demonstrate adequately  -LB        User Key  (r) = Recorded By, (t) = Taken By, (c) = Cosigned By    Initials Name Provider Type    OTTO Garvey, PT Physical Therapist    LB Mitzy Turner, PT Physical Therapist          Physical Therapy Education     Title: PT OT SLP Therapies (Done)     Topic: Physical Therapy (Done)     Point: Mobility training (Done)    Learning Progress Summary    Learner Readiness Method Response Comment Documented by Status   Patient Acceptance E VU,NR steps LB 01/23/18 0941 Done    Acceptance E VU sit to stand LB 01/22/18 0925 Done    Acceptance E VU,NR  MG 01/21/18 0932 Done    Acceptance E VU,NR  LB 01/20/18 1146 Done    Acceptance E VU,NR  LB 01/19/18 1231 Done    Acceptance E VU  MM 01/19/18 0029 Done               Point: Home exercise program (Done)    Learning Progress Summary    Learner Readiness Method Response Comment Documented by Status   Patient Acceptance E VU,NR  MG 01/21/18 0932 Done    Acceptance E VU,NR  LB 01/20/18 1146 Done    Acceptance E VU  MM 01/19/18 0029 Done               Point: Body mechanics (Done)    Learning Progress Summary    Learner Readiness Method Response Comment Documented by Status   Patient Acceptance E VU,Bed IU Pt states she understands but cannnot perform bed mobility as instructed LB 01/24/18 1515 Done    Acceptance E VU,NR  MG 01/21/18 0932 Done    Acceptance E VU  MM 01/19/18 0029 Done               Point: Precautions (Done)    Learning Progress Summary    Learner Readiness Method Response Comment Documented by Status   Patient Acceptance E VU,Bed IU Pt states she understands but cannnot perform bed mobility as instructed LB 01/24/18 1515 Done    Acceptance  E VU,DU don/doff brace LB 01/24/18 0924 Done    Acceptance E VU,NR  MG 01/21/18 0932 Done    Acceptance E VU  MM 01/19/18 0029 Done                      User Key     Initials Effective Dates Name Provider Type Discipline    LB 10/06/15 -  Mitzy Turner, PT Physical Therapist PT    MG 09/13/17 -  Megan Gosselin, PT Physical Therapist PT    MM 10/30/17 -  Nano Lama RN Registered Nurse Nurse                    PT Recommendation and Plan  Anticipated Discharge Disposition: home with home health  Planned Therapy Interventions: bed mobility training, gait training, home exercise program, strengthening, stair training, transfer training  PT Frequency: 2 times/day  Plan of Care Review  Plan Of Care Reviewed With: patient  Outcome Summary/Follow up Plan: In PT, pt did well with bed mobility , transfers and gait with use of rollator.  Pt needs vc for back precautions and assist for donning/doffing brace.         Time Calculation:         PT Charges       01/24/18 1517 01/24/18 1516       Time Calculation    Start Time 1330  -LB 0900  -LB     Stop Time 1400  -LB 1000  -LB     Time Calculation (min) 30 min  -LB 60 min  -LB     PT Received On 01/24/18  -LB 01/24/18  -LB     PT - Next Appointment 01/25/18  -LB        User Key  (r) = Recorded By, (t) = Taken By, (c) = Cosigned By    Initials Name Provider Type    LB Mitzy Turner PT Physical Therapist          Therapy Charges for Today     Code Description Service Date Service Provider Modifiers Qty    17422308892 HC PT THER PROC EA 15 MIN 1/23/2018 Mitzy Turner PT GP 4    30743205542 HC PT THER PROC EA 15 MIN 1/24/2018 Mitzy Turner PT GP 6               Mitzy Turner PT  1/24/2018

## 2018-01-24 NOTE — THERAPY TREATMENT NOTE
Inpatient Rehabilitation - Occupational Therapy Treatment Note  [facility]     Patient Name: Raquel Gonsales  : 1945  MRN: [id number]  Today's Date: 2018  Onset of Illness/Injury or Date of Surgery Date: 18  Date of Referral to OT: 18  Referring Physician: Charan      Admit Date: 2018    Visit Dx:   No diagnosis found.  Patient Active Problem List   Diagnosis   • Cervical spondylosis with radiculopathy   • Bilateral hip pain   • Trochanteric bursitis of both hips   • Status post bilateral total hip replacement   • S/P lumbar fusion   • Spinal stenosis of lumbar region with neurogenic claudication   • Lumbar spinal stenosis   • TIA (transient ischemic attack)   • Obesity   • Recurrent falls   • DM2 (diabetes mellitus, type 2)   • HTN (hypertension)   • COPD (chronic obstructive pulmonary disease)   • Decreased mobility   • Spinal stenosis             Adult Rehabilitation Note       18 1505 18 0921 18 0908    Rehab Assessment/Intervention    Discipline occupational therapist  -AF physical therapist  -LB occupational therapist  -AF    Document Type therapy note (daily note)  -AF therapy note (daily note)  -LB therapy note (daily note)  -AF    Subjective Information agree to therapy;no complaints  -AF agree to therapy  -LB agree to therapy;no complaints  -AF    Patient Effort, Rehab Treatment good  -AF good  -LB good  -AF    Patient Effort, Rehab Treatment Comment   pt stated she feels like she is ready to go home  -AF    Precautions/Limitations brace on when up;fall precautions;spinal precautions  -AF fall precautions;brace on when up;spinal precautions  -LB fall precautions;spinal precautions;brace on when up  -AF    Recorded by [AF] Veda Shukla, OTR [LB] Mitzy Turner, PT [AF] eVda Shukla, OTR    Pain Assessment    Pain Assessment No/denies pain  -AF 0-10  -LB No/denies pain  -AF    Pain Score  5  -LB     Post Pain Score  5  -LB     Pain Type  Chronic pain   -LB     Pain Location  Hip  -LB     Pain Orientation  Left;Right  -LB     Pain Intervention(s)  Ambulation/increased activity  -LB     Response to Interventions  tolerated  -LB     Recorded by [AF] Veda Shukla OTR [LB] Mitzy Turner, PT [AF] Veda Shukla OTR    Cognitive Assessment/Intervention    Current Cognitive/Communication Assessment functional  -AF  functional  -AF    Orientation Status oriented x 4  -AF  oriented x 4  -AF    Follows Commands/Answers Questions 100% of the time  -AF  100% of the time  -AF    Personal Safety WNL/WFL  -AF WNL/WFL  -LB WNL/WFL  -AF    Personal Safety Interventions fall prevention program maintained;gait belt;nonskid shoes/slippers when out of bed  -AF fall prevention program maintained;gait belt;muscle strengthening facilitated;nonskid shoes/slippers when out of bed  -LB fall prevention program maintained;gait belt;nonskid shoes/slippers when out of bed  -AF    Recorded by [AF] Veda Shukla OTR [LB] Mitzy Turner, PT [AF] Veda Shukla OTR    Transfer Assessment/Treatment    Transfers, Sit-Stand [place] conditional independence  -AF conditional independence  -LB supervision required  -AF    Transfers, Stand-Sit [place] conditional independence  -AF conditional independence  -LB supervision required  -AF    Transfers, Sit-Stand-Sit, Assist Device  rolling walker  -LB     Toilet Transfer, [place]  supervision required  -LB supervision required  -AF    Toilet Transfer, Assistive Device   --   rollator  -AF    Recorded by [AF] CINDY Orosco [LB] Mitzy Tunrer, PT [AF] Veda Shukla OTR    Gait Assessment/Treatment    Gait, [place] Level  stand by assist  -LB     Gait, Assistive Device  rollator  -LB     Gait, Distance (Feet)  200   100  -LB     Recorded by  [LB] Mitzy Turner, PT     Stairs Assessment/Treatment    Number of Stairs  4  -LB     Stairs, Handrail Location  both sides  -LB     Stairs, [place] Level  contact guard assist   -LB     Stairs, Technique Used  step to step (ascending);step to step (descending)  -LB     Recorded by  [LB] Mitzy Turner, PT     Upper Body Bathing Assessment/Training    UB Bathing Assess/Train, Position   sitting;sink side  -AF    UB Bathing Assess/Train, [place] Level   set up required  -AF    Recorded by   [AF] Veda Shukla, OTR    Lower Body Bathing Assessment/Training    LB Bathing Assess/Train, Position   sitting;sink side;standing  -AF    LB Bathing Assess/Train, [place] Level   set up required;supervision required  -AF    Recorded by   [AF] Veda Shukla, OTR    Upper Body Dressing Assessment/Training    UB Dressing Assess/Train, Position   sitting;sink side  -AF    UB Dressing Assess/Train, [place]   supervision required  -AF    UB Dressing Assess/Train, Comment   able to don back brace I'ly  -AF    Recorded by   [AF] Veda Shukla OTR    Lower Body Dressing Assessment/Training    LB Dressing Assess/Train, Position   standing;sitting;sink side  -AF    LB Dressing Assess/Train, [place]   supervision required  -AF    LB Dressing Assess/Train, Comment   sat on EOB and pt was able to don socks and shoes with elastic laces  -AF    Recorded by   [AF] Veda Shukla, ESTELLAR    Toileting Assessment/Training    Toileting Assess/Train, Assistive Device   grab bars;raised toilet seat  -AF    Toileting Assess/Train, Position   sitting;standing  -AF    Toileting Assess/Train, [place] Level   supervision required  -AF    Recorded by   [AF] Veda Shukla OTR    Grooming Assessment/Training    Grooming Assess/Train, Position   sitting;sink side  -AF    Grooming Assess/Train, [place] Level   independent  -AF    Recorded by   [AF] Veda Shukla OTR    Balance Skills Training    Sitting-Level of Assistance   Independent  -AF    Standing-Level of Assistance Distant supervision  -AF  Distant supervision  -AF    Static Standing Balance Support No upper extremity supported  -AF       Standing-Balance Activities --   [facility] [place] [facility] cognitive task  -AF      Standing Balance # of Minutes 4 mins x 2 sets  -AF      Recorded by [AF] CINDY Orosco  [AF] CINDY Orosco    Therapy Exercises    Bilateral Upper Extremity AROM:;15 reps;sitting;elbow flexion/extension;hand pumps;pronation/supination;shoulder protraction/retraction   #1 hand weigt and dowel natalee, 3 sets  -AF      Recorded by [AF] CINDY Orosco      Functional Endurance    Detail (Functional Endurance) standign arm bike 3 mins  -AF      Recorded by [AF] CINDY Orosco      Orthosis Location    Orthosis, Neck/Back  LSO (lumbar sacral orthosis)  -LB     Recorded by  [LB] Mitzy Turner, HIRAM     Orthosis Management/Training    Orthosis Skills Training  donning orthosis;doffing orthosis  -LB     Orthosis Skills Training Comment  setup to don/doff brace  -LB     Orthosis Wear Schedule  wear when out of bed only  -LB     Recorded by  [LB] Mitzy Turner, HIRAM     Positioning and Restraints    Pre-Treatment Position sitting in chair/recliner  -AF sitting in chair/recliner  -LB sitting in chair/recliner  -AF    Post Treatment Position wheelchair  -AF  wheelchair  -AF    In Wheelchair sitting;call light within reach;encouraged to call for assist;notified nsg  -AF  sitting;with PT  -AF    Recorded by [AF] CINDY Orosco [LB] Mitzy Turner PT [AF] Veda Shukla OTR      01/23/18 1452 01/23/18 1353 01/23/18 0922    Rehab Assessment/Intervention    Discipline occupational therapist  -AF physical therapist  -JK physical therapist  -LB    Document Type therapy note (daily note)  -AF therapy note (daily note)  -JK therapy note (daily note)  -LB    Subjective Information agree to therapy;no complaints  -AF agree to therapy;no complaints  -JK agree to therapy  -LB    Patient Effort, Rehab Treatment good  -AF good  -JK good  -LB    Symptoms Noted During/After Treatment  none  -JK     Precautions/Limitations brace on when up;fall  precautions;spinal precautions  -AF brace on when up;fall precautions  -JK brace on when up;spinal precautions;fall precautions  -LB    Precautions/Limitations, Vision  WFL with corrective lenses  -JK     Precautions/Limitations, Hearing  WFL  -JK     Recorded by [AF] Veda Shukla, OTR [JK] Terri Woo PT [LB] Mitzy Turner, PT    Pain Assessment    Pain Assessment No/denies pain  -AF No/denies pain  -JK     Recorded by [AF] Veda Shukla, OTR [JK] Terri Woo PT     Vision Assessment/Intervention    Visual Impairment  WFL with corrective lenses  -JK     Recorded by  [JK] Terri Woo PT     Cognitive Assessment/Intervention    Current Cognitive/Communication Assessment functional  -AF functional  -JK     Orientation Status oriented x 4  -AF oriented x 4  -JK     Follows Commands/Answers Questions 100% of the time  -% of the time;able to follow multi-step instructions  -JK     Personal Safety WNL/WFL  -AF WNL/WFL  -JK     Personal Safety Interventions fall prevention program maintained;gait belt;nonskid shoes/slippers when out of bed  -AF fall prevention program maintained;gait belt  -JK     Recorded by [AF] Veda Shukla, OTR [JK] Terri Woo, PT     Bed Mobility, Assessment/Treatment    Bed Mob, Supine to Sit, [place] contact guard assist  -AF      Bed Mob, Sit to Supine, [place] moderate assist (50% patient effort)  -AF      Bed Mobility, Comment  up in chair  -JK     Recorded by [AF] Veda Shukla OTR [JK] Terri Woo PT     Transfer Assessment/Treatment    Transfers, Bed-Chair [place] contact guard assist;stand by assist  -AF      Transfers, Chair-Bed [place] contact guard assist;stand by assist  -AF      Transfers, Sit-Stand [place] stand by assist  -AF supervision required  -JK supervision required  -LB    Transfers, Stand-Sit [place] stand by assist  -AF supervision required  -JK supervision required  -LB    Transfers, Sit-Stand-Sit, Assist  Device  rolling walker  -JK --   rollator  -LB    Walk-In Shower Transfer, [place] contact guard assist  -AF      Walk-In Shower Transfer, Assist Device --   rollator, tub transfer bench, grab bars  -AF      Transfer, Safety Issues  step length decreased;balance decreased during turns  -JK     Transfer, Impairments  strength decreased;impaired balance  -JK     Transfer, Comment  car tsf min assist for L foot with seat back reclined  -JK     Recorded by [AF] Veda Shukla, OTR [JK] Terri Woo, PT [LB] Mitzy Turner, PT    Gait Assessment/Treatment    Gait, [place] Level  stand by assist;contact guard assist  -JK stand by assist;contact guard assist  -LB    Gait, Assistive Device  rollator  -JK rollator  -LB    Gait, Distance (Feet)  80   40' x 2  -  -LB    Gait, Gait Deviations  bilateral:;ray[place] decreased;forward flexed posture;step length decreased  -JK bilateral:;decreased heel strike;forward flexed posture  -LB    Gait, Safety Issues  step length decreased  -JK     Gait, Impairments  strength decreased  -JK     Gait, Comment   cues for upright posture  -LB    Recorded by  [JK] Terri Woo, PT [LB] Mitzy Turner, PT    Stairs Assessment/Treatment    Number of Stairs   6   small 4 inch steps  -LB    Stairs, Handrail Location   both sides  -LB    Stairs, [place] Level   verbal cues required;contact guard assist  -LB    Stairs, Technique Used   step to step (ascending);step to step (descending)  -LB    Recorded by   [LB] Mitzy Turner, PT    Functional Mobility    Functional Mobility- Comment walked from EOB to shower chair with rollator with SBA  -AF      Recorded by [AF] Veda Shukla, OTR      Upper Body Bathing Assessment/Training    UB Bathing Assess/Train Assistive Device grab bars;hand-held shower head;shower chair with back  -AF      UB Bathing Assess/Train, Position sitting  -AF      UB Bathing Assess/Train, [place] Level supervision required  -AF      Recorded by [AF]  CINDY Orosco      Lower Body Bathing Assessment/Training    LB Bathing Assess/Train Assistive Device grab bars;hand-held shower head;long-handled sponge;shower chair with back  -AF      LB Bathing Assess/Train, Position sitting;standing  -AF      LB Bathing Assess/Train, [place] Level supervision required  -AF      Recorded by [AF] CINDY Orosco      Upper Body Dressing Assessment/Training    UB Dressing Assess/Train, Position sitting  -AF      UB Dressing Assess/Train, [place] supervision required  -AF      Recorded by [AF] CINDY Orosco      Lower Body Dressing Assessment/Training    LB Dressing Assess/Train, Position sitting;standing  -AF      LB Dressing Assess/Train, [place] moderate assist (50% patient effort)  -AF      LB Dressing Assess/Train, Comment Assist with socks and shoes  -AF      Recorded by [AF] CINDY Orosco      Grooming Assessment/Training    Grooming Assess/Train, Position sitting;sink side  -AF      Grooming Assess/Train, [place] Level supervision required  -AF      Grooming Assess/Train, Comment sitting at sink on locked rollator like she does at home  -AF      Recorded by [AF] CINDY Orosco      Balance Skills Training    Sitting-Level of Assistance Independent  -AF      Standing-Level of Assistance Close supervision  -AF      Recorded by [AF] CINDY Orosco      Therapy Exercises    Bilateral Lower Extremities   AROM:;20 reps;sitting;ankle pumps/circles;LAQ;hip flexion;hip abduction/adduction  -LB    Bilateral Upper Extremity AROM:;15 reps;sitting;elbow flexion/extension;hand pumps;pronation/supination;shoulder protraction/retraction   #1 hand weights, 3 sets   -AF      Recorded by [AF] CINDY Orosco  [LB] Mitzy Turner, PT    Functional Endurance    Detail (Functional Endurance) stood for arm bike 2 mins  -AF      Recorded by [AF] CINDY Orosco      Orthosis Location    Orthosis, Neck/Back   LSO (lumbar sacral  orthosis)  -LB    Recorded by   [LB] Mitzy Turner PT    Orthosis Management/Training    Orthosis Skills Training   purpose/goals of orthosis;sleeping without orthosis  -LB    Orthosis Wear Schedule   wear when out of bed only  -LB    Recorded by   [LB] Mitzy Turner PT    Positioning and Restraints    Pre-Treatment Position in bed  -AF sitting in chair/recliner  -JK sitting in chair/recliner  -LB    Post Treatment Position bed  -AF wheelchair  -JK     In Bed supine;call light within reach;encouraged to call for assist;exit alarm on   in PM  -AF      In Wheelchair sitting;with PT   in AM  -AF sitting;with OT;heels elevated  -JK     Recorded by [AF] Veda Shukla OTR [JK] Terri Woo PT [LB] Mitzy Turner, PT      01/22/18 1402 01/22/18 0923 01/22/18 0910    Rehab Assessment/Intervention    Discipline occupational therapist  -AF physical therapist  -LB occupational therapist  -AF    Document Type therapy note (daily note)  -AF therapy note (daily note)  -LB therapy note (daily note)  -AF    Subjective Information agree to therapy  -AF agree to therapy  -LB agree to therapy;no complaints  -AF    Patient Effort, Rehab Treatment good  -AF good  -LB good  -AF    Precautions/Limitations brace on when up;fall precautions;spinal precautions  -AF brace on when up;fall precautions;spinal precautions  -LB brace on when up;fall precautions;spinal precautions  -AF    Recorded by [AF] Veda Shukla, OTR [LB] Mitzy Turner, PT [AF] Veda Shukla, ESTELLAR    Pain Assessment    Pain Assessment No/denies pain  -AF 0-10  -LB No/denies pain  -AF    Pain Score  6  -LB     Post Pain Score  6  -LB     Pain Type  Chronic pain  -LB     Pain Location  Hip  -LB     Pain Orientation  Left;Right  -LB     Pain Intervention(s)  Ambulation/increased activity  -LB     Response to Interventions  tolerated  -LB     Recorded by [AF] Veda Shukla OTR [LB] Mitzy Turner, PT [AF] Veda Shukla OTR    Vision Assessment/Intervention     Visual Impairment WNL  -AF      Recorded by [AF] Veda Shukla OTR      Cognitive Assessment/Intervention    Current Cognitive/Communication Assessment functional  -AF  functional  -AF    Orientation Status oriented x 4  -AF  oriented x 4  -AF    Follows Commands/Answers Questions 100% of the time  -AF  100% of the time  -AF    Personal Safety WNL/WFL  -AF WNL/WFL  -LB WNL/WFL  -AF    Personal Safety Interventions fall prevention program maintained;gait belt;nonskid shoes/slippers when out of bed  -AF fall prevention program maintained;gait belt;muscle strengthening facilitated;nonskid shoes/slippers when out of bed  -LB fall prevention program maintained;gait belt;nonskid shoes/slippers when out of bed  -AF    Recorded by [AF] Veda Shukla, OTR [LB] Mitzy Turner, PT [AF] Veda Shukla OTR    Bed Mobility, Assessment/Treatment    Bed Mob, Supine to Sit, [place]   contact guard assist;verbal cues required   bed rail  -AF    Bed Mob, Sit to Sidelying, [place]  verbal cues required;minimum assist (75% patient effort)  -LB     Bed Mobility, Comment  pt needs cues to maintain back precautions  -LB     Recorded by  [LB] Mitzy Turner, PT [AF] Veda Shukla, ESTELLAR    Transfer Assessment/Treatment    Transfers, Bed-Chair [place]  contact guard assist  -LB     Transfers, Sit-Stand [place] stand by assist  -AF stand by assist  -LB stand by assist;verbal cues required  -AF    Transfers, Stand-Sit [place] stand by assist  -AF stand by assist  -LB stand by assist;verbal cues required  -AF    Transfers, Sit-Stand-Sit, Assist Device  rolling walker  -LB --   rollator  -AF    Toilet Transfer, [place] contact guard assist;verbal cues required;supervision required  -AF  contact guard assist  -AF    Toilet Transfer, Assistive Device --   rollator  -AF  --   rollator and grab bar  -AF    Recorded by [AF] Veda Shukla, OTR [LB] Mitzy Turner, PT [AF] Veda Shukla, ESTELLAR    Gait  Assessment/Treatment    Gait, [place] Level  stand by assist;contact guard assist  -LB     Gait, Assistive Device  rollator  -LB     Gait, Distance (Feet)  200   160, 100  -LB     Gait, Gait Deviations  decreased heel strike;forward flexed posture  -LB     Gait, Safety Issues  step length decreased  -LB     Gait, Impairments  strength decreased  -LB     Gait, Comment  cues for upright posture  -LB     Recorded by  [LB] Mitzy Turner, PT     Functional Mobility    Functional Mobility- Comment walked to and from bathroom with rollaotr with SBA/CGA  -AF  walked from EOB to commode with rollator and CGA  -AF    Recorded by [AF] Veda Shukla, OTR  [AF] Veda Shukla OTR    Upper Body Bathing Assessment/Training    UB Bathing Assess/Train, Position   sitting;sink side  -AF    UB Bathing Assess/Train, [place] Level   set up required  -AF    Recorded by   [AF] Veda Shukla, OTR    Lower Body Bathing Assessment/Training    LB Bathing Assess/Train, Position   sink side;standing;sitting  -AF    LB Bathing Assess/Train, [place] Level   minimum assist (75% patient effort);verbal cues required  -AF    LB Bathing Assess/Train, Comment   assist with feet  -AF    Recorded by   [AF] Veda Shukla OTR    Upper Body Dressing Assessment/Training    UB Dressing Assess/Train, Position   sitting;sink side  -AF    UB Dressing Assess/Train, [place]   supervision required  -AF    UB Dressing Assess/Train, Comment   pt able to don brace seated on EOB and w/c level with set up  -AF    Recorded by   [AF] Veda Shukla OTR    Lower Body Dressing Assessment/Training    LB Dressing Assess/Train, Position   sitting;standing;sink side  -AF    LB Dressing Assess/Train, [place]   moderate assist (50% patient effort)  -AF    LB Dressing Assess/Train, Comment   assist with TEDS, and heel management on shoes. edcuated on spinal precautions with ADLs  -AF    Recorded by   [AF] Veda Shukla OTR    Toileting  Assessment/Training    Toileting Assess/Train, Assistive Device   grab bars;raised toilet seat  -AF    Toileting Assess/Train, Position   standing;sitting  -AF    Toileting Assess/Train, [place] Level   supervision required  -AF    Recorded by   [AF] CINDY Orosco    Grooming Assessment/Training    Grooming Assess/Train, Position   sitting;sink side  -AF    Grooming Assess/Train, [place] Level   supervision required  -AF    Grooming Assess/Train, Comment stood at sink to wash hands  -AF  w/c level  -AF    Recorded by [AF] CINDY Orosco  [AF] CINDY Orosco    Balance Skills Training    Sitting-Level of Assistance   Distant supervision;Independent  -AF    Standing-Level of Assistance Close supervision  -AF  Contact guard;Close supervision  -AF    Static Standing Balance Support assistive device  -AF  assistive device  -AF    Standing-Balance Activities Ball toss  -AF  --   ADLs  -AF    Standing Balance # of Minutes 2mins x 2 sets  -AF  3-5 mins x 2  -AF    Gait Balance-Level of Assistance  Contact guard  -LB     Gait Balance Support  odell bar  -LB     Gait Balance Activities  side-stepping;backwards  -LB     Recorded by [AF] CINDY Orosco [LB] Mitzy Turner, PT [AF] CINDY Orosco    Therapy Exercises    Bilateral Lower Extremities  AROM:;10 reps;sitting  -LB     BLE Other Reps  15  -LB     Bilateral Upper Extremity AROM:;15 reps;sitting;elbow flexion/extension;hand pumps;pronation/supination;shoulder protraction/retraction   #1 dowel natalee, hand weight, hand gripper, 3 sets  -AF      Recorded by [AF] CINDY Orosco [LB] Mitzy Turner PT     Functional Endurance    Detail (Functional Endurance) arm bike standing 2 mins   -AF  good with ADLs  -AF    Recorded by [AF] CINDY Orosco  [AF] CINDY Orosco    Orthosis Location    Orthosis, Neck/Back  LSO (lumbar sacral orthosis)  -LB     Recorded by  [LB] Mitzy Turner PT     Orthosis Management/Training    Orthosis  Wear Schedule  wear when out of bed only  -LB     Recorded by  [LB] Mitzy Turner, PT     Positioning and Restraints    Pre-Treatment Position sitting in chair/recliner  -AF sitting in chair/recliner  -LB in bed  -AF    Post Treatment Position wheelchair  -AF wheelchair  -LB wheelchair  -AF    In Bed  with other staff  -LB     In Wheelchair sitting;with PT  -AF call light within reach;encouraged to call for assist  -LB sitting;with PT  -AF    Recorded by [AF] Veda Shukla, OTR [LB] Mitzy Turner, PT [AF] Veda Shukla, OTR      User Key  (r) = Recorded By, (t) = Taken By, (c) = Cosigned By    Initials Name Effective Dates    LB Mitzy Turner, PT 10/06/15 -     AF Veda Shukla, OTR 12/01/15 -     JAMAL Woo, PT 12/01/15 -                 OT Goals       01/19/18 1530 01/16/18 1309 01/16/18 1219    Transfer Training OT STG    Transfer Training OT STG, Date Established 01/19/18  -AF      Transfer Training OT STG, Time to Achieve 1 wk  -AF      Transfer Training OT STG, Activity Type toilet  -AF      Transfer Training OT STG, [place] Level contact guard assist;verbal cues required  -AF      Transfer Training OT STG, Assist Device walker, rolling  -AF      Transfer Training OT STG, Additional Goal grab bars  -AF      Transfer Training OT STG, Outcome goal ongoing  -AF      Transfer Training OT LTG    Transfer Training OT LTG, Date Established 01/19/18  -AF  01/16/18  -HC    Transfer Training OT LTG, Time to Achieve by discharge  -AF  1 wk  -HC    Transfer Training OT LTG, Activity Type toilet  -AF  bed to chair /chair to bed;sit to stand/stand to sit;toilet  -HC    Transfer Training OT LTG, [place] Level supervision required;conditional independence  -AF  supervision required  -HC    Transfer Training OT LTG, Assist Device walker, rolling  -AF  walker, rolling  -HC    Transfer Training OT LTG, Additional Goal grab bars  -AF      Transfer Training OT LTG, Outcome goal ongoing  -AF  goal revised   -HC    Transfer Training 2 OT STG    Transfer Training 2 OT STG, Date Established 01/19/18  -AF      Transfer Training 2 OT STG, Time to Achieve 1 wk  -AF      Transfer Training 2 OT STG, Activity Type shower chair;walk-in shower  -AF      Transfer Training 2 OT STG, [place] Level contact guard assist;verbal cues required  -AF      Transfer Training 2 OT STG, Assist Device walker, rolling;shower chair  -AF      Transfer Training 2 OT STG, Outcome goal ongoing  -AF      Transfer Training 2 OT LTG    Transfer Training 2 OT LTG, Date Established 01/19/18  -AF      Transfer Training 2 OT LTG, Time to Achieve by discharge  -AF      Transfer Training 2 OT LTG, Activity Type shower chair;walk-in shower  -AF      Transfer Training 2 OT LTG, [place] Level supervision required  -AF      Transfer Training 2 OT LTG, Assist Device walker, rolling;shower chair  -AF      Transfer Training 2 OT LTG, Outcome goal ongoing  -AF      Strength OT LTG    Strength Goal OT LTG, Date Established   01/16/18  -HC    Strength Goal OT LTG, Time to Achieve   1 wk  -HC    Strength Goal OT LTG, Outcome   goal revised  -HC    Patient Education OT STG    Patient Education OT STG, Date Established 01/19/18  -AF      Patient Education OT STG, Time to Achieve 1 wk  -AF      Patient Education OT STG, Education Type precautions per surgeon;brace use/care  -AF      Patient Education OT STG, Education Understanding demonstrates adequately  -AF      Patient Education OT STG, Additional Goal with ADLs  -AF      Patient Education OT STG Outcome goal ongoing  -AF      Patient Education OT LTG    Patient Education OT LTG, Date Established 01/19/18  -AF 01/16/18  -HC     Patient Education OT LTG, Time to Achieve by discharge  -AF 1 wk  -HC     Patient Education OT LTG, Education Type HEP;precautions per surgeon;brace use/care;home safety;adaptive equipment mgmt  -AF precautions per surgeon;brace use/care;positioning;posture/body mechanics;adaptive  equipment mgmt  -HC     Patient Education OT LTG, Education Understanding demonstrates adequately;independent  -AF      Patient Education OT LTG Outcome goal ongoing  -AF      ADL OT STG    ADL OT STG, Date Established 01/19/18  -AF      ADL OT STG, Time to Achieve 1 wk  -AF      ADL OT STG, Activity Type ADL skills  -AF      ADL OT STG, [place] Level standby assist;min assist  -AF      ADL OT STG, Additional Goal with LH reacher  -AF      ADL OT STG, Outcome goal ongoing  -AF      ADL OT LTG    ADL OT LTG, Date Established 01/19/18  -AF 01/16/18  -HC     ADL OT LTG, Time to Achieve by discharge  -AF 1 wk  -HC     ADL OT LTG, Activity Type ADL skills  -AF ADL skills  -HC     ADL OT LTG, [place] Level modified independent;standby assist  -AF standby assist  -HC     ADL OT LTG, Additional Goal with AE  -AF      ADL OT LTG, Outcome goal ongoing  -AF      Functional Mobility OT STG    Functional Mobility Goal OT STG, Date Established 01/19/18  -AF      Functional Mobility Goal OT STG, Time to Achieve 1 wk  -AF      Functional Mobility Goal OT STG, [place] Level contact guard  -AF      Functional Mobility Goal OT STG, Assist Device rolling walker  -AF      Functional Mobility Goal OT STG, Distance to Achieve to the bathroom  -AF      Functional Mobility Goal OT STG, Outcome goal ongoing  -AF      Functional Mobility OT LTG    Functional Mobility Goal OT LTG, Date Established 01/19/18  -AF  01/16/18  -HC    Functional Mobility Goal OT LTG, Time to Achieve by discharge  -AF  1 wk  -HC    Functional Mobility Goal OT LTG, [place] Level modified independent;independent with device;supervision  -AF  supervision  -HC    Functional Mobility Goal OT LTG, Assist Device rolling walker  -AF      Functional Mobility Goal OT LTG, Distance to Achieve to the bathroom  -AF      Functional Mobility Goal OT LTG, Outcome goal ongoing  -AF  goal revised  -HC      User Key  (r) = Recorded By, (t) = Taken By, (c) = Cosigned  By    Initials Name Provider Type    AF Veda Shukla, OTR Occupational Therapist    HC Margy Burks, OTR Occupational Therapist          Occupational Therapy Education     Title: PT OT SLP Therapies (Done)     Topic: Occupational Therapy (Done)     Point: ADL training (Done)    Description: Instruct learner(s) on proper safety adaptation and remediation techniques during self care or transfers.   Instruct in proper use of assistive devices.    Learning Progress Summary    Learner Readiness Method Response Comment Documented by Status   Patient Acceptance E VU,DU spinal precautions with donning socks and shoes AF 01/24/18 0913 Done    Acceptance E,D FRANNR edcuated on log rolling and spinal precautions during ADL tasks. will introduce AE with ADLs to increase her independence AF 01/19/18 1529 Done    Acceptance E VU  MM 01/19/18 0029 Done               Point: Home exercise program (Done)    Description: Instruct learner(s) on appropriate technique for monitoring, assisting and/or progressing therapeutic exercises/activities.    Learning Progress Summary    Learner Readiness Method Response Comment Documented by Status   Patient Acceptance E VU  MM 01/19/18 0029 Done               Point: Precautions (Done)    Description: Instruct learner(s) on prescribed precautions during self-care and functional transfers.    Learning Progress Summary    Learner Readiness Method Response Comment Documented by Status   Patient Acceptance E VU edcuated on spinal precautions with ADLs AF 01/22/18 0914 Done    Acceptance E,D VU,NR edcuated on log rolling and spinal precautions during ADL tasks. will introduce AE with ADLs to increase her independence AF 01/19/18 1529 Done    Acceptance E VU  MM 01/19/18 0029 Done               Point: Body mechanics (Done)    Description: Instruct learner(s) on proper positioning and spine alignment during self-care, functional mobility activities and/or exercises.    Learning Progress Summary     Learner Readiness Method Response Comment Documented by Status   Patient Acceptance E VU  MM 01/19/18 0029 Done                      User Key     Initials Effective Dates Name Provider Type Discipline    AF 12/01/15 -  CINDY Orosco Occupational Therapist OT    MM 10/30/17 -  Nano Lama RN Registered Nurse Nurse                  OT Recommendation and Plan  Anticipated Discharge Disposition: home with home health, home with assist  Planned Therapy Interventions: adaptive equipment training, activity intolerance, ADL retraining, balance training, fine motor coordination training, home exercise program, strengthening, transfer training  Therapy Frequency: 3-5 times/wk          Time Calculation:         Time Calculation- OT       01/24/18 1508 01/24/18 0914       Time Calculation- OT    OT Start Time 1400  -AF 0800  -AF     OT Stop Time 1430  -AF 0900  -AF     OT Time Calculation (min) 30 min  -AF 60 min  -AF       User Key  (r) = Recorded By, (t) = Taken By, (c) = Cosigned By    Initials Name Provider Type    AF CINDY Orosco Occupational Therapist           Therapy Charges for Today     Code Description Service Date Service Provider Modifiers Qty    14352307101 HC OT SELF CARE/MGMT/TRAIN EA 15 MIN 1/23/2018 CINDY Orosco GO 2    42313376894 HC OT THER PROC EA 15 MIN 1/23/2018 CINDY Orosco GO 2    91288541552 HC OT SELF CARE/MGMT/TRAIN EA 15 MIN 1/24/2018 CINDY Orosco GO 4    04402867150 HC OT THER PROC EA 15 MIN 1/24/2018 CINDY Orosco GO 1    40205652438 HC OT THERAPEUTIC ACT EA 15 MIN 1/24/2018 CINDY Orosco GO 1               CINDY Orosco  1/24/2018

## 2018-01-24 NOTE — PROGRESS NOTES
Inpatient Rehabilitation Functional Measures Assessment    Functional Measures  DANUTA Eating:  [facility] Grooming: [facility] Bathing:  [facility] Upper Body Dressing:  [facility] Lower Body Dressing:  [facility] Toileting:  [facility] Bladder Management  Level of Assistance:  [place]  Frequency/Number of Accidents this Shift:  [facility] Bowel Management  Level of Assistance: [place]  Frequency/Number of Accidents this Shift: [facility] Bed/Chair/Wheelchair Transfer:  [facility] Toilet Transfer:  [facility] Tub/Shower Transfer:  [place]    Previously Documented Mode of Locomotion at Discharge: Field  DANUTA Expected Mode of Locomotion at Discharge: [facility] Walk/Wheelchair:  [facility] Stairs:  [facility] Comprehension:  Auditory comprehension is the usual mode. Comprehension  Score = 6, Modified [place].  Patient comprehends complex/abstract  information in their primary language with only mild difficulty.  DANUTA Expression:  Vocal expression is the usual mode. Expression Score = 6,  Modified Independent.  Patient expresses complex/abstract information in their  primary language with only mild difficulty with tasks.  DANUTA Social Interaction:  Social Interaction Score = 6, Modified Independent.  Patient is modified independent for social interaction, requiring: Requires  additional time.  DANUTA Problem Solving:  Problem Solving Score = 6, Modified [place].  Patient  makes appropriate decisions in order to solve complex problems with mild  difficulty but self-corrects.  DANUTA Memory:  Memory Score = 6, Modified [place].  Patient is modified  independent for memory, requiring: Requires additional time.    Therapy Mode Minutes  Occupational Therapy: Branch  Physical Therapy: Branch  Speech Language Pathology:  [place]    Signed by: Ana Kim RN

## 2018-01-24 NOTE — PROGRESS NOTES
Per staff report.      Section B. Hearing, Speech, Vision: Expression of Ideas and Wants: Expresses  complex messages without difficulty and with speech that is clear and easy to  understand.  Understanding Verbal Content: Understands: Clear comprehension without cues or  repetitions.    Section C. Cognitive Patterns: Brief Interview for Mental Status (BIMS) was  conducted.  Repetition of Three Words: Three words  Temporal Orientation Year: Correct  Temporal Orientation Month: Accurate within 5 days  Temporal Orientation Day of Week: Correct  Recall Socks: Yes, no cue required  Recall Blue: Yes, no cue required  Recall Bed: Yes, no cue required  BIMS SUMMARY SCORE: 15 Cognitively intact Patient was able to complete the Brief  Interview for Mental Status    Signed by: Ishaan Gonzalez RN

## 2018-01-24 NOTE — PROGRESS NOTES
Inpatient Rehabilitation Functional Measures Assessment    Functional Measures  DANUTA Eating:  [facility] Grooming: [facility] Bathing:  [facility] Upper Body Dressing:  [facility] Lower Body Dressing:  [facility] Toileting:  [facility] Bladder Management  Level of Assistance:  [place]  Frequency/Number of Accidents this Shift:  [facility] Bowel Management  Level of Assistance: [place]  Frequency/Number of Accidents this Shift: [facility] Bed/Chair/Wheelchair Transfer:  [facility] Toilet Transfer:  [facility] Tub/Shower Transfer:  [place]    Previously Documented Mode of Locomotion at Discharge: Field  DANUTA Expected Mode of Locomotion at Discharge: [facility] Walk/Wheelchair:  [facility] Stairs:  [facility] Comprehension:  Auditory comprehension is the usual mode. Comprehension  Score = 6, Modified [place].  Patient comprehends complex/abstract  information in their primary language, requiring: Additional time.  DANUTA Expression:  Vocal expression is the usual mode. Expression Score = 6,  Modified Independent.  Patient expresses complex/abstract information in their  primary language, requiring: Additional time.  DANUTA Social Interaction:  Social Interaction Score = 6, Modified Independent.  Patient is modified independent for social interaction, requiring: Requires  additional time.  DANUTA Problem Solving:  Activity was not observed.  DANUTA Memory:  Memory Score = 6, Modified [place].  Patient is modified  independent for memory, requiring: Requires additional time.    Therapy Mode Minutes  Occupational Therapy: Branch  Physical Therapy: Branch  Speech Language Pathology:  [place]    Signed by: Britt Haas RN

## 2018-01-24 NOTE — PROGRESS NOTES
Inpatient Rehabilitation Plan of Care Note    Plan of Care  Care Plan Reviewed - No updates at this time.    Sphincter Control    Performed Intervention(s)  Monitor intake and output  Fluid restriction      Psychosocial    Performed Intervention(s)  Support/ Peer group  Verbalizes needs and concerns      Safety    Performed Intervention(s)  Falls precautions/ protocol  Safety monitoring during functional activities  Safety rounds  Bed alarm and/or chair alarm      Body Systems    Performed Intervention(s)  Blood glucose testing  Medication    Signed by: Ana Kim RN

## 2018-01-24 NOTE — PROGRESS NOTES
Inpatient Rehabilitation Functional Measures Assessment and Plan of Care    Plan of Care  Updated Problems/Interventions  Mobility    [PT] Bed/Chair/Wheelchair(Active)  Current Status(01/23/2018): Mod Indep with rollator  Weekly Goal(01/26/2018): SBA with Rollator  Discharge Goal: Mod Indep rollator    [PT] Bed Mobility(Active)  Current Status(01/24/2018): Indep  Weekly Goal(01/26/2018): Indep  Discharge Goal: Indep    [PT] Walk(Active)  Current Status(01/24/2018): 150 ft rollator and SBA  Weekly Goal(01/26/2018): BR Rollator SBA  Discharge Goal: 150 ft Rollator Mod Indep    [PT] Stairs(Active)  Current Status(01/24/2018): 4 HR CGA  Weekly Goal(01/26/2018): PT only  Discharge Goal: 4 HR CGA    Functional Measures  DANUTA Eating:  [place]  [facility] Grooming: [place]  [facility] Bathing:  [place]  [facility] Upper Body Dressing:  [place]  [facility] Lower Body Dressing:  [place]  [facility] Toileting:  [place]    [facility] Bladder Management  Level of Assistance:  [place]  Frequency/Number of Accidents this Shift:  [place]    [facility] Bowel Management  Level of Assistance: [place]  Frequency/Number of Accidents this Shift: [place]    [facility] Bed/Chair/Wheelchair Transfer:  Bed/chair/wheelchair Transfer Score = 6.  Patient is modified independent for transferring to and from the  bed/chair/wheelchair, requiring: Walker.  DANUTA Toilet Transfer:  [place]  [facility] Tub/Shower Transfer:  [place]    Previously Documented Mode of Locomotion at Discharge: Field  [facility] Expected Mode of Locomotion at Discharge: [place]  [facility] Walk/Wheelchair:  WHEELCHAIR OBSERVATION   Activity was not observed.    WALK OBSERVATION   Walk Distance Scale = 3.  Distance walked is greater than 150 feet. Walk Score  = 5.  Patient requires supervision or set up for walking. Patient walked a  distance of  200 feet. Patient requires the following assistive device(s):  Rolling walker.  DANUTA Stairs:  Stairs Score = 2.  Incidental assistance with lifting or lowering,  contact guard or steadying was provided. Patient performs 75%  or more of effort  and requires minimal contact assistance. Patient negotiated  4 stairs. Patient  requires the following assistive device(s): Handrail(s).    DANUTA Comprehension:  [place]  DANUTA Expression:  [place]  DANUTA Social Interaction:  [place]  DANUTA Problem Solving:  [facility] Memory:  [place]    Therapy Mode Minutes  Occupational Therapy: Branch  Physical Therapy: Individual: 90 minutes.  Speech Language Pathology:  [place]    Signed by: Mitzy Turner PT

## 2018-01-24 NOTE — PROGRESS NOTES
Inpatient Rehabilitation Plan of Care Note    Plan of Care  Care Plan Reviewed - No updates at this time.    Body Function Structure    [RN] Skin Integrity(Active)  Current Status(01/24/2018): Incision low back  Weekly Goal(01/31/2018): Teach family/ patient dressing changes  Discharge Goal: Incision healed    Performed Intervention(s)  Dressing changes  Wound care      Body Systems    [RN] Endocrine(Active)  Current Status(01/24/2018): Blood sugar not controlled  Weekly Goal(02/01/2018): Blood sugar ill be within acceptable limits  Discharge Goal: patient and family educated on testing blood sugars    Performed Intervention(s)  Blood glucose testing  Medication      Psychosocial    [RN] Behavior(Active)  Current Status(01/24/2018): Lacks insight current situation  Weekly Goal(01/31/2018): Identify progress in functional status  Discharge Goal: Demonstrate healthy coping strategies    Performed Intervention(s)  Support/ Peer group  Verbalizes needs and concerns      Safety    [RN] Potential for Injury(Active)  Current Status(01/24/2018): No unsafe behaviors  Weekly Goal(02/01/2018): Cue to use call bell  Discharge Goal: Patient/ family will be aware of risk of fall and safety in the  home setting        Sphincter Control    [RN] Bladder Management(Active)  Current Status(01/24/2018): Continent 100%  Weekly Goal(01/31/2018): Continent 100%  Discharge Goal: Continent 100%    [RN] Bowel Management(Active)  Current Status(01/23/2018): Continent of bowel  Weekly Goal(02/01/2018): Continent 100% with regular movements every 1-3 days  Discharge Goal: Same as weekly    Performed Intervention(s)  Monitor intake and output  Fluid restriction    Signed by: Britt Haas RN

## 2018-01-24 NOTE — PLAN OF CARE
Problem: Patient Care Overview (Adult)  Goal: Plan of Care Review  Outcome: Ongoing (interventions implemented as appropriate)   01/24/18 0057   Coping/Psychosocial Response Interventions   Plan Of Care Reviewed With patient   Patient Care Overview   Progress improving   Outcome Evaluation   Outcome Summary/Follow up Plan Pt. doing fine tonight. c/o soreness to legs from today's therapy. Tylenol given per request. No other issues.      Goal: Adult Individualization and Mutuality  Outcome: Ongoing (interventions implemented as appropriate)    Goal: Discharge Needs Assessment  Outcome: Ongoing (interventions implemented as appropriate)      Problem: Pain, Chronic (Adult)  Goal: Acceptable Pain Control/Comfort Level  Outcome: Ongoing (interventions implemented as appropriate)      Problem: Skin Integrity Impairment, Risk/Actual (Adult)  Goal: Skin Integrity/Wound Healing  Outcome: Ongoing (interventions implemented as appropriate)      Problem: Mobility, Physical Impaired (Adult)  Goal: Enhanced Functionality Ability  Outcome: Ongoing (interventions implemented as appropriate)

## 2018-01-24 NOTE — PROGRESS NOTES
Inpatient Rehabilitation Functional Measures Assessment    Functional Measures  DANUTA Eating:  [place]  [facility] Grooming: [place]  [facility] Bathing:  [place]  [facility] Upper Body Dressing:  [place]  [facility] Lower Body Dressing:  [facility] Toileting:  [facility] Bladder Management  Level of Assistance:  Bladder Score = 5.  Patient is supervision/set-up for  bladder management, requiring: Stand by assistance. No assistive devices were  required.  Frequency/Number of Accidents this Shift:  Bladder accidents this shift:  0 .  Patient has not had an accident this shift.    DANUTA Bowel Management  Level of Assistance: Activity was not observed.  Frequency/Number of Accidents this Shift: Bowel accidents this shift: 0 .  Patient has not had an accident this shift.    DANUTA Bed/Chair/Wheelchair Transfer:  [facility] Toilet Transfer:  [facility] Tub/Shower Transfer:  [place]    Previously Documented Mode of Locomotion at Discharge: [place]  DANUTA Expected Mode of Locomotion at Discharge: [facility] Walk/Wheelchair:  [facility] Stairs:  [facility] Comprehension:  [facility] Expression:  [facility] Social Interaction:  [facility] Problem Solving:  [facility] Memory:  [place]    Therapy Mode Minutes  Occupational Therapy: Branch  Physical Therapy: Branch  Speech Language Pathology:  [place]    Signed by: DENNIS Bishop

## 2018-01-24 NOTE — PLAN OF CARE
Problem: Patient Care Overview (Adult)  Goal: Plan of Care Review  Outcome: Ongoing (interventions implemented as appropriate)   01/23/18 1959   Coping/Psychosocial Response Interventions   Plan Of Care Reviewed With patient   Patient Care Overview   Progress improving   Outcome Evaluation   Outcome Summary/Follow up Plan Pleasant and copperative. No issues reported with therapy today. No c/o pain.        Problem: Pain, Chronic (Adult)  Goal: Acceptable Pain Control/Comfort Level  Outcome: Ongoing (interventions implemented as appropriate)      Problem: Skin Integrity Impairment, Risk/Actual (Adult)  Goal: Skin Integrity/Wound Healing  Outcome: Ongoing (interventions implemented as appropriate)      Problem: Mobility, Physical Impaired (Adult)  Goal: Enhanced Functionality Ability  Outcome: Ongoing (interventions implemented as appropriate)

## 2018-01-25 LAB
GLUCOSE BLDC GLUCOMTR-MCNC: 173 MG/DL (ref 70–130)
GLUCOSE BLDC GLUCOMTR-MCNC: 209 MG/DL (ref 70–130)
GLUCOSE BLDC GLUCOMTR-MCNC: 234 MG/DL (ref 70–130)
GLUCOSE BLDC GLUCOMTR-MCNC: 282 MG/DL (ref 70–130)

## 2018-01-25 PROCEDURE — 82962 GLUCOSE BLOOD TEST: CPT

## 2018-01-25 PROCEDURE — 97110 THERAPEUTIC EXERCISES: CPT

## 2018-01-25 PROCEDURE — 97535 SELF CARE MNGMENT TRAINING: CPT

## 2018-01-25 PROCEDURE — 63710000001 INSULIN ASPART PER 5 UNITS: Performed by: HOSPITALIST

## 2018-01-25 PROCEDURE — 63710000001 INSULIN DETEMER PER 5 UNITS: Performed by: INTERNAL MEDICINE

## 2018-01-25 RX ADMIN — LOSARTAN POTASSIUM 50 MG: 50 TABLET, FILM COATED ORAL at 08:13

## 2018-01-25 RX ADMIN — FAMOTIDINE 20 MG: 20 TABLET, FILM COATED ORAL at 08:14

## 2018-01-25 RX ADMIN — INSULIN ASPART 8 UNITS: 100 INJECTION, SOLUTION INTRAVENOUS; SUBCUTANEOUS at 17:18

## 2018-01-25 RX ADMIN — ATORVASTATIN CALCIUM 10 MG: 10 TABLET, FILM COATED ORAL at 20:09

## 2018-01-25 RX ADMIN — FLUTICASONE PROPIONATE 1 SPRAY: 50 SPRAY, METERED NASAL at 08:14

## 2018-01-25 RX ADMIN — INSULIN ASPART 3 UNITS: 100 INJECTION, SOLUTION INTRAVENOUS; SUBCUTANEOUS at 08:13

## 2018-01-25 RX ADMIN — INSULIN ASPART 3 UNITS: 100 INJECTION, SOLUTION INTRAVENOUS; SUBCUTANEOUS at 11:47

## 2018-01-25 RX ADMIN — FAMOTIDINE 20 MG: 20 TABLET, FILM COATED ORAL at 20:09

## 2018-01-25 RX ADMIN — CETIRIZINE HYDROCHLORIDE 10 MG: 10 TABLET, FILM COATED ORAL at 08:32

## 2018-01-25 RX ADMIN — DULOXETINE HYDROCHLORIDE 60 MG: 60 CAPSULE, DELAYED RELEASE ORAL at 08:14

## 2018-01-25 RX ADMIN — VITAMIN D, TAB 1000IU (100/BT) 2000 UNITS: 25 TAB at 08:14

## 2018-01-25 RX ADMIN — CALCIUM CARBONATE-VITAMIN D TAB 500 MG-200 UNIT 1000 MG: 500-200 TAB at 08:14

## 2018-01-25 RX ADMIN — INSULIN DETEMIR 20 UNITS: 100 INJECTION, SOLUTION SUBCUTANEOUS at 08:13

## 2018-01-25 RX ADMIN — INSULIN ASPART 5 UNITS: 100 INJECTION, SOLUTION INTRAVENOUS; SUBCUTANEOUS at 21:00

## 2018-01-25 RX ADMIN — CYANOCOBALAMIN TAB 500 MCG 1000 MCG: 500 TAB at 08:13

## 2018-01-25 RX ADMIN — INSULIN DETEMIR 4 UNITS: 100 INJECTION, SOLUTION SUBCUTANEOUS at 10:02

## 2018-01-25 NOTE — PLAN OF CARE
Problem: Patient Care Overview (Adult)  Goal: Plan of Care Review  Outcome: Ongoing (interventions implemented as appropriate)   01/25/18 0249   Coping/Psychosocial Response Interventions   Plan Of Care Reviewed With patient   Patient Care Overview   Progress improving   Outcome Evaluation   Outcome Summary/Follow up Plan Patient pleasant and cooperative, no complaints of pain and no safety issues. Assist x1 with w/c. Blood sugar elevated- 322, 10 units of insulin given. She seemed to sleep well, no other issues at this time.        Problem: Pain, Chronic (Adult)  Goal: Acceptable Pain Control/Comfort Level  Outcome: Ongoing (interventions implemented as appropriate)   01/25/18 0249   Pain, Chronic (Adult)   Acceptable Pain Control/Comfort Level making progress toward outcome       Problem: Skin Integrity Impairment, Risk/Actual (Adult)  Goal: Skin Integrity/Wound Healing  Outcome: Ongoing (interventions implemented as appropriate)   01/25/18 0249   Skin Integrity Impairment, Risk/Actual (Adult)   Skin Integrity/Wound Healing making progress toward outcome       Problem: Mobility, Physical Impaired (Adult)  Goal: Enhanced Functionality Ability  Outcome: Ongoing (interventions implemented as appropriate)   01/25/18 0249   Mobility, Physical Impaired (Adult)   Enhanced Functionality Ability making progress toward outcome

## 2018-01-25 NOTE — PROGRESS NOTES
Inpatient Rehabilitation Functional Measures Assessment    Functional Measures  DANUTA Eating:  [facility] Grooming: Grooming Score = 7.  Patient is completely independent for  grooming.  There are no activity limitations.  DANUTA Bathing:  Patient bathed in shower. Bathing Score = 6.  Patient is modified  independent for bathing, requiring: Hand held shower. Grab bar/arm rest to  maintain balance.  DANUTA Upper Body Dressing:  Upper Body Dressing Score = 7 Patient is completely  independent for upper body dressing. There are no activity limitations.  DANUTA Lower Body Dressing:  Lower Body Dressing  Score = 6. Patient is modified  independent for lower body dressing, requiring: Safety considerations.  DANUTA Toileting:  Toileting Score = 6.  Patient is modified independent for  toileting, requiring: Safety considerations. Grab bar.    [facility] Bladder Management  Level of Assistance:  [place]  Frequency/Number of Accidents this Shift:  [facility] Bowel Management  Level of Assistance: [place]  Frequency/Number of Accidents this Shift: [facility] Bed/Chair/Wheelchair Transfer:  [facility] Toilet Transfer:  Toilet Transfer Score = 6.  Patient is modified  independent for transferring to and from the toilet/commode, requiring: Safety  considerations. Walker.  DANUTA Tub/Shower Transfer:  Shower Transfer Score = 6.  Patient is modified  independent for transferring to and from the shower, requiring: Safety  considerations. Walker. Grab bars. Shower chair    Previously Documented Mode of Locomotion at Discharge: [place]  DANUTA Expected Mode of Locomotion at Discharge: [facility] Walk/Wheelchair:  [facility] Stairs:  [facility] Comprehension:  [facility] Expression:  [facility] Social Interaction:  [facility] Problem Solving:  [facility] Memory:  [place]    Therapy Mode Minutes  Occupational Therapy: Individual: 90 minutes.  Physical Therapy: [place]  Speech Language Pathology:  [place]    Signed by: Veda Shukla OT

## 2018-01-25 NOTE — PROGRESS NOTES
Inpatient Rehabilitation Functional Measures Assessment    Functional Measures  DANUTA Eating:  [facility] Grooming: [facility] Bathing:  [facility] Upper Body Dressing:  [facility] Lower Body Dressing:  [facility] Toileting:  [facility] Bladder Management  Level of Assistance:  [place]  Frequency/Number of Accidents this Shift:  [facility] Bowel Management  Level of Assistance: [place]  Frequency/Number of Accidents this Shift: [facility] Bed/Chair/Wheelchair Transfer:  [facility] Toilet Transfer:  [facility] Tub/Shower Transfer:  [place]    Previously Documented Mode of Locomotion at Discharge: Field  DANUTA Expected Mode of Locomotion at Discharge: [facility] Walk/Wheelchair:  [facility] Stairs:  [facility] Comprehension:  Auditory comprehension is the usual mode. Comprehension  Score = 7, Independent.  Patient comprehends complex/abstract information in  their primary language.  Patient is completely independent for auditory  comprehension.  There are no activity limitations.  DANUTA Expression:  Vocal expression is the usual mode. Expression Score = 7,  Independent.  Patient expresses complex/abstract information in their primary  language.  Patient is completely independent for vocal expression.  There are no  activity limitations.  DANUTA Social Interaction:  Social Interaction Score = 7, Independent. Patient is  completely independent for social interaction.  There are no activity  limitations.  DANUTA Problem Solving:  Activity was not observed.  DANUTA Memory:  Memory Score = 6, Modified [place].  Patient is modified  independent for memory, requiring: Requires additional time.    Therapy Mode Minutes  Occupational Therapy: [place]  Physical Therapy: [place]  Speech Language Pathology:  [place]    Signed by: Britt Haas RN

## 2018-01-25 NOTE — PROGRESS NOTES
LOS: 7 days   Primary Care Physician: Roger Patel MD     Subjective   Feels good.  Anxious to start her therapy this morning.  Drank a Pepsi yesterday which made her sugar go up.    Vital Signs  Body mass index is 17.85 kg/(m^2).  Temp:  [98.2 °F (36.8 °C)-98.3 °F (36.8 °C)] 98.3 °F (36.8 °C)  Heart Rate:  [] 84  Resp:  [18-20] 20  BP: (125-136)/(61-75) 136/65      Objective:  General Appearance:  In no acute distress.    Vital signs: (most recent): Blood pressure 136/65, pulse 84, temperature 98.3 °F (36.8 °C), temperature source Oral, resp. rate 20, weight 50.2 kg (110 lb 9 oz), SpO2 96 %.    Lungs:  There are decreased breath sounds.  No wheezes, rales or rhonchi.    Heart: Normal rate.  Regular rhythm.  No murmur.   Abdomen: Abdomen is soft and non-distended.  Bowel sounds are normal.   There is no abdominal tenderness.   There is no splenomegaly. There is no hepatomegaly.   Extremities: There is dependent edema.  (Trace)  Neurological: Patient is alert.          Results Review:    I reviewed the patient's new clinical results.                Hemoglobin A1C:  Lab Results   Component Value Date    HGBA1C 9.39 (H) 01/09/2018       Glucose Range:  Glucose   Date/Time Value Ref Range Status   01/25/2018 0719 173 (H) 70 - 130 mg/dL Final   01/24/2018 2035 322 (H) 70 - 130 mg/dL Final   01/24/2018 1619 298 (H) 70 - 130 mg/dL Final   01/24/2018 1116 257 (H) 70 - 130 mg/dL Final   01/24/2018 1114 276 (H) 70 - 130 mg/dL Final   01/24/2018 0723 185 (H) 70 - 130 mg/dL Final       No results found for: [id number]    Lab Results   Component Value Date    TSH 2.510 01/15/2018       Assessment & Plan      Medication Review: Yes    Active Hospital Problems (** Indicates Principal Problem)    Diagnosis Date Noted   • Spinal stenosis [M48.00] 01/19/2018      Resolved Hospital Problems    Diagnosis Date Noted Date Resolved   No resolved problems to display.       Assessment/Plan  1.  Diabetes mellitus type 2  with hyperglycemia.  I increased Levemir doses today.  If patient is going home tomorrow, she can resume her previous home regimen of NovoLog, Tresiba, and Victoza.  Follow-up with her primary care provider.  2.  Hypertension.  Continue Cozaar  3.  Hyperlipidemia.  Continue statin    Plans for discharge tomorrow noted.  We will sign off.  Call if needed.    Melita Wooten MD  01/25/18  10:10 AM

## 2018-01-25 NOTE — PROGRESS NOTES
Inpatient Rehabilitation Functional Measures Assessment    Functional Measures  DNAUTA Eating:  [facility] Grooming: [facility] Bathing:  [facility] Upper Body Dressing:  [facility] Lower Body Dressing:  [facility] Toileting:  [facility] Bladder Management  Level of Assistance:  [place]  Frequency/Number of Accidents this Shift:  [facility] Bowel Management  Level of Assistance: [place]  Frequency/Number of Accidents this Shift: [facility] Bed/Chair/Wheelchair Transfer:  [facility] Toilet Transfer:  [facility] Tub/Shower Transfer:  [place]    Previously Documented Mode of Locomotion at Discharge: Field  DANUTA Expected Mode of Locomotion at Discharge: [facility] Walk/Wheelchair:  [facility] Stairs:  [facility] Comprehension:  Auditory comprehension is the usual mode. Comprehension  Score = 6, Modified [place].  Patient comprehends complex/abstract  information in their primary language, requiring:  DANUTA Expression:  Vocal expression is the usual mode. Expression Score = 6,  Modified Independent.  Patient expresses complex/abstract information in their  primary language, requiring:  DANUTA Social Interaction:  Social Interaction Score = 6, Modified Independent.  Patient is modified independent for social interaction, requiring:  DANUTA Problem Solving:  Problem Solving Score = 6, Modified [place].  Patient  makes appropriate decisions in order to solve complex problems, but requires  extra time.  DANUTA Memory:  Memory Score = 6, Modified [place].  Patient is modified  independent for memory, requiring:    Therapy Mode Minutes  Occupational Therapy: [place]  Physical Therapy: [place]  Speech Language Pathology:  [place]    Signed by: Shanthi Dan RN

## 2018-01-25 NOTE — PROGRESS NOTES
SECTION GG      Self Care Performance Discharge:   Oral Hygiene: Patient completed the activities by him/herself with no  assistance from a helper.   Toileting Hygiene: : Patient completed the activities by him/herself with no  assistance from a helper.   Shower/Bathe Self: Patient completed the activities by him/herself with no  assistance from a helper.   Upper Body Dressing: Patient completed the activities by him/herself with no  assistance from a helper.   Lower Body Dressing: Patient completed the activities by him/herself with no  assistance from a helper.   Putting On/Taking Off Footwear: Patient completed the activities by him/herself  with no assistance from a helper.    Mobility Toilet Transfer Discharge: Patient completed the activities by  him/herself with no assistance from a helper.    Signed by: Veda Shukla OT

## 2018-01-25 NOTE — THERAPY TREATMENT NOTE
Inpatient Rehabilitation - Physical Therapy Treatment Note  [facility]     Patient Name: Raquel Gonsales  : 1945  MRN: [id number]  Today's Date: 2018  Onset of Illness/Injury or Date of Surgery Date: 18  Date of Referral to PT: 18  Referring Physician: Charan    Admit Date: 2018    Visit Dx:  No diagnosis found.  Patient Active Problem List   Diagnosis   • Cervical spondylosis with radiculopathy   • Bilateral hip pain   • Trochanteric bursitis of both hips   • Status post bilateral total hip replacement   • S/P lumbar fusion   • Spinal stenosis of lumbar region with neurogenic claudication   • Lumbar spinal stenosis   • TIA (transient ischemic attack)   • Obesity   • Recurrent falls   • DM2 (diabetes mellitus, type 2)   • HTN (hypertension)   • COPD (chronic obstructive pulmonary disease)   • Decreased mobility   • Spinal stenosis               Adult Rehabilitation Note       18 0918 18 1505 18 0921    Rehab Assessment/Intervention    Discipline physical therapist  -LB occupational therapist  -AF physical therapist  -LB    Document Type therapy note (daily note)  -LB therapy note (daily note)  -AF therapy note (daily note)  -LB    Subjective Information agree to therapy  -LB agree to therapy;no complaints  -AF agree to therapy  -LB    Patient Effort, Rehab Treatment good  -LB good  -AF good  -LB    Precautions/Limitations brace on when up;spinal precautions  -LB brace on when up;fall precautions;spinal precautions  -AF fall precautions;brace on when up;spinal precautions  -LB    Recorded by [LB] Mitzy Turner, PT [AF] Veda Shukla OTR [LB] Mitzy Turner PT    Pain Assessment    Pain Assessment  No/denies pain  -AF 0-10  -LB    Pain Score   5  -LB    Post Pain Score   5  -LB    Pain Type   Chronic pain  -LB    Pain Location   Hip  -LB    Pain Orientation   Left;Right  -LB    Pain Intervention(s)   Ambulation/increased activity  -LB    Response to Interventions    tolerated  -LB    Recorded by  [AF] Veda Shukla, OTR [LB] Mitzy Turner PT    Cognitive Assessment/Intervention    Current Cognitive/Communication Assessment  functional  -AF     Orientation Status  oriented x 4  -AF     Follows Commands/Answers Questions  100% of the time  -AF     Personal Safety WNL/WFL  -LB WNL/WFL  -AF WNL/WFL  -LB    Personal Safety Interventions fall prevention program maintained;muscle strengthening facilitated;nonskid shoes/slippers when out of bed;gait belt  -LB fall prevention program maintained;gait belt;nonskid shoes/slippers when out of bed  -AF fall prevention program maintained;gait belt;muscle strengthening facilitated;nonskid shoes/slippers when out of bed  -LB    Recorded by [LB] Mitzy Turner PT [AF] Veda Shukla, OTR [LB] Mitzy Turner PT    Bed Mobility, Assessment/Treatment    Bed Mobility, Roll Left, [place] independent  -LB  verbal cues required;supervision required  -LB    Bed Mobility, Roll Right, [place] independent  -LB  supervision required;verbal cues required  -LB    Bed Mob, Supine to Sit, [place] independent  -LB  independent  -LB    Bed Mob, Sit to Supine, [place] independent  -LB  independent  -LB    Bed Mobility, Comment pt still is not able to lie down in sidelying, she can only lift L LE up onto the bed and then lie down; practiced times 3  -LB  Pt was educated to complete sidelying but pt states she cannot perform the activity without lying back onto her back and lifiting her left leg into the bed.  Pt told therapist that she crawls into bed forward but her daughter was present in the room in the PM and states that the patient no longer does that.  Educated on bed mobility   -LB    Recorded by [LB] Mitzy Turner PT  [LB] Mitzy Turner PT    Transfer Assessment/Treatment    Transfers, Bed-Chair [place] conditional independence  -LB  conditional independence  -LB    Transfers, Chair-Bed [place] conditional independence  -LB   conditional independence  -LB    Transfers, Bed-Chair-Bed, Assist Device rolling walker  -LB  rolling walker  -LB    Transfers, Sit-Stand [place] conditional independence  -LB conditional independence  -AF conditional independence  -LB    Transfers, Stand-Sit [place] conditional independence  -LB conditional independence  -AF conditional independence  -LB    Transfers, Sit-Stand-Sit, Assist Device rolling walker  -LB  rolling walker  -LB    Toilet Transfer, [place]   supervision required  -LB    Transfer, Comment car transfer with Rwx Min  -LB      Recorded by [LB] Mitzy Turner, PT [AF] Veda Shukla OTR [LB] Mitzy Turner, PT    Gait Assessment/Treatment    Gait, [place] Level conditional independence  -LB  stand by assist  -LB    Gait, Assistive Device rollator  -LB  rollator  -LB    Gait, Distance (Feet) 240   times 2  -LB  200   100; 80  -LB    Gait, Gait Deviations bilateral:;forward flexed posture;toe-to-floor clearance decreased  -LB      Gait, Impairments strength decreased  -LB      Recorded by [LB] Mitzy Turner, PT  [LB] Mitzy Turner, PT    Stairs Assessment/Treatment    Number of Stairs 4  -LB  4  -LB    Stairs, Handrail Location both sides  -LB  both sides  -LB    Stairs, [place] Level contact guard assist  -LB  contact guard assist  -LB    Stairs, Technique Used step to step (ascending);step to step (descending)  -LB  step to step (ascending);step to step (descending)  -LB    Stairs, Impairments   strength decreased  -LB    Stairs, Comment up/down curb with rollator and CGA; up/down ramp with rollator and CGA  -LB      Recorded by [LB] Mitzy Turner, PT  [LB] Mitzy Turner, PT    Balance Skills Training    Standing-Level of Assistance  Distant supervision  -AF     Static Standing Balance Support  No upper extremity supported  -AF     Standing-Balance Activities  --   FMC cognitive task  -AF     Standing Balance # of Minutes  4 mins x 2 sets  -AF     Gait Balance-Level of  Assistance Contact guard  -LB      Gait Balance Support assistive device  -LB      Gait Balance Activities uneven surface  -LB      Recorded by [LB] Mitzy Turner PT [AF] Veda Shukla OTR     Therapy Exercises    Bilateral Lower Extremities AROM:;20 reps;sitting;ankle pumps/circles;LAQ;hip flexion;hip abduction/adduction  -LB  AROM:;20 reps;sitting;ankle pumps/circles;LAQ;hip flexion;hip abduction/adduction  -LB    Bilateral Upper Extremity  AROM:;15 reps;sitting;elbow flexion/extension;hand pumps;pronation/supination;shoulder protraction/retraction   #1 hand weigt and dowel natalee, 3 sets  -AF     Recorded by [LB] Mitzy Turner PT [AF] Veda Shukla OTR [LB] Mitzy Turner PT    Functional Endurance    Detail (Functional Endurance)  standign arm bike 3 mins  -AF     Recorded by  [AF] Veda Shukla OTR     Orthosis Location    Orthosis, Neck/Back LSO (lumbar sacral orthosis)  -LB  LSO (lumbar sacral orthosis)  -LB    Recorded by [LB] Mitzy Turner PT  [LB] Mitzy Turner PT    Orthosis Management/Training    Orthosis Skills Training doffing orthosis;donning orthosis  -LB  donning orthosis;doffing orthosis  -LB    Orthosis Skills Training Comment pt performed indep  -LB  setup to don/doff brace  -LB    Orthosis Wear Schedule wear when out of bed only   encouraged to wear when up walking and when sitting in chair  -LB  wear when out of bed only  -LB    Recorded by [LB] Mitzy Turner PT  [LB] Mitzy Turner PT    Positioning and Restraints    Pre-Treatment Position in bed  -LB sitting in chair/recliner  -AF sitting in chair/recliner  -LB    Post Treatment Position wheelchair  -LB wheelchair  -AF wheelchair  -LB    In Wheelchair call light within reach;encouraged to call for assist  -LB sitting;call light within reach;encouraged to call for assist;notified nsg  -AF with OT  -LB    Recorded by [LB] Mitzy Turner PT [AF] Veda Shukla OTR [LB] Mitzy Turner PT      01/24/18 0908 01/23/18 1452 01/23/18 1356     Rehab Assessment/Intervention    Discipline occupational therapist  -AF occupational therapist  -AF physical therapist  -JK    Document Type therapy note (daily note)  -AF therapy note (daily note)  -AF therapy note (daily note)  -JK    Subjective Information agree to therapy;no complaints  -AF agree to therapy;no complaints  -AF agree to therapy;no complaints  -JK    Patient Effort, Rehab Treatment good  -AF good  -AF good  -JK    Patient Effort, Rehab Treatment Comment pt stated she feels like she is ready to go home  -AF      Symptoms Noted During/After Treatment   none  -JK    Precautions/Limitations fall precautions;spinal precautions;brace on when up  -AF brace on when up;fall precautions;spinal precautions  -AF brace on when up;fall precautions  -JK    Precautions/Limitations, Vision   WFL with corrective lenses  -JK    Precautions/Limitations, Hearing   WFL  -JK    Recorded by [AF] Veda Shukla, OTR [AF] Veda Shukla, OTR [JK] Terri Woo, HIRAM    Pain Assessment    Pain Assessment No/denies pain  -AF No/denies pain  -AF No/denies pain  -JK    Recorded by [AF] Veda Shukla, OTR [AF] Veda Shukla, OTR [JK] Terri Woo, HIRAM    Vision Assessment/Intervention    Visual Impairment   WFL with corrective lenses  -JK    Recorded by   [JK] Terri Woo, HIRAM    Cognitive Assessment/Intervention    Current Cognitive/Communication Assessment functional  -AF functional  -AF functional  -JK    Orientation Status oriented x 4  -AF oriented x 4  -AF oriented x 4  -JK    Follows Commands/Answers Questions 100% of the time  -% of the time  -% of the time;able to follow multi-step instructions  -JK    Personal Safety WNL/WFL  -AF WNL/WFL  -AF WNL/WFL  -JK    Personal Safety Interventions fall prevention program maintained;gait belt;nonskid shoes/slippers when out of bed  -AF fall prevention program maintained;gait belt;nonskid shoes/slippers when out of bed  -AF fall prevention program  maintained;gait belt  -JK    Recorded by [AF] Veda Shukla, OTR [AF] Veda Shukla, OTR [JK] Terri Woo, PT    Bed Mobility, Assessment/Treatment    Bed Mob, Supine to Sit, [place]  contact guard assist  -AF     Bed Mob, Sit to Supine, [place]  moderate assist (50% patient effort)  -AF     Bed Mobility, Comment   up in chair  -JK    Recorded by  [AF] Veda Shukla OTR [JK] Terri Woo, PT    Transfer Assessment/Treatment    Transfers, Bed-Chair [place]  contact guard assist;stand by assist  -AF     Transfers, Chair-Bed [place]  contact guard assist;stand by assist  -AF     Transfers, Sit-Stand [place] supervision required  -AF stand by assist  -AF supervision required  -JK    Transfers, Stand-Sit [place] supervision required  -AF stand by assist  -AF supervision required  -JK    Transfers, Sit-Stand-Sit, Assist Device   rolling walker  -JK    Toilet Transfer, [place] supervision required  -AF      Toilet Transfer, Assistive Device --   rollator  -AF      Walk-In Shower Transfer, [place]  contact guard assist  -AF     Walk-In Shower Transfer, Assist Device  --   rollator, tub transfer bench, grab bars  -AF     Transfer, Safety Issues   step length decreased;balance decreased during turns  -JK    Transfer, Impairments   strength decreased;impaired balance  -JK    Transfer, Comment   car tsf min assist for L foot with seat back reclined  -JK    Recorded by [AF] Veda Shukla, OTR [AF] Veda Shukla, OTR [JK] Terri Woo, PT    Gait Assessment/Treatment    Gait, [place] Level   stand by assist;contact guard assist  -JK    Gait, Assistive Device   rollator  -JK    Gait, Distance (Feet)   80   40' x 2  -JK    Gait, Gait Deviations   bilateral:;[place] decreased;forward flexed posture;step length decreased  -JK    Gait, Safety Issues   step length decreased  -JK    Gait, Impairments   strength decreased  -JK    Recorded by   [JK] Terri Woo, PT     Functional Mobility    Functional Mobility- Comment  walked from EOB to shower chair with rollator with SBA  -AF     Recorded by  [AF] CINDY Orosco     Upper Body Bathing Assessment/Training    UB Bathing Assess/Train Assistive Device  grab bars;hand-held shower head;shower chair with back  -AF     UB Bathing Assess/Train, Position sitting;sink side  -AF sitting  -AF     UB Bathing Assess/Train, [place] Level set up required  -AF supervision required  -AF     Recorded by [AF] Veda Shukla OTR [AF] CINDY Orosco     Lower Body Bathing Assessment/Training    LB Bathing Assess/Train Assistive Device  grab bars;hand-held shower head;long-handled sponge;shower chair with back  -AF     LB Bathing Assess/Train, Position sitting;sink side;standing  -AF sitting;standing  -AF     LB Bathing Assess/Train, [place] Level set up required;supervision required  -AF supervision required  -AF     Recorded by [AF] CINDY Orosco [AF] CINDY Orosco     Upper Body Dressing Assessment/Training    UB Dressing Assess/Train, Position sitting;sink side  -AF sitting  -AF     UB Dressing Assess/Train, [place] supervision required  -AF supervision required  -AF     UB Dressing Assess/Train, Comment able to don back brace I'ly  -AF      Recorded by [AF] CINDY Orosco [AF] CINDY Orosco     Lower Body Dressing Assessment/Training    LB Dressing Assess/Train, Position standing;sitting;sink side  -AF sitting;standing  -AF     LB Dressing Assess/Train, [place] supervision required  -AF moderate assist (50% patient effort)  -AF     LB Dressing Assess/Train, Comment sat on EOB and pt was able to don socks and shoes with elastic laces  -AF Assist with socks and shoes  -AF     Recorded by [AF] CINDY Orosco [AF] CINDY Orosco     Toileting Assessment/Training    Toileting Assess/Train, Assistive Device grab bars;raised toilet seat  -AF      Toileting Assess/Train,  Position sitting;standing  -AF      Toileting Assess/Train, [place] Level supervision required  -AF      Recorded by [AF] CINDY Orosco      Grooming Assessment/Training    Grooming Assess/Train, Position sitting;sink side  -AF sitting;sink side  -AF     Grooming Assess/Train, [place] Level independent  -AF supervision required  -AF     Grooming Assess/Train, Comment  sitting at sink on locked rollator like she does at home  -AF     Recorded by [AF] CINDY Orosco [AF] CINDY Orosco     Balance Skills Training    Sitting-Level of Assistance Independent  -AF Independent  -AF     Standing-Level of Assistance Distant supervision  -AF Close supervision  -AF     Recorded by [AF] CINDY Orosco [AF] CINDY Orosco     Therapy Exercises    Bilateral Upper Extremity  AROM:;15 reps;sitting;elbow flexion/extension;hand pumps;pronation/supination;shoulder protraction/retraction   #1 hand weights, 3 sets   -AF     Recorded by  [AF] CINDY Orosco     Functional Endurance    Detail (Functional Endurance)  stood for arm bike 2 mins  -AF     Recorded by  [AF] CINDY Orosco     Positioning and Restraints    Pre-Treatment Position sitting in chair/recliner  -AF in bed  -AF sitting in chair/recliner  -JK    Post Treatment Position wheelchair  -AF bed  -AF wheelchair  -JK    In Bed  supine;call light within reach;encouraged to call for assist;exit alarm on   in PM  -AF     In Wheelchair sitting;with PT  -AF sitting;with PT   in AM  -AF sitting;with OT;heels elevated  -JK    Recorded by [AF] CINDY Orosco [AF] CINDY Orosco [JK] Terri ASHER Woo, PT      01/23/18 0922          Rehab Assessment/Intervention    Discipline physical therapist  -LB      Document Type therapy note (daily note)  -LB      Subjective Information agree to therapy  -LB      Patient Effort, Rehab Treatment good  -LB      Precautions/Limitations brace on when up;spinal precautions;fall precautions   -LB      Recorded by [LB] Mitzy Turner PT      Transfer Assessment/Treatment    Transfers, Sit-Stand [place] supervision required  -LB      Transfers, Stand-Sit [place] supervision required  -LB      Transfers, Sit-Stand-Sit, Assist Device --   rollator  -LB      Recorded by [LB] Mitzy Turner PT      Gait Assessment/Treatment    Gait, [place] Level stand by assist;contact guard assist  -LB      Gait, Assistive Device rollator  -LB      Gait, Distance (Feet) 200  -LB      Gait, Gait Deviations bilateral:;decreased heel strike;forward flexed posture  -LB      Gait, Comment cues for upright posture  -LB      Recorded by [LB] Mitzy Turner PT      Stairs Assessment/Treatment    Number of Stairs 6   small 4 inch steps  -LB      Stairs, Handrail Location both sides  -LB      Stairs, [place] Level verbal cues required;contact guard assist  -LB      Stairs, Technique Used step to step (ascending);step to step (descending)  -LB      Recorded by [LB] Mitzy Turner PT      Therapy Exercises    Bilateral Lower Extremities AROM:;20 reps;sitting;ankle pumps/circles;LAQ;hip flexion;hip abduction/adduction  -LB      Recorded by [LB] Mitzy Turner PT      Orthosis Location    Orthosis, Neck/Back LSO (lumbar sacral orthosis)  -LB      Recorded by [LB] Mitzy Turner PT      Orthosis Management/Training    Orthosis Skills Training purpose/goals of orthosis;sleeping without orthosis  -LB      Orthosis Wear Schedule wear when out of bed only  -LB      Recorded by [LB] Mitzy Turner PT      Positioning and Restraints    Pre-Treatment Position sitting in chair/recliner  -LB      Recorded by [LB] Mitzy Turner PT        User Key  (r) = Recorded By, (t) = Taken By, (c) = Cosigned By    Initials Name Effective Dates    LB Mitzy Turner, PT 10/06/15 -     AF Veda Shukla, OTR 12/01/15 -     JAMAL Woo, PT 12/01/15 -                 IP PT Goals       01/19/18 1546 01/15/18 1620       Bed Mobility PT LTG    Bed  Mobility PT LTG, Date Established 01/19/18  -LB 01/15/18  -KH     Bed Mobility PT LTG, Time to Achieve 1 wk  -LB 1 wk  -KH     Bed Mobility PT LTG, Activity Type sidelying to sit/sit to sidelying;roll left/roll right  -LB all bed mobility  -KH     Bed Mobility PT LTG, [place] Level independent  -LB minimum assist (75% patient effort)  -KH     Transfer Training PT LTG    Transfer Training PT LTG, Date Established 01/19/18  -LB 01/15/18  -KH     Transfer Training PT LTG, Time to Achieve 1 wk  -LB 1 wk  -KH     Transfer Training PT LTG, Activity Type sit to stand/stand to sit  -LB all transfers  -KH     Transfer Training PT LTG, [place] Level conditional independence  -LB minimum assist (75% patient effort)  -KH     Transfer Training PT LTG, Assist Device walker, rolling  -LB walker, rolling  -KH     Transfer Training 2 PT LTG    Transfer Training PT 2 LTG, Time to Achieve 1 wk  -LB      Transfer Training PT 2 LTG, Activity Type --   car transfer  -LB      Transfer Training PT 2 LTG, [place] Level supervision required  -LB      Transfer Training PT 2 LTG, Assist Device walker, rolling  -LB      Gait Training PT LTG    Gait Training Goal PT LTG, Date Established 01/19/18  -LB 01/15/18  -KH     Gait Training Goal PT LTG, Time to Achieve 1 wk  -LB 1 wk  -KH     Gait Training Goal PT LTG, [place] Level conditional independence  -LB minimum assist (75% patient effort)  -KH     Gait Training Goal PT LTG, Assist Device walker, rolling  -LB walker, rolling  -KH     Gait Training Goal PT LTG, Distance to Achieve 200  -LB  50 ft  -KH     Stair Training PT LTG    Stair Training Goal PT LTG, Date Established 01/19/18  -LB      Stair Training Goal PT LTG, Time to Achieve 1 wk  -LB      Stair Training Goal PT LTG, Number of Steps 4  -LB      Stair Training Goal PT LTG, [place] Level contact guard assist  -LB      Stair Training Goal PT LTG, Assist Device 2 handrails  -LB      Patient Education PT LTG     Patient Education PT LTG, Date Established 01/19/18  -LB      Patient Education PT LTG, Time to Achieve 1 wk  -LB      Patient Education PT LTG, Education Type elva/doff brace  -LB      Patient Education PT LTG, Education Understanding demonstrate adequately  -LB        User Key  (r) = Recorded By, (t) = Taken By, (c) = Cosigned By    Initials Name Provider Type    OTTO Garvey, PT Physical Therapist    PAYTON Turner, PT Physical Therapist          Physical Therapy Education     Title: PT OT SLP Therapies (Done)     Topic: Physical Therapy (Done)     Point: Mobility training (Done)    Learning Progress Summary    Learner Readiness Method Response Comment Documented by Status   Patient Acceptance E VU,NR steps LB 01/23/18 0941 Done    Acceptance E VU sit to stand LB 01/22/18 0925 Done    Acceptance E VU,NR  MG 01/21/18 0932 Done    Acceptance E VU,NR  LB 01/20/18 1146 Done    Acceptance E VU,NR  LB 01/19/18 1231 Done    Acceptance E VU  MM 01/19/18 0029 Done               Point: Home exercise program (Done)    Learning Progress Summary    Learner Readiness Method Response Comment Documented by Status   Patient Acceptance E VU,NR  MG 01/21/18 0932 Done    Acceptance E VU,NR  LB 01/20/18 1146 Done    Acceptance E VU  MM 01/19/18 0029 Done               Point: Body mechanics (Done)    Learning Progress Summary    Learner Readiness Method Response Comment Documented by Status   Patient Acceptance E VU  LB 01/25/18 0959 Done    Acceptance E VU,Bed IU Pt states she understands but cannnot perform bed mobility as instructed LB 01/24/18 1515 Done    Acceptance E VU,NR  MG 01/21/18 0932 Done    Acceptance E VU  MM 01/19/18 0029 Done               Point: Precautions (Done)    Learning Progress Summary    Learner Readiness Method Response Comment Documented by Status   Patient Acceptance E VU  LB 01/25/18 0959 Done    Acceptance E VU,Bed IU Pt states she understands but cannnot perform bed mobility as instructed  LB 01/24/18 1515 Done    Acceptance E VU,DU don/doff brace LB 01/24/18 0924 Done    Acceptance E VU,NR  MG 01/21/18 0932 Done    Acceptance E VU   01/19/18 0029 Done                      User Key     Initials Effective Dates Name Provider Type Discipline    LB 10/06/15 -  Mitzy Turner, PT Physical Therapist PT    MG 09/13/17 -  Megan Gosselin, PT Physical Therapist PT    MM 10/30/17 -  Nano Lama RN Registered Nurse Nurse                    PT Recommendation and Plan  Anticipated Discharge Disposition: home with home health  Planned Therapy Interventions: bed mobility training, gait training, home exercise program, strengthening, stair training, transfer training  PT Frequency: 2 times/day  Plan of Care Review  Plan Of Care Reviewed With: patient  Outcome Summary/Follow up Plan: In PT, pt did well with bed mobility , transfers and gait with use of rollator.  Pt needs vc for back precautions and assist for donning/doffing brace.         Time Calculation:         PT Charges       01/25/18 1501 01/25/18 0959       Time Calculation    Start Time 1330  -LB 0900  -LB     Stop Time 1400  -LB 1000  -LB     Time Calculation (min) 30 min  -LB 60 min  -LB     PT Received On 01/25/18  -LB 01/25/18  -LB     PT - Next Appointment 01/26/18  -LB 01/25/18  -LB       User Key  (r) = Recorded By, (t) = Taken By, (c) = Cosigned By    Initials Name Provider Type    LB Mitzy Turner PT Physical Therapist          Therapy Charges for Today     Code Description Service Date Service Provider Modifiers Qty    58730718043 HC PT THER PROC EA 15 MIN 1/24/2018 Mitzy Turner PT GP 6    [id number] HC PT THER PROC EA 15 MIN 1/25/2018 Mitzy Turner PT GP 6               Mitzy Turner PT  1/25/2018

## 2018-01-25 NOTE — THERAPY DISCHARGE NOTE
Inpatient Rehabilitation - Occupational Therapy Treatment Note/Discharge  [facility]     Patient Name: Raquel Gonsales  : 1945  MRN: [id number]  Today's Date: 2018  Onset of Illness/Injury or Date of Surgery Date: 18  Date of Referral to OT: 18  Referring Physician: Charan      Admit Date: 2018    Visit Dx:   No diagnosis found.  Patient Active Problem List   Diagnosis   • Cervical spondylosis with radiculopathy   • Bilateral hip pain   • Trochanteric bursitis of both hips   • Status post bilateral total hip replacement   • S/P lumbar fusion   • Spinal stenosis of lumbar region with neurogenic claudication   • Lumbar spinal stenosis   • TIA (transient ischemic attack)   • Obesity   • Recurrent falls   • DM2 (diabetes mellitus, type 2)   • HTN (hypertension)   • COPD (chronic obstructive pulmonary disease)   • Decreased mobility   • Spinal stenosis             Adult Rehabilitation Note       18 1537 18 0918 18 1505    Rehab Assessment/Intervention    Discipline occupational therapist  -AF physical therapist  -LB occupational therapist  -AF    Document Type discharge summary;therapy note (daily note)  -AF therapy note (daily note)  -LB therapy note (daily note)  -AF    Subjective Information agree to therapy;no complaints  -AF agree to therapy  -LB agree to therapy;no complaints  -AF    Patient Effort, Rehab Treatment good  -AF good  -LB good  -AF    Precautions/Limitations brace on when up;spinal precautions  -AF brace on when up;spinal precautions  -LB brace on when up;fall precautions;spinal precautions  -AF    Patient Response to Treatment Pt is independent in her room today  -AF      Recorded by [AF] CINDY Orosco [LB] Mitzy Turner PT [AF] CINDY Orosco    Pain Assessment    Pain Assessment No/denies pain  -AF  No/denies pain  -AF    Recorded by [AF] CINDY Orosco  [AF] CINDY Orosco    Cognitive Assessment/Intervention    Current  Cognitive/Communication Assessment functional  -AF  functional  -AF    Orientation Status oriented x 4  -AF  oriented x 4  -AF    Follows Commands/Answers Questions 100% of the time  -AF  100% of the time  -AF    Personal Safety WNL/WFL  -AF WNL/WFL  -LB WNL/WFL  -AF    Personal Safety Interventions fall prevention program maintained;gait belt;nonskid shoes/slippers when out of bed  -AF fall prevention program maintained;muscle strengthening facilitated;nonskid shoes/slippers when out of bed;gait belt  -LB fall prevention program maintained;gait belt;nonskid shoes/slippers when out of bed  -AF    Recorded by [AF] Veda Shukla, OTR [LB] Mitzy Turner, PT [AF] Veda Shukla, OTR    ROM (Range of Motion)    General ROM Detail BUE WFL, arthritis in shoulders/wrists  -AF      Recorded by [AF] Veda Shukla OTR      MMT (Manual Muscle Testing)    General MMT Assessment Detail  R:21, L:30, lateral pinch R:7, L:8, 3 point pinch R:7, L:10  -AF      Recorded by [AF] CINDY Orosco      Bed Mobility, Assessment/Treatment    Bed Mobility, Roll Left, [place]  independent  -LB     Bed Mobility, Roll Right, [place]  independent  -LB     Bed Mob, Supine to Sit, [place] independent  -AF independent  -LB     Bed Mob, Sit to Supine, [place] independent  -AF independent  -LB     Bed Mobility, Comment  pt still is not able to lie down in sidelying, she can only lift L LE up onto the bed and then lie down; practiced times 3  -LB     Recorded by [AF] Veda Shukla, OTR [LB] Mitzy Turner, PT     Transfer Assessment/Treatment    Transfers, Bed-Chair [place] conditional independence  -AF conditional independence  -LB     Transfers, Chair-Bed [place] conditional independence  -AF conditional independence  -LB     Transfers, Bed-Chair-Bed, Assist Device  rolling walker  -LB     Transfers, Sit-Stand [place] conditional independence  -AF conditional independence  -LB conditional  independence  -AF    Transfers, Stand-Sit [place] conditional independence  -AF conditional independence  -LB conditional independence  -AF    Transfers, Sit-Stand-Sit, Assist Device  rolling walker  -LB     Toilet Transfer, [place] conditional independence  -AF      Toilet Transfer, Assistive Device --   rollator  -AF      Walk-In Shower Transfer, [place] conditional independence  -AF      Walk-In Shower Transfer, Assist Device standard shower chair   grab bars, rollator  -AF      Transfer, Comment  car transfer with Rwx Min  -LB     Recorded by [AF] Veda Shukla, OTR [LB] Mitzy Turner, PT [AF] Veda Shukla, ESTELLAR    Gait Assessment/Treatment    Gait, [place] Level  conditional independence  -LB     Gait, Assistive Device  rollator  -LB     Gait, Distance (Feet)  240   times 2  -LB     Gait, Gait Deviations  bilateral:;forward flexed posture;toe-to-floor clearance decreased  -LB     Gait, Impairments  strength decreased  -LB     Recorded by  [LB] Mitzy Turner PT     Stairs Assessment/Treatment    Number of Stairs  4  -LB     Stairs, Handrail Location  both sides  -LB     Stairs, [place] Level  contact guard assist  -LB     Stairs, Technique Used  step to step (ascending);step to step (descending)  -LB     Stairs, Comment  up/down curb with rollator and CGA; up/down ramp with rollator and CGA  -LB     Recorded by  [LB] Mitzy Turner, PT     Functional Mobility    Functional Mobility- Comment with rollator in room MOD I  -AF      Recorded by [AF] Veda Shukla OTR      Upper Body Bathing Assessment/Training    UB Bathing Assess/Train Assistive Device grab bars;hand-held shower head;shower chair with back  -AF      UB Bathing Assess/Train, Position sitting  -AF      UB Bathing Assess/Train, [place] Level conditional independence  -AF      Recorded by [AF] Veda Shukla OTR      Lower Body Bathing Assessment/Training    LB Bathing Assess/Train Assistive Device grab  bars;hand-held shower head;shower chair with back;long-handled sponge  -AF      LB Bathing Assess/Train, Position sitting;standing  -AF      LB Bathing Assess/Train, [place] Level conditional independence  -AF      Recorded by [AF] Veda Shukla OTR      Upper Body Dressing Assessment/Training    UB Dressing Assess/Train, Position sitting  -AF      UB Dressing Assess/Train, [place] independent  -AF      Recorded by [AF] Veda Shukla OTR      Lower Body Dressing Assessment/Training    LB Dressing Assess/Train, Assist Device elastic laces;reacher  -AF      LB Dressing Assess/Train, Position sitting;standing  -AF      LB Dressing Assess/Train, [place] conditional independence  -AF      Recorded by [AF] Veda Shukla OTR      Toileting Assessment/Training    Toileting Assess/Train, Assistive Device grab bars;raised toilet seat  -AF      Toileting Assess/Train, Position sitting;standing  -AF      Toileting Assess/Train, [place] Level conditional independence  -AF      Recorded by [AF] CINDY Orosco      Grooming Assessment/Training    Grooming Assess/Train, Position sitting;sink side  -AF      Grooming Assess/Train, [place] Level conditional independence;independent  -AF      Recorded by [AF] CINDY Orosco      Balance Skills Training    Sitting-Level of Assistance Independent  -AF      Standing-Level of Assistance Independent  -AF  Distant supervision  -AF    Static Standing Balance Support   No upper extremity supported  -AF    Standing-Balance Activities   --   FMC cognitive task  -AF    Standing Balance # of Minutes   4 mins x 2 sets  -AF    Gait Balance-Level of Assistance  Contact guard  -LB     Gait Balance Support  assistive device  -LB     Gait Balance Activities  uneven surface  -LB     Recorded by [AF] CINDY Orosco [LB] Mitzy Turner, PT [AF] Veda Shukla OTR    Therapy Exercises    Bilateral Lower Extremities  AROM:;20 reps;sitting;ankle  pumps/circles;LAQ;hip flexion;hip abduction/adduction  -LB     Bilateral Upper Extremity AROM:;15 reps;sitting;elbow flexion/extension;pronation/supination;shoulder protraction/retraction   #1 hand weight, 3 sets  -AF  AROM:;15 reps;sitting;elbow flexion/extension;hand pumps;pronation/supination;shoulder protraction/retraction   #1 hand weigt and dowel natalee, 3 sets  -AF    Recorded by [AF] CINDY Orosco [LB] Mitzy Turner PT [AF] Veda Shukla, ESTELLAR    Functional Endurance    Detail (Functional Endurance) arm bike standing 3 mins   -AF  standign arm bike 3 mins  -AF    Recorded by [AF] Veda Shukla OTR  [AF] Veda Shukla OTR    Orthosis Location    Orthosis, Neck/Back  LSO (lumbar sacral orthosis)  -LB     Recorded by  [LB] Mitzy Turner PT     Orthosis Management/Training    Orthosis Skills Training  doffing orthosis;donning orthosis  -LB     Orthosis Skills Training Comment  pt performed indep  -LB     Orthosis Wear Schedule  wear when out of bed only   encouraged to wear when up walking and when sitting in chair  -LB     Recorded by  [LB] Mitzy Turner PT     Positioning and Restraints    Pre-Treatment Position in bed  -AF in bed  -LB sitting in chair/recliner  -AF    Post Treatment Position wheelchair  -AF wheelchair  -LB wheelchair  -AF    In Wheelchair sitting;call light within reach;encouraged to call for assist;with PT   with PT in am, in room in PM  -AF call light within reach;encouraged to call for assist  -LB sitting;call light within reach;encouraged to call for assist;notified nsg  -AF    Recorded by [AF] CINDY Orosco [LB] Mitzy Turner, PT [AF] Veda Shukla OTR      01/24/18 0921 01/24/18 0908 01/23/18 1452    Rehab Assessment/Intervention    Discipline physical therapist  -LB occupational therapist  -AF occupational therapist  -AF    Document Type therapy note (daily note)  -LB therapy note (daily note)  -AF therapy note (daily note)  -AF    Subjective Information agree  to therapy  -LB agree to therapy;no complaints  -AF agree to therapy;no complaints  -AF    Patient Effort, Rehab Treatment good  -LB good  -AF good  -AF    Patient Effort, Rehab Treatment Comment  pt stated she feels like she is ready to go home  -AF     Precautions/Limitations fall precautions;brace on when up;spinal precautions  -LB fall precautions;spinal precautions;brace on when up  -AF brace on when up;fall precautions;spinal precautions  -AF    Recorded by [LB] Mitzy Turner, PT [AF] Veda Shukla OTR [AF] CINDY Orosco    Pain Assessment    Pain Assessment 0-10  -LB No/denies pain  -AF No/denies pain  -AF    Pain Score 5  -LB      Post Pain Score 5  -LB      Pain Type Chronic pain  -LB      Pain Location Hip  -LB      Pain Orientation Left;Right  -LB      Pain Intervention(s) Ambulation/increased activity  -LB      Response to Interventions tolerated  -LB      Recorded by [LB] Mitzy Turner PT [AF] Veda Shukla OTR [AF] CINDY Orosco    Cognitive Assessment/Intervention    Current Cognitive/Communication Assessment  functional  -AF functional  -AF    Orientation Status  oriented x 4  -AF oriented x 4  -AF    Follows Commands/Answers Questions  100% of the time  -% of the time  -AF    Personal Safety WNL/WFL  -LB WNL/WFL  -AF WNL/WFL  -AF    Personal Safety Interventions fall prevention program maintained;gait belt;muscle strengthening facilitated;nonskid shoes/slippers when out of bed  -LB fall prevention program maintained;gait belt;nonskid shoes/slippers when out of bed  -AF fall prevention program maintained;gait belt;nonskid shoes/slippers when out of bed  -AF    Recorded by [LB] Mitzy Turner, PT [AF] Veda Shukla OTR [AF] CINDY Orosco    Bed Mobility, Assessment/Treatment    Bed Mobility, Roll Left, [place] verbal cues required;supervision required  -LB      Bed Mobility, Roll Right, [place] supervision required;verbal cues required  -LB      Bed  Mob, Supine to Sit, [place] independent  -LB  contact guard assist  -AF    Bed Mob, Sit to Supine, [place] independent  -LB  moderate assist (50% patient effort)  -AF    Bed Mobility, Comment Pt was educated to complete sidelying but pt states she cannot perform the activity without lying back onto her back and lifiting her left leg into the bed.  Pt told therapist that she crawls into bed forward but her daughter was present in the room in the PM and states that the patient no longer does that.  Educated on bed mobility   -LB      Recorded by [LB] Mitzy Turner, PT  [AF] Veda Shukla, OTR    Transfer Assessment/Treatment    Transfers, Bed-Chair [place] conditional independence  -LB  contact guard assist;stand by assist  -AF    Transfers, Chair-Bed [place] conditional independence  -LB  contact guard assist;stand by assist  -AF    Transfers, Bed-Chair-Bed, Assist Device rolling walker  -LB      Transfers, Sit-Stand [place] conditional independence  -LB supervision required  -AF stand by assist  -AF    Transfers, Stand-Sit [place] conditional independence  -LB supervision required  -AF stand by assist  -AF    Transfers, Sit-Stand-Sit, Assist Device rolling walker  -LB      Toilet Transfer, [place] supervision required  -LB supervision required  -AF     Toilet Transfer, Assistive Device  --   rollator  -AF     Walk-In Shower Transfer, [place]   contact guard assist  -AF    Walk-In Shower Transfer, Assist Device   --   rollator, tub transfer bench, grab bars  -AF    Recorded by [LB] Mitzy Turner, PT [AF] Veda Shukla, OTR [AF] Veda Shukla, OTR    Gait Assessment/Treatment    Gait, [place] Level stand by assist  -LB      Gait, Assistive Device rollator  -LB      Gait, Distance (Feet) 200   100; 80  -LB      Recorded by [LB] Mitzy Turner, PT      Stairs Assessment/Treatment    Number of Stairs 4  -LB      Stairs, Handrail Location both sides  -LB      Stairs,  [place] Level contact guard assist  -LB      Stairs, Technique Used step to step (ascending);step to step (descending)  -LB      Stairs, Impairments strength decreased  -LB      Recorded by [LB] Mitzy Turner, PT      Functional Mobility    Functional Mobility- Comment   walked from EOB to shower chair with rollator with SBA  -AF    Recorded by   [AF] CINDY Orosco    Upper Body Bathing Assessment/Training    UB Bathing Assess/Train Assistive Device   grab bars;hand-held shower head;shower chair with back  -AF    UB Bathing Assess/Train, Position  sitting;sink side  -AF sitting  -AF    UB Bathing Assess/Train, [place] Level  set up required  -AF supervision required  -AF    Recorded by  [AF] Veda Shukla OTR [AF] CINDY Orosco    Lower Body Bathing Assessment/Training    LB Bathing Assess/Train Assistive Device   grab bars;hand-held shower head;long-handled sponge;shower chair with back  -AF    LB Bathing Assess/Train, Position  sitting;sink side;standing  -AF sitting;standing  -AF    LB Bathing Assess/Train, [place] Level  set up required;supervision required  -AF supervision required  -AF    Recorded by  [AF] CINDY Orosco [AF] CINDY Orosco    Upper Body Dressing Assessment/Training    UB Dressing Assess/Train, Position  sitting;sink side  -AF sitting  -AF    UB Dressing Assess/Train, [place]  supervision required  -AF supervision required  -AF    UB Dressing Assess/Train, Comment  able to don back brace I'ly  -AF     Recorded by  [AF] CINDY Orosco [AF] CINDY Orosco    Lower Body Dressing Assessment/Training    LB Dressing Assess/Train, Position  standing;sitting;sink side  -AF sitting;standing  -AF    LB Dressing Assess/Train, [place]  supervision required  -AF moderate assist (50% patient effort)  -AF    LB Dressing Assess/Train, Comment  sat on EOB and pt was able to don socks and shoes with elastic laces  -AF Assist with socks and  shoes  -AF    Recorded by  [AF] CINDY Orosco [AF] CINDY Orosco    Toileting Assessment/Training    Toileting Assess/Train, Assistive Device  grab bars;raised toilet seat  -AF     Toileting Assess/Train, Position  sitting;standing  -AF     Toileting Assess/Train, [place] Level  supervision required  -AF     Recorded by  [AF] CINDY Orosco     Grooming Assessment/Training    Grooming Assess/Train, Position  sitting;sink side  -AF sitting;sink side  -AF    Grooming Assess/Train, [place] Level  independent  -AF supervision required  -AF    Grooming Assess/Train, Comment   sitting at sink on locked rollator like she does at home  -AF    Recorded by  [AF] CINDY Orosco [AF] CINDY Orosco    Balance Skills Training    Sitting-Level of Assistance  Independent  -AF Independent  -AF    Standing-Level of Assistance  Distant supervision  -AF Close supervision  -AF    Recorded by  [AF] CINDY Orosco [AF] CINDY Orosco    Therapy Exercises    Bilateral Lower Extremities AROM:;20 reps;sitting;ankle pumps/circles;LAQ;hip flexion;hip abduction/adduction  -LB      Bilateral Upper Extremity   AROM:;15 reps;sitting;elbow flexion/extension;hand pumps;pronation/supination;shoulder protraction/retraction   #1 hand weights, 3 sets   -AF    Recorded by [LB] Mitzy Turner PT  [AF] CINDY Orosco    Functional Endurance    Detail (Functional Endurance)   stood for arm bike 2 mins  -AF    Recorded by   [AF] CINDY Orosco    Orthosis Location    Orthosis, Neck/Back LSO (lumbar sacral orthosis)  -LB      Recorded by [LB] Mitzy Turner PT      Orthosis Management/Training    Orthosis Skills Training donning orthosis;doffing orthosis  -LB      Orthosis Skills Training Comment setup to don/doff brace  -LB      Orthosis Wear Schedule wear when out of bed only  -LB      Recorded by [LB] Mitzy Turner PT      Positioning and Restraints    Pre-Treatment Position sitting in  chair/recliner  -LB sitting in chair/recliner  -AF in bed  -AF    Post Treatment Position wheelchair  -LB wheelchair  -AF bed  -AF    In Bed   supine;call light within reach;encouraged to call for assist;exit alarm on   in PM  -AF    In Wheelchair with OT  -LB sitting;with PT  -AF sitting;with PT   in AM  -AF    Recorded by [LB] Mitzy Turner, PT [AF] Veda Shukla, OTR [AF] Veda Shukla, OTR      01/23/18 1353 01/23/18 0922       Rehab Assessment/Intervention    Discipline physical therapist  -JK physical therapist  -LB     Document Type therapy note (daily note)  -JK therapy note (daily note)  -LB     Subjective Information agree to therapy;no complaints  -JK agree to therapy  -LB     Patient Effort, Rehab Treatment good  -JK good  -LB     Symptoms Noted During/After Treatment none  -JK      Precautions/Limitations brace on when up;fall precautions  -JK brace on when up;spinal precautions;fall precautions  -LB     Precautions/Limitations, Vision WFL with corrective lenses  -JK      Precautions/Limitations, Hearing WFL  -JK      Recorded by [JK] Terri Woo, PT [LB] Mitzy Turner, PT     Pain Assessment    Pain Assessment No/denies pain  -JK      Recorded by [JK] Terri Woo PT      Vision Assessment/Intervention    Visual Impairment WFL with corrective lenses  -JK      Recorded by [JK] Terri Woo PT      Cognitive Assessment/Intervention    Current Cognitive/Communication Assessment functional  -JK      Orientation Status oriented x 4  -JK      Follows Commands/Answers Questions 100% of the time;able to follow multi-step instructions  -JK      Personal Safety WNL/WFL  -JK      Personal Safety Interventions fall prevention program maintained;gait belt  -JK      Recorded by [JK] Terri Woo, PT      Bed Mobility, Assessment/Treatment    Bed Mobility, Comment up in chair  -JK      Recorded by [JK] Terri Woo PT      Transfer Assessment/Treatment    Transfers, Sit-Stand [place]  supervision required  -JK supervision required  -LB     Transfers, Stand-Sit [place] supervision required  -JK supervision required  -LB     Transfers, Sit-Stand-Sit, Assist Device rolling walker  -JK --   rollator  -LB     Transfer, Safety Issues step length decreased;balance decreased during turns  -JK      Transfer, Impairments strength decreased;impaired balance  -JK      Transfer, Comment car tsf min assist for L foot with seat back reclined  -JK      Recorded by [JK] Terri Woo PT [LB] Mitzy Turner PT     Gait Assessment/Treatment    Gait, [place] Level stand by assist;contact guard assist  -JK stand by assist;contact guard assist  -LB     Gait, Assistive Device rollator  -JK rollator  -LB     Gait, Distance (Feet) 80   40' x 2  -  -LB     Gait, Gait Deviations bilateral:;[place] decreased;forward flexed posture;step length decreased  -JK bilateral:;decreased heel strike;forward flexed posture  -LB     Gait, Safety Issues step length decreased  -JK      Gait, Impairments strength decreased  -JK      Gait, Comment  cues for upright posture  -LB     Recorded by [JK] Terri Woo PT [LB] Mitzy Turner PT     Stairs Assessment/Treatment    Number of Stairs  6   small 4 inch steps  -LB     Stairs, Handrail Location  both sides  -LB     Stairs, [place] Level  verbal cues required;contact guard assist  -LB     Stairs, Technique Used  step to step (ascending);step to step (descending)  -LB     Recorded by  [LB] Mitzy Turner PT     Therapy Exercises    Bilateral Lower Extremities  AROM:;20 reps;sitting;ankle pumps/circles;LAQ;hip flexion;hip abduction/adduction  -LB     Recorded by  [LB] Mitzy Turner PT     Orthosis Location    Orthosis, Neck/Back  LSO (lumbar sacral orthosis)  -LB     Recorded by  [LB] Mitzy Turner PT     Orthosis Management/Training    Orthosis Skills Training  purpose/goals of orthosis;sleeping without orthosis  -LB     Orthosis Wear Schedule  wear when out of bed  only  -LB     Recorded by  [LB] Mitzy Turner, PT     Positioning and Restraints    Pre-Treatment Position sitting in chair/recliner  -JK sitting in chair/recliner  -LB     Post Treatment Position wheelchair  -JK      In Wheelchair sitting;with OT;heels elevated  -JK      Recorded by [JK] Terri Woo, PT [LB] Mitzy Turner, PT       User Key  (r) = Recorded By, (t) = Taken By, (c) = Cosigned By    Initials Name Effective Dates    LB Mitzy Turner, PT 10/06/15 -     AF Veda Marivel Shukla, OTR 12/01/15 -     JK Terri Woo, PT 12/01/15 -                 OT Goals       01/19/18 1530 01/16/18 1309 01/16/18 1219    Transfer Training OT STG    Transfer Training OT STG, Date Established 01/19/18  -AF      Transfer Training OT STG, Time to Achieve 1 wk  -AF      Transfer Training OT STG, Activity Type toilet  -AF      Transfer Training OT STG, [place] Level contact guard assist;verbal cues required  -AF      Transfer Training OT STG, Assist Device walker, rolling  -AF      Transfer Training OT STG, Additional Goal grab bars  -AF      Transfer Training OT STG, Outcome goal ongoing  -AF      Transfer Training OT LTG    Transfer Training OT LTG, Date Established 01/19/18  -AF  01/16/18  -HC    Transfer Training OT LTG, Time to Achieve by discharge  -AF  1 wk  -HC    Transfer Training OT LTG, Activity Type toilet  -AF  bed to chair /chair to bed;sit to stand/stand to sit;toilet  -HC    Transfer Training OT LTG, [place] Level supervision required;conditional independence  -AF  supervision required  -HC    Transfer Training OT LTG, Assist Device walker, rolling  -AF  walker, rolling  -HC    Transfer Training OT LTG, Additional Goal grab bars  -AF      Transfer Training OT LTG, Outcome goal ongoing  -AF  goal revised  -HC    Transfer Training 2 OT STG    Transfer Training 2 OT STG, Date Established 01/19/18  -AF      Transfer Training 2 OT STG, Time to Achieve 1 wk  -AF      Transfer Training 2 OT STG, Activity Type  shower chair;walk-in shower  -AF      Transfer Training 2 OT STG, [place] Level contact guard assist;verbal cues required  -AF      Transfer Training 2 OT STG, Assist Device walker, rolling;shower chair  -AF      Transfer Training 2 OT STG, Outcome goal ongoing  -AF      Transfer Training 2 OT LTG    Transfer Training 2 OT LTG, Date Established 01/19/18  -AF      Transfer Training 2 OT LTG, Time to Achieve by discharge  -AF      Transfer Training 2 OT LTG, Activity Type shower chair;walk-in shower  -AF      Transfer Training 2 OT LTG, [place] Level supervision required  -AF      Transfer Training 2 OT LTG, Assist Device walker, rolling;shower chair  -AF      Transfer Training 2 OT LTG, Outcome goal ongoing  -AF      Strength OT LTG    Strength Goal OT LTG, Date Established   01/16/18  -HC    Strength Goal OT LTG, Time to Achieve   1 wk  -HC    Strength Goal OT LTG, Outcome   goal revised  -HC    Patient Education OT STG    Patient Education OT STG, Date Established 01/19/18  -AF      Patient Education OT STG, Time to Achieve 1 wk  -AF      Patient Education OT STG, Education Type precautions per surgeon;brace use/care  -AF      Patient Education OT STG, Education Understanding demonstrates adequately  -AF      Patient Education OT STG, Additional Goal with ADLs  -AF      Patient Education OT STG Outcome goal ongoing  -AF      Patient Education OT LTG    Patient Education OT LTG, Date Established 01/19/18  -AF 01/16/18  -HC     Patient Education OT LTG, Time to Achieve by discharge  -AF 1 wk  -HC     Patient Education OT LTG, Education Type HEP;precautions per surgeon;brace use/care;home safety;adaptive equipment mgmt  -AF precautions per surgeon;brace use/care;positioning;posture/body mechanics;adaptive equipment mgmt  -HC     Patient Education OT LTG, Education Understanding demonstrates adequately;independent  -AF      Patient Education OT LTG Outcome goal ongoing  -AF      ADL OT STG    ADL OT STG, Date  Established 01/19/18  -AF      ADL OT STG, Time to Achieve 1 wk  -AF      ADL OT STG, Activity Type ADL skills  -AF      ADL OT STG, [place] Level standby assist;min assist  -AF      ADL OT STG, Additional Goal with LH reacher  -AF      ADL OT STG, Outcome goal ongoing  -AF      ADL OT LTG    ADL OT LTG, Date Established 01/19/18  -AF 01/16/18  -HC     ADL OT LTG, Time to Achieve by discharge  -AF 1 wk  -HC     ADL OT LTG, Activity Type ADL skills  -AF ADL skills  -HC     ADL OT LTG, [place] Level modified independent;standby assist  -AF standby assist  -HC     ADL OT LTG, Additional Goal with AE  -AF      ADL OT LTG, Outcome goal ongoing  -AF      Functional Mobility OT STG    Functional Mobility Goal OT STG, Date Established 01/19/18  -AF      Functional Mobility Goal OT STG, Time to Achieve 1 wk  -AF      Functional Mobility Goal OT STG, [place] Level contact guard  -AF      Functional Mobility Goal OT STG, Assist Device rolling walker  -AF      Functional Mobility Goal OT STG, Distance to Achieve to the bathroom  -AF      Functional Mobility Goal OT STG, Outcome goal ongoing  -AF      Functional Mobility OT LTG    Functional Mobility Goal OT LTG, Date Established 01/19/18  -AF  01/16/18  -HC    Functional Mobility Goal OT LTG, Time to Achieve by discharge  -AF  1 wk  -HC    Functional Mobility Goal OT LTG, [place] Level modified independent;independent with device;supervision  -AF  supervision  -HC    Functional Mobility Goal OT LTG, Assist Device rolling walker  -AF      Functional Mobility Goal OT LTG, Distance to Achieve to the bathroom  -AF      Functional Mobility Goal OT LTG, Outcome goal ongoing  -AF  goal revised  -HC      User Key  (r) = Recorded By, (t) = Taken By, (c) = Cosigned By    Initials Name Provider Type    AF Veda Shukla OTR Occupational Therapist    CINDY Porras Occupational Therapist          Occupational Therapy Education     Title: PT OT SLP Therapies  (Done)     Topic: Occupational Therapy (Resolved)     Point: ADL training (Resolved)    Description: Instruct learner(s) on proper safety adaptation and remediation techniques during self care or transfers.   Instruct in proper use of assistive devices.    Learning Progress Summary    Learner Readiness Method Response Comment Documented by Status   Patient Acceptance E VU safety with ADls AF 01/25/18 1540 Done    Acceptance E VU,DU spinal precautions with donning socks and shoes AF 01/24/18 0913 Done    Acceptance E,D VU,NR edcuated on log rolling and spinal precautions during ADL tasks. will introduce AE with ADLs to increase her independence AF 01/19/18 1529 Done    Acceptance E VU  MM 01/19/18 0029 Done               Point: Home exercise program (Resolved)    Description: Instruct learner(s) on appropriate technique for monitoring, assisting and/or progressing therapeutic exercises/activities.    Learning Progress Summary    Learner Readiness Method Response Comment Documented by Status   Patient Acceptance E VU safety with ADls AF 01/25/18 1540 Done    Acceptance E VU  MM 01/19/18 0029 Done               Point: Precautions (Resolved)    Description: Instruct learner(s) on prescribed precautions during self-care and functional transfers.    Learning Progress Summary    Learner Readiness Method Response Comment Documented by Status   Patient Acceptance E VU safety with ADls AF 01/25/18 1540 Done    Acceptance E VU edcuated on spinal precautions with ADLs AF 01/22/18 0914 Done    Acceptance E,D VU,NR edcuated on log rolling and spinal precautions during ADL tasks. will introduce AE with ADLs to increase her independence AF 01/19/18 1529 Done    Acceptance E VU  MM 01/19/18 0029 Done               Point: Body mechanics (Resolved)    Description: Instruct learner(s) on proper positioning and spine alignment during self-care, functional mobility activities and/or exercises.    Learning Progress Summary    Learner  Readiness Method Response Comment Documented by Status   Patient Acceptance E VU safety with ADls AF 01/25/18 1540 Done    Acceptance E VU  MM 01/19/18 0029 Done                      User Key     Initials Effective Dates Name Provider Type Discipline    AF 12/01/15 -  CINDY Orosco Occupational Therapist OT    MM 10/30/17 -  Nano Lama RN Registered Nurse Nurse                OT Recommendation and Plan  Anticipated Discharge Disposition: home with home health, home  Planned Therapy Interventions: adaptive equipment training, activity intolerance, ADL retraining, balance training, fine motor coordination training, home exercise program, strengthening, transfer training  Therapy Frequency: 3-5 times/wk            Time Calculation:          Time Calculation- OT       01/25/18 1542 01/25/18 1541       Time Calculation- OT    OT Start Time 1400  -AF 0800  -AF     OT Stop Time 1430  -AF 0900  -AF     OT Time Calculation (min) 30 min  -AF 60 min  -AF       User Key  (r) = Recorded By, (t) = Taken By, (c) = Cosigned By    Initials Name Provider Type    AF CINDY Orosco Occupational Therapist          Therapy Charges for Today     Code Description Service Date Service Provider Modifiers Qty    83091106260 HC OT SELF CARE/MGMT/TRAIN EA 15 MIN 1/24/2018 CINDY Orosco GO 4    61005068635 HC OT THER PROC EA 15 MIN 1/24/2018 CINDY Orosco GO 1    70391743258 HC OT THERAPEUTIC ACT EA 15 MIN 1/24/2018 CINDY Orosco GO 1    91093530099 HC OT SELF CARE/MGMT/TRAIN EA 15 MIN 1/25/2018 CINDY Orosco GO 4    43994351841 HC OT THER PROC EA 15 MIN 1/25/2018 CINDY Orosco GO 2               OT Discharge Summary  Anticipated Discharge Disposition: home with home health, home  Reason for Discharge: All goals achieved  Outcomes Achieved: Able to achieve all goals within established timeline  Discharge Destination: Home, Home with home health    CINDY Orosco  1/25/2018

## 2018-01-25 NOTE — PROGRESS NOTES
Inpatient Rehabilitation Functional Measures Assessment    Functional Measures  DANUTA Eating:  Branch  [facility] Grooming: Branch  [facility] Bathing:  Branch  [facility] Upper Body Dressing:  Branch  [facility] Lower Body Dressing:  Branch  [facility] Toileting:  [facility] Bladder Management  Level of Assistance:  [place]  Frequency/Number of Accidents this Shift:  [facility] Bowel Management  Level of Assistance: [place]  Frequency/Number of Accidents this Shift: Branch    [facility] Bed/Chair/Wheelchair Transfer:  Bed/chair/wheelchair Transfer Score = 6.  Patient is modified independent for transferring to and from the  bed/chair/wheelchair, requiring: Walker.  [facility] Toilet Transfer:  [facility] Tub/Shower Transfer:  [place]    Previously Documented Mode of Locomotion at Discharge: Field  DANUTA Expected Mode of Locomotion at Discharge: [facility] Walk/Wheelchair:  WHEELCHAIR OBSERVATION   Activity was not observed.    WALK OBSERVATION   Walk Distance Scale = 3.  Distance walked is greater than 150 feet. Walk Score  = 6.  Patient is modified independence with walking, requiring: Rolling walker.  Patient walked a distance of 200 feet.  DANUTA Stairs:  Branch    [facility] Comprehension:  Branch  [facility] Expression:  Branch  [facility] Social Interaction:  [facility] Problem Solving:  [facility] Memory:  [place]    Therapy Mode Minutes  Occupational Therapy: [place]  Physical Therapy: Individual: 90 minutes.  Speech Language Pathology:  Branch    Signed by: Mitzy Turner PT

## 2018-01-25 NOTE — PROGRESS NOTES
Inpatient Rehabilitation Plan of Care Note    Plan of Care  Care Plan Reviewed - No updates at this time.    Sphincter Control    Performed Intervention(s)  Monitor intake and output      Psychosocial    Performed Intervention(s)  Verbalizes needs and concerns      Safety    Performed Intervention(s)  Falls precautions/ protocol  Safety monitoring during functional activities  Safety rounds  Bed alarm and/or chair alarm      Body Function Structure    Performed Intervention(s)  Dressing changes  Wound care      Body Systems    Performed Intervention(s)  Blood glucose testing  Medication    Signed by: Shanthi Dan RN

## 2018-01-25 NOTE — PROGRESS NOTES
Discharge plans discussed with pt and daughter, Renita.  Pt  feels she has made good progress and is looking forward to going home tomorrow.  Pt has been allowed independence in her room today.  The team recommends home health therapies.  Per pt's request, referral has been made to [facility] for PT and OT .  Pt has all recommended medical equipment, rolling walker, cane, rollator and tub seat.

## 2018-01-25 NOTE — PROGRESS NOTES
Case Management  Inpatient Rehabilitation Team Conference    Conference Date/Time: 1/25/2018 7:43:54 AM    Team Conference Attendees:  Dr. Mehdi Blake, MSSW  Mitzy Turner, PT  Veda Shukla, OT  Jitendra Yang, CTRS  Britt Haas RN    Demographics            Age: 72Y            Gender: Female    Admission Date: 1/18/2018 7:03:29 PM  Rehabilitation Diagnosis:  Immobility secondary spinal surgery  Past Medical History: DESHAWN cataracts; HLD, HTN; asthma, COPD; low back pain, RA,  BTHR, BTKR, right wrist surgery; hyst; diverticulitis, appy, richard, left hernia  repair; incision; migraines, neuropathy, chronic back apin, sciatic nerve pain;  DM; anterior cervical disc with fusion 4/17      Plan of Care  Anticipated Discharge Date/Estimated Length of Stay: ELOS: 10 - 14 days  Anticipated Discharge Destination: Community discharge with assistance  Discharge Plan : Pt lives alone in a first floor apartment, no steps to enter.  Pt will have only intermittent assiatance at discharge.  Medical Necessity Expected Level Rationale: MOD I  Intensity and Duration: an average of 3 hours/5 days per week  Medical Supervision and 24 Hour Rehab Nursing: x  Physical Therapy: x  PT Intensity/Duration: 90 minutes/day, 5 days/week  Occupational Therapy: x  OT Intensity/Duration: 90 minutes/day, 5 days/week  Social Work: x  Therapeutic Recreation: x  Updated (if changes indicated)    Anticipated Discharge Date/Estimated Length of Stay:   ELOS: DC 1/26    Based on the patient's medical and functional status, their prognosis and  expected level of functional improvement is: MOD I      Interdisciplinary Problem/Goals/Status    All Rehab Problems:  Body Function Structure    [RN] Skin Integrity(Active)  Current Status(01/24/2018): Incision low back  Weekly Goal(01/31/2018): Teach family/ patient dressing changes  Discharge Goal: Incision healed        Body Systems    [RN] Endocrine(Active)  Current Status(01/24/2018): Blood  sugar not controlled  Weekly Goal(02/01/2018): Blood sugar ill be within acceptable limits  Discharge Goal: patient and family educated on testing blood sugars        Mobility    [OT] Toilet Transfers(Active)  Current Status(01/24/2018): SBA with rollator  Weekly Goal(01/26/2018): MOD I  Discharge Goal: MOD I    [OT] Tub/Shower Transfers(Active)  Current Status(01/24/2018): CGA  Weekly Goal(01/26/2018): Supervision  Discharge Goal: Supervision    [PT] Bed/Chair/Wheelchair(Active)  Current Status(01/23/2018): Mod Indep with rollator  Weekly Goal(01/26/2018): SBA with Rollator  Discharge Goal: Mod Indep rollator    [PT] Bed Mobility(Active)  Current Status(01/24/2018): Indep  Weekly Goal(01/26/2018): Indep  Discharge Goal: Indep    [PT] Walk(Active)  Current Status(01/24/2018): 150 ft rollator and SBA  Weekly Goal(01/26/2018): BR Rollator SBA  Discharge Goal: 150 ft Rollator Mod Indep    [PT] Stairs(Active)  Current Status(01/24/2018): 4 HR CGA  Weekly Goal(01/26/2018): PT only  Discharge Goal: 4 HR CGA        Psychosocial    [RN] Behavior(Active)  Current Status(01/24/2018): Lacks insight current situation  Weekly Goal(01/31/2018): Identify progress in functional status  Discharge Goal: Demonstrate healthy coping strategies        Safety    [RN] Potential for Injury(Active)  Current Status(01/24/2018): No unsafe behaviors  Weekly Goal(02/01/2018): Cue to use call bell  Discharge Goal: Patient/ family will be aware of risk of fall and safety in the  home setting        Self Care    [OT] Bathing(Active)  Current Status(01/24/2018): Supervision with AE  Weekly Goal(01/26/2018): MOD I  Discharge Goal: MOD I    [OT] Dressing (Lower)(Active)  Current Status(01/24/2018): Supervision  Weekly Goal(01/26/2018): MOD I  Discharge Goal: MOD I    [OT] Dressing (Upper)(Active)  Current Status(01/24/2018): MOD I  Weekly Goal(01/26/2018): MOD I  Discharge Goal: MOD I    [OT] Grooming(Active)  Current Status(01/24/2018): MOD I  Weekly  Goal(01/26/2018): MOD I  Discharge Goal: MOD I    [OT] Toileting(Active)  Current Status(01/24/2018): Supervision  Weekly Goal(01/26/2018): MOD I  Discharge Goal: MOD I        Sphincter Control    [RN] Bladder Management(Active)  Current Status(01/24/2018): Continent 100%  Weekly Goal(01/31/2018): Continent 100%  Discharge Goal: Continent 100%    [RN] Bowel Management(Active)  Current Status(01/23/2018): Continent of bowel  Weekly Goal(02/01/2018): Continent 100% with regular movements every 1-3 days  Discharge Goal: Same as weekly        Comments: 1/25: doing well; indep in room today;    Signed by: Ishaan Gonzalez RN    Physician CoSigned By: Mehdi Augustine 01/25/2018 07:54:43

## 2018-01-25 NOTE — PROGRESS NOTES
LOS: 7 days   Patient Care Team:  Roger Patel MD as PCP - General    Chief Complaint: same    Subjective     History of Present Illness    Subjective Pt is easily awakened no new issues.     History taken from: patient    Objective     Vital Signs  Temp:  [98.2 °F (36.8 °C)-98.3 °F (36.8 °C)] 98.3 °F (36.8 °C)  Heart Rate:  [] 84  Resp:  [18-20] 20  BP: (125-136)/(61-75) 136/65    Objectiveexam unchanged    Results Review:     I reviewed the patient's new clinical results.    Medication Review:     Assessment/Plan     Active Problems:    Spinal stenosis      Assessment & Plan Continue to prepare for dc. I returned to room to discuss dc plans with patient and  as finalized in care coordination conference this am.     Mehdi Augustine MD  01/25/18  6:58 AM    Time:

## 2018-01-26 VITALS
TEMPERATURE: 98.2 F | OXYGEN SATURATION: 95 % | BODY MASS INDEX: 17.85 KG/M2 | DIASTOLIC BLOOD PRESSURE: 74 MMHG | SYSTOLIC BLOOD PRESSURE: 135 MMHG | HEART RATE: 84 BPM | RESPIRATION RATE: 20 BRPM | WEIGHT: 110.56 LBS

## 2018-01-26 LAB
ANION GAP SERPL CALCULATED.3IONS-SCNC: 11.3 MMOL/L
BUN BLD-MCNC: 12 MG/DL (ref 8–23)
BUN/CREAT SERPL: 16 (ref 7–25)
CALCIUM SPEC-SCNC: 9.6 MG/DL (ref 8.6–10.5)
CHLORIDE SERPL-SCNC: 99 MMOL/L (ref 98–107)
CO2 SERPL-SCNC: 27.7 MMOL/L (ref 22–29)
CREAT BLD-MCNC: 0.75 MG/DL (ref 0.57–1)
GFR SERPL CREATININE-BSD FRML MDRD: 92 ML/MIN/1.73
GLUCOSE BLD-MCNC: 149 MG/DL (ref 65–99)
GLUCOSE BLDC GLUCOMTR-MCNC: 164 MG/DL (ref 70–130)
GLUCOSE BLDC GLUCOMTR-MCNC: 278 MG/DL (ref 70–130)
POTASSIUM BLD-SCNC: 3.9 MMOL/L (ref 3.5–5.2)
SODIUM BLD-SCNC: 138 MMOL/L (ref 136–145)

## 2018-01-26 PROCEDURE — 82962 GLUCOSE BLOOD TEST: CPT

## 2018-01-26 PROCEDURE — 63710000001 INSULIN ASPART PER 5 UNITS: Performed by: HOSPITALIST

## 2018-01-26 PROCEDURE — 80048 BASIC METABOLIC PNL TOTAL CA: CPT | Performed by: PHYSICAL MEDICINE & REHABILITATION

## 2018-01-26 RX ADMIN — CALCIUM CARBONATE-VITAMIN D TAB 500 MG-200 UNIT 1000 MG: 500-200 TAB at 08:54

## 2018-01-26 RX ADMIN — FLUTICASONE PROPIONATE 1 SPRAY: 50 SPRAY, METERED NASAL at 08:56

## 2018-01-26 RX ADMIN — FAMOTIDINE 20 MG: 20 TABLET, FILM COATED ORAL at 08:54

## 2018-01-26 RX ADMIN — CYANOCOBALAMIN TAB 500 MCG 1000 MCG: 500 TAB at 08:54

## 2018-01-26 RX ADMIN — CETIRIZINE HYDROCHLORIDE 10 MG: 10 TABLET, FILM COATED ORAL at 08:54

## 2018-01-26 RX ADMIN — DULOXETINE HYDROCHLORIDE 60 MG: 60 CAPSULE, DELAYED RELEASE ORAL at 08:54

## 2018-01-26 RX ADMIN — INSULIN ASPART 3 UNITS: 100 INJECTION, SOLUTION INTRAVENOUS; SUBCUTANEOUS at 08:55

## 2018-01-26 RX ADMIN — LOSARTAN POTASSIUM 50 MG: 50 TABLET, FILM COATED ORAL at 08:54

## 2018-01-26 RX ADMIN — VITAMIN D, TAB 1000IU (100/BT) 2000 UNITS: 25 TAB at 08:54

## 2018-01-26 NOTE — PROGRESS NOTES
SECTION GG    Eating Performance Discharge: Patient completed the activities by him/herself  with no assistance from a helper.    Signed by: Crystal Moore RN

## 2018-01-26 NOTE — PLAN OF CARE
Problem: Patient Care Overview (Adult)  Goal: Plan of Care Review  Outcome: Ongoing (interventions implemented as appropriate)   01/26/18 0254   Coping/Psychosocial Response Interventions   Plan Of Care Reviewed With patient   Patient Care Overview   Progress improving   Outcome Evaluation   Outcome Summary/Follow up Plan Patient looking forward to discharge this morning. Patient is independent in room. No complaints of pain, but blood sugars remain elevated- 5 units of insulin given. Back brace should be worn when out of bed, but patient does not always wear it.        Problem: Pain, Chronic (Adult)  Goal: Acceptable Pain Control/Comfort Level  Outcome: Ongoing (interventions implemented as appropriate)   01/26/18 0254   Pain, Chronic (Adult)   Acceptable Pain Control/Comfort Level making progress toward outcome       Problem: Skin Integrity Impairment, Risk/Actual (Adult)  Goal: Skin Integrity/Wound Healing  Outcome: Ongoing (interventions implemented as appropriate)   01/26/18 0254   Skin Integrity Impairment, Risk/Actual (Adult)   Skin Integrity/Wound Healing making progress toward outcome       Problem: Mobility, Physical Impaired (Adult)  Goal: Enhanced Functionality Ability  Outcome: Ongoing (interventions implemented as appropriate)   01/26/18 0254   Mobility, Physical Impaired (Adult)   Enhanced Functionality Ability making progress toward outcome

## 2018-01-26 NOTE — PROGRESS NOTES
Inpatient Rehabilitation Plan of Care Note    Plan of Care  Care Plan Reviewed - No updates at this time.    Psychosocial    Performed Intervention(s)  Verbalizes needs and concerns      Safety    Performed Intervention(s)  Falls precautions/ protocol  Safety monitoring during functional activities  Safety rounds  Bed alarm and/or chair alarm      Body Function Structure    Performed Intervention(s)  Dressing changes  Wound care      Body Systems    Performed Intervention(s)  Blood glucose testing  Medication    Signed by: Shanthi Dan RN

## 2018-01-26 NOTE — THERAPY DISCHARGE NOTE
Inpatient Rehabilitation - Physical Therapy Discharge Summary  [facility]       Patient Name: Raquel Gonsales  : 1945  MRN: [id number]    Today's Date: 2018  Onset of Illness/Injury or Date of Surgery Date: 18    Date of Referral to PT: 18  Referring Physician: Charan      Admit Date: 2018      PT Recommendation and Plan    Visit Dx:  No diagnosis found.                    IP PT Goals       18 1546 01/15/18 1620       Bed Mobility PT LTG    Bed Mobility PT LTG, Date Established 18  -LB 01/15/18  -KH     Bed Mobility PT LTG, Time to Achieve 1 wk  -LB 1 wk  -KH     Bed Mobility PT LTG, Activity Type sidelying to sit/sit to sidelying;roll left/roll right  -LB all bed mobility  -KH     Bed Mobility PT LTG, [place] Level independent  -LB minimum assist (75% patient effort)  -KH     Transfer Training PT LTG    Transfer Training PT LTG, Date Established 18  -LB 01/15/18  -KH     Transfer Training PT LTG, Time to Achieve 1 wk  -LB 1 wk  -KH     Transfer Training PT LTG, Activity Type sit to stand/stand to sit  -LB all transfers  -KH     Transfer Training PT LTG, [place] Level conditional independence  -LB minimum assist (75% patient effort)  -KH     Transfer Training PT LTG, Assist Device walker, rolling  -LB walker, rolling  -KH     Transfer Training 2 PT LTG    Transfer Training PT 2 LTG, Time to Achieve 1 wk  -LB      Transfer Training PT 2 LTG, Activity Type --   car transfer  -LB      Transfer Training PT 2 LTG, [place] Level supervision required  -LB      Transfer Training PT 2 LTG, Assist Device walker, rolling  -LB      Gait Training PT LTG    Gait Training Goal PT LTG, Date Established 18  -LB 01/15/18  -KH     Gait Training Goal PT LTG, Time to Achieve 1 wk  -LB 1 wk  -KH     Gait Training Goal PT LTG, [place] Level conditional independence  -LB minimum assist (75% patient effort)  -KH     Gait Training Goal PT LTG, Assist Device walker,  rolling  -LB walker, rolling  -KH     Gait Training Goal PT LTG, Distance to Achieve 200  -LB  50 ft  -KH     Stair Training PT LTG    Stair Training Goal PT LTG, Date Established 01/19/18  -LB      Stair Training Goal PT LTG, Time to Achieve 1 wk  -LB      Stair Training Goal PT LTG, Number of Steps 4  -LB      Stair Training Goal PT LTG, [place] Level contact guard assist  -LB      Stair Training Goal PT LTG, Assist Device 2 handrails  -LB      Patient Education PT LTG    Patient Education PT LTG, Date Established 01/19/18  -LB      Patient Education PT LTG, Time to Achieve 1 wk  -LB      Patient Education PT LTG, Education Type elva/doff brace  -LB      Patient Education PT LTG, Education Understanding demonstrate adequately  -LB        User Key  (r) = Recorded By, (t) = Taken By, (c) = Cosigned By    Initials Name Provider Type    OTTO Garvey, PT Physical Therapist    LB Mitzy Turner, PT Physical Therapist          Therapy Charges for Today     Code Description Service Date Service Provider Modifiers Qty    03673351804 HC PT THER PROC EA 15 MIN 1/25/2018 Mitzy Turner, PT GP 6          PT Discharge Summary  Anticipated Discharge Disposition: home with home health  Reason for Discharge: Discharge from facility  Outcomes Achieved: Able to achieve all goals within established timeline  Discharge Destination: Home with home health      Mitzy Turner, PT   1/26/2018

## 2018-01-26 NOTE — PROGRESS NOTES
LOS: 8 days   Patient Care Team:  Roger Patel MD as PCP - General    Chief Complaint: same    Subjective     History of Present Illness    Subjective Pt is awake and alert. No c/o. Pt is dissatisfied with her current PCP and will be changing.     History taken from: patient    Objective     Vital Signs  Temp:  [98.1 °F (36.7 °C)-98.2 °F (36.8 °C)] 98.2 °F (36.8 °C)  Heart Rate:  [84-99] 84  Resp:  [18-20] 20  BP: (133-148)/(74-81) 135/74    Objective exam unchanged    Results Review:     I reviewed the patient's new clinical results.    Medication Review:     Assessment/Plan     Active Problems:    Spinal stenosis      Assessment & Plan Continue to prepare for dc. Med reconciliation completed and DC summary dictated.     Mehdi Augustine MD  01/26/18  6:15 AM    Time:

## 2018-01-26 NOTE — PLAN OF CARE
Problem: Patient Care Overview (Adult)  Goal: Plan of Care Review  Outcome: Outcome(s) achieved Date Met: 01/26/18 01/26/18 1100   Coping/Psychosocial Response Interventions   Plan Of Care Reviewed With patient   Patient Care Overview   Progress improving   Outcome Evaluation   Outcome Summary/Follow up Plan Patient is being discharged home today, education provided on followup appointments and discharge medications, no complaints of pain or discomfort today.      Goal: Adult Individualization and Mutuality  Outcome: Outcome(s) achieved Date Met: 01/26/18    Goal: Discharge Needs Assessment  Outcome: Outcome(s) achieved Date Met: 01/26/18      Problem: Pain, Chronic (Adult)  Goal: Acceptable Pain Control/Comfort Level  Outcome: Outcome(s) achieved Date Met: 01/26/18 01/26/18 1100   Pain, Chronic (Adult)   Acceptable Pain Control/Comfort Level achieves outcome       Problem: Skin Integrity Impairment, Risk/Actual (Adult)  Goal: Skin Integrity/Wound Healing  Outcome: Outcome(s) achieved Date Met: 01/26/18 01/26/18 1100   Skin Integrity Impairment, Risk/Actual (Adult)   Skin Integrity/Wound Healing achieves outcome       Problem: Mobility, Physical Impaired (Adult)  Goal: Enhanced Functionality Ability  Outcome: Outcome(s) achieved Date Met: 01/26/18

## 2018-01-26 NOTE — PROGRESS NOTES
Inpatient Rehabilitation Functional Measures Assessment    Functional Measures  DANUTA Eating:  [facility] Grooming: [facility] Bathing:  [facility] Upper Body Dressing:  [facility] Lower Body Dressing:  [facility] Toileting:  [facility] Bladder Management  Level of Assistance:  [place]  Frequency/Number of Accidents this Shift:  [facility] Bowel Management  Level of Assistance: [place]  Frequency/Number of Accidents this Shift: [facility] Bed/Chair/Wheelchair Transfer:  [facility] Toilet Transfer:  [facility] Tub/Shower Transfer:  [place]    Previously Documented Mode of Locomotion at Discharge: Field  DANUTA Expected Mode of Locomotion at Discharge: [facility] Walk/Wheelchair:  [facility] Stairs:  [facility] Comprehension:  Auditory comprehension is the usual mode. Comprehension  Score = 7, Independent.  Patient comprehends complex/abstract information in  their primary language.  Patient is completely independent for auditory  comprehension.  There are no activity limitations.  DANUTA Expression:  Vocal expression is the usual mode. Expression Score = 7,  Independent.  Patient expresses complex/abstract information in their primary  language.  Patient is completely independent for vocal expression.  There are no  activity limitations.  DANUTA Social Interaction:  Social Interaction Score = 7, Independent. Patient is  completely independent for social interaction.  There are no activity  limitations.  DANUTA Problem Solving:  Problem Solving Score = 7, Independent.  Patient makes  appropriate decisions in order to solve complex problems.  Patient is completely  independent for problem solving.  There are no activity limitations.  DANUTA Memory:  Memory Score = 7, Independent.  Patient is completely independent  for memory.  There are no activity limitations.    Therapy Mode Minutes  Occupational Therapy: Branch  Physical Therapy: Branch  Speech Language Pathology:  Branch    Signed by: Shanthi Dan RN

## 2018-01-26 NOTE — PROGRESS NOTES
Inpatient Rehabilitation Functional Measures Assessment    Functional Measures  DANUTA Eating:  Branch  [facility] Grooming: [place]  [facility] Bathing:  [place]anch  [facility] Upper Body Dressing:  Branch  [facility] Lower Body Dressing:  [place]anch  [facility] Toileting:  Branch    [facility] Bladder Management  Level of Assistance:  Activity was not observed. patient is independent in room    Frequency/Number of Accidents this Shift:  [facility] Bowel Management  Level of Assistance: [place]  Frequency/Number of Accidents this Shift: [place]    [facility] Bed/Chair/Wheelchair Transfer:  Bed/chair/wheelchair Transfer Score = 6.  Patient is modified independent for transferring to and from the  bed/chair/wheelchair, requiring: Walker. patient is independent in room, uses  walker from home, room kept clear of clutter  [facility] Toilet Transfer:  [facility] Tub/Shower Transfer:  [place]    Previously Documented Mode of Locomotion at Discharge: Field  DANUTA Expected Mode of Locomotion at Discharge: [facility] Walk/Wheelchair:  Branch  [facility] Stairs:  Branch    [facility] Comprehension:  [facility] Expression:  [place]  [facility] Social Interaction:  [facility] Problem Solving:  [facility] Memory:  [place]    Therapy Mode Minutes  Occupational Therapy: Branch  Physical Therapy: Branch  Speech Language Pathology:  Branch    Signed by: DENNIS Clemente

## 2018-01-26 NOTE — DISCHARGE INSTRUCTIONS
Spinal Stenosis  Spinal stenosis is an abnormal narrowing of the canals of your spine (vertebrae).  CAUSES   Spinal stenosis is caused by areas of bone pushing into the central canals of your vertebrae. This condition can be present at birth (congenital). It also may be caused by arthritic deterioration of your vertebrae (spinal degeneration).   SYMPTOMS   · Pain that is generally worse with activities, particularly standing and walking.  · Numbness, tingling, hot or cold sensations, weakness, or weariness in your legs.  · Frequent episodes of falling.  · A foot-slapping gait that leads to muscle weakness.  DIAGNOSIS   Spinal stenosis is diagnosed with the use of magnetic resonance imaging (MRI) or computed tomography (CT).  TREATMENT   Initial therapy for spinal stenosis focuses on the management of the pain and other symptoms associated with the condition. These therapies include:  · Practicing postural changes to lessen pressure on your nerves.  · Exercises to strengthen the core of your body.  · Loss of excess body weight.  · The use of nonsteroidal anti-inflammatory medicines to reduce swelling and inflammation in your nerves.  When therapies to manage pain are not successful, surgery to treat spinal stenosis may be recommended. This surgery involves removing excess bone, which puts pressure on your nerve roots. During this surgery (laminectomy), the posterior leoncio arch (lamina) and excess bone around the facet joints are removed.  This information is not intended to replace advice given to you by your health care provider. Make sure you discuss any questions you have with your health care provider.  Document Released: 03/09/2005 Document Revised: 01/08/2016 Document Reviewed: 03/28/2014  Elsevier Interactive Patient Education © 2017 [employer]      Spinal Stenosis  Spinal stenosis is when the open spaces between the bones of your spine (vertebrae) get smaller (narrow). It is caused by bone pushing on the  open spaces of your spine. This puts pressure on your spine and the nerves in your spine. You may be given medicine to lessen the puffiness (inflammation) in your nerves. Other times, surgery is needed.  HOME CARE  · Change positions when you sit, stand, and lie. This can help take pressure off your nerves.  · Do exercises as told by your doctor to strengthen the middle part of your body.  · Lose weight if your doctor suggests it. This takes pressure off your spine.  · Take all medicine as told by your doctor.  MAKE SURE YOU:  · Understand these instructions.  · Will watch your condition.  · Will get help right away if you are not doing well or get worse.  This information is not intended to replace advice given to you by your health care provider. M    Spinal Stenosis  Spinal stenosis is when the open spaces between the bones of your spine (vertebrae) get smaller (narrow). It is caused by bone pushing on the open spaces of your spine. This puts pressure on your spine and the nerves in your spine. You may be given medicine to lessen the puffiness (inflammation) in your nerves. Other times, surgery is needed.  HOME CARE  · Change positions when you sit, stand, and lie. This can help take pressure off your nerves.  · Do exercises as told by your doctor to strengthen the middle part of your body.  · Lose weight if your doctor suggests it. This takes pressure off your spine.  · Take all medicine as told by your doctor.  MAKE SURE YOU:  · Understand these instructions.  · Will watch your condition.  · Will get help right away if you are not doing well or get worse.  This information is not intended to replace advice given to you by your health care provider. Make sure you discuss any questions you have with your health care provider.  Document Released: 04/12/2012 Document Revised: 08/20/2014 Document Reviewed: 03/28/2014  Elsevier Interactive Patient Education © 2017 [employer]  yisel sure you discuss any questions you  have with your health care provider.  Document Released: 04/12/2012 Document Revised: 08/20/2014 Document Reviewed: 03/28/2014  Elsevier Interactive Patient Education © 2017 [employer] Inc.

## 2018-01-26 NOTE — PROGRESS NOTES
SECTION GG      Mobility Performance Discharge:   Roll Left and Right: Patient completed the activities by him/herself with no  assistance from a helper.   Sit to Lying: Patient completed the activities by him/herself with no  assistance from a helper.   Lying to Sitting on Side of Bed: Patient completed the activities by  him/herself with no assistance from a helper.   Sit to Stand: Patient completed the activities by him/herself with no  assistance from a helper.   Chair/Bed to Chair Transfer: Patient completed the activities by him/herself  with no assistance from a helper.   Car Transfer: [place] does less than half the effort. [place] lifts, holds or  supports trunk or limbs but provides less than half the effort.    Patient Walks:  Yes   Walk 10 Feet: Patient completed the activities by him/herself with no  assistance from a helper.   Walk 50 Feet With 2 Turns: Patient completed the activities by him/herself with  no assistance from a helper.   Walk 150 Feet: Patient completed the activities by him/herself with no  assistance from a helper.   Walking 10 Feet on Uneven Surfaces: [place] provides verbal cues or  touching/steadying assistance as patient completes activity.   1 Step Over Curb or Up/Down Stair: [place] provides verbal cues or  touching/steadying assistance as patient completes activity.   4 Steps Up and Down, With/Without Rail: [place] provides verbal cues or  touching/steadying assistance as patient completes activity.   12 Steps Up and Down, With/Without Rail: Not applicable.   Picking up an Object: Not attempted due to medical or safety concerns.     Uses Wheelchair/Scooter: No    Signed by: Mitzy Turner, PT

## 2018-01-26 NOTE — DISCHARGE SUMMARY
ADMISSION/DISCHARGE DIAGNOSES:    1.   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    2.   Degenerative joint disease.   3.   Hyperlipidemia.   4.   Hypertension.   5.   Asthma/chronic obstructive pulmonary disease.   6.   Chronic low back pain.   7.   Rheumatoid arthritis.   8.   Diverticulitis.   9.   Migraine headaches.   10. Peripheral neuropathy.   11. Diabetes mellitus.     HISTORY OF PRESENT ILLNESS:  The patient is a 72-year-old black female admitted here and transferred from [facility] where she presented on 01/11/2018 with numbness of the left upper/lower extremity and face with history of falls.  The patient had a previous lumbar laminectomy and underwent repeat procedure as outlined above.  The patient participated activity in therapies and made good progress in terms of functional mobility.  Rehabilitation team has recommended home health therapies and referral has been made to [facility] for PT and OT.  Rolling walker, cane, and rollator walker as well as tub seat were recommended as discharge equipment.      DISCHARGE MEDICATIONS:    1.   Resume Mobic 7.5 mg every day.   2.   Aspirin 81 mg enteric coated every day.   3.   Resume Proventil inhaler 2 puffs every 6 hours as needed.    4.   Resume Lyrica 75 three times daily for treatment of peripheral neuropathies.   5.   Resume Cymbalta 60 mg twice a day.    6.   Return to her previous insulin regimen with NovoLog FlexPen.   7.   Tresiba FlexTouch every evening.   8.   Victoza 1.8 mg every night.   9.   Claritin 10 mg every day.   10. Atorvastatin 10 mg every evening.   11. Omega-3, Lovaza 2 tablets twice daily.   12. Losartan 50 mg every morning.   13. Voltaren gel topically 4 times daily as needed for hip pain.   14. Cyanocobalamin 1000 mcg tablet daily.   15. Calcium carbonate vitamin D 400 once daily.   16. Flonase 1 spray each nostril daily.   17. Zantac 150 twice daily.      CONDITION: The patient was discharged in stable condition with family members providing assistance as needed.

## 2018-01-26 NOTE — PROGRESS NOTES
Inpatient Rehabilitation Functional Measures Assessment    Functional Measures  DANUTA Eating:  [facility] Grooming: [facility] Bathing:  [facility] Upper Body Dressing:  [facility] Lower Body Dressing:  [facility] Toileting:  [facility] Bladder Management  Level of Assistance:  [place]  Frequency/Number of Accidents this Shift:  [facility] Bowel Management  Level of Assistance: [place]  Frequency/Number of Accidents this Shift: [facility] Bed/Chair/Wheelchair Transfer:  [facility] Toilet Transfer:  [facility] Tub/Shower Transfer:  [place]    Previously Documented Mode of Locomotion at Discharge: Field  DANUTA Expected Mode of Locomotion at Discharge: [facility] Walk/Wheelchair:  [facility] Stairs:  [facility] Comprehension:  Auditory comprehension is the usual mode. Comprehension  Score = 6, Modified [place].  Patient comprehends complex/abstract  information in their primary language, requiring: Additional time.  DANUTA Expression:  Vocal expression is the usual mode. Expression Score = 7,  Independent.  Patient expresses complex/abstract information in their primary  language.  Patient is completely independent for vocal expression.  There are no  activity limitations.  DANUTA Social Interaction:  Social Interaction Score = 7, Independent. Patient is  completely independent for social interaction.  There are no activity  limitations.  DANUTA Problem Solving:  Activity was not observed.  DANUTA Memory:  Memory Score = 6, Modified [place].  Patient is modified  independent for memory, requiring: Requires additional time.    Therapy Mode Minutes  Occupational Therapy: Branch  Physical Therapy: Branch  Speech Language Pathology:  [place]    Signed by: Crystal Moore RN

## 2018-02-14 ENCOUNTER — OFFICE VISIT (OUTPATIENT)
Dept: ORTHOPEDIC SURGERY | Facility: CLINIC | Age: 73
End: 2018-02-14

## 2018-02-14 VITALS — WEIGHT: 110.9 LBS | HEIGHT: 66 IN | TEMPERATURE: 97.9 F | BODY MASS INDEX: 17.82 KG/M2

## 2018-02-14 DIAGNOSIS — Z98.1 S/P LUMBAR FUSION: Primary | ICD-10-CM

## 2018-02-14 PROCEDURE — 99024 POSTOP FOLLOW-UP VISIT: CPT | Performed by: ORTHOPAEDIC SURGERY

## 2018-02-14 PROCEDURE — 72100 X-RAY EXAM L-S SPINE 2/3 VWS: CPT | Performed by: ORTHOPAEDIC SURGERY

## 2018-02-22 ENCOUNTER — TELEPHONE (OUTPATIENT)
Dept: NEUROLOGY | Facility: CLINIC | Age: 73
End: 2018-02-22

## 2018-02-22 NOTE — TELEPHONE ENCOUNTER
"I called and spoke with Ms. Gonsales.  She stated she is doing fair.  Has [facility] 2 times a week and will for awhile.  Her PCP has been changing some medications for her diabetes.  Her insurance is denying Victoza and her blood sugar have been flucuating a lot.  She still states \"I did not have a stroke\"  I asked about the numbness and tingling she had before her hospital admission.  She attributed it to her spinal issues.  I told her about her follow up appointment and she didn't see why she needed to come since she didn't have a stroke.  I let her know about her follow up appointment with Ciarra BARTON and she doesn't see why she needs to come since she didn't have a stroke.  I explained we can follow her and make sure her medications are appropriate such as her ASA EC 81 mg daily, Lipitor 10 mg at night.  She is taking Cozaar also but her prescription runs out today she is calling her PCP for a refill.  Her blood sugars have been up and down and she says she has no more areas to poke for blood, starting on her feet.  I reminded her of signs and symptoms of stroke and to call 911 immediately.  mRS 2.  E Robinson ADRIAN  "

## 2018-04-17 ENCOUNTER — OFFICE VISIT (OUTPATIENT)
Dept: ORTHOPEDIC SURGERY | Facility: CLINIC | Age: 73
End: 2018-04-17

## 2018-04-17 VITALS — HEIGHT: 66 IN | WEIGHT: 245 LBS | TEMPERATURE: 96.5 F | BODY MASS INDEX: 39.37 KG/M2

## 2018-04-17 DIAGNOSIS — Z98.1 S/P LAMINECTOMY WITH SPINAL FUSION: Primary | ICD-10-CM

## 2018-04-17 PROCEDURE — 72100 X-RAY EXAM L-S SPINE 2/3 VWS: CPT | Performed by: ORTHOPAEDIC SURGERY

## 2018-04-17 PROCEDURE — 99213 OFFICE O/P EST LOW 20 MIN: CPT | Performed by: ORTHOPAEDIC SURGERY

## 2018-04-17 RX ORDER — POLYETHYLENE GLYCOL 3350 17 G/17G
17 POWDER, FOR SOLUTION ORAL DAILY
COMMUNITY
End: 2022-05-17

## 2018-04-17 NOTE — PROGRESS NOTES
She is 4 months out and doing very well actually.  Still with some leg pain but much better than before surgery her back pain is improved posture is immensely better.  Good strength in lower extremities on examination.  Two-view x-rays of lumbar spine show good position of graft and implants in improvement of posture from preoperative films.  Doing well I will see her back as needed

## 2018-11-09 ENCOUNTER — TRANSCRIBE ORDERS (OUTPATIENT)
Dept: ADMINISTRATIVE | Facility: HOSPITAL | Age: 73
End: 2018-11-09

## 2018-11-09 DIAGNOSIS — Z12.31 SCREENING MAMMOGRAM, ENCOUNTER FOR: Primary | ICD-10-CM

## 2018-12-17 ENCOUNTER — HOSPITAL ENCOUNTER (OUTPATIENT)
Dept: MAMMOGRAPHY | Facility: HOSPITAL | Age: 73
Discharge: HOME | End: 2018-12-17
Admitting: NURSE PRACTITIONER

## 2018-12-17 DIAGNOSIS — Z12.31 SCREENING MAMMOGRAM, ENCOUNTER FOR: ICD-10-CM

## 2018-12-17 PROCEDURE — 77067 SCR MAMMO BI INCL CAD: CPT

## 2018-12-17 PROCEDURE — 77063 BREAST TOMOSYNTHESIS BI: CPT

## 2019-10-08 ENCOUNTER — OFFICE VISIT (OUTPATIENT)
Dept: ORTHOPEDIC SURGERY | Facility: CLINIC | Age: 74
End: 2019-10-08

## 2019-10-08 VITALS — HEIGHT: 66 IN | WEIGHT: 254 LBS | BODY MASS INDEX: 40.82 KG/M2 | TEMPERATURE: 97.5 F

## 2019-10-08 DIAGNOSIS — Z98.1 S/P LUMBAR FUSION: Primary | ICD-10-CM

## 2019-10-08 PROCEDURE — 72100 X-RAY EXAM L-S SPINE 2/3 VWS: CPT | Performed by: ORTHOPAEDIC SURGERY

## 2019-10-08 PROCEDURE — 99213 OFFICE O/P EST LOW 20 MIN: CPT | Performed by: ORTHOPAEDIC SURGERY

## 2019-10-08 NOTE — PROGRESS NOTES
She is a couple years out from extension of her fusion up to L2-3 and doing very well.  Occasional thigh pain but nothing incapacitating no falls good strength on exam but absent reflexes.  X-rays suggest a solid fusion from top to bottom.  Further remodeling since prior films.  We will do some therapy and I will see her as needed for back pain.

## 2021-01-12 ENCOUNTER — LAB REQUISITION (OUTPATIENT)
Dept: LAB | Facility: HOSPITAL | Age: 76
End: 2021-01-12

## 2021-01-12 DIAGNOSIS — Z00.00 ENCOUNTER FOR GENERAL ADULT MEDICAL EXAMINATION WITHOUT ABNORMAL FINDINGS: ICD-10-CM

## 2021-01-12 PROCEDURE — U0004 COV-19 TEST NON-CDC HGH THRU: HCPCS | Performed by: OTOLARYNGOLOGY

## 2021-01-13 LAB — SARS-COV-2 ORF1AB RESP QL NAA+PROBE: DETECTED

## 2021-02-26 ENCOUNTER — LAB REQUISITION (OUTPATIENT)
Dept: LAB | Facility: HOSPITAL | Age: 76
End: 2021-02-26

## 2021-02-26 DIAGNOSIS — Z00.00 ENCOUNTER FOR GENERAL ADULT MEDICAL EXAMINATION WITHOUT ABNORMAL FINDINGS: ICD-10-CM

## 2021-02-26 PROCEDURE — 88305 TISSUE EXAM BY PATHOLOGIST: CPT | Performed by: OTOLARYNGOLOGY

## 2021-03-01 LAB
LAB AP CASE REPORT: NORMAL
LAB AP CLINICAL INFORMATION: NORMAL
PATH REPORT.FINAL DX SPEC: NORMAL
PATH REPORT.GROSS SPEC: NORMAL

## 2022-05-17 ENCOUNTER — PRE-ADMISSION TESTING (OUTPATIENT)
Dept: PREADMISSION TESTING | Facility: HOSPITAL | Age: 77
End: 2022-05-17

## 2022-05-17 VITALS
HEART RATE: 80 BPM | OXYGEN SATURATION: 96 % | BODY MASS INDEX: 39.21 KG/M2 | DIASTOLIC BLOOD PRESSURE: 91 MMHG | WEIGHT: 244 LBS | SYSTOLIC BLOOD PRESSURE: 163 MMHG | RESPIRATION RATE: 18 BRPM | TEMPERATURE: 98 F | HEIGHT: 66 IN

## 2022-05-17 LAB
ANION GAP SERPL CALCULATED.3IONS-SCNC: 10.6 MMOL/L (ref 5–15)
BUN SERPL-MCNC: 13 MG/DL (ref 8–23)
BUN/CREAT SERPL: 16.3 (ref 7–25)
CALCIUM SPEC-SCNC: 9.4 MG/DL (ref 8.6–10.5)
CHLORIDE SERPL-SCNC: 104 MMOL/L (ref 98–107)
CO2 SERPL-SCNC: 22.4 MMOL/L (ref 22–29)
CREAT SERPL-MCNC: 0.8 MG/DL (ref 0.57–1)
DEPRECATED RDW RBC AUTO: 41.4 FL (ref 37–54)
EGFRCR SERPLBLD CKD-EPI 2021: 76 ML/MIN/1.73
ERYTHROCYTE [DISTWIDTH] IN BLOOD BY AUTOMATED COUNT: 12.7 % (ref 12.3–15.4)
GLUCOSE SERPL-MCNC: 116 MG/DL (ref 65–99)
HCT VFR BLD AUTO: 36.3 % (ref 34–46.6)
HGB BLD-MCNC: 11.7 G/DL (ref 12–15.9)
MCH RBC QN AUTO: 29.1 PG (ref 26.6–33)
MCHC RBC AUTO-ENTMCNC: 32.2 G/DL (ref 31.5–35.7)
MCV RBC AUTO: 90.3 FL (ref 79–97)
PLATELET # BLD AUTO: 236 10*3/MM3 (ref 140–450)
PMV BLD AUTO: 10.3 FL (ref 6–12)
POTASSIUM SERPL-SCNC: 3.7 MMOL/L (ref 3.5–5.2)
QT INTERVAL: 395 MS
RBC # BLD AUTO: 4.02 10*6/MM3 (ref 3.77–5.28)
SARS-COV-2 ORF1AB RESP QL NAA+PROBE: NOT DETECTED
SODIUM SERPL-SCNC: 137 MMOL/L (ref 136–145)
WBC NRBC COR # BLD: 6.6 10*3/MM3 (ref 3.4–10.8)

## 2022-05-17 PROCEDURE — 85027 COMPLETE CBC AUTOMATED: CPT

## 2022-05-17 PROCEDURE — C9803 HOPD COVID-19 SPEC COLLECT: HCPCS

## 2022-05-17 PROCEDURE — 80048 BASIC METABOLIC PNL TOTAL CA: CPT

## 2022-05-17 PROCEDURE — 36415 COLL VENOUS BLD VENIPUNCTURE: CPT

## 2022-05-17 PROCEDURE — U0004 COV-19 TEST NON-CDC HGH THRU: HCPCS

## 2022-05-17 PROCEDURE — 93010 ELECTROCARDIOGRAM REPORT: CPT | Performed by: INTERNAL MEDICINE

## 2022-05-17 PROCEDURE — 93005 ELECTROCARDIOGRAM TRACING: CPT

## 2022-05-17 RX ORDER — ATORVASTATIN CALCIUM 10 MG/1
10 TABLET, FILM COATED ORAL NIGHTLY
COMMUNITY
Start: 2022-04-20 | End: 2022-05-17

## 2022-05-17 RX ORDER — INSULIN ASPART 100 [IU]/ML
10 INJECTION, SUSPENSION SUBCUTANEOUS 2 TIMES DAILY PRN
COMMUNITY

## 2022-05-17 RX ORDER — ZINC GLUCONATE 50 MG
50 TABLET ORAL DAILY
COMMUNITY
End: 2022-05-19 | Stop reason: HOSPADM

## 2022-05-17 RX ORDER — POTASSIUM CHLORIDE 750 MG/1
10 TABLET, FILM COATED, EXTENDED RELEASE ORAL 2 TIMES DAILY
COMMUNITY
Start: 2022-04-20 | End: 2022-07-19

## 2022-05-17 RX ORDER — DEXTRAN 70/HYPROMELLOSE
1 DROPS OPHTHALMIC (EYE) AS NEEDED
COMMUNITY
Start: 2022-03-07

## 2022-05-17 RX ORDER — ASCORBIC ACID 500 MG
500 TABLET ORAL DAILY
COMMUNITY
End: 2022-05-19 | Stop reason: HOSPADM

## 2022-05-17 RX ORDER — DAPAGLIFLOZIN 5 MG/1
5 TABLET, FILM COATED ORAL DAILY
COMMUNITY

## 2022-05-17 RX ORDER — KETOTIFEN FUMARATE 0.35 MG/ML
1 SOLUTION/ DROPS OPHTHALMIC DAILY PRN
COMMUNITY
Start: 2022-04-01

## 2022-05-17 RX ORDER — FUROSEMIDE 40 MG/1
1 TABLET ORAL DAILY
COMMUNITY
Start: 2022-03-08

## 2022-05-17 RX ORDER — PREGABALIN 50 MG/1
50 CAPSULE ORAL 3 TIMES DAILY
COMMUNITY

## 2022-05-17 RX ORDER — OMEPRAZOLE 20 MG/1
20 CAPSULE, DELAYED RELEASE ORAL DAILY
COMMUNITY
Start: 2022-04-20

## 2022-05-17 RX ORDER — PSEUDOEPHEDRINE HCL 30 MG
100 TABLET ORAL 2 TIMES DAILY
COMMUNITY
Start: 2022-04-20 | End: 2022-05-20

## 2022-05-17 NOTE — DISCHARGE INSTRUCTIONS
Arrive to hospital on your day of surgery at  615AM      Take the following medications the morning of surgery:  OMEPRAZOLE, LYRICA AND  BRING INHALER      If you are on prescription narcotic pain medication to control your pain you may also take that medication the morning of surgery.    General Instructions:  Do not eat solid food after midnight the night before surgery.  You may drink clear liquids day of surgery but must stop at least one hour before your hospital arrival time.  It is beneficial for you to have a clear drink that contains carbohydrates the day of surgery.  We suggest a 12 to 20 ounce bottle of Gatorade or Powerade for non-diabetic patients or a 12 to 20 ounce bottle of G2 or Powerade Zero for diabetic patients. (Pediatric patients, are not advised to drink a 12 to 20 ounce carbohydrate drink)    Clear liquids are liquids you can see through.  Nothing red in color.     Plain water                               Sports drinks  Sodas                                   Gelatin (Jell-O)  Fruit juices without pulp such as white grape juice and apple juice  Popsicles that contain no fruit or yogurt  Tea or coffee (no cream or milk added)  Gatorade / Powerade  G2 / Powerade Zero    Infants may have breast milk up to four hours before surgery.  Infants drinking formula may drink formula up to six hours before surgery.   Patients who avoid smoking, chewing tobacco and alcohol for 4 weeks prior to surgery have a reduced risk of post-operative complications.  Quit smoking as many days before surgery as you can.  Do not smoke, use chewing tobacco or drink alcohol the day of surgery.   If applicable bring your C-PAP/ BI-PAP machine.  Bring any papers given to you in the doctor’s office.  Wear clean comfortable clothes.  Do not wear contact lenses, false eyelashes or make-up.  Bring a case for your glasses.   Bring crutches or walker if applicable.  Remove all piercings.  Leave jewelry and any other valuables at  home.  Hair extensions with metal clips must be removed prior to surgery.  The Pre-Admission Testing nurse will instruct you to bring medications if unable to obtain an accurate list in Pre-Admission Testing.        If you were given a blood bank ID arm band remember to bring it with you the day of surgery.    Preventing a Surgical Site Infection:  For 2 to 3 days before surgery, avoid shaving with a razor because the razor can irritate skin and make it easier to develop an infection.    Any areas of open skin can increase the risk of a post-operative wound infection by allowing bacteria to enter and travel throughout the body.  Notify your surgeon if you have any skin wounds / rashes even if it is not near the expected surgical site.  The area will need assessed to determine if surgery should be delayed until it is healed.  The night prior to surgery shower using a fresh bar of anti-bacterial soap (such as Dial) and clean washcloth.  Sleep in a clean bed with clean clothing.  Do not allow pets to sleep with you.  Shower on the morning of surgery using a fresh bar of anti-bacterial soap (such as Dial) and clean washcloth.  Dry with a clean towel and dress in clean clothing.  Ask your surgeon if you will be receiving antibiotics prior to surgery.  Make sure you, your family, and all healthcare providers clean their hands with soap and water or an alcohol based hand  before caring for you or your wound.    Day of surgery:  Your arrival time is approximately two hours before your scheduled surgery time.  Upon arrival, a Pre-op nurse and Anesthesiologist will review your health history, obtain vital signs, and answer questions you may have.  The only belongings needed at this time will be a list of your home medications and if applicable your C-PAP/BI-PAP machine.  A Pre-op nurse will start an IV and you may receive medication in preparation for surgery, including something to help you relax.     Please be  aware that surgery does come with discomfort.  We want to make every effort to control your discomfort so please discuss any uncontrolled symptoms with your nurse.   Your doctor will most likely have prescribed pain medications.      If you are going home after surgery you will receive individualized written care instructions before being discharged.  A responsible adult must drive you to and from the hospital on the day of your surgery and stay with you for 24 hours.  Discharge prescriptions can be filled by the hospital pharmacy during regular pharmacy hours.  If you are having surgery late in the day/evening your prescription may be e-prescribed to your pharmacy.  Please verify your pharmacy hours or chose a 24 hour pharmacy to avoid not having access to your prescription because your pharmacy has closed for the day.    If you are staying overnight following surgery, you will be transported to your hospital room following the recovery period.  [facility] has all private rooms.    If you have any questions please call Pre-Admission Testing at [phone].  Deductibles and co-payments are collected on the day of service. Please be prepared to pay the required co-pay, deductible or deposit on the day of service as defined by your plan.    Patient Education for Self-Quarantine Process    Following your COVID testing, we strongly recommend that you wear a mask when you are with other people and practice social distancing.   Limit your activities to only required outings.  Wash your hands with soap and water frequently for at least 20 seconds.   Avoid touching your eyes, nose and mouth with unwashed hands.  Do not share anything - utensils, drinking glasses, food from the same bowl.   Sanitize household surfaces daily. Include all high touch areas (door handles, light switches, phones, countertops, etc.)    Call your surgeon immediately if you experience any of the following symptoms:  Sore  Throat  Shortness of Breath or difficulty breathing  Cough  Chills  Body soreness or muscle pain  Headache  Fever  New loss of taste or smell  Do not arrive for your surgery ill.  Your procedure will need to be rescheduled to another time.  You will need to call your physician before the day of surgery to avoid any unnecessary exposure to hospital staff as well as other patients.

## 2022-05-19 ENCOUNTER — HOSPITAL ENCOUNTER (OUTPATIENT)
Facility: HOSPITAL | Age: 77
Setting detail: HOSPITAL OUTPATIENT SURGERY
Discharge: HOME | End: 2022-05-19
Attending: OPHTHALMOLOGY | Admitting: OPHTHALMOLOGY

## 2022-05-19 ENCOUNTER — ANESTHESIA (OUTPATIENT)
Dept: PERIOP | Facility: HOSPITAL | Age: 77
End: 2022-05-19

## 2022-05-19 ENCOUNTER — ANESTHESIA EVENT (OUTPATIENT)
Dept: PERIOP | Facility: HOSPITAL | Age: 77
End: 2022-05-19

## 2022-05-19 VITALS
RESPIRATION RATE: 18 BRPM | HEART RATE: 60 BPM | TEMPERATURE: 97.8 F | SYSTOLIC BLOOD PRESSURE: 140 MMHG | OXYGEN SATURATION: 94 % | DIASTOLIC BLOOD PRESSURE: 85 MMHG

## 2022-05-19 LAB
GLUCOSE BLDC GLUCOMTR-MCNC: 126 MG/DL (ref 70–130)
GLUCOSE BLDC GLUCOMTR-MCNC: 134 MG/DL (ref 70–130)

## 2022-05-19 PROCEDURE — 25010000002 FENTANYL CITRATE (PF) 50 MCG/ML SOLUTION: Performed by: NURSE ANESTHETIST, CERTIFIED REGISTERED

## 2022-05-19 PROCEDURE — 25010000002 TRIAMCINOLONE PER 10 MG: Performed by: OPHTHALMOLOGY

## 2022-05-19 PROCEDURE — 25010000002 KETOROLAC TROMETHAMINE PER 15 MG: Performed by: NURSE ANESTHETIST, CERTIFIED REGISTERED

## 2022-05-19 PROCEDURE — 25010000002 PROPOFOL 10 MG/ML EMULSION: Performed by: NURSE ANESTHETIST, CERTIFIED REGISTERED

## 2022-05-19 PROCEDURE — 82962 GLUCOSE BLOOD TEST: CPT

## 2022-05-19 RX ORDER — FLUMAZENIL 0.1 MG/ML
0.2 INJECTION INTRAVENOUS AS NEEDED
Status: DISCONTINUED | OUTPATIENT
Start: 2022-05-19 | End: 2022-05-19 | Stop reason: HOSPADM

## 2022-05-19 RX ORDER — SODIUM CHLORIDE 0.9 % (FLUSH) 0.9 %
3 SYRINGE (ML) INJECTION EVERY 12 HOURS SCHEDULED
Status: DISCONTINUED | OUTPATIENT
Start: 2022-05-19 | End: 2022-05-19 | Stop reason: HOSPADM

## 2022-05-19 RX ORDER — HYDROMORPHONE HYDROCHLORIDE 1 MG/ML
0.5 INJECTION, SOLUTION INTRAMUSCULAR; INTRAVENOUS; SUBCUTANEOUS
Status: DISCONTINUED | OUTPATIENT
Start: 2022-05-19 | End: 2022-05-19 | Stop reason: HOSPADM

## 2022-05-19 RX ORDER — TETRACAINE HYDROCHLORIDE 5 MG/ML
SOLUTION OPHTHALMIC AS NEEDED
Status: DISCONTINUED | OUTPATIENT
Start: 2022-05-19 | End: 2022-05-19 | Stop reason: HOSPADM

## 2022-05-19 RX ORDER — FENTANYL CITRATE 50 UG/ML
INJECTION, SOLUTION INTRAMUSCULAR; INTRAVENOUS AS NEEDED
Status: DISCONTINUED | OUTPATIENT
Start: 2022-05-19 | End: 2022-05-19 | Stop reason: SURG

## 2022-05-19 RX ORDER — ONDANSETRON 2 MG/ML
4 INJECTION INTRAMUSCULAR; INTRAVENOUS ONCE AS NEEDED
Status: DISCONTINUED | OUTPATIENT
Start: 2022-05-19 | End: 2022-05-19 | Stop reason: HOSPADM

## 2022-05-19 RX ORDER — LIDOCAINE HYDROCHLORIDE 20 MG/ML
INJECTION, SOLUTION INFILTRATION; PERINEURAL AS NEEDED
Status: DISCONTINUED | OUTPATIENT
Start: 2022-05-19 | End: 2022-05-19 | Stop reason: SURG

## 2022-05-19 RX ORDER — HYDRALAZINE HYDROCHLORIDE 20 MG/ML
5 INJECTION INTRAMUSCULAR; INTRAVENOUS
Status: DISCONTINUED | OUTPATIENT
Start: 2022-05-19 | End: 2022-05-19 | Stop reason: HOSPADM

## 2022-05-19 RX ORDER — KETOROLAC TROMETHAMINE 30 MG/ML
INJECTION, SOLUTION INTRAMUSCULAR; INTRAVENOUS AS NEEDED
Status: DISCONTINUED | OUTPATIENT
Start: 2022-05-19 | End: 2022-05-19 | Stop reason: SURG

## 2022-05-19 RX ORDER — DIPHENHYDRAMINE HCL 25 MG
25 CAPSULE ORAL
Status: DISCONTINUED | OUTPATIENT
Start: 2022-05-19 | End: 2022-05-19 | Stop reason: HOSPADM

## 2022-05-19 RX ORDER — SODIUM CHLORIDE, SODIUM LACTATE, POTASSIUM CHLORIDE, CALCIUM CHLORIDE 600; 310; 30; 20 MG/100ML; MG/100ML; MG/100ML; MG/100ML
9 INJECTION, SOLUTION INTRAVENOUS CONTINUOUS
Status: DISCONTINUED | OUTPATIENT
Start: 2022-05-19 | End: 2022-05-19 | Stop reason: HOSPADM

## 2022-05-19 RX ORDER — LABETALOL HYDROCHLORIDE 5 MG/ML
5 INJECTION, SOLUTION INTRAVENOUS
Status: DISCONTINUED | OUTPATIENT
Start: 2022-05-19 | End: 2022-05-19 | Stop reason: HOSPADM

## 2022-05-19 RX ORDER — NALOXONE HCL 0.4 MG/ML
0.2 VIAL (ML) INJECTION AS NEEDED
Status: DISCONTINUED | OUTPATIENT
Start: 2022-05-19 | End: 2022-05-19 | Stop reason: HOSPADM

## 2022-05-19 RX ORDER — PROMETHAZINE HYDROCHLORIDE 25 MG/1
25 SUPPOSITORY RECTAL ONCE AS NEEDED
Status: DISCONTINUED | OUTPATIENT
Start: 2022-05-19 | End: 2022-05-19 | Stop reason: HOSPADM

## 2022-05-19 RX ORDER — SODIUM CHLORIDE 0.9 % (FLUSH) 0.9 %
3-10 SYRINGE (ML) INJECTION AS NEEDED
Status: DISCONTINUED | OUTPATIENT
Start: 2022-05-19 | End: 2022-05-19 | Stop reason: HOSPADM

## 2022-05-19 RX ORDER — IBUPROFEN 600 MG/1
600 TABLET ORAL ONCE AS NEEDED
Status: DISCONTINUED | OUTPATIENT
Start: 2022-05-19 | End: 2022-05-19 | Stop reason: HOSPADM

## 2022-05-19 RX ORDER — EPHEDRINE SULFATE 50 MG/ML
5 INJECTION, SOLUTION INTRAVENOUS ONCE AS NEEDED
Status: DISCONTINUED | OUTPATIENT
Start: 2022-05-19 | End: 2022-05-19 | Stop reason: HOSPADM

## 2022-05-19 RX ORDER — OXYCODONE AND ACETAMINOPHEN 7.5; 325 MG/1; MG/1
1 TABLET ORAL EVERY 4 HOURS PRN
Status: DISCONTINUED | OUTPATIENT
Start: 2022-05-19 | End: 2022-05-19 | Stop reason: HOSPADM

## 2022-05-19 RX ORDER — DIPHENHYDRAMINE HYDROCHLORIDE 50 MG/ML
12.5 INJECTION INTRAMUSCULAR; INTRAVENOUS
Status: DISCONTINUED | OUTPATIENT
Start: 2022-05-19 | End: 2022-05-19 | Stop reason: HOSPADM

## 2022-05-19 RX ORDER — ERYTHROMYCIN 5 MG/G
OINTMENT OPHTHALMIC 2 TIMES DAILY
Qty: 3.5 G | Refills: 0 | Status: SHIPPED | OUTPATIENT
Start: 2022-05-19

## 2022-05-19 RX ORDER — TRIAMCINOLONE ACETONIDE 40 MG/ML
INJECTION, SUSPENSION INTRA-ARTICULAR; INTRAMUSCULAR AS NEEDED
Status: DISCONTINUED | OUTPATIENT
Start: 2022-05-19 | End: 2022-05-19 | Stop reason: HOSPADM

## 2022-05-19 RX ORDER — PROPOFOL 10 MG/ML
VIAL (ML) INTRAVENOUS AS NEEDED
Status: DISCONTINUED | OUTPATIENT
Start: 2022-05-19 | End: 2022-05-19 | Stop reason: SURG

## 2022-05-19 RX ORDER — ERYTHROMYCIN 5 MG/G
OINTMENT OPHTHALMIC AS NEEDED
Status: DISCONTINUED | OUTPATIENT
Start: 2022-05-19 | End: 2022-05-19 | Stop reason: HOSPADM

## 2022-05-19 RX ORDER — LIDOCAINE HYDROCHLORIDE 10 MG/ML
0.5 INJECTION, SOLUTION EPIDURAL; INFILTRATION; INTRACAUDAL; PERINEURAL ONCE AS NEEDED
Status: COMPLETED | OUTPATIENT
Start: 2022-05-19 | End: 2022-05-19

## 2022-05-19 RX ORDER — FENTANYL CITRATE 50 UG/ML
50 INJECTION, SOLUTION INTRAMUSCULAR; INTRAVENOUS
Status: DISCONTINUED | OUTPATIENT
Start: 2022-05-19 | End: 2022-05-19 | Stop reason: HOSPADM

## 2022-05-19 RX ORDER — PROMETHAZINE HYDROCHLORIDE 25 MG/1
25 TABLET ORAL ONCE AS NEEDED
Status: DISCONTINUED | OUTPATIENT
Start: 2022-05-19 | End: 2022-05-19 | Stop reason: HOSPADM

## 2022-05-19 RX ADMIN — FENTANYL CITRATE 25 MCG: 50 INJECTION INTRAMUSCULAR; INTRAVENOUS at 07:35

## 2022-05-19 RX ADMIN — PROPOFOL 50 MG: 10 INJECTION, EMULSION INTRAVENOUS at 07:02

## 2022-05-19 RX ADMIN — PROPOFOL 20 MG: 10 INJECTION, EMULSION INTRAVENOUS at 07:17

## 2022-05-19 RX ADMIN — SODIUM CHLORIDE, POTASSIUM CHLORIDE, SODIUM LACTATE AND CALCIUM CHLORIDE 9 ML/HR: 600; 310; 30; 20 INJECTION, SOLUTION INTRAVENOUS at 06:31

## 2022-05-19 RX ADMIN — FENTANYL CITRATE 50 MCG: 50 INJECTION INTRAMUSCULAR; INTRAVENOUS at 07:56

## 2022-05-19 RX ADMIN — PROPOFOL 50 MG: 10 INJECTION, EMULSION INTRAVENOUS at 06:59

## 2022-05-19 RX ADMIN — LIDOCAINE HYDROCHLORIDE 0.5 ML: 10 INJECTION, SOLUTION EPIDURAL; INFILTRATION; INTRACAUDAL; PERINEURAL at 06:30

## 2022-05-19 RX ADMIN — LIDOCAINE HYDROCHLORIDE 80 MG: 20 INJECTION, SOLUTION INFILTRATION; PERINEURAL at 06:59

## 2022-05-19 RX ADMIN — PROPOFOL 50 MG: 10 INJECTION, EMULSION INTRAVENOUS at 07:00

## 2022-05-19 RX ADMIN — KETOROLAC TROMETHAMINE 30 MG: 30 INJECTION, SOLUTION INTRAMUSCULAR at 07:17

## 2022-05-19 RX ADMIN — FENTANYL CITRATE 25 MCG: 50 INJECTION INTRAMUSCULAR; INTRAVENOUS at 07:17

## 2022-05-19 RX ADMIN — FENTANYL CITRATE 25 MCG: 50 INJECTION INTRAMUSCULAR; INTRAVENOUS at 06:59

## 2022-05-19 NOTE — H&P
" History & Physical       Patient: Raquel Gonsales    Date of Admission: 5/19/2022  5:45 AM    YOB: 1945    Medical Record Number: [id number]      Chief Complaints: Bilateral upper lid ptosis      History of Present Illness: 77 y.o. female presents with as above. No new meds/health problems since office visit      Allergies:   Allergies   Allergen Reactions   • Hydrocodone Itching     \"All pain medicines make her itch:   • Aminosalicylic Acid Unknown - Low Severity   • Lisinopril Other (See Comments)     Cough     • Penicillins Hives   • Sodium Swelling   • Sulfa Antibiotics Other (See Comments)     Hair loss  Pt states sulfa causes her hair to fall out       10 point review of systems negative, except pertaining to the HPI    Medications:   Home Medications:  No current facility-administered medications on file prior to encounter.     Current Outpatient Medications on File Prior to Encounter   Medication Sig   • albuterol (PROVENTIL HFA;VENTOLIN HFA) 108 (90 BASE) MCG/ACT inhaler Inhale 2 puffs Every 6 (Six) Hours As Needed for Wheezing or Shortness of Air.   • aspirin 81 MG EC tablet Take 81 mg by mouth Daily. HOLDING FOR DOS   • atorvastatin (LIPITOR) 10 MG tablet Take 10 mg by mouth Every Night.   • calcium carbonate-vitamin d 600-400 MG-UNIT per tablet Take 1 tablet by mouth Daily.   • Cholecalciferol (VITAMIN D3) 2000 UNITS tablet Take 2,000 Units by mouth Daily. HOLDING FOR DOS   • diclofenac (VOLTAREN) 1 % gel gel Apply 4 g topically 4 (Four) Times a Day As Needed (HIP PAIN).   • fluticasone (FLONASE) 50 MCG/ACT nasal spray 1 spray into each nostril Daily. INHALE 1 (ONE) SPRAY IN EACH NOSTRIL ONCE DAILY   • Insulin Degludec (TRESIBA FLEXTOUCH) 200 UNIT/ML solution pen-injector pen injection Inject  under the skin into the appropriate area as directed Every Night. SLIDING SCALE   • loratadine (CLARITIN) 10 MG tablet Take 10 mg by mouth Daily.   • losartan (COZAAR) 50 MG tablet Take 50 mg by " mouth Every Morning.   • vitamin B-12 (CYANOCOBALAMIN) 1000 MCG tablet Take 1,000 mcg by mouth Daily.     Current Medications:  Scheduled Meds:sodium chloride, 3 mL, Intravenous, Q12H      Continuous Infusions:lactated ringers, 9 mL/hr, Last Rate: 9 mL/hr (05/19/22 0631)      PRN Meds:.sodium chloride    Past Medical History:   Diagnosis Date   • Acid reflux    • Asthma    • COPD (chronic obstructive pulmonary disease) ([facility])    • COVID 2021   • Diabetes mellitus ([facility])    • Dry eyes    • Ear congestion, bilateral    • History of fainting spells of unknown cause    • Hyperlipidemia    • Hypertension    • Itchy eyes    • Low back pain    • Migraine    • Neck pain    • Neuropathy     HANDS AND FEET   • Numbness or tingling     FEET   • Rheumatoid arthritis ([facility])    • Sciatic nerve pain         Past Surgical History:   Procedure Laterality Date   • ANTERIOR CERVICAL DISCECTOMY W/ FUSION N/A 04/27/2017    Procedure: CERVICAL DISCECTOMY ANTERIOR FUSION WITH INSTRUMENTATION C3 to C7;  Surgeon: Chan Farrar MD;  Location: [facility];  Service:    • APPENDECTOMY     • BACK SURGERY  2016   • CATARACT EXTRACTION Bilateral    • CHOLECYSTECTOMY     • HERNIA REPAIR     • HYSTERECTOMY     • LUMBAR DISCECTOMY FUSION INSTRUMENTATION N/A 01/11/2018    Procedure: L2-3 repeat laminectomy and fusion with instrumentation;  Surgeon: Chan Farrar MD;  Location: [facility];  Service:    • OOPHORECTOMY     • TOE SURGERY Bilateral     GREAT TOE   • TOTAL HIP ARTHROPLASTY Bilateral    • TOTAL KNEE ARTHROPLASTY Bilateral    • WRIST SURGERY Right     fx        Social History     Occupational History   • Not on file   Tobacco Use   • Smoking status: Never Smoker   • Smokeless tobacco: Never Used   Vaping Use   • Vaping Use: Never used   Substance and Sexual Activity   • Alcohol use: No   • Drug use: No   • Sexual activity: Defer      Social History     Social History Narrative   • Not on file        Family History   Problem  Relation Age of Onset   • Diabetes Other    • Diabetes Father    • Malig Hyperthermia Neg Hx            Physical Exam   Constitutional: Alert, cooperative, in no acute distress    Head: Normocephalic.   Eyes:    Bilateral upper eyelid ptosis  Neck: Normal range of motion.   Cardiovascular: Normal rate.    Pulmonary/Chest: Effort normal.   Neurological: Alert.   Skin: Skin is warm.   Psychiatric: Normal mood and affect.       Assessment/Plan:  The patient voiced understanding of the risks, benefits, and alternative forms of treatment that were discussed and the patient consents to proceed with bilateral upper eyelid ptosis repair.    Radhames Scruggs MD

## 2022-05-19 NOTE — ANESTHESIA PREPROCEDURE EVALUATION
Anesthesia Evaluation     Patient summary reviewed and Nursing notes reviewed   no history of anesthetic complications:  NPO Solid Status: > 8 hours  NPO Liquid Status: > 2 hours           Airway   Mallampati: II  TM distance: >3 FB  Neck ROM: full  Dental    (+) lower dentures and upper dentures    Pulmonary    (+) COPD, asthma,  Cardiovascular     ECG reviewed    (+) hypertension, hyperlipidemia,       Neuro/Psych  (+) TIA,    GI/Hepatic/Renal/Endo    (+) obesity,  GERD,  diabetes mellitus,     Musculoskeletal     Abdominal    Substance History      OB/GYN          Other                      Anesthesia Plan    ASA 3     MAC     intravenous induction     Anesthetic plan, all risks, benefits, and alternatives have been provided, discussed and informed consent has been obtained with: patient.        CODE STATUS:

## 2022-05-19 NOTE — ANESTHESIA POSTPROCEDURE EVALUATION
Patient: Raquel Gonsales    Procedure Summary     Date: 05/19/22 Room / Location:  [facility]    Anesthesia Start: 0654 Anesthesia Stop: 0823    Procedure: BILATERAL UPPER LID PTOSIS REPAIR (Bilateral Eye) Diagnosis:     Surgeons: Radhames Scruggs MD Provider: Roberto Boyd MD    Anesthesia Type: MAC ASA Status: 3          Anesthesia Type: MAC    Vitals  Vitals Value Taken Time   /85 05/19/22 0855   Temp 36.6 °C (97.8 °F) 05/19/22 0820   Pulse 60 05/19/22 0855   Resp 18 05/19/22 0855   SpO2 94 % 05/19/22 0855           Post Anesthesia Care and Evaluation    Patient location during evaluation: bedside  Patient participation: complete - patient participated  Level of consciousness: awake and alert  Pain management: adequate  Airway patency: patent  Anesthetic complications: No anesthetic complications  PONV Status: controlled  Cardiovascular status: blood pressure returned to baseline and acceptable  Respiratory status: acceptable  Hydration status: acceptable

## (undated) DEVICE — OCCLUSIVE GAUZE STRIP,3% BISMUTH TRIBROMOPHENATE IN PETROLATUM BLEND: Brand: XEROFORM

## (undated) DEVICE — DIFFUSER: Brand: CORE, MAESTRO

## (undated) DEVICE — PATIENT RETURN ELECTRODE, SINGLE-USE, CONTACT QUALITY MONITORING, ADULT, WITH 9FT CORD, FOR PATIENTS WEIGING OVER 33LBS. (15KG): Brand: MEGADYNE

## (undated) DEVICE — SUT MNCRYL PLS ANTIB UD 4/0 PS2 18IN

## (undated) DEVICE — TOTAL TRAY, 16FR 10ML SIL FOLEY, URN: Brand: MEDLINE

## (undated) DEVICE — HALTR TRACT HD PAD DLX UNIV

## (undated) DEVICE — MEDI-VAC YANKAUER SUCTION HANDLE W/BULBOUS TIP: Brand: CARDINAL HEALTH

## (undated) DEVICE — WIPE INST MEROCEL

## (undated) DEVICE — 3M™ STERI-STRIP™ REINFORCED ADHESIVE SKIN CLOSURES, R1547, 1/2 IN X 4 IN (12 MM X 100 MM), 6 STRIPS/ENVELOPE: Brand: 3M™ STERI-STRIP™

## (undated) DEVICE — PK NEURO SPINE 40

## (undated) DEVICE — CONN TBG Y 5 IN 1 LF STRL

## (undated) DEVICE — ELECTRD NDL EZ CLN MOD 2.75IN

## (undated) DEVICE — HANDPIECE SET WITH COAXIAL HIGH FLOW TIP AND SUCTION TUBE: Brand: INTERPULSE

## (undated) DEVICE — GOWN,NON-REINFORCED,SIRUS,SET IN SLV,XL: Brand: MEDLINE

## (undated) DEVICE — GLV SURG BIOGEL LTX PF 7 1/2

## (undated) DEVICE — MIS NEURO (MATCH HEAD)

## (undated) DEVICE — GOWN,REINF,POLY,ECL,PP SLV,XL: Brand: MEDLINE

## (undated) DEVICE — ENCORE® LATEX ORTHO SIZE 8, STERILE LATEX POWDER-FREE SURGICAL GLOVE: Brand: ENCORE

## (undated) DEVICE — HDRST POSITIONING FM RND 2X9IN

## (undated) DEVICE — SUT GUT PLN FAST ABS 5/0 PC1 18IN 1915G

## (undated) DEVICE — NDL HYPO PRECISIONGLIDE REG 25G 1 1/2

## (undated) DEVICE — SPNG DISECTOR KTNER XRAY COTN 1/4X9/16IN PK/5

## (undated) DEVICE — DRSNG SURESITE WNDW 4X4.5

## (undated) DEVICE — NDL SPINE 18G 31/2IN PNK

## (undated) DEVICE — GLV SURG BIOGEL SENSR LTX PF SZ7.5

## (undated) DEVICE — GLV SURG BIOGEL LTX PF 7

## (undated) DEVICE — SUT VIC 1 OS8 27IN J699H

## (undated) DEVICE — SYR LL TP 10ML STRL

## (undated) DEVICE — DISPOSABLE BIPOLAR CABLE 12FT. (3.6M): Brand: KIRWAN

## (undated) DEVICE — ADHS SKIN DERMABOND

## (undated) DEVICE — SWABSTK SKINPREP PVPI LF PK/3

## (undated) DEVICE — EAGLE PLUS ANTERIOR CERVICAL PLATE SYSTEM FIXED DEPTH DRILL - AO 12MM: Brand: EAGLE

## (undated) DEVICE — TBG PENCL TELESCP MEGADYNE SMOKE EVAC 10FT

## (undated) DEVICE — JACKSON-PRATT 100CC BULB RESERVOIR: Brand: CARDINAL HEALTH

## (undated) DEVICE — 3.0MM PRECISION NEURO (MATCH HEAD)

## (undated) DEVICE — ELECTRD BLD EDGE/INSUL1P 2.4X5.1MM STRL

## (undated) DEVICE — LIMB HOLDER, WRIST/ANKLE: Brand: DEROYAL

## (undated) DEVICE — PAD,ABDOMINAL,8"X10",ST,LF: Brand: MEDLINE

## (undated) DEVICE — SPNG GZ WOVN 4X4IN 12PLY 10/BX STRL

## (undated) DEVICE — 6.0MM PRECISION ROUND

## (undated) DEVICE — ADHS SKIN DERMABOND TOP ADVANCED

## (undated) DEVICE — OIL CARTRIDGE: Brand: CORE, MAESTRO

## (undated) DEVICE — STPLR SKIN VISISTAT WD 35CT

## (undated) DEVICE — COLR CERV MED/DENS NRW LNG

## (undated) DEVICE — SEALANT HEMOS FLOSEAL MATRX W/MALL TP 5ML

## (undated) DEVICE — BONE MARROW ASPIRATION NEEDLES ASPIRATOR KIT 3-HOLE 4 INCHES NDLE

## (undated) DEVICE — SPONGE,LAP,12"X12",XR,ST,5/PK,40PK/CS: Brand: MEDLINE

## (undated) DEVICE — TAPE MICROFM 2IN LF

## (undated) DEVICE — GOWN,ECLIPSE,FABRIC-REINFORCED,X-LARGE: Brand: MEDLINE

## (undated) DEVICE — APPL DURAPREP IODOPHOR APL 26ML

## (undated) DEVICE — SMOKE EVACUATION TUBING WITH 7/8 IN TO 1/4 IN REDUCER: Brand: BUFFALO FILTER

## (undated) DEVICE — STERILE COTTON TIP 6IN 10PK: Brand: MEDLINE

## (undated) DEVICE — SUT VIC 2/0 CT1 36IN

## (undated) DEVICE — PK ATS CUST W CARDIOTOMY RESEVOIR

## (undated) DEVICE — PK ENT 40

## (undated) DEVICE — SUT SILK B OPTH G1 6/0 18IN 780G BX/12

## (undated) DEVICE — EAGLE/SWIFT ANTERIOR CERVICAL PLATE SYSTEM TEMPORARY FIXATION PIN: Brand: EAGLE SWIFT

## (undated) DEVICE — DRSNG TELFA PAD NONADH STR 1S 3X4IN

## (undated) DEVICE — SOL NACL 0.9PCT 1000ML

## (undated) DEVICE — TOWEL,OR,DSP,ST,BLUE,STD,4/PK,20PK/CS: Brand: MEDLINE

## (undated) DEVICE — ANTIBACTERIAL UNDYED BRAIDED (POLYGLACTIN 910), SYNTHETIC ABSORBABLE SUTURE: Brand: COATED VICRYL

## (undated) DEVICE — TRAP FLD MINIVAC MEGADYNE 100ML

## (undated) DEVICE — DRN JP FLT NO TROC SIL FUL/PERF 7MM

## (undated) DEVICE — APPL CHLORAPREP W/TINT 10.5ML PERC STRL